# Patient Record
Sex: MALE | Race: WHITE | NOT HISPANIC OR LATINO | Employment: OTHER | URBAN - METROPOLITAN AREA
[De-identification: names, ages, dates, MRNs, and addresses within clinical notes are randomized per-mention and may not be internally consistent; named-entity substitution may affect disease eponyms.]

---

## 2017-05-15 ENCOUNTER — ALLSCRIPTS OFFICE VISIT (OUTPATIENT)
Dept: WOUND CARE | Facility: HOSPITAL | Age: 77
End: 2017-05-15
Payer: MEDICARE

## 2017-05-15 PROCEDURE — 99213 OFFICE O/P EST LOW 20 MIN: CPT | Performed by: PODIATRIST

## 2017-05-15 PROCEDURE — 11042 DBRDMT SUBQ TIS 1ST 20SQCM/<: CPT | Performed by: PODIATRIST

## 2017-05-22 ENCOUNTER — ALLSCRIPTS OFFICE VISIT (OUTPATIENT)
Dept: WOUND CARE | Facility: HOSPITAL | Age: 77
End: 2017-05-22
Payer: MEDICARE

## 2017-05-22 PROCEDURE — 11042 DBRDMT SUBQ TIS 1ST 20SQCM/<: CPT | Performed by: PODIATRIST

## 2017-06-05 ENCOUNTER — ALLSCRIPTS OFFICE VISIT (OUTPATIENT)
Dept: WOUND CARE | Facility: HOSPITAL | Age: 77
End: 2017-06-05
Payer: MEDICARE

## 2017-06-05 PROCEDURE — 11042 DBRDMT SUBQ TIS 1ST 20SQCM/<: CPT | Performed by: PODIATRIST

## 2017-06-26 ENCOUNTER — APPOINTMENT (OUTPATIENT)
Dept: WOUND CARE | Facility: HOSPITAL | Age: 77
End: 2017-06-26
Payer: MEDICARE

## 2017-06-26 PROCEDURE — 11042 DBRDMT SUBQ TIS 1ST 20SQCM/<: CPT | Performed by: PODIATRIST

## 2017-07-03 ENCOUNTER — APPOINTMENT (OUTPATIENT)
Dept: WOUND CARE | Facility: HOSPITAL | Age: 77
End: 2017-07-03
Payer: MEDICARE

## 2017-07-03 PROCEDURE — 11042 DBRDMT SUBQ TIS 1ST 20SQCM/<: CPT | Performed by: PODIATRIST

## 2017-07-10 ENCOUNTER — APPOINTMENT (OUTPATIENT)
Dept: WOUND CARE | Facility: HOSPITAL | Age: 77
End: 2017-07-10
Payer: MEDICARE

## 2017-07-10 PROCEDURE — 11042 DBRDMT SUBQ TIS 1ST 20SQCM/<: CPT

## 2017-07-24 ENCOUNTER — APPOINTMENT (OUTPATIENT)
Dept: WOUND CARE | Facility: HOSPITAL | Age: 77
End: 2017-07-24
Payer: MEDICARE

## 2017-07-24 PROCEDURE — 97597 DBRDMT OPN WND 1ST 20 CM/<: CPT | Performed by: PODIATRIST

## 2017-08-07 ENCOUNTER — APPOINTMENT (OUTPATIENT)
Dept: WOUND CARE | Facility: HOSPITAL | Age: 77
End: 2017-08-07
Payer: MEDICARE

## 2017-08-07 PROCEDURE — 97597 DBRDMT OPN WND 1ST 20 CM/<: CPT | Performed by: PODIATRIST

## 2017-08-21 ENCOUNTER — HOSPITAL ENCOUNTER (OUTPATIENT)
Dept: RADIOLOGY | Facility: HOSPITAL | Age: 77
Discharge: HOME | End: 2017-08-21
Attending: PODIATRIST
Payer: MEDICARE

## 2017-08-21 ENCOUNTER — APPOINTMENT (OUTPATIENT)
Dept: WOUND CARE | Facility: HOSPITAL | Age: 77
End: 2017-08-21
Payer: MEDICARE

## 2017-08-21 ENCOUNTER — TRANSCRIBE ORDERS (OUTPATIENT)
Dept: ADMINISTRATIVE | Facility: HOSPITAL | Age: 77
End: 2017-08-21

## 2017-08-21 DIAGNOSIS — L97.422 ULCER OF LEFT HEEL, WITH FAT LAYER EXPOSED (HCC): ICD-10-CM

## 2017-08-21 DIAGNOSIS — L97.422 ULCER OF LEFT HEEL, WITH FAT LAYER EXPOSED (HCC): Primary | ICD-10-CM

## 2017-08-21 PROCEDURE — 73650 X-RAY EXAM OF HEEL: CPT

## 2017-08-21 PROCEDURE — 11042 DBRDMT SUBQ TIS 1ST 20SQCM/<: CPT | Performed by: PODIATRIST

## 2017-08-21 PROCEDURE — 99213 OFFICE O/P EST LOW 20 MIN: CPT | Performed by: PODIATRIST

## 2017-08-28 ENCOUNTER — APPOINTMENT (OUTPATIENT)
Dept: WOUND CARE | Facility: HOSPITAL | Age: 77
End: 2017-08-28
Payer: MEDICARE

## 2017-08-28 PROCEDURE — 11042 DBRDMT SUBQ TIS 1ST 20SQCM/<: CPT | Performed by: PODIATRIST

## 2017-09-02 ENCOUNTER — APPOINTMENT (EMERGENCY)
Dept: RADIOLOGY | Facility: HOSPITAL | Age: 77
DRG: 616 | End: 2017-09-02
Payer: MEDICARE

## 2017-09-02 ENCOUNTER — GENERIC CONVERSION - ENCOUNTER (OUTPATIENT)
Dept: OTHER | Facility: OTHER | Age: 77
End: 2017-09-02

## 2017-09-02 ENCOUNTER — HOSPITAL ENCOUNTER (INPATIENT)
Facility: HOSPITAL | Age: 77
LOS: 31 days | DRG: 616 | End: 2017-10-03
Attending: EMERGENCY MEDICINE | Admitting: INTERNAL MEDICINE
Payer: MEDICARE

## 2017-09-02 DIAGNOSIS — N18.30 STAGE 3 CHRONIC KIDNEY DISEASE (HCC): ICD-10-CM

## 2017-09-02 DIAGNOSIS — I73.9 PAD (PERIPHERAL ARTERY DISEASE) (HCC): ICD-10-CM

## 2017-09-02 DIAGNOSIS — L89.509: ICD-10-CM

## 2017-09-02 DIAGNOSIS — E11.9 DIABETES MELLITUS (HCC): ICD-10-CM

## 2017-09-02 DIAGNOSIS — Z95.2 S/P AVR: Chronic | ICD-10-CM

## 2017-09-02 DIAGNOSIS — M25.561 ACUTE PAIN OF RIGHT KNEE: ICD-10-CM

## 2017-09-02 DIAGNOSIS — I50.32 CHRONIC DIASTOLIC HEART FAILURE (HCC): ICD-10-CM

## 2017-09-02 DIAGNOSIS — I48.20 CHRONIC ATRIAL FIBRILLATION (HCC): ICD-10-CM

## 2017-09-02 DIAGNOSIS — Z97.8 CHRONIC INDWELLING FOLEY CATHETER: ICD-10-CM

## 2017-09-02 DIAGNOSIS — L89.603: ICD-10-CM

## 2017-09-02 DIAGNOSIS — D72.829 LEUKOCYTOSIS: ICD-10-CM

## 2017-09-02 DIAGNOSIS — R53.1 ASTHENIA: Primary | ICD-10-CM

## 2017-09-02 DIAGNOSIS — R79.82 CRP ELEVATED: ICD-10-CM

## 2017-09-02 PROBLEM — R82.90 ABNORMAL URINALYSIS: Status: ACTIVE | Noted: 2017-09-02

## 2017-09-02 LAB
ALBUMIN SERPL BCP-MCNC: 3.4 G/DL (ref 3.5–5)
ALP SERPL-CCNC: 99 U/L (ref 46–116)
ALT SERPL W P-5'-P-CCNC: 25 U/L (ref 12–78)
ANION GAP SERPL CALCULATED.3IONS-SCNC: 6 MMOL/L (ref 4–13)
AST SERPL W P-5'-P-CCNC: 15 U/L (ref 5–45)
ATRIAL RATE: 202 BPM
BACTERIA UR QL AUTO: ABNORMAL /HPF
BASOPHILS # BLD MANUAL: 0 THOUSAND/UL (ref 0–0.1)
BASOPHILS NFR MAR MANUAL: 0 % (ref 0–1)
BILIRUB SERPL-MCNC: 0.34 MG/DL (ref 0.2–1)
BILIRUB UR QL STRIP: ABNORMAL
BUN SERPL-MCNC: 27 MG/DL (ref 5–25)
CALCIUM SERPL-MCNC: 9.6 MG/DL (ref 8.3–10.1)
CHLORIDE SERPL-SCNC: 98 MMOL/L (ref 100–108)
CLARITY UR: ABNORMAL
CO2 SERPL-SCNC: 34 MMOL/L (ref 21–32)
COLOR UR: ABNORMAL
CREAT SERPL-MCNC: 1.49 MG/DL (ref 0.6–1.3)
EOSINOPHIL # BLD MANUAL: 0.5 THOUSAND/UL (ref 0–0.4)
EOSINOPHIL NFR BLD MANUAL: 4 % (ref 0–6)
ERYTHROCYTE [DISTWIDTH] IN BLOOD BY AUTOMATED COUNT: 14.5 % (ref 11.6–15.1)
GFR SERPL CREATININE-BSD FRML MDRD: 46 ML/MIN/1.73SQ M
GLUCOSE SERPL-MCNC: 148 MG/DL (ref 65–140)
GLUCOSE SERPL-MCNC: 217 MG/DL (ref 65–140)
GLUCOSE SERPL-MCNC: 219 MG/DL (ref 65–140)
GLUCOSE SERPL-MCNC: 255 MG/DL (ref 65–140)
GLUCOSE UR STRIP-MCNC: NEGATIVE MG/DL
HCT VFR BLD AUTO: 39.1 % (ref 36.5–49.3)
HGB BLD-MCNC: 13.1 G/DL (ref 12–17)
HGB UR QL STRIP.AUTO: ABNORMAL
INR PPP: 2.9 (ref 0.86–1.16)
KETONES UR STRIP-MCNC: NEGATIVE MG/DL
LACTATE SERPL-SCNC: 2 MMOL/L (ref 0.5–2)
LEUKOCYTE ESTERASE UR QL STRIP: ABNORMAL
LYMPHOCYTES # BLD AUTO: 0.62 THOUSAND/UL (ref 0.6–4.47)
LYMPHOCYTES # BLD AUTO: 5 % (ref 14–44)
MCH RBC QN AUTO: 28.1 PG (ref 26.8–34.3)
MCHC RBC AUTO-ENTMCNC: 33.5 G/DL (ref 31.4–37.4)
MCV RBC AUTO: 84 FL (ref 82–98)
MONOCYTES # BLD AUTO: 0.87 THOUSAND/UL (ref 0–1.22)
MONOCYTES NFR BLD: 7 % (ref 4–12)
NEUTROPHILS # BLD MANUAL: 10.47 THOUSAND/UL (ref 1.85–7.62)
NEUTS BAND NFR BLD MANUAL: 2 % (ref 0–8)
NEUTS SEG NFR BLD AUTO: 82 % (ref 43–75)
NITRITE UR QL STRIP: POSITIVE
NON-SQ EPI CELLS URNS QL MICRO: ABNORMAL /HPF
NT-PROBNP SERPL-MCNC: 678 PG/ML
PH UR STRIP.AUTO: 7.5 [PH] (ref 4.5–8)
PLATELET # BLD AUTO: 327 THOUSANDS/UL (ref 149–390)
PLATELET BLD QL SMEAR: ADEQUATE
PMV BLD AUTO: 8.6 FL (ref 8.9–12.7)
POTASSIUM SERPL-SCNC: 4.2 MMOL/L (ref 3.5–5.3)
PROT SERPL-MCNC: 7.3 G/DL (ref 6.4–8.2)
PROT UR STRIP-MCNC: ABNORMAL MG/DL
PROTHROMBIN TIME: 30.7 SECONDS (ref 12.1–14.4)
QRS AXIS: 92 DEGREES
QRSD INTERVAL: 82 MS
QT INTERVAL: 352 MS
QTC INTERVAL: 396 MS
RBC # BLD AUTO: 4.66 MILLION/UL (ref 3.88–5.62)
RBC #/AREA URNS AUTO: ABNORMAL /HPF
RBC MORPH BLD: NORMAL
SODIUM SERPL-SCNC: 138 MMOL/L (ref 136–145)
SP GR UR STRIP.AUTO: 1.02 (ref 1–1.03)
SPECIMEN SOURCE: NORMAL
T WAVE AXIS: 38 DEGREES
TOTAL CELLS COUNTED SPEC: 100
TROPONIN I BLD-MCNC: 0.01 NG/ML (ref 0–0.08)
UROBILINOGEN UR QL STRIP.AUTO: 0.2 E.U./DL
VENTRICULAR RATE: 76 BPM
WBC # BLD AUTO: 12.46 THOUSAND/UL (ref 4.31–10.16)
WBC #/AREA URNS AUTO: ABNORMAL /HPF

## 2017-09-02 PROCEDURE — 87147 CULTURE TYPE IMMUNOLOGIC: CPT

## 2017-09-02 PROCEDURE — 81002 URINALYSIS NONAUTO W/O SCOPE: CPT | Performed by: EMERGENCY MEDICINE

## 2017-09-02 PROCEDURE — 36415 COLL VENOUS BLD VENIPUNCTURE: CPT | Performed by: EMERGENCY MEDICINE

## 2017-09-02 PROCEDURE — 84484 ASSAY OF TROPONIN QUANT: CPT

## 2017-09-02 PROCEDURE — 83880 ASSAY OF NATRIURETIC PEPTIDE: CPT | Performed by: EMERGENCY MEDICINE

## 2017-09-02 PROCEDURE — 82948 REAGENT STRIP/BLOOD GLUCOSE: CPT

## 2017-09-02 PROCEDURE — 83605 ASSAY OF LACTIC ACID: CPT | Performed by: EMERGENCY MEDICINE

## 2017-09-02 PROCEDURE — 81001 URINALYSIS AUTO W/SCOPE: CPT

## 2017-09-02 PROCEDURE — 85610 PROTHROMBIN TIME: CPT | Performed by: PHYSICIAN ASSISTANT

## 2017-09-02 PROCEDURE — 80053 COMPREHEN METABOLIC PANEL: CPT | Performed by: EMERGENCY MEDICINE

## 2017-09-02 PROCEDURE — 71020 HB CHEST X-RAY 2VW FRONTAL&LATL: CPT

## 2017-09-02 PROCEDURE — 87075 CULTR BACTERIA EXCEPT BLOOD: CPT | Performed by: PODIATRIST

## 2017-09-02 PROCEDURE — 87077 CULTURE AEROBIC IDENTIFY: CPT | Performed by: PODIATRIST

## 2017-09-02 PROCEDURE — 87186 SC STD MICRODIL/AGAR DIL: CPT | Performed by: PODIATRIST

## 2017-09-02 PROCEDURE — 87147 CULTURE TYPE IMMUNOLOGIC: CPT | Performed by: PODIATRIST

## 2017-09-02 PROCEDURE — 85027 COMPLETE CBC AUTOMATED: CPT | Performed by: EMERGENCY MEDICINE

## 2017-09-02 PROCEDURE — 87205 SMEAR GRAM STAIN: CPT | Performed by: PODIATRIST

## 2017-09-02 PROCEDURE — 99285 EMERGENCY DEPT VISIT HI MDM: CPT

## 2017-09-02 PROCEDURE — 85007 BL SMEAR W/DIFF WBC COUNT: CPT | Performed by: EMERGENCY MEDICINE

## 2017-09-02 PROCEDURE — 93005 ELECTROCARDIOGRAM TRACING: CPT | Performed by: EMERGENCY MEDICINE

## 2017-09-02 PROCEDURE — 87070 CULTURE OTHR SPECIMN AEROBIC: CPT | Performed by: PODIATRIST

## 2017-09-02 PROCEDURE — 87086 URINE CULTURE/COLONY COUNT: CPT

## 2017-09-02 RX ORDER — OXYBUTYNIN CHLORIDE 5 MG/1
5 TABLET ORAL 2 TIMES DAILY
Status: DISCONTINUED | OUTPATIENT
Start: 2017-09-02 | End: 2017-10-03 | Stop reason: HOSPADM

## 2017-09-02 RX ORDER — FUROSEMIDE 40 MG/1
40 TABLET ORAL DAILY
Status: DISCONTINUED | OUTPATIENT
Start: 2017-09-02 | End: 2017-09-10

## 2017-09-02 RX ORDER — INSULIN GLARGINE 100 [IU]/ML
24 INJECTION, SOLUTION SUBCUTANEOUS 2 TIMES DAILY
COMMUNITY
End: 2017-10-03 | Stop reason: HOSPADM

## 2017-09-02 RX ORDER — INSULIN GLARGINE 100 [IU]/ML
24 INJECTION, SOLUTION SUBCUTANEOUS EVERY 12 HOURS SCHEDULED
Status: DISCONTINUED | OUTPATIENT
Start: 2017-09-02 | End: 2017-09-07

## 2017-09-02 RX ORDER — FERROUS SULFATE 325(65) MG
325 TABLET ORAL 2 TIMES DAILY WITH MEALS
COMMUNITY

## 2017-09-02 RX ORDER — OXYBUTYNIN CHLORIDE 5 MG/1
5 TABLET ORAL 2 TIMES DAILY
COMMUNITY

## 2017-09-02 RX ORDER — WARFARIN SODIUM 7.5 MG/1
7.5 TABLET ORAL
Status: DISCONTINUED | OUTPATIENT
Start: 2017-09-02 | End: 2017-09-02

## 2017-09-02 RX ORDER — ACETAMINOPHEN 325 MG/1
650 TABLET ORAL EVERY 6 HOURS PRN
Status: DISCONTINUED | OUTPATIENT
Start: 2017-09-02 | End: 2017-10-03 | Stop reason: HOSPADM

## 2017-09-02 RX ORDER — FERROUS SULFATE 325(65) MG
325 TABLET ORAL 2 TIMES DAILY
Status: DISCONTINUED | OUTPATIENT
Start: 2017-09-02 | End: 2017-10-03 | Stop reason: HOSPADM

## 2017-09-02 RX ORDER — WARFARIN SODIUM 5 MG/1
5 TABLET ORAL
Status: DISCONTINUED | OUTPATIENT
Start: 2017-09-02 | End: 2017-09-07

## 2017-09-02 RX ORDER — ONDANSETRON 2 MG/ML
4 INJECTION INTRAMUSCULAR; INTRAVENOUS EVERY 6 HOURS PRN
Status: DISCONTINUED | OUTPATIENT
Start: 2017-09-02 | End: 2017-10-03 | Stop reason: HOSPADM

## 2017-09-02 RX ORDER — PRAVASTATIN SODIUM 40 MG
40 TABLET ORAL
Status: DISCONTINUED | OUTPATIENT
Start: 2017-09-02 | End: 2017-10-03 | Stop reason: HOSPADM

## 2017-09-02 RX ORDER — WARFARIN SODIUM 7.5 MG/1
7.5 TABLET ORAL
Status: ON HOLD | COMMUNITY
End: 2017-10-03

## 2017-09-02 RX ORDER — SUB-Q INSULIN DEVICE, 40 UNIT
EACH MISCELLANEOUS
COMMUNITY
End: 2017-10-26 | Stop reason: ALTCHOICE

## 2017-09-02 RX ORDER — GABAPENTIN 100 MG/1
100 CAPSULE ORAL 3 TIMES DAILY
Status: DISCONTINUED | OUTPATIENT
Start: 2017-09-02 | End: 2017-10-03 | Stop reason: HOSPADM

## 2017-09-02 RX ORDER — WARFARIN SODIUM 7.5 MG/1
7.5 TABLET ORAL
Status: DISCONTINUED | OUTPATIENT
Start: 2017-09-04 | End: 2017-09-07

## 2017-09-02 RX ORDER — GABAPENTIN 100 MG/1
100 CAPSULE ORAL 3 TIMES DAILY
COMMUNITY
End: 2017-10-26 | Stop reason: DRUGHIGH

## 2017-09-02 RX ORDER — FUROSEMIDE 40 MG/1
40 TABLET ORAL DAILY
COMMUNITY
End: 2017-10-26 | Stop reason: DRUGHIGH

## 2017-09-02 RX ORDER — WARFARIN SODIUM 5 MG/1
5 TABLET ORAL
COMMUNITY
End: 2017-10-03 | Stop reason: HOSPADM

## 2017-09-02 RX ORDER — WARFARIN SODIUM 5 MG/1
5 TABLET ORAL
Status: DISCONTINUED | OUTPATIENT
Start: 2017-09-04 | End: 2017-09-02

## 2017-09-02 RX ORDER — SIMVASTATIN 40 MG
40 TABLET ORAL DAILY
COMMUNITY
End: 2017-10-26 | Stop reason: ALTCHOICE

## 2017-09-02 RX ADMIN — GABAPENTIN 100 MG: 100 CAPSULE ORAL at 21:04

## 2017-09-02 RX ADMIN — OXYBUTYNIN CHLORIDE 5 MG: 5 TABLET ORAL at 17:10

## 2017-09-02 RX ADMIN — FERROUS SULFATE TAB 325 MG (65 MG ELEMENTAL FE) 325 MG: 325 (65 FE) TAB at 17:09

## 2017-09-02 RX ADMIN — PRAVASTATIN SODIUM 40 MG: 40 TABLET ORAL at 17:08

## 2017-09-02 RX ADMIN — INSULIN GLARGINE 24 UNITS: 100 INJECTION, SOLUTION SUBCUTANEOUS at 21:04

## 2017-09-02 RX ADMIN — INSULIN LISPRO 3 UNITS: 100 INJECTION, SOLUTION INTRAVENOUS; SUBCUTANEOUS at 21:04

## 2017-09-02 RX ADMIN — GABAPENTIN 100 MG: 100 CAPSULE ORAL at 17:09

## 2017-09-02 RX ADMIN — FLUTICASONE PROPIONATE AND SALMETEROL 1 PUFF: 50; 500 POWDER RESPIRATORY (INHALATION) at 19:31

## 2017-09-02 RX ADMIN — WARFARIN SODIUM 5 MG: 5 TABLET ORAL at 17:29

## 2017-09-02 NOTE — PLAN OF CARE
DISCHARGE PLANNING     Discharge to home or other facility with appropriate resources Progressing        INFECTION - ADULT     Absence or prevention of progression during hospitalization Progressing        Knowledge Deficit     Patient/family/caregiver demonstrates understanding of disease process, treatment plan, medications, and discharge instructions Progressing        Nutrition/Hydration-ADULT     Nutrient/Hydration intake appropriate for improving, restoring or maintaining nutritional needs Progressing        PAIN - ADULT     Verbalizes/displays adequate comfort level or baseline comfort level Progressing        Potential for Falls     Patient will remain free of falls Progressing        Prexisting or High Potential for Compromised Skin Integrity     Skin integrity is maintained or improved Progressing        SAFETY ADULT     Maintain or return to baseline ADL function Progressing     Maintain or return mobility status to optimal level Progressing        SKIN/TISSUE INTEGRITY - ADULT     Skin integrity remains intact Progressing     Incision(s), wounds(s) or drain site(s) healing without S/S of infection Progressing

## 2017-09-02 NOTE — H&P
History and Physical - [facility] Internal Medicine    Patient Information: Princess Brown 78 y o  male MRN: [id number]  Unit/Bed#: E4 -01 Encounter: [id number]  Admitting Physician: Tip Vargas PA-C  PCP: Alycia Zavala MD  Date of Admission:  09/02/17    Assessment/Plan:    Hospital Problem List:     Principal Problem:    Asthenia  Active Problems:    Decubitus ulcer of heel, stage 3    Leukocytosis    Stage 3 chronic kidney disease    Chronic indwelling Beckett catheter    Abnormal urinalysis    Chronic atrial fibrillation    Diabetes mellitus    Chronic diastolic heart failure      Plan for the Primary Problem(s):  · Generalized weakness  · Unclear etiology, possibly to underlying UTI vs progression of chronic weakness from wheelchair bound status  · Mild leukocytosis and creatinine 1 49, however electrolytes okay, and pt clinically appears euvolemic, pt otherwise asymptomatic  · CXR negative for PNA or pulmonary edema  · UA appears nitrite and leukocyte which is worse from previous UAs at [facility], however pt w/indwelling beckett cath and may be colonized  · Will monitor off antibiotics at this time, consult PT/OT for evaluation as I do not believe patient is safe to return home and likely needs SNF vs short term rehab given multiple comorbidities, poor ambulatory status  I anticipate this will take greater than 2 midnights  Patient is a full code    · Case was discussed with doctor dalton    Plan for Additional Problems:   · Leukocytosis  · Unclear etiology, suspect physiologic although consider 2* UTI vs pressure ulcers of calcaneus b/l (although do not appear infected)  · As pt feels well otherwise and is w/o fevers, will monitor urine culture, consult podiatry for wound cultures and local wound care, and continue to monitor VS    Calcaneus ulcers b/l   Appear to be stage 3 b/l   Pt known to Dr Ashvin Corey, no clear evidence of infection   Will consult podiatry for local wound care and possible culture given leukocytosis   Offload heels    Chronic indwelling beckett catheter   In the setting of previous prostate cancer   No problems with beckett catheter per pt, no suprapubic pain or sediment noted by family   Continue to monitor I/Os    Abnormal UA   Likely colonization given asymptomatic patient however nitrite/leukocyte positive which is new from previous UAs, last in 03/17 at [facility]   Monitor off abx, f/u urine cx    CKD Stage III   Suspect 2* DM   Baseline appears to be 1 18-1 4   No JULIA, avoid nephrotoxins    Chronic diastolic heart failure   No acute exacerbation   Continue lasix 40mg     Chronic A fib   Pt rate controlled w/o medications   Check PT/INR continue coumadin from Op setting    Diabetes mellitus 2   Insulin dependent, last A1c 6 1   Patient instructed to DC insulin pump, will continue on 24 units of Lantus b i d , will start on sliding scale insulin        VTE Prophylaxis: Warfarin (Coumadin)  / sequential compression device   Code Status: Full Code  POLST: There is no POLST form on file for this patient (pre-hospital)    Anticipated Length of Stay:  Patient will be admitted on an Inpatient basis with an anticipated length of stay of  Greater than 2 midnights  Justification for Hospital Stay: generalized weakness    Total Time for Visit, including Counseling / Coordination of Care: 45 minutes  Greater than 50% of this total time spent on direct patient counseling and coordination of care  Chief Complaint:   Generalized weakness    History of Present Illness:    Robe Quiles is a 78 y o  male who presents with PMH of diabetes mellitus, chronic diastolic heart failure, chronic AFib on Coumadin, and bilateral calcaneus pressure ulcers coming into ED with his wife due to generalized weakness and the inability to ambulate or transfer  HPI this obtained by patient who is a good historian    He reports he has a history of a right calcaneal ulcer which she has been following up with Dr  the for for some time and had seen some improvement over the last several months  Unfortunately he started also developed a left-sided calcaneal ulcer and had recently followed up with Dr Sonali Fernández for this as well  Because of this he has been wheelchair-bound is recommended offloading and Prevalon boots in the past for his ulcers  Typically, his wife and daughter are able to help lift him and assist with sit-to-stand activities from his couch where he sleeps due to his bedroom being accessible by stairs in the home setting  He no longer ambulates with walker cane and this has been progressive since March due to progression of his ulcers  He has had generalized weakness since, however his wife has been able to provide the lion share of assistance with transfers and bed mobility  Today however, the patient was in his normal state of health and was unable to his pop up like he normally does  He after multiple efforts with his wife was still unable to get up and with his daughter ultimately they were able to finally get him to stand however they are concerned about their ability to continue to take care of their father and brought him in for evaluation  Review of Systems:    Review of Systems   Constitutional: Negative for appetite change, chills, fatigue and fever  HENT: Negative for congestion and rhinorrhea  Eyes: Negative for visual disturbance  Respiratory: Negative for cough and shortness of breath  Cardiovascular: Negative for chest pain, palpitations and leg swelling  Gastrointestinal: Negative for abdominal pain, diarrhea, nausea and vomiting  Endocrine: Negative for polydipsia  Genitourinary: Negative for decreased urine volume, dysuria and hematuria  Musculoskeletal: Positive for gait problem  Negative for arthralgias, back pain and myalgias  Skin: Positive for wound  Negative for color change  Neurological: Positive for weakness and numbness (peripheral neuropathy in feet b/l)   Negative for light-headedness and headaches  Psychiatric/Behavioral: Negative for sleep disturbance  All other systems reviewed and are negative  Past Medical and Surgical History:     Past Medical History:   Diagnosis Date    Anemia     Aortic stenosis     Atrial fibrillation     Chronic kidney disease     stage 3    COPD (chronic obstructive pulmonary disease)     Diabetes mellitus     Hyperlipidemia     Hypertension     Sleep apnea        History reviewed  No pertinent surgical history  Meds/Allergies:    Prior to Admission medications    Medication Sig Start Date End Date Taking?  Authorizing Provider   Cholecalciferol (VITAMIN D3 PO) Take 5,000 Units by mouth daily   Yes Historical Provider, MD   ferrous sulfate 325 (65 Fe) mg tablet Take 325 mg by mouth 2 (two) times a day   Yes Historical Provider, MD   fluticasone-salmeterol (ADVAIR) 500-50 mcg/dose Inhale 1 puff 2 (two) times a day   Yes Historical Provider, MD   furosemide (LASIX) 40 mg tablet Take 40 mg by mouth daily   Yes Historical Provider, MD   gabapentin (NEURONTIN) 100 mg capsule Take 100 mg by mouth 3 (three) times a day   Yes Historical Provider, MD   insulin aspart (NovoLOG) 100 units/mL injection Inject under the skin 3 (three) times a day before meals   Yes Historical Provider, MD   Insulin Disposable Pump (V-GO 40) KIT by Does not apply route   Yes Historical Provider, MD   insulin glargine (LANTUS) 100 units/mL subcutaneous injection Inject 24 Units under the skin 2 (two) times a day     Yes Historical Provider, MD   MAGNESIUM OXIDE PO Take 400 mg by mouth daily   Yes Historical Provider, MD   Multiple Vitamins-Minerals (CENTRUM SILVER PO) Take 1 tablet by mouth daily   Yes Historical Provider, MD   oxybutynin (DITROPAN) 5 mg tablet Take 5 mg by mouth 2 (two) times a day   Yes Historical Provider, MD   simvastatin (ZOCOR) 40 mg tablet Take 40 mg by mouth daily   Yes Historical Provider, MD   tiotropium (SPIRIVA) 18 mcg inhalation capsule Place 18 mcg into inhaler and inhale daily   Yes Historical Provider, MD   warfarin (COUMADIN) 5 mg tablet Take 5 mg by mouth daily   Yes Historical Provider, MD   warfarin (COUMADIN) 7 5 mg tablet Take 7 5 mg by mouth daily   Yes Historical Provider, MD     I have reviewed home medications with patient personally  Allergies: Allergies   Allergen Reactions    Aspirin      Chest tightness       Social History:     Marital Status: /Civil Union   Occupation:   Patient Pre-hospital Living Situation:   Patient Pre-hospital Level of Mobility:   Patient Pre-hospital Diet Restrictions:   Substance Use History:   History   Alcohol Use No     History   Smoking Status    Former Smoker   Smokeless Tobacco    Not on file     History   Drug Use No       Family History:    History reviewed  No pertinent family history  Physical Exam:     Vitals:   Blood Pressure: 133/75 (09/02/17 1347)  Pulse: 78 (09/02/17 1347)  Temperature: (!) 96 1 °F (35 6 °C) (09/02/17 1347)  Temp Source: Oral (09/02/17 1347)  Respirations: 20 (09/02/17 1347)  Height: 5' 9 5" (176 5 cm) (09/02/17 1347)  Weight - Scale: 108 kg (238 lb 8 6 oz) (09/02/17 1347)  SpO2: 99 % (09/02/17 1347)    Physical Exam   Constitutional: He appears well-developed  No distress  HENT:   Head: Normocephalic and atraumatic  Right Ear: External ear normal    Left Ear: External ear normal    Mouth/Throat: Oropharynx is clear and moist  No oropharyngeal exudate  Eyes: Conjunctivae are normal  Pupils are equal, round, and reactive to light  Right eye exhibits no discharge  Left eye exhibits no discharge  No scleral icterus  Cardiovascular: Normal rate, normal heart sounds and intact distal pulses  Exam reveals no gallop and no friction rub  No murmur heard  S2 click  No split beats or extra beats  Irregularly irregular rhythm   Pulmonary/Chest: Effort normal and breath sounds normal  No respiratory distress  He has no wheezes  He has no rales  He exhibits no tenderness  Abdominal: Soft  He exhibits no distension  There is no tenderness  There is no rebound and no guarding  Periumbilical hernia, reducible, nontender  Obese abdomen     Musculoskeletal: He exhibits no edema  Lymphadenopathy:     He has no cervical adenopathy  Neurological: He is alert  Skin: Skin is warm and dry  He is not diaphoretic  No erythema  B/l calcaneus ulcers  approx stage 3 b/l  Left heel ulcer appears to have subcutaneous exposure, but no surrounding erythema, or purulence  B/l feet are xerotic  Right hallux with mild erythema but is not warm to touch, fluctuant or indurated or painful to touch  Psychiatric: He has a normal mood and affect  Vitals reviewed  (  Be Sure to Include Physical Exam: Delete this entire line when you have entered your exam)    Additional Data:     Lab Results: I have personally reviewed pertinent reports  Results from last 7 days  Lab Units 09/02/17  1135   WBC Thousand/uL 12 46*   HEMOGLOBIN g/dL 13 1   HEMATOCRIT % 39 1   PLATELETS Thousands/uL 327   LYMPHO PCT % 5*   MONO PCT MAN % 7   EOSINO PCT MANUAL % 4       Results from last 7 days  Lab Units 09/02/17  1135   SODIUM mmol/L 138   POTASSIUM mmol/L 4 2   CHLORIDE mmol/L 98*   CO2 mmol/L 34*   BUN mg/dL 27*   CREATININE mg/dL 1 49*   CALCIUM mg/dL 9 6   TOTAL PROTEIN g/dL 7 3   BILIRUBIN TOTAL mg/dL 0 34   ALK PHOS U/L 99   ALT U/L 25   AST U/L 15   GLUCOSE RANDOM mg/dL 217*           Imaging: I have personally reviewed pertinent reports   , I have personally reviewed pertinent films in PACS and sternotomy wires noted, cardiomegaly appreciated  no pleural effusion, vascular congestion or infiltrate noted  concur with radiology read  Xr Chest 2 Views    Result Date: 9/2/2017  Narrative: CHEST INDICATION:  Weakness  COMPARISON:  None VIEWS:  Frontal and lateral projections IMAGES:  2 FINDINGS:  Sternotomy wires are intact  Prior prosthetic heart valve   Cardiac silhouette is within upper limits of normal in size  The lungs are clear  No pneumothorax or pleural effusion  Visualized osseous structures appear within normal limits for the patient's age  Impression: No active pulmonary disease  Workstation performed: [id number]     Xr Heel / Calcaneus 2+ Vw Left    Result Date: 8/23/2017  Narrative: LEFT HEEL INDICATION:  Ulcer on left heel  COMPARISON: None VIEWS:  2 IMAGES:  3 FINDINGS: There is no acute fracture or dislocation  A tiny plantar calcaneal spur noted with degenerative changes of the tibiotalar joint also noted  No periosteal reaction or osseous destruction to suggest osteomyelitis  The posterior plantar soft tissue swelling with no retained radiopaque foreign body or subcutaneous emphysema  There are vascular calcifications throughout the regional soft tissues  Impression: No acute osseous abnormality  Plantar soft tissue swelling  Workstation performed: [id number]       EKG, Pathology, and Other Studies Reviewed on Admission:   · EKG: rate controlled a fib  · No ST changes or T wave inversions  · Right axis deviation    Allscripts / Epic Records Reviewed: Yes     ** Please Note: This note has been constructed using a voice recognition system   **

## 2017-09-02 NOTE — ED PROVIDER NOTES
History  Chief Complaint   Patient presents with    Weakness - Generalized     pt woke up this morning just feeling very weak and unable to get up   pt denies fevers, n/v   denies any pain or sick contacts  79 y/o M presents for evaluation of worseing weakness  Pt is chronic weakness and is bed ridden  He is usually able to get up and do ADL with family assistance which he has been unable to do for the past 2 days  No n/v, f/c, ha, recent falls/head trauma, focal neuro deficits/weakness, change in beckett output, diarrhea, constipation, cp, sob, cough, uri symptoms, ha, vertio  Pt has 2 chronic heal wounds which he sees wound center for  Prior to Admission Medications   Prescriptions Last Dose Informant Patient Reported? Taking?    Cholecalciferol (VITAMIN D3 PO)   Yes Yes   Sig: Take 5,000 Units by mouth daily   Insulin Disposable Pump (V-GO 40) KIT   Yes Yes   Sig: by Does not apply route   MAGNESIUM OXIDE PO   Yes Yes   Sig: Take 400 mg by mouth daily   Multiple Vitamins-Minerals (CENTRUM SILVER PO)   Yes Yes   Sig: Take 1 tablet by mouth daily   ferrous sulfate 325 (65 Fe) mg tablet   Yes Yes   Sig: Take 325 mg by mouth 2 (two) times a day   fluticasone-salmeterol (ADVAIR) 500-50 mcg/dose   Yes Yes   Sig: Inhale 1 puff 2 (two) times a day   furosemide (LASIX) 40 mg tablet   Yes Yes   Sig: Take 40 mg by mouth daily   gabapentin (NEURONTIN) 100 mg capsule   Yes Yes   Sig: Take 100 mg by mouth 3 (three) times a day   insulin aspart (NovoLOG) 100 units/mL injection   Yes Yes   Sig: Inject under the skin 3 (three) times a day before meals   insulin glargine (LANTUS) 100 units/mL subcutaneous injection   Yes Yes   Sig: Inject 24 Units under the skin 2 (two) times a day     oxybutynin (DITROPAN) 5 mg tablet   Yes Yes   Sig: Take 5 mg by mouth 2 (two) times a day   simvastatin (ZOCOR) 40 mg tablet   Yes Yes   Sig: Take 40 mg by mouth daily   tiotropium (SPIRIVA) 18 mcg inhalation capsule   Yes Yes   Sig: Place 18 mcg into inhaler and inhale daily   warfarin (COUMADIN) 5 mg tablet   Yes Yes   Sig: Take 5 mg by mouth daily   warfarin (COUMADIN) 7 5 mg tablet   Yes Yes   Sig: Take 7 5 mg by mouth daily      Facility-Administered Medications: None       Past Medical History:   Diagnosis Date    Anemia     Aortic stenosis     Atrial fibrillation     Chronic kidney disease     stage 3    COPD (chronic obstructive pulmonary disease)     Diabetes mellitus     Hyperlipidemia     Hypertension     Sleep apnea        History reviewed  No pertinent surgical history  History reviewed  No pertinent family history  I have reviewed and agree with the history as documented  Social History   Substance Use Topics    Smoking status: Former Smoker    Smokeless tobacco: Not on file    Alcohol use No        Review of Systems   Constitutional: Positive for fatigue  Negative for chills, diaphoresis and fever  HENT: Negative for congestion, ear pain, rhinorrhea, sinus pressure, sneezing, sore throat and trouble swallowing  Eyes: Negative for pain and visual disturbance  Respiratory: Negative for cough, chest tightness, shortness of breath, wheezing and stridor  Cardiovascular: Negative for chest pain, palpitations and leg swelling  Gastrointestinal: Negative for abdominal pain, blood in stool, constipation, diarrhea, nausea and vomiting  Endocrine: Negative for polydipsia, polyphagia and polyuria  Genitourinary: Negative for decreased urine volume, flank pain, frequency, penile swelling, scrotal swelling and testicular pain  Musculoskeletal: Negative for arthralgias and myalgias  Skin: Negative for color change and rash  Neurological: Positive for weakness  Negative for dizziness, tremors, seizures, syncope, facial asymmetry, speech difficulty, light-headedness, numbness and headaches  Hematological: Negative for adenopathy  Psychiatric/Behavioral: The patient is not nervous/anxious  Physical Exam  ED Triage Vitals   Temperature Pulse Respirations Blood Pressure SpO2   09/02/17 1041 09/02/17 1041 09/02/17 1041 09/02/17 1043 09/02/17 1041   97 6 °F (36 4 °C) 81 18 139/66 97 %      Temp Source Heart Rate Source Patient Position BP Location FiO2 (%)   09/02/17 1041 -- -- -- --   Oral          Pain Score       09/02/17 1041       No Pain           Physical Exam   Constitutional: He is oriented to person, place, and time  He appears well-developed and well-nourished  HENT:   Mouth/Throat: No oropharyngeal exudate  TMs normal bilaterally no pharyngeal erythema no rhinorrhea nontender palpation of sinuses, normal looking turbinates   Eyes: Conjunctivae and EOM are normal    Neck: Normal range of motion  Neck supple  No meningeal signs   Cardiovascular: Normal rate, regular rhythm, normal heart sounds and intact distal pulses  Pulmonary/Chest: Effort normal and breath sounds normal  No respiratory distress  He has no wheezes  He has no rales  He exhibits no tenderness  Abdominal: Soft  Bowel sounds are normal  He exhibits no distension and no mass  There is no tenderness  No hernia  No cvat   Musculoskeletal: Normal range of motion  He exhibits no edema  Lymphadenopathy:     He has no cervical adenopathy  Neurological: He is alert and oriented to person, place, and time  No cranial nerve deficit  Skin: No rash noted  No erythema  No edema  Stage 3 pressure ulcer bilaterarl heels with out evidence of infection   Psychiatric: He has a normal mood and affect  His behavior is normal    Nursing note and vitals reviewed        ED Medications  Medications - No data to display    Diagnostic Studies  Labs Reviewed   CBC AND DIFFERENTIAL - Abnormal        Result Value Ref Range Status    WBC 12 46 (*) 4 31 - 10 16 Thousand/uL Final    MPV 8 6 (*) 8 9 - 12 7 fL Final    RBC 4 66  3 88 - 5 62 Million/uL Final    Hemoglobin 13 1  12 0 - 17 0 g/dL Final    Hematocrit 39 1  36 5 - 49 3 % Final    MCV 84  82 - 98 fL Final    MCH 28 1  26 8 - 34 3 pg Final    MCHC 33 5  31 4 - 37 4 g/dL Final    RDW 14 5  11 6 - 15 1 % Final    Platelets 693  814 - 390 Thousands/uL Final    Narrative: This is an appended report  These results have been appended to a previously verified report  COMPREHENSIVE METABOLIC PANEL - Abnormal     Chloride 98 (*) 100 - 108 mmol/L Final    CO2 34 (*) 21 - 32 mmol/L Final    BUN 27 (*) 5 - 25 mg/dL Final    Creatinine 1 49 (*) 0 60 - 1 30 mg/dL Final    Comment: Standardized to IDMS reference method    Glucose 217 (*) 65 - 140 mg/dL Final    Comment:   If the patient is fasting, the ADA then defines impaired fasting glucose as > 100 mg/dL and diabetes as > or equal to 123 mg/dL  Specimen collection should occur prior to Sulfasalazine administration due to the potential for falsely depressed results  Specimen collection should occur prior to Sulfapyridine administration due to the potential for falsely elevated results  Albumin 3 4 (*) 3 5 - 5 0 g/dL Final    Sodium 138  136 - 145 mmol/L Final    Potassium 4 2  3 5 - 5 3 mmol/L Final    Anion Gap 6  4 - 13 mmol/L Final    Calcium 9 6  8 3 - 10 1 mg/dL Final    AST 15  5 - 45 U/L Final    Comment:   Specimen collection should occur prior to Sulfasalazine administration due to the potential for falsely depressed results  ALT 25  12 - 78 U/L Final    Comment:   Specimen collection should occur prior to Sulfasalazine administration due to the potential for falsely depressed results  Alkaline Phosphatase 99  46 - 116 U/L Final    Total Protein 7 3  6 4 - 8 2 g/dL Final    Total Bilirubin 0 34  0 20 - 1 00 mg/dL Final    eGFR 46  ml/min/1 73sq m Final    Narrative:     National Kidney Disease Education Program recommendations are as follows:  GFR calculation is accurate only with a steady state creatinine  Chronic Kidney disease less than 60 ml/min/1 73 sq  meters  Kidney failure less than 15 ml/min/1 73 sq  meters  NT-BNP PRO (BRAIN NATRIURETIC PEPTIDE) - Abnormal     NT-proBNP 678 (*) <125 pg/mL Final   URINE MICROSCOPIC - Abnormal     RBC, UA Innumerable (*) None Seen /hpf Final    WBC, UA Innumerable (*) None Seen /hpf Final    Bacteria, UA Innumerable (*) None Seen, Occasional /hpf Final    Epithelial Cells Occasional  None Seen, Occasional /hpf Final   POCT URINALYSIS DIPSTICK - Abnormal    ED URINE MACROSCOPIC - Abnormal     Leukocytes, UA Large (*) Negative Final    Nitrite, UA Positive (*) Negative Final    Protein,  (2+) (*) Negative mg/dl Final    Bilirubin, UA Interference- unable to analyze (*) Negative Final    Comment: The dipstick result may be falsely positive do to interfering substances  We recommend reliance upon serum bilirubin, liver & kidney function tests to guide patient care if clinically indicated      Blood, UA Large (*) Negative Final    Color, UA Green   Final    Clarity, UA Turbid   Final    pH, UA 7 5  4 5 - 8 0 Final    Glucose, UA Negative  Negative mg/dl Final    Ketones, UA Negative  Negative mg/dl Final    Urobilinogen, UA 0 2  0 2, 1 0 E U /dl E U /dl Final    Specific Gravity, UA 1 020  1 003 - 1 030 Final    Narrative:     CLINITEK RESULT   MANUAL DIFFERENTIAL(PHLEBS DO NOT ORDER) - Abnormal     Segmented % 82 (*) 43 - 75 % Final    Lymphocytes % 5 (*) 14 - 44 % Final    Absolute Neutrophils 10 47 (*) 1 85 - 7 62 Thousand/uL Final    Eosinophils Absolute 0 50 (*) 0 00 - 0 40 Thousand/uL Final    Bands % 2  0 - 8 % Final    Monocytes % 7  4 - 12 % Final    Eosinophils % 4  0 - 6 % Final    Basophils % 0  0 - 1 % Final    Lymphocytes Absolute 0 62  0 60 - 4 47 Thousand/uL Final    Monocytes Absolute 0 87  0 00 - 1 22 Thousand/uL Final    Basophils Absolute 0 00  0 00 - 0 10 Thousand/uL Final    Total Counted 100   Final    RBC Morphology Normal   Final    Platelet Estimate Adequate  Adequate Final   LACTIC ACID, PLASMA - Normal    LACTIC ACID 2 0  0 5 - 2 0 mmol/L Final    Narrative: Result may be elevated if tourniquet was used during collection  POCT TROPONIN - Normal    POC Troponin I 0 01  0 00 - 0 08 ng/ml Final    Specimen Type VENOUS   Final    Narrative:     [employer] i-Stat handheld analyzer 99% cutoff is > 0 08ng/mL in [facility] Emergency Departments    o cTnI 99% cutoff is useful only when applied to patients in the clinical setting of myocardial ischemia  o cTnI 99% cutoff should be interpreted in the context of clinical history, ECG findings and possibly cardiac imaging to establish correct diagnosis  o cTnI 99% cutoff may be suggestive but clearly not indicative of a coronary event without the clinical setting of myocardial ischemia  URINE CULTURE       XR chest 2 views   ED Interpretation   Primary reviewed: no acute abnormality      Final Result      No active pulmonary disease           Workstation performed: [id number]             Procedures  ECG 12 Lead Documentation  Date/Time: 9/2/2017 11:35 AM  Performed by: Brandon Concepcion by: Danitza SORTO     ECG reviewed by me, the ED Provider: yes    Patient location:  ED  Previous ECG:     Previous ECG:  Unavailable  Rate:     ECG rate:  76    ECG rate assessment: normal    Rhythm:     Rhythm: atrial fibrillation    Ectopy:     Ectopy: none    QRS:     QRS axis:  Normal    QRS intervals:  Normal  Conduction:     Conduction: normal    ST segments:     ST segments:  Normal  T waves:     T waves: normal            Phone Contacts  ED Phone Contact    ED Course  ED Course   Nelly Ritter's Documentation   Value Comment Time   Nitrite, UA: (!) Positive Chronic beckett, will bot tx for uti, culture p 09/02 1219   Creatinine: (!) 1 49 (Reviewed) 09/02 1228                               MDM  Number of Diagnoses or Management Options  Asthenia:   Pressure ulcer, ankle:   Diagnosis management comments: Generalized weakness with out neuro complaints and benign neuro exam-will do urine, labs, cardiac work up, admit    [facility] Time    Disposition  Final diagnoses:   Asthenia   Pressure ulcer, ankle - bilateral     ED Disposition     ED Disposition Condition Comment    Admit  Case was discussed with Jeniffer and the patient's admission status was agreed to be Admission Status: inpatient status to the service of Dr Lion Adjutant   Follow-up Information    None       Patient's Medications   Discharge Prescriptions    No medications on file     No discharge procedures on file      ED Provider  Electronically Signed by       Kenya Hernandez MD  09/02/17 5547

## 2017-09-02 NOTE — CONSULTS
Consultation - Kerwin Olguin 78 y o  male MRN: [id number]  Unit/Bed#: E4 -01 Encounter: [id number]      Assessment/Plan     Assessment/Plan:  1  Left heel diabetic ulceration, Craft 3, and submet 5 laceration 2/2 DM with neuropathy and pressure  - Heel ulceration is stable with no clinical signs of infection  No ascending cellulitis, no purulence  Wound probes to bone  - Submet 5 wound was not present on admission  During the dressing change, Pt scrapped it on the edge of the bed  Will monitor   - X-rays from 8/21/17 were reviewed: Results show no OM  - Wound Cx were taken due to mild leukocytosis: Results pending   - Prevalon boots ordered to offload heels while in bed  - Will provide [address] while in house  Wounds dressed with Maxsorb, DSD   - Will discuss plan with attending and update as needed  2  Right heel diabetic ulceration, craft 2, and lateral fissure 2/2 DM with neuropathy and pressure  - Heel ulceration and fissure are stable with no clinical signs of infection  No ascending cellulitis, no purulence, no probing to bone  - Prevalon boots ordered to offload heels while in bed  - Wounds dressed with Maxsorb, DSD   3  DM type 2 with neuropathy   4  CKD, Stage 3      History of Present Illness   Physician Requesting Consult: Dinorah Stands, DO  Reason for Consult / Principal Problem: B/l heel wounds    HPI: Colleen Patricia is a 78y o  year old male who presents with B/l heel wounds  He states that he has had the right heel wound since June 2017  He was in the hospital, and they never offloaded his feet, so he believes that is how they started  He has been following up weekly with Dr Niya Nixon for treatment of the wound  He states that the wound has got much smaller since treatments from Dr Niya Nixon  He states that the left heel wound started about 2 weeks ago  He states that Dr Niya Nixon is aware of the wound, and has also been treating this wound since he first noticed it   Dr Niya Nixon took x-rays on 8/21/17 which showed no osseus abnormalities  He states that when his wife changes the dressing he sometimes notices small amounts of blood on the dressings  He denies any recent N/V/F/C/SOB/CP  Inpatient consult to Podiatry  Performed by: Juan Barrett  Authorized by: Jonn Ho           Review of Systems   Constitutional: Negative for chills and fever  HENT: Negative  Respiratory: Negative for chest tightness, shortness of breath and wheezing  Cardiovascular: Negative for chest pain, palpitations and leg swelling  Gastrointestinal: Negative for constipation, diarrhea, nausea and vomiting  Genitourinary: Negative for difficulty urinating  Musculoskeletal: Positive for gait problem  Skin: Positive for color change and wound  Neurological: Positive for weakness and numbness  Negative for dizziness, light-headedness and headaches  Psychiatric/Behavioral: Negative  Historical Information   Past Medical History:   Diagnosis Date    Anemia     Aortic stenosis     Atrial fibrillation     Chronic kidney disease     stage 3    COPD (chronic obstructive pulmonary disease)     Diabetes mellitus     Hyperlipidemia     Hypertension     Sleep apnea      History reviewed  No pertinent surgical history    Social History   History   Alcohol Use No     History   Drug Use No     History   Smoking Status    Former Smoker   Smokeless Tobacco    Not on file     Family History: non-contributory    Meds/Allergies   current meds:   Current Facility-Administered Medications   Medication Dose Route Frequency Provider Last Rate Last Dose    acetaminophen (TYLENOL) tablet 650 mg  650 mg Oral Q6H PRN Ora Chapa PA-C        ferrous sulfate tablet 325 mg  325 mg Oral BID Ora Chapa PA-C        fluticasone-salmeterol (ADVAIR) 500-50 mcg/dose inhaler 1 puff  1 puff Inhalation BID Ora Chapa PA-C        furosemide (LASIX) tablet 40 mg  40 mg Oral Daily Ora Mccoy PA-C  gabapentin (NEURONTIN) capsule 100 mg  100 mg Oral TID Ora Chapa PA-C        insulin glargine (LANTUS) subcutaneous injection 24 Units  24 Units Subcutaneous Q12H [address] Ora Chapa PA-C        insulin lispro (HumaLOG) 100 units/mL subcutaneous injection 1-6 Units  1-6 Units Subcutaneous TID AC Ora Chapa PA-C        insulin lispro (HumaLOG) 100 units/mL subcutaneous injection 1-6 Units  1-6 Units Subcutaneous HS Ora Chapa PA-C        magnesium hydroxide (MILK OF MAGNESIA) 400 mg/5 mL oral suspension 30 mL  30 mL Oral PRN Ora Chapa PA-C        [START ON 9/3/2017] magnesium oxide (MAG-OX) tablet 400 mg  400 mg Oral Daily Ora Chapa PA-C        ondansetron (ZOFRAN) injection 4 mg  4 mg Intravenous Q6H PRN Ora Chapa PA-C        oxybutynin (DITROPAN) tablet 5 mg  5 mg Oral BID Ora Chapa PA-C        pravastatin (PRAVACHOL) tablet 40 mg  40 mg Oral Daily With [employer] Chapa        tiotropium (SPIRIVA) capsule for inhaler 18 mcg  18 mcg Inhalation Daily Ora Chapa PA-C        [START ON 9/4/2017] warfarin (COUMADIN) tablet 5 mg  5 mg Oral Once per day on Mon Thu [place] Rosamaria Chapa PA-C        warfarin (COUMADIN) tablet 7 5 mg  7 5 mg Oral Once per day on Sun Tue Wed Fri Sat Ora Chapa PA-C           Allergies   Allergen Reactions    Aspirin      Chest tightness       Objective     No intake or output data in the 24 hours ending 09/02/17 1451        Physical Exam   Constitutional: He is oriented to person, place, and time  He appears well-developed and well-nourished  HENT:   Head: Normocephalic and atraumatic  Eyes: Right eye exhibits no discharge  Left eye exhibits no discharge  Neck: Normal range of motion  Neck supple  Cardiovascular: Normal rate, regular rhythm and normal heart sounds  DP and PT pulses are 2/4 B/l  CRT is <3 sec x8  Pedal hair is absent  No edema noted B/l     Pulmonary/Chest: Effort normal  No respiratory distress  He has no wheezes  He has no rales  He exhibits no tenderness  Musculoskeletal: Normal range of motion  He exhibits deformity  He exhibits no edema or tenderness  MMT is 4/5 in all compartments of the foot B/l  No calf tenderness noted B/l  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  No POP or tenderness noted B/l  Neurological: He is alert and oriented to person, place, and time  Gross sensation is intact, protective sensation is absent B/l  Skin:   RLE: Plantar and posterior heel wound noted  Heel ulceration appears stable with no clinical signs of infection  No ascending cellulitis, no purulence, no probing to bone  Wound bed is granular in appearance, and measures 1 2x0 4x0 1cm  Fissure located on the lateral aspect of the foot  Fissure appears stable with no clinical signs of infection  No ascending cellulitis, no purulence, no probing to bone  LLE: Heel ulceration appears stable with no clinical signs of infection  No ascending cellulitis, no purulence  Wound probes to bone, mild malodor noted  Wound bed is 50% granular, 25% fibrotic, and 25% necrotic in appearance, and measures 3 0x1 6x0 4cm  Superficial wound on submet 5 that occurred when changing the dressing  Psychiatric: He has a normal mood and affect  His behavior is normal  Judgment and thought content normal        Left heel      Right lateral foot      Right heel      Left submet 5    Lab Results:   I have personally reviewed pertinent labs    CBC:   Lab Results   Component Value Date    WBC 12 46 (H) 09/02/2017    RBC 4 66 09/02/2017     CMP:   Lab Results   Component Value Date     09/02/2017    CL 98 (L) 09/02/2017    CO2 34 (H) 09/02/2017    ANIONGAP 6 09/02/2017    BUN 27 (H) 09/02/2017    CREATININE 1 49 (H) 09/02/2017    GLUCOSE 217 (H) 09/02/2017    CALCIUM 9 6 09/02/2017    AST 15 09/02/2017    ALT 25 09/02/2017    ALKPHOS 99 09/02/2017    PROT 7 3 09/02/2017    ALBUMIN 3 4 (L) 09/02/2017    BILITOT 0 34 09/02/2017    EGFR 46 09/02/2017                   Invalid input(s): LABAEARO            Imaging Studies: I have personally reviewed pertinent reports  EKG, Pathology, and Other Studies: I have personally reviewed pertinent reports

## 2017-09-03 ENCOUNTER — APPOINTMENT (INPATIENT)
Dept: RADIOLOGY | Facility: HOSPITAL | Age: 77
DRG: 616 | End: 2017-09-03
Payer: MEDICARE

## 2017-09-03 LAB
ANION GAP SERPL CALCULATED.3IONS-SCNC: 7 MMOL/L (ref 4–13)
BUN SERPL-MCNC: 29 MG/DL (ref 5–25)
CALCIUM SERPL-MCNC: 9.3 MG/DL (ref 8.3–10.1)
CHLORIDE SERPL-SCNC: 99 MMOL/L (ref 100–108)
CO2 SERPL-SCNC: 32 MMOL/L (ref 21–32)
CREAT SERPL-MCNC: 1.43 MG/DL (ref 0.6–1.3)
CRP SERPL QL: 63.4 MG/L
ERYTHROCYTE [SEDIMENTATION RATE] IN BLOOD: 94 MM/HOUR (ref 0–10)
EST. AVERAGE GLUCOSE BLD GHB EST-MCNC: 143 MG/DL
GFR SERPL CREATININE-BSD FRML MDRD: 48 ML/MIN/1.73SQ M
GLUCOSE SERPL-MCNC: 171 MG/DL (ref 65–140)
GLUCOSE SERPL-MCNC: 187 MG/DL (ref 65–140)
GLUCOSE SERPL-MCNC: 217 MG/DL (ref 65–140)
GLUCOSE SERPL-MCNC: 320 MG/DL (ref 65–140)
GLUCOSE SERPL-MCNC: 363 MG/DL (ref 65–140)
HBA1C MFR BLD: 6.6 % (ref 4.2–6.3)
POTASSIUM SERPL-SCNC: 4.2 MMOL/L (ref 3.5–5.3)
SODIUM SERPL-SCNC: 138 MMOL/L (ref 136–145)
TSH SERPL DL<=0.05 MIU/L-ACNC: 2 UIU/ML (ref 0.36–3.74)

## 2017-09-03 PROCEDURE — 73630 X-RAY EXAM OF FOOT: CPT

## 2017-09-03 PROCEDURE — 86140 C-REACTIVE PROTEIN: CPT | Performed by: PODIATRIST

## 2017-09-03 PROCEDURE — 85652 RBC SED RATE AUTOMATED: CPT | Performed by: PODIATRIST

## 2017-09-03 PROCEDURE — 84443 ASSAY THYROID STIM HORMONE: CPT | Performed by: PHYSICIAN ASSISTANT

## 2017-09-03 PROCEDURE — 82948 REAGENT STRIP/BLOOD GLUCOSE: CPT

## 2017-09-03 PROCEDURE — 80048 BASIC METABOLIC PNL TOTAL CA: CPT | Performed by: PHYSICIAN ASSISTANT

## 2017-09-03 PROCEDURE — 83036 HEMOGLOBIN GLYCOSYLATED A1C: CPT | Performed by: INTERNAL MEDICINE

## 2017-09-03 RX ADMIN — FLUTICASONE PROPIONATE AND SALMETEROL 1 PUFF: 50; 500 POWDER RESPIRATORY (INHALATION) at 20:37

## 2017-09-03 RX ADMIN — PRAVASTATIN SODIUM 40 MG: 40 TABLET ORAL at 17:06

## 2017-09-03 RX ADMIN — INSULIN LISPRO 1 UNITS: 100 INJECTION, SOLUTION INTRAVENOUS; SUBCUTANEOUS at 08:50

## 2017-09-03 RX ADMIN — FLUTICASONE PROPIONATE AND SALMETEROL 1 PUFF: 50; 500 POWDER RESPIRATORY (INHALATION) at 08:50

## 2017-09-03 RX ADMIN — GABAPENTIN 100 MG: 100 CAPSULE ORAL at 08:57

## 2017-09-03 RX ADMIN — GABAPENTIN 100 MG: 100 CAPSULE ORAL at 20:37

## 2017-09-03 RX ADMIN — INSULIN LISPRO 2 UNITS: 100 INJECTION, SOLUTION INTRAVENOUS; SUBCUTANEOUS at 21:02

## 2017-09-03 RX ADMIN — INSULIN LISPRO 6 UNITS: 100 INJECTION, SOLUTION INTRAVENOUS; SUBCUTANEOUS at 11:52

## 2017-09-03 RX ADMIN — INSULIN GLARGINE 24 UNITS: 100 INJECTION, SOLUTION SUBCUTANEOUS at 21:02

## 2017-09-03 RX ADMIN — WARFARIN SODIUM 5 MG: 5 TABLET ORAL at 17:06

## 2017-09-03 RX ADMIN — TIOTROPIUM BROMIDE 18 MCG: 18 CAPSULE ORAL; RESPIRATORY (INHALATION) at 08:50

## 2017-09-03 RX ADMIN — Medication 400 MG: at 08:57

## 2017-09-03 RX ADMIN — OXYBUTYNIN CHLORIDE 5 MG: 5 TABLET ORAL at 08:53

## 2017-09-03 RX ADMIN — FERROUS SULFATE TAB 325 MG (65 MG ELEMENTAL FE) 325 MG: 325 (65 FE) TAB at 17:06

## 2017-09-03 RX ADMIN — INSULIN GLARGINE 24 UNITS: 100 INJECTION, SOLUTION SUBCUTANEOUS at 08:57

## 2017-09-03 RX ADMIN — INSULIN LISPRO 5 UNITS: 100 INJECTION, SOLUTION INTRAVENOUS; SUBCUTANEOUS at 17:07

## 2017-09-03 RX ADMIN — FERROUS SULFATE TAB 325 MG (65 MG ELEMENTAL FE) 325 MG: 325 (65 FE) TAB at 08:57

## 2017-09-03 RX ADMIN — GABAPENTIN 100 MG: 100 CAPSULE ORAL at 17:06

## 2017-09-03 RX ADMIN — FUROSEMIDE 40 MG: 40 TABLET ORAL at 08:53

## 2017-09-03 RX ADMIN — OXYBUTYNIN CHLORIDE 5 MG: 5 TABLET ORAL at 17:07

## 2017-09-03 NOTE — PROGRESS NOTES
[facility] Progress Note  Patient: Domi Olivas 78 y o  male   MRN: [id number]  PCP: Jessica Conn MD  Unit/Bed#: E4 -01 Encounter: [id number]  Date Of Visit: 09/03/17      Assessment/plan  Plan for the Primary Problem(s): 1  Generalized weakness- etiology is unclear  Possible UTI vrs deconditioning vrs infected foot ulcer  Await urine culture  Await podiatry attending eval       Plan for Additional Problems:   1  Leukocytosis- possible reactive vrs infection  No infectious source identified at this time  Check cbc in am      2  Bilateral diabetic heel ulcer- await podiatry attending eval  Resident stated no infection  Continue wound care  Awaiting podiatry clarification of ambulation status for physical therapy  3  Chronic indwelling beckett catheter- pt scheduled to have catheter changed this week  Will change  4  CKD Stage III- stable  5  Chronic diastolic heart failure- no acute exacerbation  Continue lasix                6  Chronic A fib- currently rate controlled  Continue coumadin  Check inr in am       7  Diabetes mellitus 2- pt on insulin pump at home and lantus sq  Pt not does not bolus himself with pump  Will ask endocrine to help manage    dispo- spoke with his wife  She would like str for pt  Subjective:   Pt seen and examined  Pt has generalized weakness  He needs his beckett catheter changed  He has no other complaints  No f/c no cp no sob no n/v/d no abd pain    Objective:     Vitals: Blood pressure 118/67, pulse 69, temperature (!) 97 4 °F (36 3 °C), temperature source Tympanic, resp  rate 18, height 5' 9 5" (1 765 m), weight 108 kg (238 lb 8 6 oz), SpO2 97 %  ,Body mass index is 34 72 kg/m²      Lab, Imaging and other studies:    Results from last 7 days  Lab Units 09/02/17  1426 09/02/17  1135   WBC Thousand/uL  --  12 46*   HEMOGLOBIN g/dL  --  13 1   HEMATOCRIT %  --  39 1   PLATELETS Thousands/uL  --  327   INR  2 90*  --        Results from last 7 days  Lab Units 09/03/17  0625 09/02/17  1135   SODIUM mmol/L 138 138   POTASSIUM mmol/L 4 2 4 2   CHLORIDE mmol/L 99* 98*   CO2 mmol/L 32 34*   BUN mg/dL 29* 27*   CREATININE mg/dL 1 43* 1 49*   CALCIUM mg/dL 9 3 9 6   TOTAL PROTEIN g/dL  --  7 3   BILIRUBIN TOTAL mg/dL  --  0 34   ALK PHOS U/L  --  99   ALT U/L  --  25   AST U/L  --  15   GLUCOSE RANDOM mg/dL 171* 217*         Lab Results   Component Value Date    WOUNDCULT 3+ Growth of Staphylococcus aureus 09/02/2017         Xr Chest 2 Views    Result Date: 9/2/2017  Narrative: CHEST INDICATION:  Weakness  COMPARISON:  None VIEWS:  Frontal and lateral projections IMAGES:  2 FINDINGS:  Sternotomy wires are intact  Prior prosthetic heart valve  Cardiac silhouette is within upper limits of normal in size  The lungs are clear  No pneumothorax or pleural effusion  Visualized osseous structures appear within normal limits for the patient's age  Impression: No active pulmonary disease  Workstation performed: [id number]     Xr Foot 3+ Vw Left    Result Date: 9/3/2017  Narrative: LEFT FOOT INDICATION:  "eval OM, ulcer on heel of left foot " COMPARISON: Calcaneus radiographs 8/21/2017  VIEWS:  3 IMAGES:  3 FINDINGS: There is no acute fracture or dislocation  No definite osteomyelitis  Mild/moderate diffuse degenerative changes most prominent the tibiotalar joint space  Advanced atherosclerotic calcifications  Impression: No definite osteomyelitis  Workstation performed: [id number]     Xr Heel / Calcaneus 2+ Vw Left    Result Date: 8/23/2017  Narrative: LEFT HEEL INDICATION:  Ulcer on left heel  COMPARISON: None VIEWS:  2 IMAGES:  3 FINDINGS: There is no acute fracture or dislocation  A tiny plantar calcaneal spur noted with degenerative changes of the tibiotalar joint also noted  No periosteal reaction or osseous destruction to suggest osteomyelitis  The posterior plantar soft tissue swelling with no retained radiopaque foreign body or subcutaneous emphysema    There are vascular calcifications throughout the regional soft tissues  Impression: No acute osseous abnormality  Plantar soft tissue swelling  Workstation performed: [id number]         Physical exam:  Physical Exam  General appearance: alert, appears stated age and cooperative  Head: Normocephalic, without obvious abnormality, atraumatic  Eyes: conjunctivae/corneas clear  PERRL, EOM's intact  Fundi benign  Neck: no adenopathy, no carotid bruit, no JVD, supple, symmetrical, trachea midline and thyroid not enlarged, symmetric, no tenderness/mass/nodules  Lungs: clear to auscultation bilaterally  Heart: regular rate and rhythm, S1, S2 normal, no murmur, click, rub or gallop  Abdomen: soft, non-tender; bowel sounds normal; no masses,  no organomegaly  Extremities: bandages bilateral feet c/d/i  Pulses: 2+ and symmetric  Skin: bandaged bilateral feet c/d/i  Neurologic: awake alert oriented x3 4/5 muscle strength in all         VTE Pharmacologic Prophylaxis: Warfarin (Coumadin)  VTE Mechanical Prophylaxis: sequential compression device    Counseling / Coordination of Care  Total floor / unit time spent today 20 minutes      Current Length of Stay: 1 day(s)    Current Patient Status: Inpatient       Code Status: Level 1 - Full Code

## 2017-09-03 NOTE — PHYSICAL THERAPY NOTE
PHYSICAL THERAPY NOTE          Patient Name: Elizabeth HERNANDEZ Date: 9/3/2017     Spoke with Dr Cirilo Malagon re:  Heel wounds and need for clarification of WBS from podiatry  Per Dr Cirilo Malagon cx for PT eval today and await further clarification if any WB restrictions from podiatry standpoint    Armani Aden, PT

## 2017-09-03 NOTE — OCCUPATIONAL THERAPY NOTE
Occupational Therapy Cancellation        Patient Name: Grant Ramsey  JULKP'A Date: 9/3/2017    Attempted to see pt for OT evaluation, will await WB status from podiatry to complete OT evaluation       Kevin Camacho MS, OTR/L

## 2017-09-03 NOTE — CASE MANAGEMENT
Initial Clinical Review    Admission: Date/Time/Statement: 9/2/17 @ 1247     Orders Placed This Encounter   Procedures    Inpatient Admission (expected length of stay for this patient is greater than two midnights)     Standing Status:   Standing     Number of Occurrences:   1     Order Specific Question:   Admitting Physician     Answer:   Tracy Watkins [[id number]]     Order Specific Question:   Level of Care     Answer:   Med Surg [[id number]]     Order Specific Question:   Estimated length of stay     Answer:   More than 2 Midnights     Order Specific Question:   Certification     Answer:   I certify that inpatient services are medically necessary for this patient for a duration of greater than two midnights  See H&P and MD Progress Notes for additional information about the patient's course of treatment  ED: Date/Time/Mode of Arrival:   ED Arrival Information     Expected Arrival Acuity Means of Arrival Escorted By Service Admission Type    - 9/2/2017 10:35 Urgent Ambulance [facility] General Medicine Urgent    Arrival Complaint    WEAKNESS          Chief Complaint:   Chief Complaint   Patient presents with    Weakness - Generalized     pt woke up this morning just feeling very weak and unable to get up   pt denies fevers, n/v   denies any pain or sick contacts  History of Illness:   Yessica Kwon is a 78 y o  male who presents with PMH of diabetes mellitus, chronic diastolic heart failure, chronic AFib on Coumadin, and bilateral calcaneus pressure ulcers coming into ED with his wife due to generalized weakness and the inability to ambulate or transfer  HPI this obtained by patient who is a good historian  He reports he has a history of a right calcaneal ulcer which she has been following up with Dr  the for for some time and had seen some improvement over the last several months    Unfortunately he started also developed a left-sided calcaneal ulcer and had recently followed up with Dr Patsy Olvera for this as well   Because of this he has been wheelchair-bound is recommended offloading and Prevalon boots in the past for his ulcers  Typically, his wife and daughter are able to help lift him and assist with sit-to-stand activities from his couch where he sleeps due to his bedroom being accessible by stairs in the home setting      He no longer ambulates with walker cane and this has been progressive since March due to progression of his ulcers  He has had generalized weakness since, however his wife has been able to provide the lion share of assistance with transfers and bed mobility  Today however, the patient was in his normal state of health and was unable to his pop up like he normally does  He after multiple efforts with his wife was still unable to get up and with his daughter ultimately they were able to finally get him to stand however they are concerned about their ability to continue to take care of their father and brought him in for evaluation  ED Vital Signs:   ED Triage Vitals   Temperature Pulse Respirations Blood Pressure SpO2   09/02/17 1041 09/02/17 1041 09/02/17 1041 09/02/17 1043 09/02/17 1041   97 6 °F (36 4 °C) 81 18 139/66 97 %      Temp Source Heart Rate Source Patient Position BP Location FiO2 (%)   09/02/17 1041 09/02/17 1347 09/02/17 1347 09/02/17 1347 --   Oral Monitor Lying Right arm       Pain Score       09/02/17 1041       No Pain        Wt Readings from Last 1 Encounters:   09/02/17 108 kg (238 lb 8 6 oz)       Vital Signs (abnormal):    above    Abnormal Labs/Diagnostic Test Results:   CXR:  NAD  WBC   12 46  BNP    678  Albumin  3 4  BS  217  BUN/Creat   27/1 49  Co2  34    ED Treatment:   Medication Administration from 09/02/2017 1034 to 09/02/2017 1340     None          Past Medical/Surgical History:    Active Ambulatory Problems     Diagnosis Date Noted    No Active Ambulatory Problems     Resolved Ambulatory Problems     Diagnosis Date Noted    No Resolved Ambulatory Problems Past Medical History:   Diagnosis Date    Anemia     Aortic stenosis     Atrial fibrillation     Chronic kidney disease     COPD (chronic obstructive pulmonary disease)     Diabetes mellitus     Hyperlipidemia     Hypertension     Sleep apnea        Admitting Diagnosis: Asthenia [R53 1]  Weakness [R53 1]  Pressure ulcer, ankle [L89 509]    Age/Sex: 78 y o  male    · Assessment/Plan:   Generalized weakness  ? Unclear etiology, possibly to underlying UTI vs progression of chronic weakness from wheelchair bound status  ? Mild leukocytosis and creatinine 1 49, however electrolytes okay, and pt clinically appears euvolemic, pt otherwise asymptomatic  ? CXR negative for PNA or pulmonary edema  ? UA appears nitrite and leukocyte which is worse from previous UAs at [facility], however pt w/indwelling beckett cath and may be colonized  ? Will monitor off antibiotics at this time, consult PT/OT for evaluation as I do not believe patient is safe to return home and likely needs SNF vs short term rehab given multiple comorbidities, poor ambulatory status  I anticipate this will take greater than 2 midnights  Patient is a full code  ?  Case was discussed with doctor sha  Calcaneus ulcers b/l                  Appear to be stage 3 b/l                  Pt known to Dr Ashvin Corey, no clear evidence of infection                  Will consult podiatry for local wound care and possible culture given leukocytosis                  Offload heels     Chronic indwelling beckett catheter                  In the setting of previous prostate cancer                  No problems with beckett catheter per pt, no suprapubic pain or sediment noted by family                  Continue to monitor I/Os     Abnormal UA                  Likely colonization given asymptomatic patient however nitrite/leukocyte positive which is new from previous UAs, last in 03/17 at [facility]                  Monitor off abx, f/u urine cx     CKD Stage III Suspect 2* DM                  Baseline appears to be 1 18-1 4                  No JULIA, avoid nephrotoxins  Chronic diastolic heart failure                        No acute exacerbation                        Continue lasix 40mg                  Chronic A fib                        Pt rate controlled w/o medications                        Check PT/INR continue coumadin from Op setting     Diabetes mellitus 2                        Insulin dependent, last A1c 6 1                        Patient instructed to DC insulin pump, will continue on 24 units of Lantus b i d , will start on sliding scale insulin           VTE Prophylaxis: Warfarin (Coumadin)  / sequential compression device   Code Status: Full Code  POLST: There is no POLST form on file for this patient (pre-hospital)     Anticipated Length of Stay:  Patient will be admitted on an Inpatient basis with an anticipated length of stay of  Greater than 2 midnights  Justification for Hospital Stay: generalized weakness     Plan for Additional Problems:   · Leukocytosis  ?  Unclear etiology, suspect physiologic although consider 2* UTI vs pressure ulcers of calcaneus b/l (although do not appear infected)  As pt feels well otherwise and is w/o fevers, will monitor urine culture, consult podiatry for wound cultures and local wound care, and continue to monitor VS    Admission Orders:    IP    9/2  @    1248  Scheduled Meds:   ferrous sulfate 325 mg Oral BID   fluticasone-salmeterol 1 puff Inhalation BID   furosemide 40 mg Oral Daily   gabapentin 100 mg Oral TID   insulin glargine 24 Units Subcutaneous Q12H [address]   insulin lispro 1-6 Units Subcutaneous TID AC   insulin lispro 1-6 Units Subcutaneous HS   magnesium oxide 400 mg Oral Daily   oxybutynin 5 mg Oral BID   pravastatin 40 mg Oral Daily With Dinner   tiotropium 18 mcg Inhalation Daily   warfarin 5 mg Oral Once per day on Sun Tue Wed Fri Sat   [START ON 9/4/2017] warfarin 7 5 mg Oral Once per day on Mon Thu Continuous Infusions:    PRN Meds:   acetaminophen    magnesium hydroxide    ondansetron    CCD diet  PT/OT  Cons podiatry  Wound  c/s

## 2017-09-04 LAB
ANION GAP SERPL CALCULATED.3IONS-SCNC: 8 MMOL/L (ref 4–13)
BACTERIA WND AEROBE CULT: NORMAL
BUN SERPL-MCNC: 29 MG/DL (ref 5–25)
CALCIUM SERPL-MCNC: 9.1 MG/DL (ref 8.3–10.1)
CHLORIDE SERPL-SCNC: 100 MMOL/L (ref 100–108)
CO2 SERPL-SCNC: 31 MMOL/L (ref 21–32)
CREAT SERPL-MCNC: 1.42 MG/DL (ref 0.6–1.3)
ERYTHROCYTE [DISTWIDTH] IN BLOOD BY AUTOMATED COUNT: 14.5 % (ref 11.6–15.1)
GFR SERPL CREATININE-BSD FRML MDRD: 48 ML/MIN/1.73SQ M
GLUCOSE SERPL-MCNC: 137 MG/DL (ref 65–140)
GLUCOSE SERPL-MCNC: 149 MG/DL (ref 65–140)
GLUCOSE SERPL-MCNC: 218 MG/DL (ref 65–140)
GLUCOSE SERPL-MCNC: 229 MG/DL (ref 65–140)
GLUCOSE SERPL-MCNC: 265 MG/DL (ref 65–140)
GRAM STN SPEC: NORMAL
HCT VFR BLD AUTO: 33.9 % (ref 36.5–49.3)
HGB BLD-MCNC: 11.7 G/DL (ref 12–17)
INR PPP: 2.98 (ref 0.86–1.16)
MCH RBC QN AUTO: 28.7 PG (ref 26.8–34.3)
MCHC RBC AUTO-ENTMCNC: 34.5 G/DL (ref 31.4–37.4)
MCV RBC AUTO: 83 FL (ref 82–98)
PLATELET # BLD AUTO: 248 THOUSANDS/UL (ref 149–390)
PMV BLD AUTO: 9.1 FL (ref 8.9–12.7)
POTASSIUM SERPL-SCNC: 4.1 MMOL/L (ref 3.5–5.3)
PROTHROMBIN TIME: 31.4 SECONDS (ref 12.1–14.4)
RBC # BLD AUTO: 4.07 MILLION/UL (ref 3.88–5.62)
SODIUM SERPL-SCNC: 139 MMOL/L (ref 136–145)
WBC # BLD AUTO: 10.73 THOUSAND/UL (ref 4.31–10.16)

## 2017-09-04 PROCEDURE — G8988 SELF CARE GOAL STATUS: HCPCS

## 2017-09-04 PROCEDURE — 82948 REAGENT STRIP/BLOOD GLUCOSE: CPT

## 2017-09-04 PROCEDURE — 0T2BX0Z CHANGE DRAINAGE DEVICE IN BLADDER, EXTERNAL APPROACH: ICD-10-PCS | Performed by: INTERNAL MEDICINE

## 2017-09-04 PROCEDURE — G8987 SELF CARE CURRENT STATUS: HCPCS

## 2017-09-04 PROCEDURE — 97167 OT EVAL HIGH COMPLEX 60 MIN: CPT

## 2017-09-04 PROCEDURE — G8978 MOBILITY CURRENT STATUS: HCPCS

## 2017-09-04 PROCEDURE — 80048 BASIC METABOLIC PNL TOTAL CA: CPT | Performed by: INTERNAL MEDICINE

## 2017-09-04 PROCEDURE — 85610 PROTHROMBIN TIME: CPT | Performed by: INTERNAL MEDICINE

## 2017-09-04 PROCEDURE — 85027 COMPLETE CBC AUTOMATED: CPT | Performed by: INTERNAL MEDICINE

## 2017-09-04 PROCEDURE — 97163 PT EVAL HIGH COMPLEX 45 MIN: CPT

## 2017-09-04 PROCEDURE — G8979 MOBILITY GOAL STATUS: HCPCS

## 2017-09-04 RX ADMIN — FERROUS SULFATE TAB 325 MG (65 MG ELEMENTAL FE) 325 MG: 325 (65 FE) TAB at 17:49

## 2017-09-04 RX ADMIN — GABAPENTIN 100 MG: 100 CAPSULE ORAL at 08:15

## 2017-09-04 RX ADMIN — FUROSEMIDE 40 MG: 40 TABLET ORAL at 08:15

## 2017-09-04 RX ADMIN — FLUTICASONE PROPIONATE AND SALMETEROL 1 PUFF: 50; 500 POWDER RESPIRATORY (INHALATION) at 20:15

## 2017-09-04 RX ADMIN — OXYBUTYNIN CHLORIDE 5 MG: 5 TABLET ORAL at 17:50

## 2017-09-04 RX ADMIN — INSULIN LISPRO 2 UNITS: 100 INJECTION, SOLUTION INTRAVENOUS; SUBCUTANEOUS at 17:50

## 2017-09-04 RX ADMIN — INSULIN LISPRO 2 UNITS: 100 INJECTION, SOLUTION INTRAVENOUS; SUBCUTANEOUS at 11:53

## 2017-09-04 RX ADMIN — FLUTICASONE PROPIONATE AND SALMETEROL 1 PUFF: 50; 500 POWDER RESPIRATORY (INHALATION) at 08:16

## 2017-09-04 RX ADMIN — GABAPENTIN 100 MG: 100 CAPSULE ORAL at 21:43

## 2017-09-04 RX ADMIN — TIOTROPIUM BROMIDE 18 MCG: 18 CAPSULE ORAL; RESPIRATORY (INHALATION) at 08:16

## 2017-09-04 RX ADMIN — INSULIN GLARGINE 24 UNITS: 100 INJECTION, SOLUTION SUBCUTANEOUS at 08:15

## 2017-09-04 RX ADMIN — WARFARIN SODIUM 7.5 MG: 7.5 TABLET ORAL at 17:49

## 2017-09-04 RX ADMIN — Medication 400 MG: at 08:15

## 2017-09-04 RX ADMIN — OXYBUTYNIN CHLORIDE 5 MG: 5 TABLET ORAL at 08:16

## 2017-09-04 RX ADMIN — FERROUS SULFATE TAB 325 MG (65 MG ELEMENTAL FE) 325 MG: 325 (65 FE) TAB at 08:15

## 2017-09-04 RX ADMIN — INSULIN GLARGINE 24 UNITS: 100 INJECTION, SOLUTION SUBCUTANEOUS at 21:43

## 2017-09-04 RX ADMIN — GABAPENTIN 100 MG: 100 CAPSULE ORAL at 17:48

## 2017-09-04 RX ADMIN — INSULIN LISPRO 3 UNITS: 100 INJECTION, SOLUTION INTRAVENOUS; SUBCUTANEOUS at 21:43

## 2017-09-04 RX ADMIN — PRAVASTATIN SODIUM 40 MG: 40 TABLET ORAL at 17:48

## 2017-09-04 NOTE — PLAN OF CARE
Problem: PHYSICAL THERAPY ADULT  Goal: Performs mobility at highest level of function for planned discharge setting  See evaluation for individualized goals  Treatment/Interventions: Functional transfer training, LE strengthening/ROM, Elevations, Therapeutic exercise, Endurance training, Patient/family training, Equipment eval/education, Bed mobility, Gait training, OT  Equipment Recommended: Walker (RW)       See flowsheet documentation for full assessment, interventions and recommendations  Outcome: Progressing  Prognosis: Fair  Problem List: Decreased strength, Decreased endurance, Impaired balance, Decreased mobility, Decreased safety awareness, Impaired hearing, Impaired sensation, Decreased skin integrity, Pain, Obesity  Assessment: Pt is 77 yo male that presented to [facility] with generalized weakness, inability to ambulate, and bilateral heel ulcers  Per podiatry note on 9/4/17, pt is WBAT BLE  His wife assists with HPI  She states pt has been progressively weak since March when he was hospitalized  Prior to current admission, pt unable to ambulate without assistance or negotiate stairs  He had visiting nurses and HHPT but was halted when he developed B heel ulcers and was ordered NWB  Pt's wife says she can no longer physically care for him and assist him at home  On evaluation, he presents with BLE weakness and poor sitting and standing balance  Pt is very retropulsive, is unaware of positioning, requires modAx2 to maintain upright posture  He performs transfers with mod-maxAx2 and ambulates 1 lateral step with max cueing + modAx2 + RW  Issued HEP handout and reviewed with pt and wife  Pt would benefit from skilled PT services to address deficits and progress towards PLOF  Currently recommend d/c to STR    Barriers to Discharge: Decreased caregiver support, Inaccessible home environment     Recommendation: Short-term skilled PT     PT - OK to Discharge: No (yes to STR when medically cleared)    See flowsheet documentation for full assessment

## 2017-09-04 NOTE — PLAN OF CARE
Problem: OCCUPATIONAL THERAPY ADULT  Goal: Performs self-care activities at highest level of function for planned discharge setting  See evaluation for individualized goals  Treatment Interventions: ADL retraining, Functional transfer training, UE strengthening/ROM, Endurance training, Patient/family training, Equipment evaluation/education, Compensatory technique education, Energy conservation, Activityengagement          See flowsheet documentation for full assessment, interventions and recommendations  Limitation: Decreased ADL status, Decreased UE strength, Decreased Safe judgement during ADL, Decreased endurance, Decreased self-care trans, Decreased high-level ADLs (WBAT b/l LEs)  Prognosis: Fair  Assessment: Pt is a 78 y o  male seen for OT evaluation s/p admit to [facility] on 9/2/2017 w/ Asthenia  Comorbidities affecting pt's functional performance at time of assessment include: b/l heel ulcerations (WBAT per podiatry note), DM ,CHF, a-fib, DM II, UTI, chronic indwelling beckett cath, COPD  Personal factors affecting pt at time of IE include: deconditioned, impaired balance  Pt wife reports since pt hospitalization in march, pt has had a decline in ADLs, transfers and mobility and pt recent NWB on R LE limited progression w/ home therapy and increased weakness, caused increased assistance wife needed to provide pt w/ during ADLS and transfers  Prior to admission, pt was living w/ spouse w/ 1st floor setup w/ commode and reports setup UB ADLS, assist LB ADLS, assist for functional transfers and short distance mobility, assist for IADLs   Upon evaluation: Pt requires MIN-MOD assist UB ADLs, MOD-MAX assist LB ADLS, MAX assist-Dependent toileting, MAX assist supine<>sit bed mobility, MOD assist sit<>stand from bed, MOD assist x2 sidestepping to [facility], retropulsive during mobility w/ guidance of RW 2* the following deficits impacting occupational performance: decreased strength and endurance, impaired balance,decreased activity tolerance, retropulsive in static stance and dynamic sitting, increased fatigue, decreased sensation of b/l feet  Pt to benefit from continued skilled OT tx while in the hospital to address deficits as defined above and maximize level of functional independence w ADL's and functional mobility  Occupational Performance areas to address include: grooming, bathing/shower, toilet hygiene, dressing, functional mobility and clothing management  Pt positioned upright in bed at end of session w/ prevalon boots intact to offload heels and bed alarm intact  From OT standpoint, recommendation at time of d/c would be short term rehab        Discharge Recommendation: Short Term Rehab         Comments: Hernan Mcgarry MS, OTR/L

## 2017-09-04 NOTE — PROGRESS NOTES
Progress Note - Podiatry  Domi Olivas 78 y o  male MRN: [id number]  Unit/Bed#: E4 -01 Encounter: [id number]    Assessment/Plan:  1  Left heel diabetic ulceration, Craft 3, and submet 5 laceration 2/2 DM with neuropathy and pressure  - Heel ulceration is stable with no clinical signs of infection  No ascending cellulitis, no purulence  Wound probes to bone  - Submet 5 wound shows no clinical signs of infection   - Repeat X-rays were reviewed: Results show no OM  - Wound Cx: prliminary results show 3+ growth of Staph aureus, 4+ growth gram-positive cocci in pairs chains, clusters, and 4+ Gram-negative rods  - Prevalon boots ordered to offload heels while in bed  - Will provide [address] while in house  Dressing was left intact today  Will change 9/5  - WBS: WBAT B/l  - Will discuss plan with attending and update as needed  2  Right heel diabetic ulceration, craft 2, and lateral fissure 2/2 DM with neuropathy and pressure  - Heel ulceration and fissure are stable with no clinical signs of infection  No ascending cellulitis, no purulence, no probing to bone  - Prevalon boots ordered to offload heels while in bed  - Dressing was left intact today  Will change 9/5  - WBS: WBAT B/l  3  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%  4  CKD, Stage 3    Subjective/Objective   Chief Complaint:   Chief Complaint   Patient presents with    Weakness - Generalized     pt woke up this morning just feeling very weak and unable to get up   pt denies fevers, n/v   denies any pain or sick contacts  Subjective: 78 y o  y/o male was seen and evaluated at bedside in no acute distress, nontoxic in appearance  He denies any N/V/F/C/SOB/CP    Blood pressure (!) 116/49, pulse 74, temperature 97 8 °F (36 6 °C), temperature source Tympanic, resp  rate 18, height 5' 9 5" (1 765 m), weight 108 kg (238 lb 8 6 oz), SpO2 95 %  ,Body mass index is 34 72 kg/m²      Invasive Devices     Peripheral Intravenous Line            Peripheral IV 09/02/17 Left Antecubital 1 day          Drain            Urethral Catheter 18 Fr  1 day                Physical Exam:   General: Alert, cooperative and no distress  Lungs: Non labored breathing  Heart: Positive S1, S2  Abdomen: Soft, non-tender  Extremity:     NVS at baseline B/l  MSK function at B/l  No calf tenderness noted B/l  LLE: Dressing is c/d/i with no strike through noted to the outer dressing  RLE: Dressing is c/d/i with no strike through noted to the outer dressing  Lab, Imaging and other studies:   I have personally reviewed pertinent lab results  , CBC:   Lab Results   Component Value Date    WBC 10 73 (H) 09/04/2017    HGB 11 7 (L) 09/04/2017    HCT 33 9 (L) 09/04/2017    MCV 83 09/04/2017     09/04/2017    MCH 28 7 09/04/2017    MCHC 34 5 09/04/2017    RDW 14 5 09/04/2017    MPV 9 1 09/04/2017   , CMP: No results found for: NA, K, CL, CO2, ANIONGAP, BUN, CREATININE, GLUCOSE, CALCIUM, AST, ALT, ALKPHOS, PROT, ALBUMIN, BILITOT, EGFR    Imaging: I have personally reviewed pertinent films in PACS  EKG, Pathology, and Other Studies: I have personally reviewed pertinent reports    VTE Pharmacologic Prophylaxis: Warfarin (Coumadin)

## 2017-09-04 NOTE — PHYSICAL THERAPY NOTE
Patient Name: Eriberto ROSARIOPV'O Date: 9/4/2017  Physical Therapy Evaluation      Patient Active Problem List   Diagnosis    Asthenia    Decubitus ulcer of heel, stage 3    Leukocytosis    Stage 3 chronic kidney disease    Chronic indwelling Rush catheter    Abnormal urinalysis    Chronic atrial fibrillation    Diabetes mellitus    Chronic diastolic heart failure       Past Medical History:   Diagnosis Date    Anemia     Aortic stenosis     Atrial fibrillation     Chronic kidney disease     stage 3    COPD (chronic obstructive pulmonary disease)     Diabetes mellitus     Hyperlipidemia     Hypertension     Sleep apnea        History reviewed  No pertinent surgical history  09/04/17 1156   Note Type   Note type Eval only   Pain Assessment   Pain Assessment No/denies pain   Pain Score No Pain   Home Living   Type of Home House   Home Layout Two level;Stairs to enter with rails;Bed/bath upstairs  (5 LUISA, recliner + BSC on first floor)   [address] [employer] Walker;Cane   Additional Comments sleeps on 1st floor as he cannot stair climb   Prior Function   Level of [place] Needs assistance with IADLs; Needs assistance with ADLs and functional mobility   Lives With Spouse   Receives Help From Family   ADL Assistance Needs assistance   IADLs Needs assistance   Falls in the last 6 months (2-3)   Vocational Retired   Restrictions/Precautions   RLE [place] Bearing Per Order WBAT  (per podiatry note 9/4/17)   LLE Weight Bearing Per Order WBAT  (per podiatry note 9/4/17)   Other Precautions Bed Alarm;WBS;Multiple lines;Telemetry;Pain; Fall Risk;Hard of hearing   General   Additional Pertinent History PMH: Afib, CKD, COPD, DM, HTN   Family/Caregiver Present Yes  (wife)   Cognition   Overall Cognitive Status WFL   RUE Assessment   RUE Assessment WFL   LUE Assessment   LUE Assessment WFL   Strength RLE   R Hip Flexion 3-/5   R Knee Flexion 3/5   R Knee Extension 3+/5   R Ankle Dorsiflexion 3+/5   Strength LLE   L Hip Flexion 3-/5   L Knee Flexion 3/5   L Knee Extension 3+/5   L Ankle Dorsiflexion 3+/5   Coordination   Sensation X   Light Touch   RLE Light Touch Impaired   LLE Light Touch Impaired   Bed Mobility   Supine to Sit 2  Maximal assistance   Additional items Assist x 2; Increased time required;LE management;Verbal cues;HOB elevated   Sit to Supine 2  Maximal assistance   Additional items Assist x 2;LE management;Verbal cues; Increased time required   Transfers   Sit to Stand 3  Moderate assistance   Additional items Assist x 2;Verbal cues; Increased time required; Bedrails   Stand to Sit 3  Moderate assistance   Additional items Assist x 2; Increased time required;Verbal cues   Additional Comments retropulsion in sitting and standing   Ambulation/Elevation   Gait pattern Wide LYNDSEY; Decreased foot clearance; Forward Flexion; Excessively slow; Short stride   Gait Assistance 3  Moderate assist   Additional items Assist x 2;Verbal cues; Tactile cues   Assistive Device Rolling walker   Distance 1'  (lateral step to the right)   Balance   Static Sitting Fair -   Dynamic Sitting Poor +   Static Standing Poor   Ambulatory Poor -   Activity Tolerance   Activity Tolerance Patient limited by fatigue   Nurse Made Aware Valerie   Assessment   Prognosis Fair   Problem List Decreased strength;Decreased endurance; Impaired balance;Decreased mobility; Decreased safety awareness; Impaired hearing; Impaired sensation;Decreased skin integrity;Pain;Obesity   Assessment Pt is 79 yo male that [facility] to [facility] with generalized weakness, inability to ambulate, and bilateral heel ulcers  Per podiatry note on 9/4/17, pt is WBAT BLE  His wife assists with HPI  She states pt has been progressively weak since March when he was hospitalized  Prior to current admission, pt unable to ambulate without assistance or negotiate stairs   He had visiting nurses and HHPT but was halted when he developed B heel ulcers and was ordered NWB  Pt's wife says she can no longer physically care for him and assist him at home  On evaluation, he presents with BLE weakness and poor sitting and standing balance  Pt is very retropulsive, is unaware of positioning, requires modAx2 to maintain upright posture  He performs transfers with mod-maxAx2 and ambulates 1 lateral step with max cueing + modAx2 + RW  Issued HEP handout and reviewed with pt and wife  Pt would benefit from skilled PT services to address deficits and progress towards PLOF  Currently recommend d/c to STR  Barriers to Discharge Decreased caregiver support; Inaccessible home environment   Goals   Patient Goals go to rehab   STG Expiration Date 09/13/17   Short Term Goal #1 1) inc strength 1/2 grade 2) inc balance 1/2 grade 3) perform bed mobility with modAx1 4) perform sit to stand transfer with minAx1 5) ambulate 50' with RW and minAx1 6) negotiate stairs with assistance and HR    Treatment Day 0   Plan   Treatment/Interventions Functional transfer training;LE strengthening/ROM; Elevations; Therapeutic exercise; Endurance training;Patient/family training;Equipment eval/education; Bed mobility;Gait training;OT   PT Frequency 5x/wk; Weekend  (6x/week)   Recommendation   Recommendation Short-term skilled PT   Equipment Recommended Walker  (RW)   PT - OK to Discharge No  (yes to STR when medically cleared)   Additional Comments pt's wife says she cannot physically care for him anymore, he has become too weak   Barthel Index   Feeding 5   Bathing 0   Grooming Score 0   Dressing Score 5   Bladder Score 0   Bowels Score 5   Toilet Use Score 0   Transfers (Bed/Chair) Score 5   Mobility (Level Surface) Score 0   Stairs Score 0   Barthel Index Score 20       Ibis Trevino, PT

## 2017-09-04 NOTE — OCCUPATIONAL THERAPY NOTE
[address]  Evaluation     Patient Name: Domi LAO Date: 9/4/2017  Problem List  Patient Active Problem List   Diagnosis    Asthenia    Decubitus ulcer of heel, stage 3    Leukocytosis    Stage 3 chronic kidney disease    Chronic indwelling Rush catheter    Abnormal urinalysis    Chronic atrial fibrillation    Diabetes mellitus    Chronic diastolic heart failure     Past Medical History  Past Medical History:   Diagnosis Date    Anemia     Aortic stenosis     Atrial fibrillation     Chronic kidney disease     stage 3    COPD (chronic obstructive pulmonary disease)     Diabetes mellitus     Hyperlipidemia     Hypertension     Sleep apnea      Past Surgical History  History reviewed  No pertinent surgical history  09/04/17 1155   Note Type   Note type Eval/Treat   Restrictions/Precautions   Weight Bearing Precautions Per Order Yes   RLE Weight Bearing Per Order WBAT  (per podiatry note on 9/4/2017)   LLE Weight Bearing Per Order WBAT  (per podiatry note on 9/4/2017)   Other Precautions Bed Alarm;WBS;Multiple lines;Telemetry; Fall Risk;Pain;Hard of hearing   Pain Assessment   Pain Assessment No/denies pain   Pain Score No Pain   Home Living   Type of [address] [place] [address] Two level;Stairs to enter with rails  (5 LUISA, flight to 2nd floor, 1st floor setup w/ BSC)   Bathroom Shower/Tub Tub/shower unit   Bathroom Toilet Standard   Bathroom Equipment Commode   Bathroom Accessibility Accessible   Home Equipment Walker;Cane   Additional Comments pt since hospitalization in March and April has been sleeping on couch on 1st floor w/ BSC, wife assist w/ transfers and ADLS   Prior Function   Level of [place] Needs assistance with ADLs and functional mobility; Needs assistance with IADLs  (prior to march independent)   Lives With Spouse   Receives Help From Family  (spouse and daughter)   ADL Assistance Needs assistance  (setup UB ADLs and grooming)   IADLs Needs assistance Falls in the last 6 months (3, one down outside stairs recently)   Vocational Retired   Comments pt mainly transferring from couch to chair and BSC at home, recent NWB of LEs   Lifestyle   Autonomy per pt setup UB ADLs, and grooming, assist LB ADLS, assist toileting, assist functional transfers and mobility w/ RW   Reciprocal Relationships spouse and daughter   Service to Others retired [employer]    [address] watching tv   ADL   Where Assessed Edge of bed   [address] 5  Supervision/Setup   Grooming Assistance 4  Minimal Assistance   [address] 3  Moderate Assistance   LB [address] 2  Maximal Assistance   [address] 3  Moderate Assistance    [facility] 2  [facility]  2  [address] 2  Maximal Assistance   Additional Comments pt MAX assist LB ADLS   Bed Mobility   Supine to Sit 2  Maximal assistance   Additional items Assist x 2; Increased time required;LE management;Verbal cues; Bedrails;HOB elevated   Sit to Supine 2  Maximal assistance   Additional items Assist x 2; Increased time required;Verbal cues;LE management; Bedrails;HOB elevated   Additional Comments Pt EOB sitting Fair-/Poor+   Transfers   Sit to Stand 3  Moderate assistance   Additional items Assist x 2; Increased time required;Verbal cues   Stand to Sit 3  Moderate assistance   Additional items Assist x 2; Increased time required;Verbal cues   Additional Comments pt retropulsive and pulling walker up   Functional Mobility   Functional Mobility 3  Moderate assistance   Additional Comments assist x 2 sidestepping to [facility], pt retropulsive   Additional items Rolling walker   Balance   Static Sitting Fair -   Dynamic Sitting Poor +  (retropulsive )   Static Standing Poor   Dynamic Standing Poor -   Activity Tolerance   Activity Tolerance Patient limited by fatigue   Nurse Made Aware appropriate to see per RN, Malena Bence Assessment   RUE Assessment WFL  (4-/5)   LUE Assessment   LUE Assessment WFL  (4-/5)   Hand Function   Gross Motor Coordination Functional   Fine Motor Coordination Impaired   Sensation   Light Touch Severe deficits in the RLE; Severe deficits in the LLE   Proprioception   Proprioception No apparent deficits   Vision-Basic Assessment   Current Vision Wears glasses only for reading   Patient Visual Report (pt reports visual deficits at times from DM)   Vision - Complex Assessment   Ocular Range of Motion [facility]   Acuity Able to read clock/calendar on wall without difficulty   Perception   Inattention/Neglect Appears intact   Cognition   Overall Cognitive Status [facility]   Arousal/Participation Alert; Cooperative   Attention Within functional limits   Orientation Level Oriented to person;Oriented to place;Oriented to time;Oriented to situation   Memory Decreased short term memory   Following Commands Follows one step commands with increased time or repetition   Comments pt w/ impaired STM, hard of hearing, increased processing time needed   Assessment   Limitation Decreased ADL status; Decreased UE strength;Decreased Safe judgement during ADL;Decreased endurance;Decreased self-care trans;Decreased high-level ADLs  (WBAT b/l LEs)   Prognosis Fair   Assessment Pt is a 78 y o  male seen for OT evaluation s/p admit to [facility] on 9/2/2017 w/ Asthenia  Comorbidities affecting pt's functional performance at time of assessment include: b/l heel ulcerations (WBAT per podiatry note), DM ,CHF, a-fib, DM II, UTI, chronic indwelling beckett cath, COPD  Personal factors affecting pt at time of IE include: deconditioned, impaired balance  Pt wife reports since pt hospitalization in march, pt has had a decline in ADLs, transfers and mobility and pt recent NWB on R LE limited progression w/ home therapy and increased weakness, caused increased assistance wife needed to provide pt w/ during ADLS and transfers   Prior to admission, pt was living w/ spouse w/ 1st floor setup w/ commode and reports setup UB ADLS, assist LB ADLS, assist for functional transfers and short distance mobility, assist for IADLs  Upon evaluation: Pt requires MIN-MOD assist UB ADLs, MOD-MAX assist LB ADLS, MAX assist-Dependent toileting, MAX assist supine<>sit bed mobility, MOD assist sit<>stand from bed, MOD assist x2 sidestepping to [facility], retropulsive during mobility w/ guidance of RW 2* the following deficits impacting occupational performance: decreased strength and endurance, impaired balance,decreased activity tolerance, retropulsive in static stance and dynamic sitting, increased fatigue, decreased sensation of b/l feet  Pt to benefit from continued skilled OT tx while in the hospital to address deficits as defined above and maximize level of functional independence w ADL's and functional mobility  Occupational Performance areas to address include: grooming, bathing/shower, toilet hygiene, dressing, functional mobility and clothing management  Pt positioned upright in bed at end of session w/ prevalon boots intact to offload heels and bed alarm intact  From OT standpoint, recommendation at time of d/c would be short term rehab  Goals   Patient Goals "get stronger"   LTG Time Frame 7-10   Long Term Goal please see below goals   Plan   Treatment Interventions ADL retraining;Functional transfer training;UE strengthening/ROM; Endurance training;Patient/family training;Equipment evaluation/education; Compensatory technique education; Energy conservation; Activityengagement   Goal Expiration Date 09/14/17   OT Frequency 3-5x/wk   Recommendation   Discharge Recommendation Short Term Rehab   Barthel Index   Feeding 10   Bathing 0   Grooming Score 0   Dressing Score 5   Bladder Score 0   Bowels Score 5   Toilet Use Score 0   Transfers (Bed/Chair) Score 5   Mobility (Level Surface) Score 0   Stairs Score 0   Barthel Index Score 25     Occupational Therapy Goals to be met in 7-10 days:  1) Pt will improve activity tolerance to G for min 30 min txment sessions  2) Pt will complete UB ADLs/self care w/ setup and LB ADLs w/ MOD assist  3) Pt will complete toileting w/ min assist w/ G hygiene/thoroughness using DME PRN  4) Pt will improve functional transfers on/off all surfaces using DME PRN w/ G balance/safety including toileting w/ min assist  5) Pt will improve fx'l mobility during I/ADl/leisure tasks using DME PRN w/ g balance/safety w/ min assist  6) Pt will engage in ongoing cognitive assessment w/ G participation to A w/ safe d/c planning/recommendations  7) Pt will demonstrate G carryover of pt/caregiver education and training as appropriate w/ mod I  w/ G tolerance  8) Pt will engage in depression screen/leisure interest checklist w/ G participation to monitor s/s depression and ID 3 positive coping strategies to A w/ emotional regulation and management  9) Pt will demonstrate 100% carryover of E C  techniques w/ mod I t/o fx'l I/ADL/leisure tasks w/o cues s/p skilled education  10) Pt will demonstrate improved bed mobility to min assist w/ Good- dynamic sitting balance  11) Pt will demonstrate improved b/l UE strength by 1 MMT grade to enhance ADLS and functional transfers  12) Pt will demonstrate improved EOB sitting tolerance to 20 minutes to enhance ADLS    Documentation completed by: Eri Garcia MS, OTR/L

## 2017-09-05 LAB
ANION GAP SERPL CALCULATED.3IONS-SCNC: 6 MMOL/L (ref 4–13)
BACTERIA SPEC ANAEROBE CULT: NORMAL
BACTERIA UR CULT: NORMAL
BACTERIA UR QL AUTO: ABNORMAL /HPF
BILIRUB UR QL STRIP: NEGATIVE
BUN SERPL-MCNC: 30 MG/DL (ref 5–25)
CALCIUM SERPL-MCNC: 9.3 MG/DL (ref 8.3–10.1)
CHLORIDE SERPL-SCNC: 98 MMOL/L (ref 100–108)
CLARITY UR: CLEAR
CO2 SERPL-SCNC: 31 MMOL/L (ref 21–32)
COLOR UR: YELLOW
CREAT SERPL-MCNC: 1.28 MG/DL (ref 0.6–1.3)
ERYTHROCYTE [DISTWIDTH] IN BLOOD BY AUTOMATED COUNT: 14.5 % (ref 11.6–15.1)
GFR SERPL CREATININE-BSD FRML MDRD: 55 ML/MIN/1.73SQ M
GLUCOSE SERPL-MCNC: 147 MG/DL (ref 65–140)
GLUCOSE SERPL-MCNC: 148 MG/DL (ref 65–140)
GLUCOSE SERPL-MCNC: 205 MG/DL (ref 65–140)
GLUCOSE SERPL-MCNC: 228 MG/DL (ref 65–140)
GLUCOSE SERPL-MCNC: 251 MG/DL (ref 65–140)
GLUCOSE UR STRIP-MCNC: NEGATIVE MG/DL
HCT VFR BLD AUTO: 34.5 % (ref 36.5–49.3)
HGB BLD-MCNC: 12 G/DL (ref 12–17)
HGB UR QL STRIP.AUTO: ABNORMAL
INR PPP: 2.59 (ref 0.86–1.16)
KETONES UR STRIP-MCNC: NEGATIVE MG/DL
LEUKOCYTE ESTERASE UR QL STRIP: ABNORMAL
MCH RBC QN AUTO: 29 PG (ref 26.8–34.3)
MCHC RBC AUTO-ENTMCNC: 34.8 G/DL (ref 31.4–37.4)
MCV RBC AUTO: 83 FL (ref 82–98)
NITRITE UR QL STRIP: POSITIVE
NON-SQ EPI CELLS URNS QL MICRO: ABNORMAL /HPF
PH UR STRIP.AUTO: 6.5 [PH] (ref 4.5–8)
PLATELET # BLD AUTO: 258 THOUSANDS/UL (ref 149–390)
PMV BLD AUTO: 9.2 FL (ref 8.9–12.7)
POTASSIUM SERPL-SCNC: 4 MMOL/L (ref 3.5–5.3)
PROT UR STRIP-MCNC: NEGATIVE MG/DL
PROTHROMBIN TIME: 28.1 SECONDS (ref 12.1–14.4)
RBC # BLD AUTO: 4.14 MILLION/UL (ref 3.88–5.62)
RBC #/AREA URNS AUTO: ABNORMAL /HPF
SODIUM SERPL-SCNC: 135 MMOL/L (ref 136–145)
SP GR UR STRIP.AUTO: <=1.005 (ref 1–1.03)
UROBILINOGEN UR QL STRIP.AUTO: 0.2 E.U./DL
WBC # BLD AUTO: 10.39 THOUSAND/UL (ref 4.31–10.16)
WBC #/AREA URNS AUTO: ABNORMAL /HPF

## 2017-09-05 PROCEDURE — 97530 THERAPEUTIC ACTIVITIES: CPT

## 2017-09-05 PROCEDURE — 97535 SELF CARE MNGMENT TRAINING: CPT

## 2017-09-05 PROCEDURE — 80048 BASIC METABOLIC PNL TOTAL CA: CPT | Performed by: INTERNAL MEDICINE

## 2017-09-05 PROCEDURE — 97116 GAIT TRAINING THERAPY: CPT

## 2017-09-05 PROCEDURE — 97110 THERAPEUTIC EXERCISES: CPT

## 2017-09-05 PROCEDURE — 85610 PROTHROMBIN TIME: CPT | Performed by: INTERNAL MEDICINE

## 2017-09-05 PROCEDURE — 81001 URINALYSIS AUTO W/SCOPE: CPT | Performed by: INTERNAL MEDICINE

## 2017-09-05 PROCEDURE — 82948 REAGENT STRIP/BLOOD GLUCOSE: CPT

## 2017-09-05 PROCEDURE — 85027 COMPLETE CBC AUTOMATED: CPT | Performed by: INTERNAL MEDICINE

## 2017-09-05 RX ADMIN — GABAPENTIN 100 MG: 100 CAPSULE ORAL at 21:30

## 2017-09-05 RX ADMIN — FLUTICASONE PROPIONATE AND SALMETEROL 1 PUFF: 50; 500 POWDER RESPIRATORY (INHALATION) at 21:31

## 2017-09-05 RX ADMIN — Medication 400 MG: at 09:13

## 2017-09-05 RX ADMIN — FERROUS SULFATE TAB 325 MG (65 MG ELEMENTAL FE) 325 MG: 325 (65 FE) TAB at 17:52

## 2017-09-05 RX ADMIN — GABAPENTIN 100 MG: 100 CAPSULE ORAL at 17:51

## 2017-09-05 RX ADMIN — TIOTROPIUM BROMIDE 18 MCG: 18 CAPSULE ORAL; RESPIRATORY (INHALATION) at 09:13

## 2017-09-05 RX ADMIN — PRAVASTATIN SODIUM 40 MG: 40 TABLET ORAL at 17:52

## 2017-09-05 RX ADMIN — INSULIN LISPRO 8 UNITS: 100 INJECTION, SOLUTION INTRAVENOUS; SUBCUTANEOUS at 17:54

## 2017-09-05 RX ADMIN — OXYBUTYNIN CHLORIDE 5 MG: 5 TABLET ORAL at 09:14

## 2017-09-05 RX ADMIN — INSULIN LISPRO 3 UNITS: 100 INJECTION, SOLUTION INTRAVENOUS; SUBCUTANEOUS at 12:28

## 2017-09-05 RX ADMIN — FUROSEMIDE 40 MG: 40 TABLET ORAL at 09:13

## 2017-09-05 RX ADMIN — WARFARIN SODIUM 5 MG: 5 TABLET ORAL at 17:51

## 2017-09-05 RX ADMIN — FERROUS SULFATE TAB 325 MG (65 MG ELEMENTAL FE) 325 MG: 325 (65 FE) TAB at 09:13

## 2017-09-05 RX ADMIN — FLUTICASONE PROPIONATE AND SALMETEROL 1 PUFF: 50; 500 POWDER RESPIRATORY (INHALATION) at 09:13

## 2017-09-05 RX ADMIN — INSULIN GLARGINE 24 UNITS: 100 INJECTION, SOLUTION SUBCUTANEOUS at 09:21

## 2017-09-05 RX ADMIN — INSULIN GLARGINE 24 UNITS: 100 INJECTION, SOLUTION SUBCUTANEOUS at 21:31

## 2017-09-05 RX ADMIN — INSULIN LISPRO 4 UNITS: 100 INJECTION, SOLUTION INTRAVENOUS; SUBCUTANEOUS at 17:54

## 2017-09-05 RX ADMIN — INSULIN LISPRO 2 UNITS: 100 INJECTION, SOLUTION INTRAVENOUS; SUBCUTANEOUS at 21:31

## 2017-09-05 RX ADMIN — OXYBUTYNIN CHLORIDE 5 MG: 5 TABLET ORAL at 17:53

## 2017-09-05 RX ADMIN — GABAPENTIN 100 MG: 100 CAPSULE ORAL at 09:13

## 2017-09-05 NOTE — OCCUPATIONAL THERAPY NOTE
Occupational Therapy Progress Note      Patient Name: Rashi Bose    ZPITV'W Date: 9/5/2017    Problem List:   Patient Active Problem List   Diagnosis    Asthenia    Decubitus ulcer of heel, stage 3    Leukocytosis    Stage 3 chronic kidney disease    Chronic indwelling Rush catheter    Abnormal urinalysis    Chronic atrial fibrillation    Diabetes mellitus    Chronic diastolic heart failure                09/05/17 1201   Restrictions/Precautions   Weight Bearing Precautions Per Order Yes   RLE Weight Bearing Per Order WBAT   LLE Weight Bearing Per Order WBAT  (per podiatry on 9/4/17)   Other Precautions Bed Alarm;WBS;Fall Risk;Hard of hearing   Pain Assessment   Pain Assessment No/denies pain   Pain Score No Pain   ADL   Where Assessed Edge of bed   Grooming Assistance 5  Supervision/Setup   Grooming Deficit Setup;Supervision/safety; Increased time to complete;Wash/dry hands; Wash/dry face; Teeth care   UB Bathing Assistance 4  Minimal Assistance   UB Bathing Deficit Setup;Verbal cueing;Supervision/safety; Increased time to complete   LB Bathing Assistance 3  Moderate Assistance   LB Bathing Deficit Setup;Verbal cueing;Supervision/safety; Increased time to complete;Right lower leg including foot; Left lower leg including foot; Buttocks   LB Bathing Comments assist for LEs, did not wash feet due to dressings intact on b/l feet   UB Dressing Assistance 4  Minimal Assistance   UB Dressing Deficit Setup; Increased time to complete;Supervision/safety   UB Dressing Comments don/doff hospital gown   LB Dressing Assistance 1  Total Assistance   LB Dressing Deficit Don/doff R sock; Don/doff L sock   LB Dressing Comments to don/doff socks   Functional Standing Tolerance   Time 2 minutes    Activity functional mobility   Comments RW and no LOB   Bed Mobility   Supine to Sit 3  Moderate assistance   Additional items Assist x 2; Increased time required;Verbal cues;LE management;HOB elevated; Bedrails   Additional Comments pt EOB w/ Fair/Fair- sitting balance   Transfers   Sit to Stand 3  Moderate assistance   Additional items Assist x 2; Increased time required;Verbal cues;Armrests   Stand to Sit 3  Moderate assistance   Additional items Assist x 2; Increased time required;Verbal cues;Armrests   Stand pivot 4  Minimal assistance   Additional items Assist x 2; Increased time required;Verbal cues   Functional Mobility   Functional Mobility 4  Minimal assistance   Additional Comments assist x 2   Additional items Rolling walker   Therapeutic Exercise - ROM   UE-ROM Yes   ROM- Right Upper Extremities   R Shoulder AROM;ABduction; Flexion; Extension   R Elbow AROM;Elbow flexion;Elbow extension   R Weight/Reps/Sets 2 sets x 10 reps   RUE ROM Comment tolerated well   ROM - Left Upper Extremities    L Shoulder AROM; Flexion; Extension;ABduction   L Elbow AROM;Elbow flexion;Elbow extension   L Weight/Reps/Sets 2 sets x 10 reps   LUE ROM Comment tolerated well   Cognition   Overall Cognitive Status WFL   Arousal/Participation Alert; Cooperative   Attention Within functional limits   Orientation Level Oriented to person;Oriented to place;Oriented to time;Oriented to situation   Memory Decreased short term memory   Following Commands Follows one step commands with increased time or repetition   Comments pt w/ [place], impaired short term memory, pt agreeable to rehab   Activity Tolerance   Activity Tolerance Patient limited by fatigue   Medical Staff Made Aware appropriate to see per RNLeny   Assessment   Assessment PT is a 78 y o male seen for OT session focused on ADLs, functional transfers and mobility  Pt w/ setup for grooming, washing face, and brushing teeth  Pt MIN assist for UB Bathing and MIN [facility]  Pt w/ MOD assist for LB Bathing and dependent for don/doff socks  Pt w/ MOD assist x2 supine>sit bed mobility and MOD assist x 2 sit<>stand from elevated bed height   Pt MIN assist x 2 functional mobility to bedside chair w/ no LOB during task  Pt completed b/l UE exercises to increased strength and endurance for daily activities  Will continue to follow to address OT goals  Pt continues to be limited due to decreased strength and endurance, impaired balance, decreased activity tolerance, impaired STM  Recommend STR when pt medically stable, pt agreeable to rehab  Plan   Treatment Interventions ADL retraining;Functional transfer training;UE strengthening/ROM; Endurance training; Compensatory technique education; Energy conservation; Activityengagement   Goal Expiration Date 09/14/17   Treatment Day 1   OT Frequency 3-5x/wk   Recommendation   Discharge Recommendation Short Term Rehab     Documentation completed by: Hoda Buckner MS, OTR/L

## 2017-09-05 NOTE — PROGRESS NOTES
Progress Note - Madison Mullen 78 y o  male MRN: [id number]    Unit/Bed#: E4 -01 Encounter: [id number]      Assessment/Plan:  1-generalized weakness:  Acute/chronic:  Likely multifactorial   Patient undergoing an evaluation for possible infection due to his elevated CRP and ESR    -continue PT/OT   -will need inpatient short-term rehab    2-chronic kidney disease stage 3:  Baseline creatinine ranges 1 1-1 4  Creatinine currently at baseline  Continue to monitor    3-chronic diastolic congestive heart failure:  No evidence of acute exacerbation  He is currently euvolemic  Continue his Lasix    4-chronic atrial fibrillation:  Patient is currently rate controlled, not on any anti chronotropic agents     His INR is therapeutic at 2 98     5-diabetes type 2:  Patient was on a insulin pump as an outpatient  Patient's complaint held on admission  He is currently on Lantus and sliding scale insulin  His Accu-Cheks have been adequately controlled today:  149, 218, 229  Continue current regimen and monitor  6-chronic indwelling Rush catheter:  Patient's catheter was changed by his home nurse monthly  Is due to be changed today  Will change his catheter per home regimen  7-left heel diabetic ulceration:  Craft 3, and sub met 5 laceration:   Appreciate the Podiatry service evaluation  Continue wound care  No evidence of osteomyelitis  8-right heel diabetic ulceration, Craft 2:  Continue wound care per Podiatry  No evidence of osteomyelitis  9-infectious Disease:  leukocytosis:  Patient presented with a leukocytosis with a white blood cell count of 12  This was felt to be most likely secondary to stress response  This improved to 10 7 without antibiotic intervention  Continue to monitor    -patient's ESR however is markedly elevated at 94, and his CRP is 63    -the patient's urinalysis is not normal, however this is likely reflective of his chronic colonization due to his chronic Rush catheter  Patient's urine cultures are pending final report    -patient's wound cultures are growing multiple organisms that are likely colonization as well  Per podiatry there is not felt to be an acute infection/osteomyelitis of the ulcers  -continue to evaluate, will recheck his CBC in the morning  10-history of prostate cancer:  Continue outpatient follow-up with his primary urologist     11-mechanical AVR:  On chronic anticoagulation with coumadin  INR therapeutic at 2 9  Cont home regimen  Disposition:  Inpatient short-term rehab, per physical therapy  VTE Pharmacologic Prophylaxis: Warfarin (Coumadin)  VTE Mechanical Prophylaxis: sequential compression device    Certification Statement: The patient will continue to require additional inpatient hospital stay due to need for further acute intervention for weakness, infection eval    Status: inpatient     ===================================================================    Subjective:  Patient denies any complaints  He notes he feels slightly better and stronger today  He denies any current pain anywhere  He denies any headache, chest pain, back pain, abdominal pain  He denies any suprapubic pain  He denies any myalgias or arthralgias  He denies any fevers or chills  He denies any shortness of breath, cough, chest congestion or phlegm  He denies any abdominal pain or cramping, nausea, vomiting, diarrhea  He denies any dizziness or lightheadedness  Physical Exam:   Temp:  [97 8 °F (36 6 °C)-98 5 °F (36 9 °C)] 98 5 °F (36 9 °C)  HR:  [73-79] 73  Resp:  [18] 18  BP: (116-139)/(49-75) 139/71    Gen:  Pleasant, non-tachypnic, non-dyspnic  Conversant  Heart: regular rate and rhythm, S1S2 present, 1/6 mariah  + click  no rub or gallop  Lungs: clear to ausculatation bilaterally  No wheezing, crackles, or rhonchi  No accessory muscle use or respiratory distress  Abd: soft, non-tender, non-distended   NABS, no guarding, rebound or peritoneal signs   Extremities:  Bilateral lower extremity dressings in place  Neuro: awake, alert and oriented  Fluent speech  Skin: warm and dry: no petechiae, purpura and rash  Bilateral lower extremity dressing in place    LABS:     Results from last 7 days  Lab Units 09/04/17  0603 09/02/17  1135   WBC Thousand/uL 10 73* 12 46*   HEMOGLOBIN g/dL 11 7* 13 1   HEMATOCRIT % 33 9* 39 1   PLATELETS Thousands/uL 248 327       Results from last 7 days  Lab Units 09/04/17  0603 09/03/17  0625 09/02/17  1135   SODIUM mmol/L 139 138 138   POTASSIUM mmol/L 4 1 4 2 4 2   CHLORIDE mmol/L 100 99* 98*   CO2 mmol/L 31 32 34*   BUN mg/dL 29* 29* 27*   CREATININE mg/dL 1 42* 1 43* 1 49*   GLUCOSE RANDOM mg/dL 137 171* 217*   CALCIUM mg/dL 9 1 9 3 9 6       Hospital Data:    9/3:  Left foot x-ray:  No osteomyelitis    9/2:  Chest x-ray:  No acute disease    9/2:  Wound culture:  Staph aureus/Enterococcus    9/2:  Urine culture:  Greater than 100,000 gram-negative kashif  [id number] Enterococcus         ---------------------------------------------------------------------------------------------------------------  This note has been constructed using a voice recognition system

## 2017-09-05 NOTE — PLAN OF CARE
Problem: OCCUPATIONAL THERAPY ADULT  Goal: Performs self-care activities at highest level of function for planned discharge setting  See evaluation for individualized goals  Treatment Interventions: ADL retraining, Functional transfer training, UE strengthening/ROM, Endurance training, Patient/family training, Equipment evaluation/education, Compensatory technique education, Energy conservation, Activityengagement          See flowsheet documentation for full assessment, interventions and recommendations  Outcome: Progressing  Limitation: Decreased ADL status, Decreased UE strength, Decreased Safe judgement during ADL, Decreased endurance, Decreased self-care trans, Decreased high-level ADLs (WBAT b/l LEs)  Prognosis: Fair  Assessment: PT is a 78 y o male seen for OT session focused on ADLs, functional transfers and mobility  Pt w/ setup for grooming, washing face, and brushing teeth  Pt MIN assist for UB Bathing and MIN assist for don/doff hospital gown  Pt w/ MOD assist for LB Bathing and dependent for don/doff socks  Pt w/ MOD assist x2 supine>sit bed mobility and MOD assist x 2 sit<>stand from elevated bed height  Pt MIN assist x 2 functional mobility to bedside chair w/ no LOB during task  Pt completed b/l UE exercises to increased strength and endurance for daily activities  Will continue to follow to address OT goals  Pt continues to be limited due to decreased strength and endurance, impaired balance, decreased activity tolerance, impaired STM  Recommend STR when pt medically stable, pt agreeable to rehab       Discharge Recommendation: Short Term Rehab         Comments: Lakshmi Ramsey MS, OTR/L

## 2017-09-05 NOTE — PROGRESS NOTES
Progress Note - Podiatry  Robe Quiles 78 y o  male MRN: [id number]  Unit/Bed#: E4 -01 Encounter: [id number]    Assessment/Plan:  3  77 yo M with left foot ulcerations (to lateral ankle and plantar foot)  -dressing changed at bedside today  Applied maxsorb, dsd to both wounds  Currently, neither wound probes to bone and neither have any purulence/malodor/crepitus or erythema    -will continue to provide [facility] while in house but will need follow-up as outpatient  Will need to closely monitor left ankle wound  -offloading of utmost importance  Patient is compliant with prevalon boots and offloading with pillow support  -PT on board, recommending STR    -final wound cultures growing MSSA and enterococcus faecalis  Off abx currently  -will continue to monitor leukocytosis  -repeat xrays reviewed, no evidence of OM  -will d/w attending    2  Right posterior heel ulceration   -continue offloading  -no acute signs of infection  Applied maxsorb, dsd although appears to be healing well  -continue [facility] while in house    3  DM, type 2 with neuropathy  -continue mgmt per SLIM    4  CKD stage 3  -mgmt per LSIM    Subjective/Objective   Chief Complaint:   Chief Complaint   Patient presents with    Weakness - Generalized     pt woke up this morning just feeling very weak and unable to get up   pt denies fevers, n/v   denies any pain or sick contacts  Subjective: 78 y o  y/o male was seen and evaluated at bedside in NAD  Patient denies any pain currently  No n/v/f/c/sob/chest pain    Blood pressure 136/74, pulse 78, temperature 98 1 °F (36 7 °C), temperature source Tympanic, resp  rate 18, height 5' 9 5" (1 765 m), weight 108 kg (238 lb 1 6 oz), SpO2 95 %  ,Body mass index is 34 66 kg/m²      Invasive Devices     Peripheral Intravenous Line            Peripheral IV 09/02/17 Left Antecubital 2 days          Drain            Urethral Catheter 18 Fr  2 days                Physical Exam:   General: Alert, cooperative and no distress  Lungs: Non labored breathing  Abdomen: Soft, non-tender  Extremity: Dressing c/d/i to the b/l feet  Right foot with partially epithelialized posterior heel wound  No drainage  No acute signs of infection  Does not probe deep  There is also mild HPK tissue to lateral amputation site  Left foot lateral posterior heel wound with mixed fibrotic and necrotic tissue present in wound  Does not probe deep to bone currently  The wound measures 3 cm x 1 5 cm x 0 5 cm  There is also fernandez stage 2 ulceration to plantar lateral foot with granular tissue present                        Lab, Imaging and other studies:   I have personally reviewed pertinent lab results  Lab Results   Component Value Date    WBC 10 39 (H) 09/05/2017    HGB 12 0 09/05/2017    HCT 34 5 (L) 09/05/2017    MCV 83 09/05/2017     09/05/2017     Lab Results   Component Value Date    GLUCOSE 148 (H) 09/05/2017    CALCIUM 9 3 09/05/2017     (L) 09/05/2017    K 4 0 09/05/2017    CO2 31 09/05/2017    CL 98 (L) 09/05/2017    BUN 30 (H) 09/05/2017    CREATININE 1 28 09/05/2017         Imaging: I have personally reviewed pertinent films in PACS  EKG, Pathology, and Other Studies: I have personally reviewed pertinent reports    VTE Pharmacologic Prophylaxis: Sequential compression device (Venodyne)  and Warfarin (Coumadin)

## 2017-09-05 NOTE — PLAN OF CARE
Problem: PHYSICAL THERAPY ADULT  Goal: Performs mobility at highest level of function for planned discharge setting  See evaluation for individualized goals  Treatment/Interventions: Functional transfer training, LE strengthening/ROM, Elevations, Therapeutic exercise, Endurance training, Patient/family training, Equipment eval/education, Bed mobility, Gait training, OT  Equipment Recommended: Walker (RW)       See flowsheet documentation for full assessment, interventions and recommendations  Outcome: Progressing  Prognosis: Fair  Problem List: Decreased strength, Decreased endurance, Impaired balance, Decreased mobility, Impaired hearing, Decreased safety awareness, Impaired sensation, Decreased skin integrity, Obesity  Assessment: No retropulsion noted this session  Pt  progressed with ambulation distances with less assistance  Pt continues to require verbal cues for safe and proper transfer and mobility techniques  PT performs b/l le arom all actively requiring verbal cues for correct exercise techniques  PT will continue to benefit from skilled inpt PT and rehab to progress mobility, strength, balance,and safety inorder to maximize functional mobility and I    Barriers to Discharge: Decreased caregiver support, Inaccessible home environment     Recommendation: Short-term skilled PT     PT - OK to Discharge: Yes (when medically cleared)    See flowsheet documentation for full assessment

## 2017-09-05 NOTE — CONSULTS
Consultation - Infectious Disease   Robe Quiles 78 y o  male MRN: [id number]  Unit/Bed#: E4 -01 Encounter: [id number]      IMPRESSION & RECOMMENDATIONS:   Impression/Recommendations:  1  Elevated ESR  Likely secondary to #2  No signs or symptoms of acute infection  No signs or symptoms of pain acute inflammatory or rheumatologic process  UA negative making UTI unlikely  Chest x-ray shows no evidence of pneumonia  Remains clinically stable off antibiotics  Rec:   · Continue to follow closely off antibiotics  · Follow temperatures and white blood cell count closely  · If develops new fever would check blood cultures ×2 sets    2  Chronic left heel ulcer  No evidence of acute infection  X-ray negative for osteomyelitis  Exam shows some mild necrosis  Rec:   · Continue to follow closely off antibiotics  · Continue local wound care as per podiatry  · Close outpatient follow-up at the wound Center ongoing    3  Weakness  Suspect multifactorial, largely due to recent inactivity and deconditioning  Rec:   · Continue PT while in house  · SNF upon discharge    The patient is stable from an ID standpoint  Discussed in detail with the patient and his wife at the bedside  Discussed in detail with the primary service  We will sign off for now  Please call with new questions  Antibiotics:  None    Thank you for this consultation  We will follow along with you  HISTORY OF PRESENT ILLNESS:  Reason for Consult: Elevated ESR    HPI: Robe Quiles is a 78 y o  male with multiple medical problems as below  The patient was admitted earlier in the rear with influenza and pneumonia  Since that time the patient's wife states that he has been chronically weak  This has been exacerbated by heel wounds which have required nonweightbearing status  Patient has had several falls at home due to weakness  He was brought to the emergency department on 9/2 for one of these episodes    Upon presentation he was noted to be afebrile with a normal heart rate and a mild leukocytosis of 12 5  He underwent a urinalysis and a chest x-ray which were unremarkable  He has been observed off antibiotics  He is noted to have a left Achilles wound which is been followed by podiatry  X-ray showed no evidence of osteomyelitis  This part of his initial evaluation he did have a sedimentation rate and CRP sent which were elevated  We are asked to comment on further evaluation and management  Upon questioning the patient denies any subjective fevers, chills, night sweats, joint pains, or myalgias  He has had no recent weight loss  Denies any nausea, vomiting, diarrhea  REVIEW OF SYSTEMS:  As per history of present illness  A complete system-based review of systems is otherwise negative  PAST MEDICAL HISTORY:  Past Medical History:   Diagnosis Date    Anemia     Aortic stenosis     Atrial fibrillation     Chronic kidney disease     stage 3    COPD (chronic obstructive pulmonary disease)     Diabetes mellitus     Hyperlipidemia     Hypertension     Sleep apnea      History reviewed  No pertinent surgical history  FAMILY HISTORY:  Non-contributory    SOCIAL HISTORY:  History   Alcohol Use No     History   Drug Use No     History   Smoking Status    Former Smoker   Smokeless Tobacco    Not on file       ALLERGIES:  Allergies   Allergen Reactions    Aspirin      Chest tightness       MEDICATIONS:  All current active medications have been reviewed      PHYSICAL EXAM:  Vitals:  HR:  [71-78] 78  Resp:  [18] 18  BP: (136-144)/(65-74) 136/74  SpO2:  [93 %-95 %] 95 %  Temp (24hrs), Av 8 °F (36 6 °C), Min:97 5 °F (36 4 °C), Max:98 1 °F (36 7 °C)  Current: Temperature: 98 1 °F (36 7 °C)     Physical Exam:  General:  Well-nourished, well-developed, in no acute distress  Eyes:  Conjunctive clear with no hemorrhages or effusions  Oropharynx:  No ulcers, no lesions  Neck:  Supple, no lymphadenopathy  Lungs:  Clear to auscultation bilaterallyAnteriorly, no accessory muscle use  Cardiac:  Regular rate and rhythm, no murmurs  Abdomen:  Soft, non-tender, non-distended  Extremities:  No peripheral cyanosis, clubbing, or edema  Skin:  No rashes  Neurological:  Moves all four extremities spontaneously, sensation grossly intact  Left lateral Achilles area: Malodorous necrotic wound with visible tendon  No purulence or cellulitis  LABS, IMAGING, & OTHER STUDIES:  Lab Results:  I have personally reviewed pertinent labs  Results from last 7 days  Lab Units 09/05/17  0652 09/04/17  0603 09/03/17  0625 09/02/17  1135   SODIUM mmol/L 135* 139 138 138   POTASSIUM mmol/L 4 0 4 1 4 2 4 2   CHLORIDE mmol/L 98* 100 99* 98*   CO2 mmol/L 31 31 32 34*   ANION GAP mmol/L 6 8 7 6   BUN mg/dL 30* 29* 29* 27*   CREATININE mg/dL 1 28 1 42* 1 43* 1 49*   EGFR ml/min/1 73sq m 55 48 48 46   GLUCOSE RANDOM mg/dL 148* 137 171* 217*   CALCIUM mg/dL 9 3 9 1 9 3 9 6   AST U/L  --   --   --  15   ALT U/L  --   --   --  25   ALK PHOS U/L  --   --   --  99   TOTAL PROTEIN g/dL  --   --   --  7 3   ALBUMIN g/dL  --   --   --  3 4*   BILIRUBIN TOTAL mg/dL  --   --   --  0 34       Results from last 7 days  Lab Units 09/05/17  0652 09/04/17  0603 09/02/17  1135   WBC Thousand/uL 10 39* 10 73* 12 46*   HEMOGLOBIN g/dL 12 0 11 7* 13 1   PLATELETS Thousands/uL 258 248 327       Results from last 7 days  Lab Units 09/02/17  1504 09/02/17  1151   GRAM STAIN RESULT  1+ Polys  4+ Gram positive cocci in pairs, chains and clusters  4+ Gram negative rods  Rare Gram positive rods  --    URINE CULTURE   --  >100,000 cfu/ml Mixed Contaminants X3   WOUND CULTURE  3+ Growth of Staphylococcus aureus  3+ Growth of Enterococcus faecalis  3+ Growth of Mixed Skin Sabiha  --        Imaging Studies:   I have personally reviewed pertinent imaging study reports and images in PACS    Left foot Xray 9/3 no osteomyelitis  CXR 9/2 CXR    EKG, Pathology, and Other Studies:   I have personally reviewed pertinent reports

## 2017-09-05 NOTE — CONSULTS
Consultation - Hasmukh Celestin 78 y o  male MRN: [id number]    Unit/Bed#: E4 -01 Encounter: [id number]      Assessment/Plan     Assessment: This is a 78y o -year-old male with diabetes with hyperglycemia, left heel ulcer, stage 3 chronic kidney disease and asthenia  Plan:  1  Type 2 diabetes with hyperglycemia-he is having elevated blood sugar post meal   This shows that his blood sugar is not well controlled during this hospitalization  At home, he takes anywhere between 6-8 units per meal   I will resume 8 units of Humalog with meals  He is requiring significantly less insulin here in the hospital compared to at home  Continue to monitor blood sugar over time and make adjustments to his regimen if necessary  Due to being on intensive insulin therapy, the patient is at high risk of severe hypoglycemia and potential death  2   Left heel ulcer is being managed by Podiatry  3   Stage 3 chronic kidney disease is stable  4   For the asthenia, acute inpatient rehabilitation has been recommended  CC: Diabetes Consult    History of Present Illness     HPI: Hasmukh Celestin is a 78y o  year old male with type 2 diabetes for 11 years  He is on insulin at home  He denies any polyuria, polydipsia, nocturia and blurry vision  He denies retinopathy, heart attack, stroke and claudication but does admit to neuropathy and nephropathy  He denies any hypoglycemia  His two brothers have diabetes  At home, he is on NovoLog insulin in a V Go 40 delivery device  Therefore, he received 40 units of NovoLog over 24 hours in addition to 20 units of Lantus twice a day  His total daily dose of basal insulin is 80 units  His mealtime insulin is 6-8 units per meal     Inpatient consult to Endocrinology  Consult performed by: Desi Escobar  Consult ordered by: Shilpa Bar          Review of Systems   Constitutional: Negative for chills and fever  HENT: Negative for congestion      Respiratory: Negative for shortness of breath  Cardiovascular: Negative for chest pain  Gastrointestinal: Negative for constipation, diarrhea, nausea and vomiting  Endocrine: Negative for cold intolerance, heat intolerance, polydipsia, polyphagia and polyuria  Musculoskeletal: Negative for arthralgias  Neurological: Negative for dizziness  Psychiatric/Behavioral: Negative for agitation  All other systems reviewed and are negative  Historical Information   Past Medical History:   Diagnosis Date    Anemia     Aortic stenosis     Atrial fibrillation     Chronic kidney disease     stage 3    COPD (chronic obstructive pulmonary disease)     Diabetes mellitus     Hyperlipidemia     Hypertension     Sleep apnea      History reviewed  No pertinent surgical history  Social History   History   Alcohol Use No     History   Drug Use No     History   Smoking Status    Former Smoker   Smokeless Tobacco    Not on file     Family History: History reviewed  No pertinent family history      Meds/Allergies   Current Facility-Administered Medications   Medication Dose Route Frequency Provider Last Rate Last Dose    acetaminophen (TYLENOL) tablet 650 mg  650 mg Oral Q6H PRN Ora Chapa PA-C        ferrous sulfate tablet 325 mg  325 mg Oral BID Ora Chapa PA-C   325 mg at 09/05/17 0913    fluticasone-salmeterol (ADVAIR) 500-50 mcg/dose inhaler 1 puff  1 puff Inhalation BID Kush Nicolas PA-C   1 puff at 09/05/17 0913    furosemide (LASIX) tablet 40 mg  40 mg Oral Daily Ora Chapa PA-C   40 mg at 09/05/17 0913    gabapentin (NEURONTIN) capsule 100 mg  100 mg Oral TID Uzma Chapa PA-C   100 mg at 09/05/17 0913    insulin glargine (LANTUS) subcutaneous injection 24 Units  24 Units Subcutaneous Q12H [address] Ora Chapa PA-C   24 Units at 09/05/17 0921    insulin lispro (HumaLOG) 100 units/mL subcutaneous injection 1-6 Units  1-6 Units Subcutaneous TID [facility] Ora Chapa PA-C   3 Units at 09/05/17 1228    insulin lispro (HumaLOG) 100 units/mL subcutaneous injection 1-6 Units  1-6 Units Subcutaneous HS Ora Rogel PA-C   3 Units at 09/04/17 2143    magnesium hydroxide (MILK OF MAGNESIA) 400 mg/5 mL oral suspension 30 mL  30 mL Oral PRN Ora Chapa PA-C        magnesium oxide (MAG-OX) tablet 400 mg  400 mg Oral Daily Ora Chapa PA-C   400 mg at 09/05/17 0913    ondansetron (ZOFRAN) injection 4 mg  4 mg Intravenous Q6H PRN Ora Chapa PA-C        oxybutynin (DITROPAN) tablet 5 mg  5 mg Oral BID Ora Chapa PA-C   5 mg at 09/05/17 0914    pravastatin (PRAVACHOL) tablet 40 mg  40 mg Oral Daily With Big Elijah Chapa PA-C   40 mg at 09/04/17 1748    tiotropium (SPIRIVA) capsule for inhaler 18 mcg  18 mcg Inhalation Daily Ora Chapa PA-C   18 mcg at 09/05/17 0913    warfarin (COUMADIN) tablet 5 mg  5 mg Oral Once per day on Sun Tue Wed Fri Sat 120 Baylee Chapa PA-C   5 mg at 09/03/17 1706    warfarin (COUMADIN) tablet 7 5 mg  7 5 mg Oral Once per day on Mon Thu 120 Baylee Chapa PA-C   7 5 mg at 09/04/17 1749     Allergies   Allergen Reactions    Aspirin      Chest tightness       Objective   Vitals: Blood pressure 136/74, pulse 78, temperature 98 1 °F (36 7 °C), temperature source Tympanic, resp  rate 18, height 5' 9 5" (1 765 m), weight 108 kg (238 lb 1 6 oz), SpO2 95 %  Intake/Output Summary (Last 24 hours) at 09/05/17 1523  Last data filed at 09/05/17 1300   Gross per 24 hour   Intake              840 ml   Output             3100 ml   Net            -2260 ml     Invasive Devices     Peripheral Intravenous Line            Peripheral IV 09/02/17 Left Antecubital 3 days          Drain            Urethral Catheter 18 Fr  2 days                Physical Exam   Constitutional: He is oriented to person, place, and time  He appears well-developed and well-nourished  No distress  HENT:   Head: Normocephalic and atraumatic     Mouth/Throat: Oropharynx is clear and moist and mucous membranes are normal  No oropharyngeal exudate  Eyes: Conjunctivae, EOM and lids are normal  Right eye exhibits no discharge  Left eye exhibits no discharge  No scleral icterus  Neck: Neck supple  No thyromegaly present  Cardiovascular: Normal rate, regular rhythm and normal heart sounds  Exam reveals no gallop and no friction rub  No murmur heard  Pulmonary/Chest: Effort normal and breath sounds normal  No respiratory distress  He has no wheezes  Abdominal: Soft  Bowel sounds are normal  He exhibits no distension  There is no tenderness  Musculoskeletal: Normal range of motion  He exhibits no edema or deformity  Lymphadenopathy:        Head (right side): No occipital adenopathy present  Head (left side): No occipital adenopathy present  Right: No supraclavicular adenopathy present  Left: No supraclavicular adenopathy present  Neurological: He is alert and oriented to person, place, and time  No cranial nerve deficit  Coordination normal    Skin: Skin is warm and intact  No rash noted  He is not diaphoretic  No erythema  Psychiatric: He has a normal mood and affect  Vitals reviewed  The history was obtained from the review of the chart, patient and family  Lab Results:     Results from last 7 days  Lab Units 09/03/17  1457   HEMOGLOBIN A1C % 6 6*     Lab Results   Component Value Date    WBC 10 39 (H) 09/05/2017    HGB 12 0 09/05/2017    HCT 34 5 (L) 09/05/2017    MCV 83 09/05/2017     09/05/2017     Lab Results   Component Value Date/Time    BUN 30 (H) 09/05/2017 06:52 AM     (L) 09/05/2017 06:52 AM    K 4 0 09/05/2017 06:52 AM    CL 98 (L) 09/05/2017 06:52 AM    CO2 31 09/05/2017 06:52 AM    CREATININE 1 28 09/05/2017 06:52 AM    AST 15 09/02/2017 11:35 AM    ALT 25 09/02/2017 11:35 AM     No results for input(s): CHOL, HDL, LDL, TRIG, VLDL in the last 72 hours    No results found for: Mauricio Sifuentes  POC Glucose (mg/dl)   Date Value   09/05/2017 251 (H)   09/05/2017 147 (H)   09/04/2017 265 (H)   09/04/2017 229 (H)   09/04/2017 218 (H)   09/04/2017 149 (H)   09/03/2017 217 (H)   09/03/2017 320 (H)   09/03/2017 363 (H)   09/03/2017 187 (H)       Imaging Studies: I have personally reviewed pertinent reports  Left foot x-ray shows no osteomyelitis  Chest x-ray shows no active pulmonary disease  Portions of the record may have been created with voice recognition software  Occasional wrong word or "sound a like" substitutions may have occurred due to the inherent limitations of voice recognition software  Read the chart carefully and recognize, using context, where substitutions have occurred

## 2017-09-05 NOTE — PROGRESS NOTES
[facility] Internal Medicine Progress Note  Patient: Anam Siddiqui 78 y o  male   MRN: [id number]  PCP: Alisa Grace MD  Unit/Bed#: E4 -14 Encounter: [id number]  Date Of Visit: 09/05/17    Assessment:    Principal Problem:    Asthenia  Active Problems:    Decubitus ulcer of heel, stage 3    Leukocytosis    Stage 3 chronic kidney disease    Chronic indwelling Rush catheter    Abnormal urinalysis    Chronic atrial fibrillation    Diabetes mellitus    Chronic diastolic heart failure      Plan:    · Asthenia  · Acute on chronic, likely multifactorial given chronic diastolic heart failure, bilateral heel ulcerations and poor mobility at baseline  Initially thought to be secondary to UTI versus infected foot ulcers, however urine cultures significant for mixed olivia and podiatry and Infectious Disease confirmed no evidence of acute infection, large blood on previous UA is resolved  Repeat UA demonstrates positive nitrite and small leuks,  However pt asympomatic and suspect this is 2* colonization  PT recommending inpatient rehab, Case Management consulted/VM left    CKD stage III   1 49 on admission now to 1 28 today   BL 1 18-1 4   Avoid nephrotoxins    Chronic diastolic heart failure   No evidence of acute exacerbation   Patient appears euvolemic   Continue current Lasix regimen    Chronic atrial fibrillation   Currently rate controlled off of chronotropic agents   INR is therapeutic at 2 59    DM II   Uncontrolled   Fasting 147, meal time 250, similar to previous   Was on insuling pump in OP and held on admission   Continue lantus and [facility], continue SSI    Chronic indwelling Rush catheterization   Change per home regimen yesterday,    Left/right heel diabetic ulcers    Local wound care per podiatry no evident of OM    Leukocytosis   Suspect physiologic   10 39 on now improved from admission of 12   Given significant elevation of ESR 94, and CRP 60   Appreciate ID consult, no further infectious workup as this could be simply from ulcers vs other factors for high inflammatory markers such as cardiac disease    Hx of prostate cancer   F/u OP urology    Mechanical AVR   Coumadin therapeutic   Continue current regimen    VTE Pharmacologic Prophylaxis:   Pharmacologic: Warfarin (Coumadin)  Mechanical VTE Prophylaxis in Place: Yes    Patient Centered Rounds: I have performed bedside rounds with nursing staff today  Discussions with Specialists or Other Care Team Provider: tara infectious disease    Education and Discussions with Family / Patient: work up thus far disposition anastasia for inpatient rehab    Time Spent for Care: 30 minutes  More than 50% of total time spent on counseling and coordination of care as described above  Current Length of Stay: 3 day(s)    Current Patient Status: Inpatient   Certification Statement: The patient will continue to require additional inpatient hospital stay due to placement and evaluation for inpatient rehab    Discharge Plan / Estimated Discharge Date:     Code Status: Level 1 - Full Code      Subjective: The patient has any complaints  He reports that he continues to be somewhat stronger from admission  He has no pain particularly within the chest or the bladder and offers no complaint otherwise  No fevers overnight, no n/v/d     Objective:     Vitals:   Temp (24hrs), Av °F (36 7 °C), Min:97 5 °F (36 4 °C), Max:98 5 °F (36 9 °C)    HR:  [71-78] 78  Resp:  [18] 18  BP: (136-144)/(65-74) 136/74  SpO2:  [93 %-97 %] 95 %  Body mass index is 34 66 kg/m²  Input and Output Summary (last 24 hours): Intake/Output Summary (Last 24 hours) at 17 1357  Last data filed at 17 0900   Gross per 24 hour   Intake              600 ml   Output             2350 ml   Net            -1750 ml       Physical Exam:     Physical Exam   Constitutional: He appears well-developed  No distress     Appears stated age, no apparent distress   HENT:   Head: Normocephalic and atraumatic  Right Ear: External ear normal    Left Ear: External ear normal    Mouth/Throat: Oropharynx is clear and moist  No oropharyngeal exudate  Eyes: Conjunctivae are normal  Right eye exhibits no discharge  Left eye exhibits no discharge  No scleral icterus  Cardiovascular: Normal rate, normal heart sounds and intact distal pulses  Exam reveals no gallop and no friction rub  No murmur heard  Irregularly irregular   Pulmonary/Chest: Effort normal and breath sounds normal  No respiratory distress  He has no wheezes  He has no rales  Abdominal: Soft  He exhibits no distension  There is no tenderness  There is no rebound and no guarding  Musculoskeletal: He exhibits no edema  Lymphadenopathy:     He has no cervical adenopathy  Neurological: He is alert  Skin: Skin is warm and dry  He is not diaphoretic  No erythema  Psychiatric: He has a normal mood and affect  Vitals reviewed  (  Be Sure to Include Physical Exam: Delete this entire line when you have entered your exam)    Additional Data:     Labs:      Results from last 7 days  Lab Units 09/05/17  0652  09/02/17  1135   WBC Thousand/uL 10 39*  < > 12 46*   HEMOGLOBIN g/dL 12 0  < > 13 1   HEMATOCRIT % 34 5*  < > 39 1   PLATELETS Thousands/uL 258  < > 327   LYMPHO PCT %  --   --  5*   MONO PCT MAN %  --   --  7   EOSINO PCT MANUAL %  --   --  4   < > = values in this interval not displayed  Results from last 7 days  Lab Units 09/05/17  0652  09/02/17  1135   SODIUM mmol/L 135*  < > 138   POTASSIUM mmol/L 4 0  < > 4 2   CHLORIDE mmol/L 98*  < > 98*   CO2 mmol/L 31  < > 34*   BUN mg/dL 30*  < > 27*   CREATININE mg/dL 1 28  < > 1 49*   CALCIUM mg/dL 9 3  < > 9 6   TOTAL PROTEIN g/dL  --   --  7 3   BILIRUBIN TOTAL mg/dL  --   --  0 34   ALK PHOS U/L  --   --  99   ALT U/L  --   --  25   AST U/L  --   --  15   GLUCOSE RANDOM mg/dL 148*  < > 217*   < > = values in this interval not displayed      Results from last 7 days  Lab Units 09/05/17  0652   INR  2 59*       * I Have Reviewed All Lab Data Listed Above  * Additional Pertinent Lab Tests Reviewed: Fifi 66 Admission Reviewed    Imaging:    Imaging Reports Reviewed Today Include:   Imaging Personally Reviewed by Myself Includes:      Recent Cultures (last 7 days):       Results from last 7 days  Lab Units 09/02/17  1504 09/02/17  1151   GRAM STAIN RESULT  1+ Polys  4+ Gram positive cocci in pairs, chains and clusters  4+ Gram negative rods  Rare Gram positive rods  --    URINE CULTURE   --  >100,000 cfu/ml Mixed Contaminants X3   WOUND CULTURE  3+ Growth of Staphylococcus aureus  3+ Growth of Enterococcus faecalis  3+ Growth of Mixed Skin Sabiha  --        Last 24 Hours Medication List:     ferrous sulfate 325 mg Oral BID   fluticasone-salmeterol 1 puff Inhalation BID   furosemide 40 mg Oral Daily   gabapentin 100 mg Oral TID   insulin glargine 24 Units Subcutaneous Q12H EFRAIN   insulin lispro 1-6 Units Subcutaneous TID AC   insulin lispro 1-6 Units Subcutaneous HS   magnesium oxide 400 mg Oral Daily   oxybutynin 5 mg Oral BID   pravastatin 40 mg Oral Daily With Dinner   tiotropium 18 mcg Inhalation Daily   warfarin 5 mg Oral Once per day on Sun Tue Wed Fri Sat   warfarin 7 5 mg Oral Once per day on Mon Thu        Today, Patient Was Seen By: Juve Arevalo PA-C    ** Please Note: This note has been constructed using a voice recognition system   **

## 2017-09-05 NOTE — PHYSICAL THERAPY NOTE
Physical Therapy Treatment Note       09/05/17 1206   Pain Assessment   Pain Assessment No/denies pain   Restrictions/Precautions   RLE Weight Bearing Per Order WBAT   LLE Weight Bearing Per Order WBAT   Other Precautions Bed Alarm;WBS;Fall Risk;Hard of hearing  ( (+) beckett cath)   General   Chart Reviewed Yes   Response to Previous Treatment Patient with no complaints from previous session  Family/Caregiver Present No   Cognition   Arousal/Participation Alert; Cooperative   Attention Within functional limits   Orientation Level Oriented to person;Oriented to place;Oriented to time;Oriented to situation   Subjective   Subjective PT  offers no c/o pain  Pt agreeable to therapy  Bed Mobility   Supine to Sit 3  Moderate assistance   Additional items Assist x 2;HOB elevated; Increased time required;Verbal cues;LE management   Transfers   Sit to Stand 3  Moderate assistance   Additional items Assist x 2; Increased time required;Verbal cues   Stand to Sit 3  Moderate assistance   Additional items Assist x 2;Armrests; Increased time required;Verbal cues   Ambulation/Elevation   Gait pattern Wide LYNDSEY; Decreased foot clearance; Short stride; Excessively slow; Foward flexed   Gait Assistance 4  Minimal assist  (min assist x2- mod assist x1)   Additional items Assist x 2   Assistive Device Rolling walker   Distance 6' forward, 6' backward  Balance   Static Sitting Fair +   Static Standing Poor   Ambulatory Poor -   Activity Tolerance   Activity Tolerance Patient limited by fatigue   Exercises   Hip Flexion Sitting;10 reps;AROM; Bilateral  (3 x 10 reps  )   Hip Abduction Sitting;10 reps;AROM; Bilateral   Hip Adduction Sitting;10 reps;AROM; Bilateral  (isometric adduction  )   Knee AROM Long Arc Quad Sitting;10 reps;AROM; Bilateral   Ankle Pumps Sitting;10 reps;AROM; Bilateral   Assessment   Prognosis Fair   Problem List Decreased strength;Decreased endurance; Impaired balance;Decreased mobility; Impaired hearing;Decreased safety awareness; Impaired sensation;Decreased skin integrity;Obesity   Assessment No retropulsion noted this session  Pt  progressed with ambulation distances with less assistance  Pt continues to require verbal cues for safe and proper transfer and mobility techniques  PT performs b/l le arom all actively requiring verbal cues for correct exercise techniques  PT will continue to benefit from skilled inpt PT and rehab to progress mobility, strength, balance,and safety inorder to maximize functional mobility and I     Goals   Patient Goals " I want to get stronger and be able to do more "     [facility] Expiration Date 09/13/17   Treatment Day 1   Plan   Treatment/Interventions Functional transfer training;LE strengthening/ROM; Elevations; Therapeutic exercise; Endurance training;Bed mobility;Gait training;Patient/family training;Equipment eval/education;OT   Progress Progressing toward goals   PT Frequency 5x/wk; Weekend  (6x/week)   Recommendation   Recommendation Short-term skilled PT   PT - OK to Discharge Yes  (when medically cleared)       Norma Jimenez PTA

## 2017-09-06 LAB
GLUCOSE SERPL-MCNC: 119 MG/DL (ref 65–140)
GLUCOSE SERPL-MCNC: 139 MG/DL (ref 65–140)
GLUCOSE SERPL-MCNC: 160 MG/DL (ref 65–140)
GLUCOSE SERPL-MCNC: 212 MG/DL (ref 65–140)

## 2017-09-06 PROCEDURE — 97116 GAIT TRAINING THERAPY: CPT

## 2017-09-06 PROCEDURE — 82948 REAGENT STRIP/BLOOD GLUCOSE: CPT

## 2017-09-06 PROCEDURE — 97110 THERAPEUTIC EXERCISES: CPT

## 2017-09-06 RX ADMIN — FLUTICASONE PROPIONATE AND SALMETEROL 1 PUFF: 50; 500 POWDER RESPIRATORY (INHALATION) at 21:00

## 2017-09-06 RX ADMIN — FUROSEMIDE 40 MG: 40 TABLET ORAL at 08:44

## 2017-09-06 RX ADMIN — INSULIN LISPRO 2 UNITS: 100 INJECTION, SOLUTION INTRAVENOUS; SUBCUTANEOUS at 17:51

## 2017-09-06 RX ADMIN — INSULIN LISPRO 4 UNITS: 100 INJECTION, SOLUTION INTRAVENOUS; SUBCUTANEOUS at 13:03

## 2017-09-06 RX ADMIN — OXYBUTYNIN CHLORIDE 5 MG: 5 TABLET ORAL at 08:44

## 2017-09-06 RX ADMIN — Medication 400 MG: at 08:43

## 2017-09-06 RX ADMIN — TIOTROPIUM BROMIDE 18 MCG: 18 CAPSULE ORAL; RESPIRATORY (INHALATION) at 08:45

## 2017-09-06 RX ADMIN — INSULIN LISPRO 8 UNITS: 100 INJECTION, SOLUTION INTRAVENOUS; SUBCUTANEOUS at 17:50

## 2017-09-06 RX ADMIN — INSULIN GLARGINE 24 UNITS: 100 INJECTION, SOLUTION SUBCUTANEOUS at 22:00

## 2017-09-06 RX ADMIN — INSULIN GLARGINE 24 UNITS: 100 INJECTION, SOLUTION SUBCUTANEOUS at 08:50

## 2017-09-06 RX ADMIN — FERROUS SULFATE TAB 325 MG (65 MG ELEMENTAL FE) 325 MG: 325 (65 FE) TAB at 08:43

## 2017-09-06 RX ADMIN — WARFARIN SODIUM 5 MG: 5 TABLET ORAL at 17:53

## 2017-09-06 RX ADMIN — FERROUS SULFATE TAB 325 MG (65 MG ELEMENTAL FE) 325 MG: 325 (65 FE) TAB at 17:50

## 2017-09-06 RX ADMIN — GABAPENTIN 100 MG: 100 CAPSULE ORAL at 22:00

## 2017-09-06 RX ADMIN — GABAPENTIN 100 MG: 100 CAPSULE ORAL at 17:50

## 2017-09-06 RX ADMIN — INSULIN LISPRO 8 UNITS: 100 INJECTION, SOLUTION INTRAVENOUS; SUBCUTANEOUS at 08:52

## 2017-09-06 RX ADMIN — PRAVASTATIN SODIUM 40 MG: 40 TABLET ORAL at 17:50

## 2017-09-06 RX ADMIN — FLUTICASONE PROPIONATE AND SALMETEROL 1 PUFF: 50; 500 POWDER RESPIRATORY (INHALATION) at 08:44

## 2017-09-06 RX ADMIN — OXYBUTYNIN CHLORIDE 5 MG: 5 TABLET ORAL at 17:51

## 2017-09-06 RX ADMIN — INSULIN LISPRO 8 UNITS: 100 INJECTION, SOLUTION INTRAVENOUS; SUBCUTANEOUS at 13:03

## 2017-09-06 RX ADMIN — GABAPENTIN 100 MG: 100 CAPSULE ORAL at 08:45

## 2017-09-06 NOTE — SOCIAL WORK
CM met with patient at beside to address discharge needs  Present in the room was wife; patient gave permission to speak in front of her  Patient lives in a 2-story row home with his wife, Shara Barnhart, and their daughter; bedroom is located on 1st floor, patient claims difficulty with steps  STR list was provided to patient and his wife, asking for 3 options in order of preference  Patient needs assistance with ADLs and functional mobility  Food shopping & meal prep is done by wife  Patient claims use of DMEs: a cane, RW, & WC  Hx of VNA reported  Patient is retired  PCP identified  No POA identified, but wife is patients healthcare representative and designated caregiver if needed  Patient uses [facility] in [place] on [address]  for Rx needs; patient made aware of [address] to use at discharge if needed  Patient does not drive; reports wife available at discharge to transport home  Help/support reported  Patient made aware of CM's name, number and role  No other needs at present  CM to follow as needed

## 2017-09-06 NOTE — PHYSICAL THERAPY NOTE
Physical Therapy Treatment Note     09/06/17 1418   Pain Assessment   Pain Assessment No/denies pain   Pain Score No Pain   Restrictions/Precautions   RLE Weight Bearing Per Order WBAT   LLE Weight Bearing Per Order WBAT   Other Precautions Bed Alarm; Fall Risk;WBS;Hard of hearing   General   Chart Reviewed Yes   Response to Previous Treatment Patient with no complaints from previous session  Family/Caregiver Present No   Cognition   Arousal/Participation Alert; Cooperative   Attention Within functional limits   Orientation Level Oriented to person;Oriented to place;Oriented to time;Oriented to situation   Following Commands Follows multistep commands with increased time or repetition   Subjective   Subjective Pt  in bed offers no c/o pain  Pt  willing to participate in PT session  Bed Mobility   Supine to Sit 3  Moderate assistance   Additional items Assist x 1;HOB elevated; Increased time required;Verbal cues;LE management   Transfers   Sit to Stand 3  Moderate assistance   Additional items Assist x 2; Increased time required;Verbal cues  (bed height elevated   )   Stand to Sit 4  Minimal assistance   Additional items Assist x 1;Verbal cues;Armrests; Increased time required   Additional Comments retropulsion with sit to stand and initial steps  Ambulation/Elevation   Gait pattern Poor UE support; Wide LYNDSEY;Retropulsion; Short stride; Inconsistent gisell   Gait Assistance 4  Minimal assist   Additional items Assist x 2  (min assist x2- mod assist x1)   Assistive Device Rolling walker   Distance 18'x1   Balance   Static Sitting Fair +   Static Standing Poor +  (initially zero)   Dynamic Standing Poor -   Ambulatory Poor   Endurance Deficit   Endurance Deficit Yes  (fatigue)   Activity Tolerance   Activity Tolerance Patient limited by fatigue;Patient tolerated treatment well   Exercises   Quad Sets Supine   Heelslides 15 reps;AROM; Bilateral   Glute Sets Supine;15 reps;AROM; Bilateral   Hip Flexion Sitting;Bilateral;10 reps  (3 x10 reps  )   Hip Abduction Supine;15 reps;AAROM;AROM; Bilateral   Hip Adduction Supine;10 reps;AAROM;AROM; Bilateral   Knee AROM Short Arc Quad Supine;15 reps;AROM; Bilateral   Knee AROM Long Arc Quad Sitting;15 reps;AROM; Bilateral   Ankle Pumps Supine;15 reps;AROM; Bilateral   Assessment   Prognosis Fair   Problem List Decreased strength;Decreased endurance; Impaired balance;Decreased mobility; Impaired hearing;Decreased safety awareness; Impaired sensation;Decreased skin integrity;Obesity   Assessment Retropulsion noted today when performing sit to stand and for first 5' requiring mod assist x2 progressing to min assist x2 and then mod assist x1  Pt progressed with ambulation distances to 18'x1  PT performed supine aa-arom exercises to b/l le's x 15 reps  PT  Tolerated well  NOted fatigue and sob with exertion  PT  performed static standing with min assist x1 x   Pt remained seated out of bed in chair post PT session  SCD's applied to b/l le's post PT session  PT will continue to benefit from skilled inpt PT and rehab to progress mobility inorder to maximize functional I    Goals   Patient Goals " to be able to walk "     [facility] Expiration Date 09/13/17   Treatment Day 2   Plan   Treatment/Interventions Functional transfer training;LE strengthening/ROM; Elevations; Therapeutic exercise; Endurance training;Patient/family training;Equipment eval/education; Bed mobility;Gait training;Spoke to nursing   Progress Progressing toward goals   PT Frequency 5x/wk; Weekend  (1x weekend)   Recommendation   Recommendation Short-term skilled PT   PT - OK to Discharge Yes  (to rehab)         Debbie Gee PTA

## 2017-09-06 NOTE — PLAN OF CARE
Problem: PHYSICAL THERAPY ADULT  Goal: Performs mobility at highest level of function for planned discharge setting  See evaluation for individualized goals  Treatment/Interventions: Functional transfer training, LE strengthening/ROM, Elevations, Therapeutic exercise, Endurance training, Patient/family training, Equipment eval/education, Bed mobility, Gait training, OT  Equipment Recommended: Walker (RW)       See flowsheet documentation for full assessment, interventions and recommendations  Outcome: Progressing  Prognosis: Fair  Problem List: Decreased strength, Decreased endurance, Impaired balance, Decreased mobility, Impaired hearing, Decreased safety awareness, Impaired sensation, Decreased skin integrity, Obesity  Assessment: Retropulsion noted today when performing sit to stand and for first 5' requiring mod assist x2 progressing to min assist x2 and then mod assist x1  Pt progressed with ambulation distances to 18'x1  PT performed supine aa-arom exercises to b/l le's x 15 reps  PT  Tolerated well  NOted fatigue and sob with exertion  PT  performed static standing with min assist x1 x   Pt remained seated out of bed in chair post PT session  SCD's applied to b/l le's post PT session  PT will continue to benefit from skilled inpt PT and rehab to progress mobility inorder to maximize functional I   Barriers to Discharge: Decreased caregiver support, Inaccessible home environment     Recommendation: Short-term skilled PT     PT - OK to Discharge: Yes (to rehab)    See flowsheet documentation for full assessment

## 2017-09-06 NOTE — CASE MANAGEMENT
Continued Stay Review    Date: 9/5    Vital Signs: /63   Pulse 78   Temp (!) 95 9 °F (35 5 °C) (Tympanic)   Resp 18   Ht 5' 9 5" (1 765 m)   Wt 108 kg (238 lb 1 6 oz)   SpO2 96%   BMI 34 66 kg/m²     Medications:   Scheduled Meds:   ferrous sulfate 325 mg Oral BID   fluticasone-salmeterol 1 puff Inhalation BID   furosemide 40 mg Oral Daily   gabapentin 100 mg Oral TID   insulin glargine 24 Units Subcutaneous Q12H EFRAIN   insulin lispro 1-6 Units Subcutaneous HS   insulin lispro 2-12 Units Subcutaneous TID AC   insulin lispro 8 Units Subcutaneous TID With Meals   magnesium oxide 400 mg Oral Daily   oxybutynin 5 mg Oral BID   pravastatin 40 mg Oral Daily With Dinner   tiotropium 18 mcg Inhalation Daily   warfarin 5 mg Oral Once per day on Sun Tue Wed Fri Sat   warfarin 7 5 mg Oral Once per day on Mon Thu     Continuous Infusions:    PRN Meds:   acetaminophen    magnesium hydroxide    ondansetron    Abnormal Labs/Diagnostic Results:Results from last 7 days  Lab Units 09/05/17 0652   09/02/17  1135   WBC Thousand/uL 10 39*  < > 12 46*   HEMOGLOBIN g/dL 12 0  < > 13 1   HEMATOCRIT % 34 5*  < > 39 1   PLATELETS Thousands/uL 258  < > 327   LYMPHO PCT %  --   --  5*   MONO PCT MAN %  --   --  7   EOSINO PCT MANUAL %  --   --  4   < > = values in this interval not displayed      Results from last 7 days  Lab Units 09/05/17 0652   09/02/17  1135   SODIUM mmol/L 135*  < > 138   POTASSIUM mmol/L 4 0  < > 4 2   CHLORIDE mmol/L 98*  < > 98*   CO2 mmol/L 31  < > 34*   BUN mg/dL 30*  < > 27*   CREATININE mg/dL 1 28  < > 1 49*   CALCIUM mg/dL 9 3  < > 9 6   TOTAL PROTEIN g/dL  --   --  7 3   BILIRUBIN TOTAL mg/dL  --   --  0 34   ALK PHOS U/L  --   --  99   ALT U/L  --   --  25   AST U/L  --   --  15   GLUCOSE RANDOM mg/dL 148*  < > 217*   < > = values in this interval not displayed      Results from last 7 days  Lab Units 09/05/17  0652   INR   2 59*         Age/Sex: 78 y o  male     Assessment/Plan:   Principal Problem:    Asthenia  Active Problems:    Decubitus ulcer of heel, stage 3    Leukocytosis    Stage 3 chronic kidney disease    Chronic indwelling Rush catheter    Abnormal urinalysis    Chronic atrial fibrillation    Diabetes mellitus    Chronic diastolic heart failure        Plan:     · Asthenia  ?  Acute on chronic, likely multifactorial given chronic diastolic heart failure, bilateral heel ulcerations and poor mobility at baseline  Initially thought to be secondary to UTI versus infected foot ulcers, however urine cultures significant for mixed olivia and podiatry and Infectious Disease confirmed no evidence of acute infection, large blood on previous UA is resolved  Repeat UA demonstrates positive nitrite and small leuks,  However pt asympomatic and suspect this is 2* colonization  PT recommending inpatient rehab, Case Management consulted/VM left     CKD stage III                        1 49 on admission now to 1 28 today                        BL 1 18-1 4                        Avoid nephrotoxins     Chronic diastolic heart failure                        No evidence of acute exacerbation                        Patient appears euvolemic                        Continue current Lasix regimen     Chronic atrial fibrillation                        Currently rate controlled off of chronotropic agents                        INR is therapeutic at 2 59     DM II                        Uncontrolled                        Fasting 147, meal time 250, similar to previous                        Was on insuling pump in OP and held on admission                        Continue lantus and provide humalog 3 IU AC scheduled, continue SSI     Chronic indwelling Rush catheterization                        Change per home regimen yesterday,     Left/right heel diabetic ulcers                                           Local wound care per podiatry no evident of OM     Leukocytosis                        Suspect physiologic 10 39 on now improved from admission of 12                        Given significant elevation of ESR 94, and CRP 60                        Appreciate ID consult, no further infectious workup as this could be simply from ulcers vs other factors for high inflammatory markers such as cardiac disease     Hx of prostate cancer                        F/u OP urology     Mechanical AVR                        Coumadin therapeutic                        Continue current regimen     VTE Pharmacologic Prophylaxis:   Pharmacologic: Warfarin (Coumadin)  Mechanical VTE Prophylaxis in Place: Yes     Patient Centered Rounds: I have performed bedside rounds with nursing staff today      Discussions with Specialists or Other Care Team Provider: tara infectious disease     Education and Discussions with Family / Patient: work up thus far disposition anastasia for inpatient rehab     Time Spent for Care: 30 minutes  More than 50% of total time spent on counseling and coordination of care as described above      Current Length of Stay: 3 day(s)     Current Patient Status: Inpatient   Certification Statement: The patient will continue to require additional inpatient hospital stay due to placement and evaluation for inpatient rehab     Discharge Plan / Estimated Discharge Date:       ID consult  9/5    1  Elevated ESR  Likely secondary to #2  No signs or symptoms of acute infection  No signs or symptoms of pain acute inflammatory or rheumatologic process  UA negative making UTI unlikely  Chest x-ray shows no evidence of pneumonia  Remains clinically stable off antibiotics  Rec:                 · Continue to follow closely off antibiotics  · Follow temperatures and white blood cell count closely  · If develops new fever would check blood cultures ×2 sets     2    Chronic left heel ulcer  No evidence of acute infection  X-ray negative for osteomyelitis  Exam shows some mild necrosis  Rec:                 § Continue to follow closely off antibiotics  § Continue local wound care as per podiatry  § Close outpatient follow-up at the wound Center ongoing     3  Weakness  Suspect multifactorial, largely due to recent inactivity and deconditioning  Rec:                 § Continue PT while in house  § SNF upon discharge     The patient is stable from an ID standpoint  Discussed in detail with the patient and his wife at the bedside  Discussed in detail with the primary service  We will sign off for now  Please call with new questions      Antibiotics:  None    Endocrinology consult 9/5  Assessment: This is a 78y o -year-old male with diabetes with hyperglycemia, left heel ulcer, stage 3 chronic kidney disease and asthenia      Plan:  1  Type 2 diabetes with hyperglycemia-he is having elevated blood sugar post meal   This shows that his blood sugar is not well controlled during this hospitalization  At home, he takes anywhere between 6-8 units per meal   I will resume 8 units of Humalog with meals  He is requiring significantly less insulin here in the hospital compared to at home  Continue to monitor blood sugar over time and make adjustments to his regimen if necessary  Due to being on intensive insulin therapy, the patient is at high risk of severe hypoglycemia and potential death      2   Left heel ulcer is being managed by Podiatry      3   Stage 3 chronic kidney disease is stable      4    For the asthenia, acute inpatient rehabilitation has been recommended

## 2017-09-06 NOTE — PROGRESS NOTES
Progress Note - Trinity Garza 78 y o  male MRN: [id number]    Unit/Bed#: E4 -01 Encounter: [id number]      Assessment/Plan:  1-generalized weakness:  Acute/chronic:   after prolonged discussion with patient and his wife, it appears that this has been a progressive worsening of his weakness  Patient was hospitalized at [facility] in March of 2017 with pneumonia  He was weak at the time of discharge, had home physical therapy  -patient was just beginning to ambulate, and attempt walking down stairs with physical therapy, when he was diagnosed with a right heel ulcer, and was placed on a nonweightbearing status to his right heel    -since that time he has been essentially nonambulatory, and spent the majority of his time on the couch or transferring from the couch to the chair/commode    -patient was admitted to the hospital to evaluate any underlying etiologies for his deterioration of his general weakness    -patient was not noted to have any acute infectious etiology  -will check B12/B1/folic acid   -his current weakness likely reflects a progressive decline in his strength due to his relative immobility over the past 4 months                        -continue PT/OT                        -will need inpatient short-term rehab     2-chronic kidney disease stage 3:  Baseline creatinine ranges 1 1-1 4  Creatinine currently at baseline  Continue to monitor     3-chronic diastolic congestive heart failure:  No evidence of acute exacerbation  He is currently euvolemic  Continue his Lasix     4-chronic atrial fibrillation:  Patient is currently rate controlled, not on any anti chronotropic agents     His INR is therapeutic at 2 98      5-diabetes type 2:  Patient was on a insulin pump as an outpatient  Patient's complaint held on admission  He is currently on Lantus and sliding scale insulin  His Accu-Cheks have been adequately controlled today:  119, 212     Continue current regimen and monitor      6-chronic indwelling Rush catheter:  Patient's catheter was changed by his home nurse monthly  was changed on 09/04  Continue outpatient catheter changes per home regimen      7-left heel diabetic ulceration:  Craft 3, and sub met 5 laceration:   Appreciate the Podiatry service evaluation  Continue wound care  No evidence of osteomyelitis      8-right heel diabetic ulceration, Craft 2:  Continue wound care per Podiatry  No evidence of osteomyelitis      9-infectious Disease:  leukocytosis:  Patient presented with a leukocytosis with a white blood cell count of 12  This was felt to be most likely secondary to stress response  This improved to 10 7 without antibiotic intervention  Continue to monitor                         -patient's ESR however is markedly elevated at 94, and his CRP is 63                         -the patient's urinalysis/culture is not normal, however this is likely reflective of his chronic colonization due to his chronic Rush catheter                           -patient's wound cultures are growing multiple organisms that are likely colonization as well  Per podiatry there is not felt to be an acute infection/osteomyelitis of the ulcers  Patient was evaluated by the Infectious Disease team and not felt to have any acute infection requiring antibiotics     10-history of prostate cancer:  Continue outpatient follow-up with his primary urologist      11-mechanical AVR:  On chronic anticoagulation with coumadin  INR therapeutic at 2 9    Cont home regimen    12-family:  Updated wife at the bedside      VTE Pharmacologic Prophylaxis: Warfarin (Coumadin)  VTE Mechanical Prophylaxis: sequential compression device    Certification Statement: The patient will continue to require additional inpatient hospital stay due to need for further acute intervention for await inpt STR    Status: inpatient     Discussed with patient's nurse and case manager  ===================================================================    Subjective:  Patient denies any pain anywhere  He denies any headache, chest pain, back pain, abdominal pain  He denies any shortness of breath or cough  He denies any dizziness, lightheadedness  He denies any headache, visual changes, jaw pain or claudication pain  He denies any nausea, vomiting, diarrhea or constipation  Patient's wife is at the bedside  She notes that patient has been weak for the past several months  He had returned home from the hospital and was doing home physical therapy  She notes he just started attempting walking down steps with physical therapy when he developed the ulcer of his right heel back in April  At that time he was placed on nonweightbearing status to his right heel  The note that when that occurred patient spent most of is day on the couch  He would transfer from the couch to the chair, or the bedside commode  He would do exercises while sitting  He however was not ambulating  He noted he has become progressively weak since that time  He required 2 person assist to help him from sitting to standing  His wife notes he has become progressively weak since April  Prior to admission she and her daughter were no longer able to assist him from a sitting to a standing position  Physical Exam:   Temp:  [95 9 °F (35 5 °C)-98 °F (36 7 °C)] 95 9 °F (35 5 °C)  HR:  [70-78] 78  Resp:  [18] 18  BP: (119-132)/(63-64) 119/63    Gen:  Pleasant, non-tachypnic, non-dyspnic  Conversant  Heart: regular rate and rhythm, S1S2 present, no murmur, rub or gallop  Lungs: clear to ausculatation bilaterally  No wheezing, crackles, or rhonchi  No accessory muscle use or respiratory distress  Abd: soft, non-tender, non-distended  NABS, no guarding, rebound or peritoneal signs  Neuro: awake, alert and oriented  Fluent speech  Follows commands        LABS:     Results from last 7 days  Lab Units 09/05/17  0652 09/04/17  0603 09/02/17  1135   WBC Thousand/uL 10 39* 10 73* 12 46*   HEMOGLOBIN g/dL 12 0 11 7* 13 1   HEMATOCRIT % 34 5* 33 9* 39 1   PLATELETS Thousands/uL 258 248 327       Results from last 7 days  Lab Units 09/05/17  0652 09/04/17  0603 09/03/17  0625   SODIUM mmol/L 135* 139 138   POTASSIUM mmol/L 4 0 4 1 4 2   CHLORIDE mmol/L 98* 100 99*   CO2 mmol/L 31 31 32   BUN mg/dL 30* 29* 29*   CREATININE mg/dL 1 28 1 42* 1 43*   GLUCOSE RANDOM mg/dL 148* 137 171*   CALCIUM mg/dL 9 3 9 1 9 3       Hospital Data:    9/3:  Left foot x-ray:  No osteomyelitis     9/2:  Chest x-ray:  No acute disease     9/2:  Wound culture:  Staph aureus/Enterococcus     9/2:  Urine culture:  Greater than 100,000 gram-negative kashif  [id number] Enterococcus       ---------------------------------------------------------------------------------------------------------------  This note has been constructed using a voice recognition system

## 2017-09-06 NOTE — PLAN OF CARE

## 2017-09-07 ENCOUNTER — ANESTHESIA EVENT (INPATIENT)
Dept: PERIOP | Facility: HOSPITAL | Age: 77
DRG: 616 | End: 2017-09-07
Payer: MEDICARE

## 2017-09-07 ENCOUNTER — APPOINTMENT (INPATIENT)
Dept: NON INVASIVE DIAGNOSTICS | Facility: HOSPITAL | Age: 77
DRG: 616 | End: 2017-09-07
Payer: MEDICARE

## 2017-09-07 ENCOUNTER — APPOINTMENT (INPATIENT)
Dept: RADIOLOGY | Facility: HOSPITAL | Age: 77
DRG: 616 | End: 2017-09-07
Payer: MEDICARE

## 2017-09-07 LAB
FOLATE SERPL-MCNC: >20 NG/ML (ref 3.1–17.5)
GLUCOSE SERPL-MCNC: 108 MG/DL (ref 65–140)
GLUCOSE SERPL-MCNC: 148 MG/DL (ref 65–140)
GLUCOSE SERPL-MCNC: 215 MG/DL (ref 65–140)
GLUCOSE SERPL-MCNC: 227 MG/DL (ref 65–140)
INR PPP: 2.61 (ref 0.86–1.16)
PROTHROMBIN TIME: 28.3 SECONDS (ref 12.1–14.4)
VIT B12 SERPL-MCNC: 609 PG/ML (ref 100–900)

## 2017-09-07 PROCEDURE — 84425 ASSAY OF VITAMIN B-1: CPT | Performed by: INTERNAL MEDICINE

## 2017-09-07 PROCEDURE — 93925 LOWER EXTREMITY STUDY: CPT

## 2017-09-07 PROCEDURE — 73650 X-RAY EXAM OF HEEL: CPT

## 2017-09-07 PROCEDURE — 85610 PROTHROMBIN TIME: CPT | Performed by: INTERNAL MEDICINE

## 2017-09-07 PROCEDURE — 82607 VITAMIN B-12: CPT | Performed by: INTERNAL MEDICINE

## 2017-09-07 PROCEDURE — 82948 REAGENT STRIP/BLOOD GLUCOSE: CPT

## 2017-09-07 PROCEDURE — 82746 ASSAY OF FOLIC ACID SERUM: CPT | Performed by: INTERNAL MEDICINE

## 2017-09-07 PROCEDURE — 93923 UPR/LXTR ART STDY 3+ LVLS: CPT

## 2017-09-07 RX ORDER — PHYTONADIONE 5 MG/1
10 TABLET ORAL ONCE
Status: COMPLETED | OUTPATIENT
Start: 2017-09-07 | End: 2017-09-07

## 2017-09-07 RX ORDER — INSULIN GLARGINE 100 [IU]/ML
12 INJECTION, SOLUTION SUBCUTANEOUS
Status: DISCONTINUED | OUTPATIENT
Start: 2017-09-07 | End: 2017-09-10

## 2017-09-07 RX ADMIN — FLUTICASONE PROPIONATE AND SALMETEROL 1 PUFF: 50; 500 POWDER RESPIRATORY (INHALATION) at 21:12

## 2017-09-07 RX ADMIN — FERROUS SULFATE TAB 325 MG (65 MG ELEMENTAL FE) 325 MG: 325 (65 FE) TAB at 17:54

## 2017-09-07 RX ADMIN — INSULIN GLARGINE 12 UNITS: 100 INJECTION, SOLUTION SUBCUTANEOUS at 22:09

## 2017-09-07 RX ADMIN — FUROSEMIDE 40 MG: 40 TABLET ORAL at 09:46

## 2017-09-07 RX ADMIN — INSULIN LISPRO 4 UNITS: 100 INJECTION, SOLUTION INTRAVENOUS; SUBCUTANEOUS at 12:47

## 2017-09-07 RX ADMIN — FERROUS SULFATE TAB 325 MG (65 MG ELEMENTAL FE) 325 MG: 325 (65 FE) TAB at 09:46

## 2017-09-07 RX ADMIN — INSULIN GLARGINE 24 UNITS: 100 INJECTION, SOLUTION SUBCUTANEOUS at 09:54

## 2017-09-07 RX ADMIN — INSULIN LISPRO 8 UNITS: 100 INJECTION, SOLUTION INTRAVENOUS; SUBCUTANEOUS at 12:47

## 2017-09-07 RX ADMIN — PRAVASTATIN SODIUM 40 MG: 40 TABLET ORAL at 17:54

## 2017-09-07 RX ADMIN — INSULIN LISPRO 8 UNITS: 100 INJECTION, SOLUTION INTRAVENOUS; SUBCUTANEOUS at 07:31

## 2017-09-07 RX ADMIN — GABAPENTIN 100 MG: 100 CAPSULE ORAL at 21:09

## 2017-09-07 RX ADMIN — OXYBUTYNIN CHLORIDE 5 MG: 5 TABLET ORAL at 17:54

## 2017-09-07 RX ADMIN — FLUTICASONE PROPIONATE AND SALMETEROL 1 PUFF: 50; 500 POWDER RESPIRATORY (INHALATION) at 07:31

## 2017-09-07 RX ADMIN — Medication 400 MG: at 09:46

## 2017-09-07 RX ADMIN — TIOTROPIUM BROMIDE 18 MCG: 18 CAPSULE ORAL; RESPIRATORY (INHALATION) at 09:45

## 2017-09-07 RX ADMIN — OXYBUTYNIN CHLORIDE 5 MG: 5 TABLET ORAL at 09:46

## 2017-09-07 RX ADMIN — INSULIN LISPRO 8 UNITS: 100 INJECTION, SOLUTION INTRAVENOUS; SUBCUTANEOUS at 17:56

## 2017-09-07 RX ADMIN — GABAPENTIN 100 MG: 100 CAPSULE ORAL at 17:54

## 2017-09-07 RX ADMIN — PHYTONADIONE 10 MG: 5 TABLET ORAL at 23:52

## 2017-09-07 RX ADMIN — INSULIN LISPRO 2 UNITS: 100 INJECTION, SOLUTION INTRAVENOUS; SUBCUTANEOUS at 22:09

## 2017-09-07 RX ADMIN — GABAPENTIN 100 MG: 100 CAPSULE ORAL at 09:46

## 2017-09-07 NOTE — PROGRESS NOTES
Progress Note - Podiatry  Tiarra Woody 78 y o  male MRN: [id number]  Unit/Bed#: E4 -01 Encounter: [id number]    Assessment/Plan:  3  77 yo M with left foot ulcerations (to lateral ankle and plantar foot) with probable osteomyelitis to the lateral calcaneus secondary to malodor and probing to bone  -lateral foot wound is superficial and stable with some serosanguinous drainage   -lateral heel wound is fibrotic/necrotic in nature with some probing to bone  However there is no cellulitis  No purulence  Will order santyl for enzymatic debridement    -dressings applied: maxorb with dry sterile dressing    -will continue to provide [facility] while in house   -patient will f/u at wound care center upon discharge   -c/w offloading heels with pillows and prevalon boots   -PT on board, recommending STR   -final wound cultures growing MSSA and enterococcus faecalis  Monitoring off antibiotics   -X-ray results from 9/3/17 reviewed, no definite osteomyelitis   -ordered left heel x-ray to assess the lateral aspect of the calcaneus   -will d/w attending     2  Right posterior heel ulceration   -wound is stable with no acute signs of infection  -dressings applied: maxsorb with dry sterile dressing   -continue [facility] while in house     3  DM, type 2 with neuropathy  -management per SLIM     4  CKD stage 3  -management per SLIM    5  PAD  - arterial duplex ordered       Subjective/Objective   Chief Complaint:   Chief Complaint   Patient presents with    Weakness - Generalized     pt woke up this morning just feeling very weak and unable to get up   pt denies fevers, n/v   denies any pain or sick contacts  Subjective: 78 y o  y/o male was seen and evaluated at bedside in NAD  Denies any complains  Blood pressure 144/64, pulse 75, temperature (!) 97 °F (36 1 °C), temperature source Tympanic, resp  rate 18, height 5' 9 5" (1 765 m), weight 108 kg (238 lb 1 6 oz), SpO2 95 %  ,Body mass index is 34 66 kg/m²      Invasive Devices Peripheral Intravenous Line            Peripheral IV 09/07/17 Right Arm less than 1 day          Drain            Urethral Catheter 18 Fr  4 days                Physical Exam:   General: Alert, cooperative and no distress  Lungs: Non labored breathing  Heart: Positive S1, S2  Abdomen: Soft, non-tender  Extremity:   NVS and motor function at baseline  Dressings dry clean and intact upon exam    1) Right foot posterior heel wound is dry and stable without acute signs of infection ~ 0 5cm x0 3cm x0 2cm with fibrogranular wound base  2) Left posterior lateral heel wound 3 5cm x1 5cm x1cm with fibrogranular and necrotic wound base  Probes to bone and has some malodor  3) left plantar lateral foot wound measures 2 5cm x3cm x0 1cm with mostly granular wound base and point of necrosis centrally  Right posterior heel                                             Left lateral foot         Left lateral heel       Lab, Imaging and other studies:   I have personally reviewed pertinent lab results  , CBC: No results found for: WBC, HGB, HCT, MCV, PLT, ADJUSTEDWBC, MCH, MCHC, RDW, MPV, NRBC, CMP: No results found for: NA, K, CL, CO2, ANIONGAP, BUN, CREATININE, GLUCOSE, CALCIUM, AST, ALT, ALKPHOS, PROT, ALBUMIN, BILITOT, EGFR    Imaging: I have personally reviewed pertinent films in PACS  EKG, Pathology, and Other Studies: I have personally reviewed pertinent reports    VTE Pharmacologic Prophylaxis: Warfarin (Coumadin)

## 2017-09-07 NOTE — SOCIAL WORK
Patient was accepted at [facility], but then he was not medically cleared by podiatry because of suspicion of osteomyelitis to the left heel  An MRI was ordered to rule it out, therefore discharge is delayed until them

## 2017-09-07 NOTE — PROGRESS NOTES
Podiatry update:   - will order MRI to rule osteomyelitis to the left heel calcaneus   - patient to OR tomorrow for debridement of wound and possibly bone on the left heel with Dr Dacia Brannon in the afternoon   - NPO starting midnight   - will ask [facility] service to reverse warfarin anticoagulation   - consent was discussed with the patient and signed

## 2017-09-08 ENCOUNTER — ANESTHESIA (INPATIENT)
Dept: PERIOP | Facility: HOSPITAL | Age: 77
DRG: 616 | End: 2017-09-08
Payer: MEDICARE

## 2017-09-08 LAB
ABO GROUP BLD: NORMAL
ANION GAP SERPL CALCULATED.3IONS-SCNC: 3 MMOL/L (ref 4–13)
BLD GP AB SCN SERPL QL: NEGATIVE
BUN SERPL-MCNC: 28 MG/DL (ref 5–25)
CALCIUM SERPL-MCNC: 9.2 MG/DL (ref 8.3–10.1)
CHLORIDE SERPL-SCNC: 99 MMOL/L (ref 100–108)
CO2 SERPL-SCNC: 32 MMOL/L (ref 21–32)
CREAT SERPL-MCNC: 1.19 MG/DL (ref 0.6–1.3)
ERYTHROCYTE [DISTWIDTH] IN BLOOD BY AUTOMATED COUNT: 14.7 % (ref 11.6–15.1)
GFR SERPL CREATININE-BSD FRML MDRD: 60 ML/MIN/1.73SQ M
GLUCOSE SERPL-MCNC: 106 MG/DL (ref 65–140)
GLUCOSE SERPL-MCNC: 109 MG/DL (ref 65–140)
GLUCOSE SERPL-MCNC: 110 MG/DL (ref 65–140)
GLUCOSE SERPL-MCNC: 130 MG/DL (ref 65–140)
GLUCOSE SERPL-MCNC: 283 MG/DL (ref 65–140)
HCT VFR BLD AUTO: 35.4 % (ref 36.5–49.3)
HGB BLD-MCNC: 11.8 G/DL (ref 12–17)
INR PPP: 2.14 (ref 0.86–1.16)
MCH RBC QN AUTO: 28.4 PG (ref 26.8–34.3)
MCHC RBC AUTO-ENTMCNC: 33.3 G/DL (ref 31.4–37.4)
MCV RBC AUTO: 85 FL (ref 82–98)
PLATELET # BLD AUTO: 355 THOUSANDS/UL (ref 149–390)
PMV BLD AUTO: 9.1 FL (ref 8.9–12.7)
POTASSIUM SERPL-SCNC: 4.2 MMOL/L (ref 3.5–5.3)
PROTHROMBIN TIME: 24.1 SECONDS (ref 12.1–14.4)
RBC # BLD AUTO: 4.16 MILLION/UL (ref 3.88–5.62)
RH BLD: POSITIVE
SODIUM SERPL-SCNC: 134 MMOL/L (ref 136–145)
SPECIMEN EXPIRATION DATE: NORMAL
WBC # BLD AUTO: 11.5 THOUSAND/UL (ref 4.31–10.16)

## 2017-09-08 PROCEDURE — 0QBM0ZZ EXCISION OF LEFT TARSAL, OPEN APPROACH: ICD-10-PCS | Performed by: PODIATRIST

## 2017-09-08 PROCEDURE — 80048 BASIC METABOLIC PNL TOTAL CA: CPT | Performed by: INTERNAL MEDICINE

## 2017-09-08 PROCEDURE — 85027 COMPLETE CBC AUTOMATED: CPT | Performed by: INTERNAL MEDICINE

## 2017-09-08 PROCEDURE — 87205 SMEAR GRAM STAIN: CPT | Performed by: PODIATRIST

## 2017-09-08 PROCEDURE — 87176 TISSUE HOMOGENIZATION CULTR: CPT | Performed by: PODIATRIST

## 2017-09-08 PROCEDURE — 88304 TISSUE EXAM BY PATHOLOGIST: CPT | Performed by: PODIATRIST

## 2017-09-08 PROCEDURE — 86900 BLOOD TYPING SEROLOGIC ABO: CPT | Performed by: INTERNAL MEDICINE

## 2017-09-08 PROCEDURE — 86901 BLOOD TYPING SEROLOGIC RH(D): CPT | Performed by: INTERNAL MEDICINE

## 2017-09-08 PROCEDURE — 97532 HB COGNITIVE SKILLS DEVELOPMENT: CPT

## 2017-09-08 PROCEDURE — 85610 PROTHROMBIN TIME: CPT | Performed by: INTERNAL MEDICINE

## 2017-09-08 PROCEDURE — 86850 RBC ANTIBODY SCREEN: CPT | Performed by: INTERNAL MEDICINE

## 2017-09-08 PROCEDURE — 88311 DECALCIFY TISSUE: CPT | Performed by: PODIATRIST

## 2017-09-08 PROCEDURE — 82948 REAGENT STRIP/BLOOD GLUCOSE: CPT

## 2017-09-08 PROCEDURE — 87070 CULTURE OTHR SPECIMN AEROBIC: CPT | Performed by: PODIATRIST

## 2017-09-08 RX ORDER — FENTANYL CITRATE/PF 50 MCG/ML
25 SYRINGE (ML) INJECTION
Status: DISCONTINUED | OUTPATIENT
Start: 2017-09-08 | End: 2017-09-08 | Stop reason: HOSPADM

## 2017-09-08 RX ORDER — PROPOFOL 10 MG/ML
INJECTION, EMULSION INTRAVENOUS AS NEEDED
Status: DISCONTINUED | OUTPATIENT
Start: 2017-09-08 | End: 2017-09-08 | Stop reason: SURG

## 2017-09-08 RX ORDER — MAGNESIUM HYDROXIDE 1200 MG/15ML
LIQUID ORAL AS NEEDED
Status: DISCONTINUED | OUTPATIENT
Start: 2017-09-08 | End: 2017-09-08 | Stop reason: HOSPADM

## 2017-09-08 RX ORDER — ONDANSETRON 2 MG/ML
INJECTION INTRAMUSCULAR; INTRAVENOUS AS NEEDED
Status: DISCONTINUED | OUTPATIENT
Start: 2017-09-08 | End: 2017-09-08 | Stop reason: SURG

## 2017-09-08 RX ORDER — ONDANSETRON 2 MG/ML
4 INJECTION INTRAMUSCULAR; INTRAVENOUS ONCE AS NEEDED
Status: DISCONTINUED | OUTPATIENT
Start: 2017-09-08 | End: 2017-09-08 | Stop reason: HOSPADM

## 2017-09-08 RX ORDER — SODIUM CHLORIDE 9 MG/ML
125 INJECTION, SOLUTION INTRAVENOUS CONTINUOUS
Status: DISCONTINUED | OUTPATIENT
Start: 2017-09-08 | End: 2017-09-08

## 2017-09-08 RX ORDER — PROPOFOL 10 MG/ML
INJECTION, EMULSION INTRAVENOUS CONTINUOUS PRN
Status: DISCONTINUED | OUTPATIENT
Start: 2017-09-08 | End: 2017-09-08 | Stop reason: SURG

## 2017-09-08 RX ORDER — FENTANYL CITRATE 50 UG/ML
INJECTION, SOLUTION INTRAMUSCULAR; INTRAVENOUS AS NEEDED
Status: DISCONTINUED | OUTPATIENT
Start: 2017-09-08 | End: 2017-09-08 | Stop reason: SURG

## 2017-09-08 RX ORDER — MIDAZOLAM HYDROCHLORIDE 1 MG/ML
INJECTION INTRAMUSCULAR; INTRAVENOUS AS NEEDED
Status: DISCONTINUED | OUTPATIENT
Start: 2017-09-08 | End: 2017-09-08 | Stop reason: SURG

## 2017-09-08 RX ADMIN — SODIUM CHLORIDE 125 ML/HR: 0.9 INJECTION, SOLUTION INTRAVENOUS at 14:53

## 2017-09-08 RX ADMIN — INSULIN GLARGINE 12 UNITS: 100 INJECTION, SOLUTION SUBCUTANEOUS at 21:51

## 2017-09-08 RX ADMIN — FLUTICASONE PROPIONATE AND SALMETEROL 1 PUFF: 50; 500 POWDER RESPIRATORY (INHALATION) at 21:50

## 2017-09-08 RX ADMIN — FERROUS SULFATE TAB 325 MG (65 MG ELEMENTAL FE) 325 MG: 325 (65 FE) TAB at 17:48

## 2017-09-08 RX ADMIN — INSULIN LISPRO 4 UNITS: 100 INJECTION, SOLUTION INTRAVENOUS; SUBCUTANEOUS at 21:51

## 2017-09-08 RX ADMIN — FLUTICASONE PROPIONATE AND SALMETEROL 1 PUFF: 50; 500 POWDER RESPIRATORY (INHALATION) at 08:35

## 2017-09-08 RX ADMIN — INSULIN LISPRO 8 UNITS: 100 INJECTION, SOLUTION INTRAVENOUS; SUBCUTANEOUS at 17:58

## 2017-09-08 RX ADMIN — TIOTROPIUM BROMIDE 18 MCG: 18 CAPSULE ORAL; RESPIRATORY (INHALATION) at 12:08

## 2017-09-08 RX ADMIN — GABAPENTIN 100 MG: 100 CAPSULE ORAL at 17:58

## 2017-09-08 RX ADMIN — OXYBUTYNIN CHLORIDE 5 MG: 5 TABLET ORAL at 17:51

## 2017-09-08 RX ADMIN — GABAPENTIN 100 MG: 100 CAPSULE ORAL at 21:50

## 2017-09-08 RX ADMIN — GABAPENTIN 100 MG: 100 CAPSULE ORAL at 08:36

## 2017-09-08 RX ADMIN — FUROSEMIDE 40 MG: 40 TABLET ORAL at 08:36

## 2017-09-08 RX ADMIN — Medication 400 MG: at 08:36

## 2017-09-08 RX ADMIN — MIDAZOLAM HYDROCHLORIDE 2 MG: 1 INJECTION, SOLUTION INTRAMUSCULAR; INTRAVENOUS at 15:01

## 2017-09-08 RX ADMIN — ONDANSETRON HYDROCHLORIDE 4 MG: 2 INJECTION, SOLUTION INTRAVENOUS at 16:14

## 2017-09-08 RX ADMIN — FERROUS SULFATE TAB 325 MG (65 MG ELEMENTAL FE) 325 MG: 325 (65 FE) TAB at 08:36

## 2017-09-08 RX ADMIN — PROPOFOL 50 MG: 10 INJECTION, EMULSION INTRAVENOUS at 15:05

## 2017-09-08 RX ADMIN — COLLAGENASE SANTYL: 250 OINTMENT TOPICAL at 08:38

## 2017-09-08 RX ADMIN — PROPOFOL 50 MCG/KG/MIN: 10 INJECTION, EMULSION INTRAVENOUS at 15:10

## 2017-09-08 RX ADMIN — PRAVASTATIN SODIUM 40 MG: 40 TABLET ORAL at 17:57

## 2017-09-08 RX ADMIN — OXYBUTYNIN CHLORIDE 5 MG: 5 TABLET ORAL at 08:36

## 2017-09-08 RX ADMIN — FENTANYL CITRATE 100 MCG: 50 INJECTION, SOLUTION INTRAMUSCULAR; INTRAVENOUS at 15:05

## 2017-09-08 NOTE — PROGRESS NOTES
[facility] Progress Note  Patient: Shirin Duncan 78 y o  male   MRN: [id number]  PCP: Millie Luz MD  Unit/Bed#: E4 -01 Encounter: [id number]  Date Of Visit: 09/08/17    Assessment:    Principal Problem:    Asthenia  Active Problems:    Decubitus ulcer of heel, stage 3    Leukocytosis    Stage 3 chronic kidney disease    Chronic indwelling Rush catheter    Abnormal urinalysis    Chronic atrial fibrillation    Diabetes mellitus    Chronic diastolic heart failure      Plan:    · Generalized weakness:  Acute/chronic which has been progressive    -Hospitalization [facility] March 2017 with pneumonia-week at discharge, had home PT   -Beginning to ambulate in walk downstairs when diagnosed with right heel ulcer   -Placed on nonweightbearing status to right heel   -Since then has been essentially nonambulatory with majority of time on couch   -Admits to [facility] to evaluate for underlying etiologies of deterioration   -No acute infectious etiology, normal B12/B1/folic acid levels   -Current weakness reflects a progressive decline in strength due to relative immobility over past 4 months   -Continue PT/OT   -Most likely will need inpatient shortly rehab    · Chronic kidney disease stage 3:  Baseline 1 1-1 4  Creatinine today 1  19     · Chronic diastolic CHF:  Euvolemic  No acute exacerbation  Continue Lasix  · Chronic atrial fibrillation:  Currently rate controlled HR in 70's  Coumadin held yesterday for OR this afternoon  · Type 2 diabetes: On insulin pump as an outpatient which was held upon admission  Currently on Lantus and insulin sliding scale  Lantus evening dose was halved given OR today and his morning dose of Lantus was held  Postoperatively patient will resume his current regimen of Lantus 24 units every 12 hours once tolerating an oral diet  · Chronic indwelling Rush catheter:  Pre-hospital catheter was changed by his home nurse monthly  Last changed on 9/4/17      · Left heel diabetic ulcer:  Per Podiatry x-rays consistent for possible early osteomyelitis  MRI pending  Patient to OR today for I and D and partial calcanectomy  Now in the setting of newly discovered PAD  · Right heel diabetic ulceration:  Continue with wound care by Podiatry  No osteomyelitis present  · PAD:  Podiatry reviewed arterial leads with vascular tech  -LLE 50-75% stenosis distal SFA with diffuse femoral and popliteal disease, ABIs noncompressible  -RLE with diffuse disease throughout femoral and popliteal arteries, no significant stenosis, ABIs unreliable  -Awaiting official report  -Vascular consulted    · Leukocytosis:  Infectious Disease following  Initially with WBC count of 12  Today 11  Felt most likely secondary to stress response  Urinalysis/culture is not normal however likely reflective of chronic colonization due to chronic Rush catheter  Wound cultures growing multiple organisms however initially felt to be due to colonization  Given change in clinical exam, Podiatry is concerned for x-rays with possible early osteomyelitis  MRI pending and pt to go to OR today  · Mechanical AVR:  On chronic anticoagulation with Coumadin  Goal INR 2 to 3   -INR yesterday 2 61    -Podiatry wishes INR less than 2 for operative intervention     -Coumadin held last night and patient received 1 dose of vitamin K 10 mg orally  -INR today 2 14   -Will require perioperative bridging anticoagulation with heparin infusion versus Lovenox once INR is less than 2     · History of prostate cancer:  Continue outpatient follow-up      This case was discussed with Podiatry resident who has accepted transfer to their service  We will continue to see patient in consultation for medical management  VTE Pharmacologic Prophylaxis:   Pharmacologic: Warfarin (Coumadin)- will require bridging once INR is subtherapeutic given mechanical AVR  Mechanical VTE Prophylaxis in Place: Yes    Discharge Plan:   Will require inpatient stay for further acute intervention and most likely need inpatient rehab upon discharge  Discussions with Specialists or Other Care Team Provider:  Dr Charisma Morales, podiatry resident    Education and Discussions with Family / Patient:  Patient, offered to update wife- pt declined says she will be in later    Time Spent for Care: 30 minutes  More than 50% of total time spent on counseling and coordination of care as described above  Current Length of Stay: 6 day(s)  Current Patient Status: Inpatient   Code Status: Level 1 - Full Code    Subjective:   Patient resting comfortably in bed  With plans to go to OR this afternoon  Otherwise with no complaints or pain  Denies any shortness of breath, chest pain or pressure, palpitations, lightheadedness, nausea or vomiting, diarrhea or constipation  Objective:     Vitals:   Temp (24hrs), Av 6 °F (36 4 °C), Min:96 6 °F (35 9 °C), Max:98 3 °F (36 8 °C)    HR:  [50-79] 79  Resp:  [18] 18  BP: (128-140)/(58-72) 140/72  SpO2:  [94 %-97 %] 96 %  Body mass index is 34 66 kg/m²  Input and Output Summary (last 24 hours): Intake/Output Summary (Last 24 hours) at 17 1052  Last data filed at 17 0630   Gross per 24 hour   Intake              180 ml   Output             1950 ml   Net            -1770 ml       Physical Exam:     Physical Exam   Constitutional: He is oriented to person, place, and time  He appears well-developed and well-nourished  HENT:   Head: Normocephalic and atraumatic  Eyes: Conjunctivae are normal    Cardiovascular: Normal rate and regular rhythm  Murmur heard  Pulmonary/Chest: Effort normal and breath sounds normal  No respiratory distress  He has no wheezes  He has no rales  He exhibits no tenderness  Abdominal: Soft  Bowel sounds are normal  He exhibits no distension and no mass  There is no tenderness  There is no rebound and no guarding  Musculoskeletal: He exhibits no edema     Neurological: He is alert and oriented to person, place, and time  Skin: Skin is warm and dry  Venous stasis changes present to shins bilaterally  Feet partially gauze wrap with offloading boots intact   Psychiatric: He has a normal mood and affect  His behavior is normal  Judgment and thought content normal    Nursing note and vitals reviewed  Additional Data:     Labs:      Results from last 7 days  Lab Units 09/08/17  0640  09/02/17  1135   WBC Thousand/uL 11 50*  < > 12 46*   HEMOGLOBIN g/dL 11 8*  < > 13 1   HEMATOCRIT % 35 4*  < > 39 1   PLATELETS Thousands/uL 355  < > 327   LYMPHO PCT %  --   --  5*   MONO PCT MAN %  --   --  7   EOSINO PCT MANUAL %  --   --  4   < > = values in this interval not displayed  Results from last 7 days  Lab Units 09/08/17  0640  09/02/17  1135   SODIUM mmol/L 134*  < > 138   POTASSIUM mmol/L 4 2  < > 4 2   CHLORIDE mmol/L 99*  < > 98*   CO2 mmol/L 32  < > 34*   BUN mg/dL 28*  < > 27*   CREATININE mg/dL 1 19  < > 1 49*   CALCIUM mg/dL 9 2  < > 9 6   TOTAL PROTEIN g/dL  --   --  7 3   BILIRUBIN TOTAL mg/dL  --   --  0 34   ALK PHOS U/L  --   --  99   ALT U/L  --   --  25   AST U/L  --   --  15   GLUCOSE RANDOM mg/dL 106  < > 217*   < > = values in this interval not displayed  Results from last 7 days  Lab Units 09/08/17  0640   INR  2 14*       * I Have Reviewed All Lab Data Listed Above  * Additional Pertinent Lab Tests Reviewed:  [facility]    Imaging:    Imaging Reports Reviewed Today Include:   Imaging Personally Reviewed by Myself Includes:      Recent Cultures (last 7 days):       Results from last 7 days  Lab Units 09/02/17  1504 09/02/17  1151   GRAM STAIN RESULT  1+ Polys  4+ Gram positive cocci in pairs, chains and clusters  4+ Gram negative rods  Rare Gram positive rods  --    URINE CULTURE   --  >100,000 cfu/ml Mixed Contaminants X3   WOUND CULTURE  3+ Growth of Staphylococcus aureus  3+ Growth of Enterococcus faecalis  3+ Growth of Mixed Skin Sabiha  --        Last 24 Hours Medication List:     collagenase  Topical Daily   ferrous sulfate 325 mg Oral BID   fluticasone-salmeterol 1 puff Inhalation BID   furosemide 40 mg Oral Daily   gabapentin 100 mg Oral TID   insulin glargine 12 Units Subcutaneous HS   insulin lispro 1-6 Units Subcutaneous HS   insulin lispro 12 Units Subcutaneous Daily Before Breakfast   insulin lispro 2-12 Units Subcutaneous TID AC   insulin lispro 8 Units Subcutaneous Daily Before Lunch   insulin lispro 8 Units Subcutaneous Before Dinner   magnesium oxide 400 mg Oral Daily   oxybutynin 5 mg Oral BID   pravastatin 40 mg Oral Daily With Dinner   tiotropium 18 mcg Inhalation Daily        Today, Patient Was Seen By: Qasim Rome PA-C    ** Please Note: This note has been constructed using a voice recognition system   **

## 2017-09-08 NOTE — OCCUPATIONAL THERAPY NOTE
Occupational Therapy Treatment Note     09/08/17 1245   Restrictions/Precautions   Weight Bearing Precautions Per Order Yes   RLE Weight Bearing Per Order WBAT   LLE Weight Bearing Per Order WBAT   Other Precautions Bed Alarm;WBS;Multiple lines; Fall Risk;Hard of hearing   Pain Assessment   Pain Assessment No/denies pain   Pain Score No Pain   Cognition   Overall Cognitive Status WFL   Arousal/Participation Alert; Cooperative   Attention Within functional limits   Orientation Level Oriented X4   Memory Decreased short term memory   Following Commands Follows multistep commands with increased time or repetition   Comments Increased processing time needed   Cognition Assessment Tools MOCA   Score 25   Additional Activities   Additional Activities Other (Comment)  (Reviewed energy conservation packet)   Additional Activities Comments Pt verbalized understanding of the importance of energy conservation   Activity Tolerance   Activity Tolerance Patient tolerated treatment well   Medical Staff Made Aware Reported all findings to nursing staff  Assessment   Assessment Pt seen for skilled OT with a focus on completion of Evans Cognitive Assessment Version 7 1 and review of energy conservation techniques  Pt positioned with bed as chair to encourage upright positioning  Pt verbalized understanding of energy conservation techniques  Pt completed MOCA with a score of 25/30  Attention: 4/6; Language: 2/3  Score indicates pt has mild cognitive deficits  Pt may benefit from further rehab to achieve highest levels of independence with all functional tasks  Plan   Treatment Interventions ADL retraining;Functional transfer training;UE strengthening/ROM; Endurance training; Activityengagement; Energy conservation; Compensatory technique education   Goal Expiration Date 09/14/17   Treatment Day 2   OT Frequency 3-5x/wk   Recommendation   Discharge Recommendation Short Term Rehab   [facility]

## 2017-09-08 NOTE — CONSULTS
Consult Note - Vascular Surgery   [facility]: [phone]    Assessment:  Peripheral arterial disease  Left lateral ankle ulceration with osteomyelitis to the lateral calcaneus  Right heel ulceration  DM II with neuropathy  CKD 3  CAD status post CABG and mechanical AVR  AFib      Plan:  -LEAD showed L SFA stenosis, SIOBHAN 1 81, /GTP 44  -Planned to go to OR today with podiatry for I+D L calcaneous   -Recommending Arteriogram and revascularization of the left lower extremity to maximize healing potential  Will consult IR  -No intervention on RLE since wound is healing with local wound care  -CKD 3  Creatinine 1 19 today  Follows [employer] as an out patient  Will consult nephrology for optimization for agram next week      ______________________________________________________________________    Consulting Service: Podiatry     Chief Complaint:  Bilateral foot wounds    HPI: Cristin Asher is a 78 y o  male with DM II w/ neuropathy, CAD s/p CABG + mechanical AVR, Afib, CKD 3, 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 who presented on 09/02 with generalized weakness and inability to bear weight on his feet  He has been followed at as an outpatient for bilateral heel ulcerations [facility] under care of Dr Cavazos Laser   He has had an ulceration of the right heel for several months and this has been improving with local wound care  Approximately 3 weeks ago he developed an ulceration of the left lateral heel that has quickly progressed and found to have osteomyelitis of the calcaneus on xray 9/7  He is to go to the operating room with podiatry today for incision and drainage  Lower extremity arterial duplex done yesterday preliminarily showed a left 50-75%  SFA stenosis with diffuse fem-pop disease and tibial peroneal disease, SIOBHAN 1 81 (super normal), metatarsal pressure of 129 and great toe pressure of 44    On the right showed diffuse fem-pop disease with no focal stenosis, SIOBHAN 1 81, metatarsal pressure not obtained due to dressing,great toe pressure of 68  Vascular was consulted  Patient has no prior history of peripheral interventions  She denies ischemic rest pain  He does admit to some chronic lower extremity fatigue with walking a distance  Review of Systems:  General: negative  Cardiovascular: no chest pain or dyspnea on exertion  Respiratory: no cough, shortness of breath, or wheezing  Gastrointestinal: no abdominal pain, change in bowel habits, or black or bloody stools  Genitourinary ROS: positive for - beckett  Musculoskeletal ROS: positive for - gait disturbance  Neurological ROS: no TIA or stroke symptoms  Hematological and Lymphatic ROS: negative  Dermatological ROS: negative  Psychological ROS: negative  Ophthalmic ROS: negative  ENT ROS: negative    Past Medical History:  Past Medical History:   Diagnosis Date    Anemia     Aortic stenosis     Atrial fibrillation     Chronic kidney disease     stage 3    COPD (chronic obstructive pulmonary disease)     Diabetes mellitus     Hyperlipidemia     Hypertension     Sleep apnea        Past Surgical History:  History reviewed  No pertinent surgical history  Social History:  History   Alcohol Use No     History   Drug Use No     History   Smoking Status    Former Smoker   Smokeless Tobacco    Not on file       Family History:  History reviewed  No pertinent family history  Allergies:   Allergies   Allergen Reactions    Aspirin      Chest tightness       Medications:  Current Facility-Administered Medications   Medication Dose Route Frequency    acetaminophen (TYLENOL) tablet 650 mg  650 mg Oral Q6H PRN    collagenase (SANTYL) ointment   Topical Daily    ferrous sulfate tablet 325 mg  325 mg Oral BID    fluticasone-salmeterol (ADVAIR) 500-50 mcg/dose inhaler 1 puff  1 puff Inhalation BID    furosemide (LASIX) tablet 40 mg  40 mg Oral Daily    gabapentin (NEURONTIN) capsule 100 mg  100 mg Oral TID    insulin glargine (LANTUS) subcutaneous injection 12 Units  12 Units Subcutaneous HS    insulin lispro (HumaLOG) 100 units/mL subcutaneous injection 1-6 Units  1-6 Units Subcutaneous HS    insulin lispro (HumaLOG) 100 units/mL subcutaneous injection 12 Units  12 Units Subcutaneous Daily Before Breakfast    insulin lispro (HumaLOG) 100 units/mL subcutaneous injection 2-12 Units  2-12 Units Subcutaneous TID AC    insulin lispro (HumaLOG) 100 units/mL subcutaneous injection 8 Units  8 Units Subcutaneous Daily Before Lunch    insulin lispro (HumaLOG) 100 units/mL subcutaneous injection 8 Units  8 Units Subcutaneous Before Dinner    magnesium hydroxide (MILK OF MAGNESIA) 400 mg/5 mL oral suspension 30 mL  30 mL Oral PRN    magnesium oxide (MAG-OX) tablet 400 mg  400 mg Oral Daily    ondansetron (ZOFRAN) injection 4 mg  4 mg Intravenous Q6H PRN    oxybutynin (DITROPAN) tablet 5 mg  5 mg Oral BID    pravastatin (PRAVACHOL) tablet 40 mg  40 mg Oral Daily With Dinner    tiotropium (SPIRIVA) capsule for inhaler 18 mcg  18 mcg Inhalation Daily       Vitals:  Vitals:    09/08/17 0810   BP: 140/72   Pulse: 79   Resp: 18   Temp: (!) 96 6 °F (35 9 °C)   SpO2: 96%       I/Os:  I/O last 3 completed shifts: In: 180 [P O :180]  Out: 3800 [Urine:3800]  No intake/output data recorded      Lab Results and Cultures:   CBC with diff:   Lab Results   Component Value Date    WBC 11 50 (H) 09/08/2017    HGB 11 8 (L) 09/08/2017    HCT 35 4 (L) 09/08/2017    MCV 85 09/08/2017     09/08/2017    MCH 28 4 09/08/2017    MCHC 33 3 09/08/2017    RDW 14 7 09/08/2017    MPV 9 1 09/08/2017   ,   BMP/CMP:  Lab Results   Component Value Date     (L) 09/08/2017    K 4 2 09/08/2017    CL 99 (L) 09/08/2017    CO2 32 09/08/2017    ANIONGAP 3 (L) 09/08/2017    BUN 28 (H) 09/08/2017    CREATININE 1 19 09/08/2017    GLUCOSE 106 09/08/2017    CALCIUM 9 2 09/08/2017    AST 15 09/02/2017    ALT 25 09/02/2017    ALKPHOS 99 09/02/2017    PROT 7 3 09/02/2017    ALBUMIN 3 4 (L) 09/02/2017    BILITOT 0 34 09/02/2017    EGFR 60 09/08/2017   ,   Lipid Panel: No results found for: CHOL,   Coags:   Lab Results   Component Value Date    INR 2 14 (H) 09/08/2017   ,     Blood Culture: No results found for: BLOODCX,   Urinalysis:   Lab Results   Component Value Date    COLORU Yellow 09/05/2017    CLARITYU Clear 09/05/2017    SPECGRAV <=1 005 09/05/2017    PHUR 6 5 09/05/2017    LEUKOCYTESUR Small (A) 09/05/2017    NITRITE Positive (A) 09/05/2017    PROTEINUA Negative 09/05/2017    GLUCOSEU Negative 09/05/2017    KETONESU Negative 09/05/2017    BILIRUBINUR Negative 09/05/2017    BLOODU Trace-lysed (A) 09/05/2017   ,   Urine Culture:   Lab Results   Component Value Date    URINECX >100,000 cfu/ml Mixed Contaminants X3 09/02/2017   ,   Wound Culure:   Lab Results   Component Value Date    WOUNDCULT 3+ Growth of Staphylococcus aureus 09/02/2017    WOUNDCULT 3+ Growth of Enterococcus faecalis 09/02/2017    WOUNDCULT 3+ Growth of Mixed Skin Sabiha 09/02/2017       Imaging:  LEAD 9/7 - preliminary study reviewed     Physical Exam:    General appearance: alert, appears stated age and cooperative  Head: Normocephalic, without obvious abnormality, atraumatic  Eyes: conjunctivae/corneas clear  PERRL, EOM's intact  Fundi benign  Throat: lips, mucosa, and tongue normal; teeth and gums normal  Neck: no carotid bruit, no JVD, supple, symmetrical, trachea midline and thyroid not enlarged, symmetric, no tenderness/mass/nodules  Back: symmetric, no curvature  ROM normal  No CVA tenderness    Lungs: clear to auscultation bilaterally  Chest wall: no tenderness  Heart: regularly irregular rhythm  Abdomen: soft, non-tender; bowel sounds normal; no masses,  no organomegaly  Genitalia: deferred  Rectal: deferred  Extremities: trace edema bilateral lower extremities, chronic skin changes of lower leg, right heel ulceration is superficial, large necrotic wound of the left lateral ankle + fould odor, left plantar foot superficial ulceration at 5th metatarsal   Skin: dry skin bilateral lower extremities and hemosiderin staining of the lower legs   Neurologic: Alert and oriented X 3, normal strength and tone  Normal symmetric reflexes   Normal coordination and gait    Wound/Incision:  B/L foot  As above      Pulse exam:  Radial: Right: 2+ Left[de-identified] 2+  Femoral: Right: 2+ Left: 2+  Popliteal: Right: non-palpable Left: non-palpable  DP: Right: doppler signal non palpable Left: doppler signal non palpable   PT: Right: doppler signal and non-palpable Left: doppler signal and non-palpable    NEIL Blanchard  9/8/2017

## 2017-09-08 NOTE — WOUND OSTOMY CARE
Progress Note - Wound   Amador Vences 78 y o  male MRN: [id number]  Unit/Bed#: E4 -01 Encounter: [id number]      Assessment:   Patient seen for skin assessment  Lying in bed, denies pain  Wife at bedside  Patient reports that he has been increasingly weak at home and has been sleeping on a couch and transferring to a chair during the day with his family's assistance during the day  Patient does not have a specialty bed or chair pillow  Patient is occasionally incontinent of stool (incontinent during assessment), chronic beckett catheter present with urethral erosion (skin intact)  Bilateral lower extremities have scattered abrasions and dressings dry and intact to b/l feet/heels  Patient's wife reports he had open areas on his buttocks that she was applying Desitin cream to  Findings:  1  Present on admission left buttock pressure injury of unknown stage--pink, blanchable, fragile area with new epithelialization  Healed  2   Present on admission right buttock pressure injury of unknown stage is healing--scab covering area at this time  Plan:   1  Turn and reposition patient every 2 hours  2   Elevate heels off of bed in prevalon boots per podiatry  3   Apply Hydraguard lotion to b/l buttocks and sacrum TID & PRN  4   Sofcare cushion with OOB to chair  5   Wound care to follow weekly  Plan of care reviewed with primary RN  Education:  Patient and wife provided with education about use of sofcare cushion and importance of frequent repositioning while the patient is in bed or chair while awake  Patient and wife able to teach back repositioning schedule  Encouraged patient to inquire with his insurance company and/or case management about qualifying for specialty bed temporarily if he remains NWB to b/l lower extremities  Patient declining wound care team inquiring about specialty mattress for home at this time      Subjective:  78year old male presented to ED with generalized weakness and inability to ambulate or transfer to a chair  Patient has history of right calcaneal ulcer and recent development of left calcaneal ulcer despite being wheelchair bound and offloading the area with prevalon boots at home  Patient receiving treatment for b/l heel ulcers from Dr Atif Duron podiatry team     Objective:    Vitals: Blood pressure 140/72, pulse 79, temperature (!) 96 6 °F (35 9 °C), temperature source Tympanic, resp  rate 18, height 5' 9 5" (1 765 m), weight 108 kg (238 lb 1 6 oz), SpO2 96 %  ,Body mass index is 34 66 kg/m²  Pressure Ulcer 09/02/17 Heel Left (Active)   Staging Stage III 9/2/2017  2:29 PM   Wound Description THIERRY 9/7/2017 11:30 PM   Saira-wound Assessment THIERRY 9/7/2017 11:30 PM   Shape circular 9/2/2017 12:02 PM   Wound Length (cm) 2 5 cm 9/2/2017  2:29 PM   Wound Width (cm) 2 cm 9/2/2017  2:29 PM   Wound Depth (cm) 1 9/2/2017  2:29 PM   Calculated Wound Area (cm^2) 5 cm^2 9/2/2017  2:29 PM   Calculated Wound Volume (cm^3) 5 cm^3 9/2/2017  2:29 PM   Drainage Amount THIERRY 9/7/2017 11:30 PM   Drainage Description Serosanguineous 9/2/2017 12:02 PM   Treatment Offload;Heel boot(s) 9/5/2017  4:15 PM   Dressing Non adherent;Dry dressing 9/7/2017 11:30 PM   Dressing Changed Changed 9/2/2017  2:29 PM   Patient Tolerance Tolerated well 9/2/2017 12:02 PM   Dressing Status Clean;Dry; Intact 9/7/2017 11:30 PM       Pressure Ulcer 09/02/17 Heel Right (Active)   Staging Stage II 9/2/2017  2:29 PM   Wound Description THIERRY 9/7/2017 11:30 PM   Saira-wound Assessment THIERRY 9/7/2017 11:30 PM   Shape circular 9/2/2017 11:35 AM   Drainage Amount THIERRY 9/7/2017 11:30 PM   Drainage Description Purulent 9/2/2017 11:35 AM   Treatment Offload;Heel boot(s) 9/5/2017  4:15 PM   Dressing Non adherent;Dry dressing 9/7/2017 11:30 PM   Dressing Changed Changed 9/2/2017  2:29 PM   Patient Tolerance Tolerated well 9/2/2017 11:35 AM   Dressing Status Clean;Dry; Intact 9/7/2017 11:30 PM       Pressure Ulcer 09/02/17 Buttocks Inner;Right red raw area (Active)   Staging Stage II 9/7/2017 11:30 PM   Wound Description Clean;Dry; Intact; Pink 9/7/2017 11:30 PM   Saira-wound Assessment Clean;Dry; Intact; Pink 9/7/2017 11:30 PM   Wound Length (cm) 1 cm 9/2/2017  2:29 PM   Wound Width (cm) 1 cm 9/2/2017  2:29 PM   Calculated Wound Area (cm^2) 1 cm^2 9/2/2017  2:29 PM   Drainage Amount None 9/7/2017 11:30 PM   Treatment Offload; Turn & reposition 9/7/2017 11:30 PM   Dressing Moisture barrier;Open to air 9/7/2017 11:30 PM   Patient Tolerance Tolerated well 9/6/2017 11:15 AM   Dressing Status Clean;Dry; Intact 9/6/2017  9:00 AM       Wound 09/02/17 Abrasion(s) Leg Right scab (Active)   Wound Description Clean;Dry; Intact 9/7/2017 11:30 PM   Saira-wound Assessment Clean;Dry; Intact 9/7/2017 11:30 PM   Drainage Amount None 9/7/2017 11:30 PM   Dressing Open to air 9/7/2017 11:30 PM     Please contact the wound care team at extension [phone] with any questions    Ky Lock BSN, RN, CCRN

## 2017-09-08 NOTE — PLAN OF CARE
Problem: OCCUPATIONAL THERAPY ADULT  Goal: Performs self-care activities at highest level of function for planned discharge setting  See evaluation for individualized goals  Treatment Interventions: ADL retraining, Functional transfer training, UE strengthening/ROM, Endurance training, Patient/family training, Equipment evaluation/education, Compensatory technique education, Energy conservation, Activityengagement          See flowsheet documentation for full assessment, interventions and recommendations  Outcome: Progressing  Limitation: Decreased ADL status, Decreased UE strength, Decreased Safe judgement during ADL, Decreased endurance, Decreased self-care trans, Decreased high-level ADLs (WBAT b/l LEs)  Prognosis: Fair  Assessment: Pt seen for skilled OT with a focus on completion of [place] Cognitive Assessment Version 7 1 and review of energy conservation techniques  Pt positioned with bed as chair to encourage upright positioning  Pt verbalized understanding of energy conservation techniques  Pt completed MOCA with a score of 25/30  Attention: 4/6; Language: 2/3  Score indicates pt has mild cognitive deficits  Pt may benefit from further rehab to achieve highest levels of independence with all functional tasks        Discharge Recommendation: Short Term Rehab         Comments: KALLIE Victor Mc

## 2017-09-08 NOTE — PLAN OF CARE
Problem: Nutrition/Hydration-ADULT  Goal: Nutrient/Hydration intake appropriate for improving, restoring or maintaining nutritional needs  Monitor and assess patient's nutrition/hydration status for malnutrition (ex- brittle hair, bruises, dry skin, pale skin and conjunctiva, muscle wasting, smooth red tongue, and disorientation)  Collaborate with interdisciplinary team and initiate plan and interventions as ordered  Monitor patient's weight and dietary intake as ordered or per policy  Utilize nutrition screening tool and intervene per policy  Determine patient's food preferences and provide high-protein, high-caloric foods as appropriate  INTERVENTIONS:  - Monitor oral intake, urinary output, labs, and treatment plans  - Assess nutrition and hydration status and recommend course of action  - Evaluate amount of meals eaten  - Assist patient with eating if necessary   - Allow adequate time for meals  - Recommend/ encourage appropriate diets, oral nutritional supplements, and vitamin/mineral supplements  - Order, calculate, and assess calorie counts as needed  - Recommend, monitor, and adjust tube feedings and TPN/PPN based on assessed needs  - Assess need for intravenous fluids  - Provide specific nutrition/hydration education as appropriate  - Include patient/family/caregiver in decisions related to nutrition   Outcome: Progressing      Problem: Potential for Falls  Goal: Patient will remain free of falls  INTERVENTIONS:  - Assess patient frequently for physical needs  -  Identify cognitive and physical deficits and behaviors that affect risk of falls    -  [place] fall precautions as indicated by assessment   - Educate patient/family on patient safety including physical limitations  - Instruct patient to call for assistance with activity based on assessment  - Modify environment to reduce risk of injury  - Consider OT/PT consult to assist with strengthening/mobility   Outcome: Progressing      Problem: Prexisting or High Potential for Compromised Skin Integrity  Goal: Skin integrity is maintained or improved  INTERVENTIONS:  - Identify patients at risk for skin breakdown  - Assess and monitor skin integrity  - Assess and monitor nutrition and hydration status  - Monitor labs (i e  albumin)  - Assess for incontinence   - Turn and reposition patient  - Assist with mobility/ambulation  - Relieve pressure over bony prominences  - Avoid friction and shearing  - Provide appropriate hygiene as needed including keeping skin clean and dry  - Evaluate need for skin moisturizer/barrier cream  - Collaborate with interdisciplinary team (i e  Nutrition, Rehabilitation, etc )   - Patient/family teaching   Outcome: Progressing      Problem: PAIN - ADULT  Goal: Verbalizes/displays adequate comfort level or baseline comfort level  Interventions:  - Encourage patient to monitor pain and request assistance  - Assess pain using appropriate pain scale  - Administer analgesics based on type and severity of pain and evaluate response  - Implement non-pharmacological measures as appropriate and evaluate response  - Consider cultural and social influences on pain and pain management  - Notify physician/advanced practitioner if interventions unsuccessful or patient reports new pain   Outcome: Progressing      Problem: INFECTION - ADULT  Goal: Absence or prevention of progression during hospitalization  INTERVENTIONS:  - Assess and monitor for signs and symptoms of infection  - Monitor lab/diagnostic results  - Monitor all insertion sites, i e  indwelling lines, tubes, and drains  - Monitor endotracheal (as able) and nasal secretions for changes in amount and color  - [place] appropriate cooling/warming therapies per order  - Administer medications as ordered  - Instruct and encourage patient and family to use good hand hygiene technique  - Identify and instruct in appropriate isolation precautions for identified infection/condition Outcome: Progressing      Problem: SAFETY ADULT  Goal: Maintain or return to baseline ADL function  INTERVENTIONS:  -  Assess patient's ability to carry out ADLs; assess patient's baseline for ADL function and identify physical deficits which impact ability to perform ADLs (bathing, care of mouth/teeth, toileting, grooming, dressing, etc )  - Assess/evaluate cause of self-care deficits   - Assess range of motion  - Assess patient's mobility; develop plan if impaired  - Assess patient's need for assistive devices and provide as appropriate  - Encourage maximum independence but intervene and supervise when necessary  ¯ Involve family in performance of ADLs  ¯ Assess for home care needs following discharge   ¯ Request OT consult to assist with ADL evaluation and planning for discharge  ¯ Provide patient education as appropriate   Outcome: Progressing    Goal: Maintain or return mobility status to optimal level  INTERVENTIONS:  - Assess patient's baseline mobility status (ambulation, transfers, stairs, etc )    - Identify cognitive and physical deficits and behaviors that affect mobility  - Identify mobility aids required to assist with transfers and/or ambulation (gait belt, sit-to-stand, lift, walker, cane, etc )  - [place] fall precautions as indicated by assessment  - Record patient progress and toleration of activity level on Mobility SBAR; progress patient to next Phase/Stage  - Instruct patient to call for assistance with activity based on assessment  - Request Rehabilitation consult to assist with strengthening/weightbearing, etc    Outcome: Progressing      Problem: DISCHARGE PLANNING  Goal: Discharge to home or other facility with appropriate resources  INTERVENTIONS:  - Identify barriers to discharge w/patient and caregiver  - Arrange for needed discharge resources and transportation as appropriate  - Identify discharge learning needs (meds, wound care, etc )  - Arrange for interpretive services to assist at discharge as needed  - Refer to Case Management Department for coordinating discharge planning if the patient needs post-hospital services based on physician/advanced practitioner order or complex needs related to functional status, cognitive ability, or social support system   Outcome: Progressing      Problem: Knowledge Deficit  Goal: Patient/family/caregiver demonstrates understanding of disease process, treatment plan, medications, and discharge instructions  Complete learning assessment and assess knowledge base    Interventions:  - Provide teaching at level of understanding  - Provide teaching via preferred learning methods   Outcome: Progressing      Problem: SKIN/TISSUE INTEGRITY - ADULT  Goal: Skin integrity remains intact  INTERVENTIONS  - Identify patients at risk for skin breakdown  - Assess and monitor skin integrity  - Assess and monitor nutrition and hydration status  - Monitor labs (i e  albumin)  - Assess for incontinence   - Turn and reposition patient  - Assist with mobility/ambulation  - Relieve pressure over bony prominences  - Avoid friction and shearing  - Provide appropriate hygiene as needed including keeping skin clean and dry  - Evaluate need for skin moisturizer/barrier cream  - Collaborate with interdisciplinary team (i e  Nutrition, Rehabilitation, etc )   - Patient/family teaching   Outcome: Progressing    Goal: Incision(s), wounds(s) or drain site(s) healing without S/S of infection  INTERVENTIONS  - Assess and document risk factors for skin impairment   - Assess and document dressing, incision, wound bed, drain sites and surrounding tissue  - Initiate Nutrition services consult and/or wound management as needed   Outcome: Progressing      Problem: DISCHARGE PLANNING - CARE MANAGEMENT  Goal: Discharge to post-acute care or home with appropriate resources  INTERVENTIONS:  - Conduct assessment to determine patient/family and health care team treatment goals, and need for post-acute services based on payer coverage, community resources, and patient preferences, and barriers to discharge  - Address psychosocial, clinical, and financial barriers to discharge as identified in assessment in conjunction with the patient/family and health care team  - Arrange appropriate level of post-acute services according to patients   needs and preference and payer coverage in collaboration with the physician and health care team  - Communicate with and update the patient/family, physician, and health care team regarding progress on the discharge plan  - Arrange appropriate transportation to post-acute venues   Outcome: Progressing

## 2017-09-08 NOTE — PROGRESS NOTES
Progress Note - Bebeto Her 78 y o  male MRN: [id number]    Unit/Bed#: E4 -01 Encounter: [id number]      Assessment/Plan:  1-generalized weakness:  Acute/chronic:    this has been a progressive worsening of his weakness  Patient was hospitalized at [facility] in March of 2017 with pneumonia  He was weak at the time of discharge, had home physical therapy  -patient was just beginning to ambulate, and attempt walking down stairs with physical therapy, when he was diagnosed with a right heel ulcer, and was placed on a nonweightbearing status to his right heel                         -since that time he has been essentially nonambulatory, and spent the majority of his time on the couch or transferring from the couch to the chair/commode                         -patient was admitted to the hospital to evaluate any underlying etiologies for his deterioration of his general weakness                         -patient was not noted to have any acute infectious etiology  -normal B12/B1/folic acid levels                        -his current weakness likely reflects a progressive decline in his strength due to his relative immobility over the past 4 months                        -continue PT/OT                        -will need inpatient short-term rehab     2-chronic kidney disease stage 3:  Baseline creatinine ranges 1 1-1 4   Creatinine currently at baseline   Continue to monitor     3-chronic diastolic congestive heart failure:  No evidence of acute exacerbation   He is currently euvolemic   Continue his Lasix     4-chronic atrial fibrillation:  Patient is currently rate controlled, not on any anti chronotropic agents     His INR is therapeutic at 2 6- when will be held for operative evaluation tomorrow     5-diabetes type 2:  Patient was on a insulin pump as an outpatient   Patient's insulin pump was held on admission  [place] is currently on Lantus and sliding scale insulin      -His Accu-Cheks have been adequately controlled today:   108, 227, 148       * as patient is NPO after midnight for the OR tomorrow, his Lantus will be changed to only half of his evening dose, which would equal 12 units  Will hold his Lantus tomorrow morning  * postoperatively will restart his current regiment of Lantus which is 24 units q 12 hours, once he is tolerating p o          6-chronic indwelling Rush catheter:  Patient's catheter was changed by his home nurse monthly    was changed on 09/04  Continue outpatient catheter changes per home regimen      7-left heel diabetic ulceration:  Craft 3, and sub met 5 laceration:   Per Podiatry, concerns for superinfection at this time  X-ray with changes that could be consistent for possible early osteomyelitis  MRI pending   -patient to go to the operating room tomorrow    -will hold antibiotics at this time to obtain accurate operative cultures  Anticipate antibiotics will be started postoperatively  -will confer with the Infectious Disease team again tomorrow after the MRI results are back     8-right heel diabetic ulceration, Craft 2:  Continue wound care per Podiatry   No evidence of osteomyelitis      9-infectious Disease:  leukocytosis:  Patient presented with a leukocytosis with a white blood cell count of 12   This was felt to be most likely secondary to stress response                           -patient's ESR however is markedly elevated at 94, and his CRP is 63                         -the patient's urinalysis/culture is not normal, however this is likely reflective of his chronic colonization due to his chronic Rush catheter                           -patient's wound cultures are growing multiple organisms that was initially felt to be colonization, as he clinically did not have evidence of acute infection               -patient's clinical exam has now changed, per the podiatry service, and his x-rays concerning for possible early osteomyelitis                -await  MRI, and surgical evaluation:  And SP antibiotics will be initiated                        10-history of prostate cancer:  Continue outpatient follow-up with his primary urologist      11-mechanical AVR:  On chronic anticoagulation with coumadin   INR therapeutic at 2 6      * will hold Coumadin tonight and give 1 dose of vitamin K 10 mg p o    * recheck INR in a m :  Podiatry wishes patient's INR less than 2 for operative intervention  Patient will require perioperative bridging anticoagulation with the heparin infusion, as he has a mechanical aortic valve, once his INR is less than therapeutic      VTE Pharmacologic Prophylaxis: Warfarin (Coumadin)-will be changed to bridging heparin  VTE Mechanical Prophylaxis: sequential compression device    Certification Statement: The patient will continue to require additional inpatient hospital stay due to need for further acute intervention for operative evaluation for osteomyelitis    Status: inpatient     Discussed with Podiatry service    ===================================================================    Subjective:  Patient denies any complaints at all  He denies any pain anywhere  He denies any shortness of breath or cough  He denies any chest congestion or phlegm  He denies any abdominal pain, nausea, vomiting, diarrhea  He denies any dizziness or lightheadedness  Patient was evaluated by the podiatry service who was concerned regarding possible bacterial superinfection of his heel ulcer, and osteomyelitis  Podiatry service is planning on taking patient to the operating room tomorrow  Physical Exam:   Temp:  [97 °F (36 1 °C)-100 4 °F (38 °C)] 97 8 °F (36 6 °C)  HR:  [73-75] 73  Resp:  [18] 18  BP: (128-144)/(58-64) 128/58    Gen:  Pleasant, non-tachypnic, non-dyspnic  Conversant  Heart: regular rate and rhythm, S1S2 present, no murmur, rub or gallop    Positive click  Lungs: clear to ausculatation bilaterally  No wheezing, crackles, or rhonchi  No accessory muscle use or respiratory distress  Abd: soft, non-tender, non-distended  NABS, no guarding, rebound or peritoneal signs  Neuro: awake, alert, communicative  Fluent speech  LABS:     Results from last 7 days  Lab Units 09/05/17  0652 09/04/17  0603 09/02/17  1135   WBC Thousand/uL 10 39* 10 73* 12 46*   HEMOGLOBIN g/dL 12 0 11 7* 13 1   HEMATOCRIT % 34 5* 33 9* 39 1   PLATELETS Thousands/uL 258 248 327       Results from last 7 days  Lab Units 09/05/17  0652 09/04/17  0603 09/03/17  0625   SODIUM mmol/L 135* 139 138   POTASSIUM mmol/L 4 0 4 1 4 2   CHLORIDE mmol/L 98* 100 99*   CO2 mmol/L 31 31 32   BUN mg/dL 30* 29* 29*   CREATININE mg/dL 1 28 1 42* 1 43*   GLUCOSE RANDOM mg/dL 148* 137 171*   CALCIUM mg/dL 9 3 9 1 9 3       Hospital Data:    9/7:  Left heel x-ray:  Ill-defined, vague lucency in the posterior calcaneus without overlying cortical destruction   Given the history, early osteomyelitis not excluded   Recommend follow-up MRI with gadolinium or nuclear medicine white blood cell scan for further   evaluation    9/3:  Left foot x-ray:  No osteomyelitis     9/2:  Chest x-ray:  No acute disease     9/2:  Wound culture:  Staph aureus/Enterococcus     9/2:  Urine culture:  Greater than 100,000 gram-negative kashif   [id number] Enterococcus        ---------------------------------------------------------------------------------------------------------------  This note has been constructed using a voice recognition system

## 2017-09-08 NOTE — PROGRESS NOTES
Treatment update:  Patient's arterial leads reviewed with vascular tech  Patient's left lower extremity has a 50-75% stenosis in the distal SFA with diffuse femoral and popliteal disease  ABIs are noncompressible with the great toe pressure of 44 and a metatarsal pressure of 129  Right lower extremity with diffuse disease throughout the femoral and popliteal arteries however there are no significant stenoses  ABIs are unreliable with great toe pressure of 68  We will consult vascular for their recommendations regarding stenosis and treatment plan going forward  We will have her proceed with partial calcanectomy and I and D this afternoon with Dr Gab Crowell secondary to acute infection with necrosis at the left heel

## 2017-09-08 NOTE — PLAN OF CARE
DISCHARGE PLANNING     Discharge to home or other facility with appropriate resources Progressing        DISCHARGE PLANNING - CARE MANAGEMENT     Discharge to post-acute care or home with appropriate resources Progressing        INFECTION - ADULT     Absence or prevention of progression during hospitalization Progressing        Knowledge Deficit     Patient/family/caregiver demonstrates understanding of disease process, treatment plan, medications, and discharge instructions Progressing        Nutrition/Hydration-ADULT     Nutrient/Hydration intake appropriate for improving, restoring or maintaining nutritional needs Progressing        PAIN - ADULT     Verbalizes/displays adequate comfort level or baseline comfort level Progressing        Potential for Falls     Patient will remain free of falls Progressing        Prexisting or High Potential for Compromised Skin Integrity     Skin integrity is maintained or improved Progressing        SAFETY ADULT     Maintain or return to baseline ADL function Progressing     Maintain or return mobility status to optimal level Progressing        SKIN/TISSUE INTEGRITY - ADULT     Skin integrity remains intact Progressing     Incision(s), wounds(s) or drain site(s) healing without S/S of infection Progressing

## 2017-09-08 NOTE — OP NOTE
OPERATIVE REPORT  PATIENT NAME: Yessica Kwon    :  1940  MRN: [id number]  Pt Location: [facility] OR ROOM 03    SURGERY DATE: 2017    Surgeon(s) and Role:     * Jolanta Alba DPM - Primary     * Aroldo Vieira - Observing     * Claudia Sharp DPM - Assisting    Preop Diagnosis:     * Decubitus ulcer of heel, stage 3 [L89 603]    Post-Op Diagnosis Codes:     * Decubitus ulcer of heel, stage 3 [L89 603]    Procedure(s) (LRB):  DEBRIDEMENT WOUND ([address] OUT), I&D, PARTIAL CALCANECTOMY, APPLICATION OF WOUND VAC (Left)    Specimen(s):  ID Type Source Tests Collected by Time Destination   1 : clean margin calcaneous  Tissue Soft Tissue, Debridement TISSUE EXAM Jolanta Alba DPM 2017 1603    A : clean margin calcaneous  Tissue Soft Tissue, Debridement CULTURE, TISSUE AND GRAM STAIN Jolanta Alba DPM 2017 1612        Estimated Blood Loss:   <100 cc     Drains:  Negative Pressure Wound Therapy (V A C ) Foot Left (Active)   Dressing Status Clean;Dry; Intact 2017 12:00 AM   Number of days: 0       Urethral Catheter 18 Fr  (Active)   Reasons to continue Urinary Catheter  Chronic urinary catheter 2017  8:40 PM   Site Assessment Clean;Skin intact 2017 11:30 PM   Collection Container Standard drainage bag 2017 11:30 PM   Securement Method Leg strap 2017 11:30 PM   Output (mL) 700 mL 2017  6:30 AM   Number of days: 6       Anesthesia Type:   IV sedation with 10 cc of 1:1 mix of 1% lidocaine plain and 0 5% marcaine plain     Hemostasis:   Anatomical dissection     Materials:   KCI wound vac     Operative Indications:  Decubitus ulcer of heel, stage 3 [L89 603]  Possible osteomyelitis of left heel     Operative Findings:  adequate perfusion; hard calcaneal bone; no deep sinus tract of infection or purulence     Complications:   None    Procedure and Technique:  Under mild sedation, the patient was brought into the operating room and placed on the operating room table in the supine position   A pneumatic ankle tourniquet was then placed around the patient's left calf with ample webril padding but was never inflated  A time out was performed to confirm the correct patient, procedure and site with all parties in agreement  Following IV sedation, local anesthetic was obtained about the patient's left heel was performed consisting of 10 ml of 1% lidocaine plain and 0 5% bupivacaine plain in a 1:1 mixture  The foot was then scrubbed, prepped and draped in the usual aseptic manner  Attention was directed to the left lateral heel where a wound was noted  Using a sterile 15 blade and rongeur, excisional debridement was performed down to the level of bone to removal all non-viable soft tissue and bone to reveal healthy bleeding wound bed  A clean margin of calcaneal bone was taken for culture and bone biopsy and sent to microbiology and pathology respectively  Calcaneal bone was noted to be hard and viable  The wound was irrigated with copious amounts of sterile saline solution with pulse lavage  The incision site was then dressed with KCI  wound vac at 75mmHg low continuous with 1 black sponge in wound and 1 black sponge for bridge  The patient tolerated the procedure and anesthesia well and was transported to the PACU with vital signs stable  As with many limb salvage procedures, we contemplate the possibility of performing further stages to this procedure  Procedures may include debridements, delayed closure, plastic surgery techniques, or more proximal amputations  This procedure may be considered part of a multi-staged limb salvage treatment plan       I was present for the entire procedure    Patient Disposition:  PACU  and hemodynamically stable    SIGNATURE: Ba Jim DPM  DATE: September 8, 2017  TIME: 4:26 PM

## 2017-09-08 NOTE — PHYSICAL THERAPY NOTE
Physical Therapy Cancellation Note    Pt to OR today  Will follow post-op as approp  Podiatry to advise   Lynette Ayala, PT

## 2017-09-08 NOTE — PROGRESS NOTES
Progress Note - Podiatry  Trini Lopez 78 y o  male MRN: [id number]  Unit/Bed#: E4 -01 Encounter: [id number]    Assessment/Plan:  3  79 yo M with left foot ulcerations (to lateral ankle and plantar foot) with osteomyelitis to the lateral calcaneus   - dressing is left intact, will change in OR  - NPO status confirmed, will take patient to the OR for I and D/partial calcanectomy this afternoon  Plan discussed with the patient  -will continue to provide [address] while in house   -patient will f/u at wound care center upon discharge   -c/w offloading heels with pillows and prevalon boots   -PT on board, recommending STR   -final wound cultures growing MSSA and enterococcus faecalis  Monitoring off antibiotics   -left lateral heel shows lucency near the area of ulceration, MRI ordered to determine extent of osteomyelitis  -will d/w attending     2  Right posterior heel ulceration   -wound is stable with no acute signs of infection  -dressings applied: maxsorb with dry sterile dressing   -continue [address] while in house     3  DM, type 2 with neuropathy  -management per SLIM     4  CKD stage 3  -management per SLIM    5  PAD  - arterial duplex results are pending, will follow up on results      Subjective/Objective   Chief Complaint:   Chief Complaint   Patient presents with    Weakness - Generalized     pt woke up this morning just feeling very weak and unable to get up   pt denies fevers, n/v   denies any pain or sick contacts  Subjective: 78 y o  y/o male was seen and evaluated at bedside in NAD  Offers no pedal complaints    Blood pressure 136/61, pulse (!) 50, temperature 98 3 °F (36 8 °C), temperature source Tympanic, resp  rate 18, height 5' 9 5" (1 765 m), weight 108 kg (238 lb 1 6 oz), SpO2 94 %  ,Body mass index is 34 66 kg/m²      Invasive Devices     Peripheral Intravenous Line            Peripheral IV 09/07/17 Right Arm 1 day          Drain            Urethral Catheter 18 Fr  5 days Physical Exam:   General: Alert, cooperative and no distress  Lungs: Non labored breathing  Heart: Positive S1, S2  Abdomen: Soft, non-tender  Extremity:   Dressing is left intact to bilateral feet, noted strike through  There clean dry intact  Manual muscle testing is at baseline, neurovascular status is at baseline  No ascending erythema  Lab, Imaging and other studies:   I have personally reviewed pertinent lab results  , CBC: No results found for: WBC, HGB, HCT, MCV, PLT, ADJUSTEDWBC, MCH, MCHC, RDW, MPV, NRBC, CMP:   Lab Results   Component Value Date     (L) 09/08/2017    K 4 2 09/08/2017    CL 99 (L) 09/08/2017    CO2 32 09/08/2017    ANIONGAP 3 (L) 09/08/2017    BUN 28 (H) 09/08/2017    CREATININE 1 19 09/08/2017    GLUCOSE 106 09/08/2017    CALCIUM 9 2 09/08/2017    EGFR 60 09/08/2017       Imaging: I have personally reviewed pertinent films in PACS  EKG, Pathology, and Other Studies: I have personally reviewed pertinent reports    VTE Pharmacologic Prophylaxis: Warfarin (Coumadin)

## 2017-09-09 PROBLEM — I73.9 PAD (PERIPHERAL ARTERY DISEASE) (HCC): Status: ACTIVE | Noted: 2017-09-09

## 2017-09-09 LAB
ANION GAP SERPL CALCULATED.3IONS-SCNC: 1 MMOL/L (ref 4–13)
APTT PPP: 42 SECONDS (ref 23–35)
BUN SERPL-MCNC: 29 MG/DL (ref 5–25)
CALCIUM SERPL-MCNC: 8.9 MG/DL (ref 8.3–10.1)
CHLORIDE SERPL-SCNC: 100 MMOL/L (ref 100–108)
CO2 SERPL-SCNC: 32 MMOL/L (ref 21–32)
CREAT SERPL-MCNC: 1.26 MG/DL (ref 0.6–1.3)
ERYTHROCYTE [DISTWIDTH] IN BLOOD BY AUTOMATED COUNT: 14.6 % (ref 11.6–15.1)
GFR SERPL CREATININE-BSD FRML MDRD: 56 ML/MIN/1.73SQ M
GLUCOSE SERPL-MCNC: 151 MG/DL (ref 65–140)
GLUCOSE SERPL-MCNC: 155 MG/DL (ref 65–140)
GLUCOSE SERPL-MCNC: 199 MG/DL (ref 65–140)
GLUCOSE SERPL-MCNC: 227 MG/DL (ref 65–140)
GLUCOSE SERPL-MCNC: 280 MG/DL (ref 65–140)
HCT VFR BLD AUTO: 31.7 % (ref 36.5–49.3)
HGB BLD-MCNC: 10.7 G/DL (ref 12–17)
INR PPP: 1.3 (ref 0.86–1.16)
MCH RBC QN AUTO: 28.5 PG (ref 26.8–34.3)
MCHC RBC AUTO-ENTMCNC: 33.8 G/DL (ref 31.4–37.4)
MCV RBC AUTO: 84 FL (ref 82–98)
PLATELET # BLD AUTO: 304 THOUSANDS/UL (ref 149–390)
PMV BLD AUTO: 8.8 FL (ref 8.9–12.7)
POTASSIUM SERPL-SCNC: 4.2 MMOL/L (ref 3.5–5.3)
PROTHROMBIN TIME: 16.3 SECONDS (ref 12.1–14.4)
RBC # BLD AUTO: 3.76 MILLION/UL (ref 3.88–5.62)
SODIUM SERPL-SCNC: 133 MMOL/L (ref 136–145)
WBC # BLD AUTO: 10.87 THOUSAND/UL (ref 4.31–10.16)

## 2017-09-09 PROCEDURE — 85027 COMPLETE CBC AUTOMATED: CPT | Performed by: PHYSICIAN ASSISTANT

## 2017-09-09 PROCEDURE — 85610 PROTHROMBIN TIME: CPT | Performed by: PHYSICIAN ASSISTANT

## 2017-09-09 PROCEDURE — 82948 REAGENT STRIP/BLOOD GLUCOSE: CPT

## 2017-09-09 PROCEDURE — 80048 BASIC METABOLIC PNL TOTAL CA: CPT | Performed by: PHYSICIAN ASSISTANT

## 2017-09-09 PROCEDURE — 85730 THROMBOPLASTIN TIME PARTIAL: CPT | Performed by: INTERNAL MEDICINE

## 2017-09-09 RX ORDER — HEPARIN SODIUM 10000 [USP'U]/100ML
3-30 INJECTION, SOLUTION INTRAVENOUS
Status: DISPENSED | OUTPATIENT
Start: 2017-09-09 | End: 2017-09-26

## 2017-09-09 RX ORDER — HEPARIN SODIUM 1000 [USP'U]/ML
8000 INJECTION, SOLUTION INTRAVENOUS; SUBCUTANEOUS AS NEEDED
Status: DISCONTINUED | OUTPATIENT
Start: 2017-09-09 | End: 2017-10-03 | Stop reason: HOSPADM

## 2017-09-09 RX ORDER — HEPARIN SODIUM 1000 [USP'U]/ML
4000 INJECTION, SOLUTION INTRAVENOUS; SUBCUTANEOUS AS NEEDED
Status: DISCONTINUED | OUTPATIENT
Start: 2017-09-09 | End: 2017-10-03 | Stop reason: HOSPADM

## 2017-09-09 RX ORDER — ACETYLCYSTEINE 200 MG/ML
1200 SOLUTION ORAL; RESPIRATORY (INHALATION) 2 TIMES DAILY
Status: COMPLETED | OUTPATIENT
Start: 2017-09-10 | End: 2017-09-11

## 2017-09-09 RX ADMIN — HEPARIN SODIUM AND DEXTROSE 18 UNITS/KG/HR: 10000; 5 INJECTION INTRAVENOUS at 17:45

## 2017-09-09 RX ADMIN — PRAVASTATIN SODIUM 40 MG: 40 TABLET ORAL at 17:31

## 2017-09-09 RX ADMIN — INSULIN LISPRO 8 UNITS: 100 INJECTION, SOLUTION INTRAVENOUS; SUBCUTANEOUS at 17:32

## 2017-09-09 RX ADMIN — FLUTICASONE PROPIONATE AND SALMETEROL 1 PUFF: 50; 500 POWDER RESPIRATORY (INHALATION) at 08:46

## 2017-09-09 RX ADMIN — INSULIN LISPRO 8 UNITS: 100 INJECTION, SOLUTION INTRAVENOUS; SUBCUTANEOUS at 13:29

## 2017-09-09 RX ADMIN — FUROSEMIDE 40 MG: 40 TABLET ORAL at 08:45

## 2017-09-09 RX ADMIN — INSULIN GLARGINE 12 UNITS: 100 INJECTION, SOLUTION SUBCUTANEOUS at 21:41

## 2017-09-09 RX ADMIN — OXYBUTYNIN CHLORIDE 5 MG: 5 TABLET ORAL at 08:45

## 2017-09-09 RX ADMIN — FERROUS SULFATE TAB 325 MG (65 MG ELEMENTAL FE) 325 MG: 325 (65 FE) TAB at 08:45

## 2017-09-09 RX ADMIN — COLLAGENASE SANTYL: 250 OINTMENT TOPICAL at 09:00

## 2017-09-09 RX ADMIN — FERROUS SULFATE TAB 325 MG (65 MG ELEMENTAL FE) 325 MG: 325 (65 FE) TAB at 17:31

## 2017-09-09 RX ADMIN — GABAPENTIN 100 MG: 100 CAPSULE ORAL at 08:45

## 2017-09-09 RX ADMIN — GABAPENTIN 100 MG: 100 CAPSULE ORAL at 21:41

## 2017-09-09 RX ADMIN — INSULIN LISPRO 4 UNITS: 100 INJECTION, SOLUTION INTRAVENOUS; SUBCUTANEOUS at 17:34

## 2017-09-09 RX ADMIN — Medication 400 MG: at 08:45

## 2017-09-09 RX ADMIN — FLUTICASONE PROPIONATE AND SALMETEROL 1 PUFF: 50; 500 POWDER RESPIRATORY (INHALATION) at 21:41

## 2017-09-09 RX ADMIN — GABAPENTIN 100 MG: 100 CAPSULE ORAL at 17:31

## 2017-09-09 RX ADMIN — INSULIN LISPRO 2 UNITS: 100 INJECTION, SOLUTION INTRAVENOUS; SUBCUTANEOUS at 13:29

## 2017-09-09 RX ADMIN — TIOTROPIUM BROMIDE 18 MCG: 18 CAPSULE ORAL; RESPIRATORY (INHALATION) at 08:46

## 2017-09-09 RX ADMIN — INSULIN LISPRO 4 UNITS: 100 INJECTION, SOLUTION INTRAVENOUS; SUBCUTANEOUS at 21:42

## 2017-09-09 RX ADMIN — OXYBUTYNIN CHLORIDE 5 MG: 5 TABLET ORAL at 17:32

## 2017-09-09 RX ADMIN — INSULIN LISPRO 2 UNITS: 100 INJECTION, SOLUTION INTRAVENOUS; SUBCUTANEOUS at 08:46

## 2017-09-09 NOTE — PROGRESS NOTES
Progress Note - Graciela Carolina 78 y o  male MRN: [id number]    Unit/Bed#: E4 -01 Encounter: [id number]    Assessment/Plan:    Left heel ulcer  status post I and D      Vac placed postop care by Podiatry      Monitor cultures     History of AVR  will start IV heparin hold it while holding warfarin postop    CKD stage 3   stable creatinine at 9 83    Diastolic HF   Compensated    Ambulatory dysfunction awaiting rehab     Diabetes   Lantus sliding scale and ADA    AFib    Chronic rate controlled      Heparin IV while holding warfarin postop    Subjective:   Feels good denies pain in left leg no shortness of breath chest pain nausea vomiting fevers or chills today appetite good    Objective:     Vitals: Blood pressure 129/68, pulse 71, temperature (!) 96 2 °F (35 7 °C), temperature source Tympanic, resp  rate 18, height 5' 9 5" (1 765 m), weight 108 kg (238 lb 1 6 oz), SpO2 97 %  ,Body mass index is 34 66 kg/m²          Results from last 7 days  Lab Units 09/09/17  0521   WBC Thousand/uL 10 87*   HEMOGLOBIN g/dL 10 7*   HEMATOCRIT % 31 7*   PLATELETS Thousands/uL 304   INR  1 30*       Results from last 7 days  Lab Units 09/09/17  0521   SODIUM mmol/L 133*   POTASSIUM mmol/L 4 2   CHLORIDE mmol/L 100   CO2 mmol/L 32   BUN mg/dL 29*   CREATININE mg/dL 1 26   CALCIUM mg/dL 8 9   GLUCOSE RANDOM mg/dL 155*       Scheduled Meds:    collagenase  Topical Daily   ferrous sulfate 325 mg Oral BID   fluticasone-salmeterol 1 puff Inhalation BID   furosemide 40 mg Oral Daily   gabapentin 100 mg Oral TID   heparin  Intravenous Once   insulin glargine 12 Units Subcutaneous HS   insulin lispro 1-6 Units Subcutaneous HS   insulin lispro 12 Units Subcutaneous Daily Before Breakfast   insulin lispro 2-12 Units Subcutaneous TID AC   insulin lispro 8 Units Subcutaneous Daily Before Lunch   insulin lispro 8 Units Subcutaneous Before Dinner   magnesium oxide 400 mg Oral Daily   oxybutynin 5 mg Oral BID   pravastatin 40 mg Oral Daily With Dinner   tiotropium 18 mcg Inhalation Daily       Continuous Infusions:     Physical exam:  General appearance:  Alert orient x3 in no distress interaction appropriate   Head/Eyes:  Nonicteric PERRL EOMI  Neck:  Supple  Lungs:  Decreased BS bilateral no wheezing rhonchi or rales  Heart: normal S1 S2 irregular  Abdomen:  Obese nontender with bowel sounds  Extremities:  Trace edema VAC left heel  Skin: no rash    Invasive Devices     Peripheral Intravenous Line            Peripheral IV 09/07/17 Right Arm 2 days          Drain            Urethral Catheter 18 Fr  6 days    Negative Pressure Wound Therapy (V A C ) Foot Left less than 1 day                  VTE Pharmacologic Prophylaxis:  heparin   VTE Mechanical Prophylaxis:  heparin                     Counseling / Coordination of Care  Total floor / unit time spent today  30   minutes  Greater than 50% of total time was spent with the patient and / or family counseling and / or coordination of care  A description of the counseling / coordination of care:  Discussed with surgery

## 2017-09-09 NOTE — CONSULTS
Consultation - Nephrology   Meena Muñiz 78 y o  male MRN: [id number]  Unit/Bed#: E4 -01 Encounter: [id number]    ASSESSMENT and PLAN:  1  CKD III: creatinine ranging 1 2-1 4 this admission    -he does follow with [facility]    -suspect his CKD is due to diabetes     -UA without proteinuria    -patient going for agram Monday at the earliest   -the day prior to agram we will order IVF and mucomyst   -will hold lasix the morning of agram but can continue for now   2  L calcaneous osteomyelitis: s/p I&D   -off abx and ID is on board  3  PAD: for agram next week   4  CAD s/p CABG with mechanical AVR  5  Chronic beckett   6  Hyponatremia: mild and likely related to volume  Continue lasix for now   7  Anemia: trend hgb post op         HISTORY OF PRESENT ILLNESS:  Requesting Physician: Gonzalez Castañeda MD  Reason for Consult: CKD pre IV dye     Meena Muñiz is a 78y o  year old male who was admitted to [facility] after presenting with weakness on 9/2  He has a history of a R calcaneal ulcer which had been improving but then he unfortunately also developed a L calcaneal ulcer  Because of this he was wheelchair bound and recently had become so weak he was unable to help with transfers like normal so his family brought him in to the ER  He was found to have osteomyelitis of the L calcaneous and went to the OR for I&D with podiatry on 9/7  He underwent LEADs which showed significant peripheral arterial disease so it was decided he should undergo an agram  He does have a history of mild CKD so renal was consulted  Patient is currently feeling well  He does follow with [facility] but does not remember the name of the physician or his baseline creatinine  He is unsure why he has CKD  His pain is currently well controlled  He has no chest pain, shortness of breath, nausea, vomiting or diarrhea  He does have a beckett in place which is chronic       PAST MEDICAL HISTORY:  Past Medical History:   Diagnosis Date    Anemia     Aortic stenosis     Atrial fibrillation     Chronic kidney disease     stage 3    COPD (chronic obstructive pulmonary disease)     Diabetes mellitus     Hyperlipidemia     Hypertension     Sleep apnea        PAST SURGICAL HISTORY:  History reviewed  No pertinent surgical history  ALLERGIES:  Allergies   Allergen Reactions    Aspirin      Chest tightness       SOCIAL HISTORY:  History   Alcohol Use No     History   Drug Use No     History   Smoking Status    Former Smoker   Smokeless Tobacco    Not on file       FAMILY HISTORY:  History reviewed  No pertinent family history  MEDICATIONS:  Scheduled Meds:  collagenase  Topical Daily   ferrous sulfate 325 mg Oral BID   fluticasone-salmeterol 1 puff Inhalation BID   furosemide 40 mg Oral Daily   gabapentin 100 mg Oral TID   insulin glargine 12 Units Subcutaneous HS   insulin lispro 1-6 Units Subcutaneous HS   insulin lispro 12 Units Subcutaneous Daily Before Breakfast   insulin lispro 2-12 Units Subcutaneous TID AC   insulin lispro 8 Units Subcutaneous Daily Before Lunch   insulin lispro 8 Units Subcutaneous Before Dinner   magnesium oxide 400 mg Oral Daily   oxybutynin 5 mg Oral BID   pravastatin 40 mg Oral Daily With Dinner   tiotropium 18 mcg Inhalation Daily       PRN Meds:   acetaminophen    magnesium hydroxide    ondansetron    Continuous Infusions:     REVIEW OF SYSTEMS:  A complete review of systems was done  Pertinent positives and negatives noted in the HPI but otherwise the review of systems is negative      PHYSICAL EXAM:  Current Weight: Weight - Scale: 108 kg (238 lb 1 6 oz)  First Weight: Weight - Scale: 110 kg (242 lb 8 1 oz)  Vitals:    09/09/17 0727   BP: 129/68   Pulse: 71   Resp: 18   Temp: (!) 96 2 °F (35 7 °C)   SpO2: 97%       Intake/Output Summary (Last 24 hours) at 09/09/17 1039  Last data filed at 09/09/17 0901   Gross per 24 hour   Intake              905 ml   Output              500 ml   Net              405 ml     General: no acute distress   Skin: no rash   Eyes: sclera anicteric   ENT: moist mucous membranes   Neck: supple, symmetric   Chest: clear to auscultation   CVS: regular rate and rhythm   Abdomen: soft, non-tender, non-distended   Extremities: L foot wrapped and wound vac in place, 0 to trace edema   Neuro: awake and alert   Psych: appropriate affect    Lab Results:   Lab Results   Component Value Date    WBC 10 87 (H) 09/09/2017    HGB 10 7 (L) 09/09/2017    HCT 31 7 (L) 09/09/2017    MCV 84 09/09/2017     09/09/2017     Lab Results   Component Value Date    GLUCOSE 155 (H) 09/09/2017    CALCIUM 8 9 09/09/2017     (L) 09/09/2017    K 4 2 09/09/2017    CO2 32 09/09/2017     09/09/2017    BUN 29 (H) 09/09/2017    CREATININE 1 26 09/09/2017     Lab Results   Component Value Date    CALCIUM 8 9 09/09/2017

## 2017-09-09 NOTE — ASSESSMENT & PLAN NOTE
Assessment:   Stable    Plan:   --Appreciate Renal input and prep for Agram  --Monitor Creat post-dye

## 2017-09-09 NOTE — PROGRESS NOTES
Split/Shared Statement  I saw/examined the patient  I agree with the Advanced Practitioner, Jatin Dillon PA-C's note with the following additions/exceptions:     72-year-old male who presents with a past medical history of CKD stage III, DM, CHF, diastolic, A  fib, Mechanical AVR, Chronic indwelling Beckett, Prostate cancer, bilateral pressure ulcers with weakness on 9/2  Podiatry was brought on board to evaluate the heel ulcerations  The etiology of weakness was unclear and the patient was admitted for further evaluation  Wound cultures are growing multiple various bacteria But felt to be secondary to colonization  Patient was found to have elevated ESR and CRP  1) CKD - Cr this admission is 1 2-1 4  need for contrast for LE studies early next week  follows with [facility]     - hold furosemide AM of a gram  - mucomyst X 4 doses starting day prior to procedure   - I have ordered mucomyst    - please order fluids starting 6 hours pre procedure at 75 cc/hr for 6 hours  Once timing of procedure is known  If timing is unknown, can start midnight prior to procedure  - risks of JULIA reviewed with patient  - if Cr worsening, then may need to delay procedure if not emergent  - IVF prior to procedure    2) chronic beckett - changed on 9/4  changed monthly at home  3) b/l LE wounds -     - as per Podiatry and ID and Primary Team   - has LLE heel wound - was to go to OR on 9/8 For I&D of left calcaneus  4) Vascular disease - concern for 50-75% stenosis in Distal SFA with diffuse femoral and popliteal disease on the vascular studies  Therefore vascular team was brought on board  5) hyponatremia -     - unclear etiology    - Na slowly trending down     - recheck in AM    6) on magnesium    - check mag level in AM

## 2017-09-09 NOTE — ASSESSMENT & PLAN NOTE
Assessment:   PAD w/ Left heel tissue loss  S/P L Heel debridement, partial calcanectomy, wound VAC placement 9/8    Plan:   --Arteriogram w/ L SFA intervention Monday 9/11  --Requires renal prep  --Continue Statin  --ASA allergic, may need to consider plavix for antiplatelet therapy after revascularization

## 2017-09-09 NOTE — SUBJECTIVE & OBJECTIVE
Subjective:  Pt doing well  Denies pain  Agreeable to Agram Monday  Vitals:  /68   Pulse 71   Temp (!) 96 2 °F (35 7 °C) (Tympanic)   Resp 18   Ht 5' 9 5" (1 765 m)   Wt 108 kg (238 lb 1 6 oz)   SpO2 97%   BMI 34 66 kg/m²     I/Os:  I/O last 3 completed shifts:   In: 725 [I V :725]  Out: 1450 [Urine:1450]  I/O this shift:  In: 180 [P O :180]  Out: -     Lab Results and Cultures:   Lab Results   Component Value Date    WBC 10 87 (H) 09/09/2017    HGB 10 7 (L) 09/09/2017    HCT 31 7 (L) 09/09/2017    MCV 84 09/09/2017     09/09/2017     Lab Results   Component Value Date    GLUCOSE 155 (H) 09/09/2017    CALCIUM 8 9 09/09/2017     (L) 09/09/2017    K 4 2 09/09/2017    CO2 32 09/09/2017     09/09/2017    BUN 29 (H) 09/09/2017    CREATININE 1 26 09/09/2017     Lab Results   Component Value Date    INR 1 30 (H) 09/09/2017    INR 2 14 (H) 09/08/2017    INR 2 61 (H) 09/07/2017    PROTIME 16 3 (H) 09/09/2017    PROTIME 24 1 (H) 09/08/2017    PROTIME 28 3 (H) 09/07/2017        Blood Culture: No results found for: BLOODCX,   Urinalysis:   Lab Results   Component Value Date    COLORU Yellow 09/05/2017    CLARITYU Clear 09/05/2017    SPECGRAV <=1 005 09/05/2017    PHUR 6 5 09/05/2017    LEUKOCYTESUR Small (A) 09/05/2017    NITRITE Positive (A) 09/05/2017    PROTEINUA Negative 09/05/2017    GLUCOSEU Negative 09/05/2017    KETONESU Negative 09/05/2017    BILIRUBINUR Negative 09/05/2017    BLOODU Trace-lysed (A) 09/05/2017   ,   Urine Culture:   Lab Results   Component Value Date    URINECX >100,000 cfu/ml Mixed Contaminants X3 09/02/2017   ,   Wound Culure:   Lab Results   Component Value Date    WOUNDCULT 3+ Growth of Staphylococcus aureus 09/02/2017    WOUNDCULT 3+ Growth of Enterococcus faecalis 09/02/2017    WOUNDCULT 3+ Growth of Mixed Skin Sabiha 09/02/2017       Medications:  Current Facility-Administered Medications   Medication Dose Route Frequency    acetaminophen (TYLENOL) tablet 650 mg  650 mg Oral Q6H PRN    collagenase (SANTYL) ointment   Topical Daily    ferrous sulfate tablet 325 mg  325 mg Oral BID    fluticasone-salmeterol (ADVAIR) 500-50 mcg/dose inhaler 1 puff  1 puff Inhalation BID    furosemide (LASIX) tablet 40 mg  40 mg Oral Daily    gabapentin (NEURONTIN) capsule 100 mg  100 mg Oral TID    insulin glargine (LANTUS) subcutaneous injection 12 Units  12 Units Subcutaneous HS    insulin lispro (HumaLOG) 100 units/mL subcutaneous injection 1-6 Units  1-6 Units Subcutaneous HS    insulin lispro (HumaLOG) 100 units/mL subcutaneous injection 12 Units  12 Units Subcutaneous Daily Before Breakfast    insulin lispro (HumaLOG) 100 units/mL subcutaneous injection 2-12 Units  2-12 Units Subcutaneous TID AC    insulin lispro (HumaLOG) 100 units/mL subcutaneous injection 8 Units  8 Units Subcutaneous Daily Before Lunch    insulin lispro (HumaLOG) 100 units/mL subcutaneous injection 8 Units  8 Units Subcutaneous Before Dinner    magnesium hydroxide (MILK OF MAGNESIA) 400 mg/5 mL oral suspension 30 mL  30 mL Oral PRN    magnesium oxide (MAG-OX) tablet 400 mg  400 mg Oral Daily    ondansetron (ZOFRAN) injection 4 mg  4 mg Intravenous Q6H PRN    oxybutynin (DITROPAN) tablet 5 mg  5 mg Oral BID    pravastatin (PRAVACHOL) tablet 40 mg  40 mg Oral Daily With Dinner    tiotropium (SPIRIVA) capsule for inhaler 18 mcg  18 mcg Inhalation Daily       Physical Exam:    General appearance: alert, appears stated age and cooperative  Lungs: clear to auscultation bilaterally  Heart: regular rate and rhythm, S1, S2 normal, no murmur, click, rub or gallop  Abdomen: soft, non-tender; bowel sounds normal; no masses,  no organomegaly  Extremities: +edema, feet warm, M/S intact   Left VAC intact      Pulse exam:  Femoral: Right: 2+ Left: 2+  Popliteal: Right: non-palpable Left: non-palpable  DP: Right: doppler signal Left: doppler signal  PT: Right: doppler signal Left: doppler signal      Mitesh Christie Nidia Goltz, PA-C  9/9/2017

## 2017-09-09 NOTE — PROGRESS NOTES
Vascular Surgery    Progress Note - Chayo Lane 78 y o  male MRN: [id number]    Unit/Bed#: E4 -01 Encounter: [id number]        PAD (peripheral artery disease)   Assessment & Plan    Assessment:   PAD w/ Left heel tissue loss  S/P L Heel debridement, partial calcanectomy, wound VAC placement 9/8    Plan:   --Arteriogram w/ L SFA intervention Monday 9/11  --Requires renal prep  --Continue Statin  --ASA allergic, may need to consider plavix for antiplatelet therapy after revascularization        Stage 3 chronic kidney disease   Assessment & Plan    Assessment:   Stable    Plan:   --Appreciate Renal input and prep for Agram  --Monitor Creat post-dye          Cardiac Disease  Assessment:  Hx AVR w/ [address] mechanical valve  Afib    Plan:  --INR 1 3 today  --Will need Heparin bridge until surgical procedures complete  --Hold coumadin until after Agram Monday  --D/W SLIM        Subjective:  Pt doing well  Denies pain  Agreeable to Agram Monday  Vitals:  /68   Pulse 71   Temp (!) 96 2 °F (35 7 °C) (Tympanic)   Resp 18   Ht 5' 9 5" (1 765 m)   Wt 108 kg (238 lb 1 6 oz)   SpO2 97%   BMI 34 66 kg/m²      I/Os:  I/O last 3 completed shifts:   In: 725 [I V :725]  Out: 1450 [Urine:1450]  I/O this shift:  In: 180 [P O :180]  Out: -     Lab Results and Cultures:   Lab Results   Component Value Date    WBC 10 87 (H) 09/09/2017    HGB 10 7 (L) 09/09/2017    HCT 31 7 (L) 09/09/2017    MCV 84 09/09/2017     09/09/2017     Lab Results   Component Value Date    GLUCOSE 155 (H) 09/09/2017    CALCIUM 8 9 09/09/2017     (L) 09/09/2017    K 4 2 09/09/2017    CO2 32 09/09/2017     09/09/2017    BUN 29 (H) 09/09/2017    CREATININE 1 26 09/09/2017     Lab Results   Component Value Date    INR 1 30 (H) 09/09/2017    INR 2 14 (H) 09/08/2017    INR 2 61 (H) 09/07/2017    PROTIME 16 3 (H) 09/09/2017    PROTIME 24 1 (H) 09/08/2017    PROTIME 28 3 (H) 09/07/2017        Blood Culture: No results found for: BLOODCX,   Urinalysis:   Lab Results   Component Value Date    COLORU Yellow 09/05/2017    CLARITYU Clear 09/05/2017    SPECGRAV <=1 005 09/05/2017    PHUR 6 5 09/05/2017    LEUKOCYTESUR Small (A) 09/05/2017    NITRITE Positive (A) 09/05/2017    PROTEINUA Negative 09/05/2017    GLUCOSEU Negative 09/05/2017    KETONESU Negative 09/05/2017    BILIRUBINUR Negative 09/05/2017    BLOODU Trace-lysed (A) 09/05/2017   ,   Urine Culture:   Lab Results   Component Value Date    URINECX >100,000 cfu/ml Mixed Contaminants X3 09/02/2017   ,   Wound Culure:   Lab Results   Component Value Date    WOUNDCULT 3+ Growth of Staphylococcus aureus 09/02/2017    WOUNDCULT 3+ Growth of Enterococcus faecalis 09/02/2017    WOUNDCULT 3+ Growth of Mixed Skin Sabiha 09/02/2017       Medications:  Current Facility-Administered Medications   Medication Dose Route Frequency    acetaminophen (TYLENOL) tablet 650 mg  650 mg Oral Q6H PRN    collagenase (SANTYL) ointment   Topical Daily    ferrous sulfate tablet 325 mg  325 mg Oral BID    fluticasone-salmeterol (ADVAIR) 500-50 mcg/dose inhaler 1 puff  1 puff Inhalation BID    furosemide (LASIX) tablet 40 mg  40 mg Oral Daily    gabapentin (NEURONTIN) capsule 100 mg  100 mg Oral TID    insulin glargine (LANTUS) subcutaneous injection 12 Units  12 Units Subcutaneous HS    insulin lispro (HumaLOG) 100 units/mL subcutaneous injection 1-6 Units  1-6 Units Subcutaneous HS    insulin lispro (HumaLOG) 100 units/mL subcutaneous injection 12 Units  12 Units Subcutaneous Daily Before Breakfast    insulin lispro (HumaLOG) 100 units/mL subcutaneous injection 2-12 Units  2-12 Units Subcutaneous TID AC    insulin lispro (HumaLOG) 100 units/mL subcutaneous injection 8 Units  8 Units Subcutaneous Daily Before Lunch    insulin lispro (HumaLOG) 100 units/mL subcutaneous injection 8 Units  8 Units Subcutaneous Before Dinner    magnesium hydroxide (MILK OF MAGNESIA) 400 mg/5 mL oral suspension 30 mL  30 mL Oral PRN    magnesium oxide (MAG-OX) tablet 400 mg  400 mg Oral Daily    ondansetron (ZOFRAN) injection 4 mg  4 mg Intravenous Q6H PRN    oxybutynin (DITROPAN) tablet 5 mg  5 mg Oral BID    pravastatin (PRAVACHOL) tablet 40 mg  40 mg Oral Daily With Dinner    tiotropium (SPIRIVA) capsule for inhaler 18 mcg  18 mcg Inhalation Daily       Physical Exam:    General appearance: alert, appears stated age and cooperative  Lungs: clear to auscultation bilaterally  Heart: regular rate and rhythm, S1, S2 normal, no murmur, click, rub or gallop  Abdomen: soft, non-tender; bowel sounds normal; no masses,  no organomegaly  Extremities: +edema, feet warm, M/S intact   Left VAC intact      Pulse exam:  Femoral: Right: 2+ Left: 2+  Popliteal: Right: non-palpable Left: non-palpable  DP: Right: doppler signal Left: doppler signal  PT: Right: doppler signal Left: doppler signal      Shelley Mckeon PA-C  9/9/2017

## 2017-09-09 NOTE — PROGRESS NOTES
Progress Note - Podiatry  Lila Contreras 78 y o  male MRN: [id number]  Unit/Bed#: E4 -01 Encounter: [id number]    Assessment/Plan:  3  79 yo M with s/p left partial calcanectomy with wound vac application secondary to  osteomyelitis by Dr Mary Christie (DOS: 9/8/17)  -dressings left intact today, will change tomorrow with attending (wound vac running at 75mmHg low continuous with 1 black sponge in wound and 1 for bridging)   -WBS: NWB to the LLE  -PT on board, recommending STR   -final wound cultures growing MSSA and enterococcus faecalis  Monitoring off antibiotics   -bone culture prelim results show no polys or bacteria   -pathology results pending for clean margin     2  Left lateral forefoot wound secondary to DM, PAD   -will change dressings tomorrow with attending      3  Right posterior heel ulceration   -continue [address] while in house  -will change dressings tomorrow      4  DM, type 2 with neuropathy  -c/w lantus and ISS   -management per SLIM     5  CKD stage 3  -management per SLIM     6  PAD  -arterial duplex results reviewed   -Agram with L SFA intervention on Monday 9/11  -renal prep per nephrology     7  Afib   -c/w heparin drip per SLIM while holding warfarin   -INR 1 3 today     8  Code status - Level 1        Subjective/Objective   Chief Complaint:   Chief Complaint   Patient presents with    Weakness - Generalized     pt woke up this morning just feeling very weak and unable to get up   pt denies fevers, n/v   denies any pain or sick contacts  Subjective: 78 y o  y/o male was seen and evaluated at bedside in NAD  Denies any pain  He is aware he will be going for agram on Monday  Blood pressure 118/56, pulse 74, temperature 97 7 °F (36 5 °C), temperature source Tympanic, resp  rate 18, height 5' 9 5" (1 765 m), weight 108 kg (238 lb 1 6 oz), SpO2 96 %  ,Body mass index is 34 66 kg/m²      Invasive Devices     Peripheral Intravenous Line            Peripheral IV 09/07/17 Right Arm 2 days Drain            Urethral Catheter 18 Fr  6 days    Negative Pressure Wound Therapy (V A C ) Foot Left less than 1 day                Physical Exam:   General: Alert, cooperative and no distress  Lungs: Non labored breathing  Heart: Positive S1, S2  Abdomen: Soft, non-tender  Extremity:   NVS and motor function at baseline  Wound vac running at 75mmHg with minimal leak  Dressings dry and intact to b/l LE  Lab, Imaging and other studies:   I have personally reviewed pertinent lab results  , CBC:   Lab Results   Component Value Date    WBC 10 87 (H) 09/09/2017    HGB 10 7 (L) 09/09/2017    HCT 31 7 (L) 09/09/2017    MCV 84 09/09/2017     09/09/2017    MCH 28 5 09/09/2017    MCHC 33 8 09/09/2017    RDW 14 6 09/09/2017    MPV 8 8 (L) 09/09/2017   , CMP:   Lab Results   Component Value Date     (L) 09/09/2017    K 4 2 09/09/2017     09/09/2017    CO2 32 09/09/2017    ANIONGAP 1 (L) 09/09/2017    BUN 29 (H) 09/09/2017    CREATININE 1 26 09/09/2017    GLUCOSE 155 (H) 09/09/2017    CALCIUM 8 9 09/09/2017    EGFR 56 09/09/2017       Imaging: I have personally reviewed pertinent films in PACS  EKG, Pathology, and Other Studies: I have personally reviewed pertinent reports    VTE Pharmacologic Prophylaxis: heparin drip

## 2017-09-10 ENCOUNTER — APPOINTMENT (INPATIENT)
Dept: RADIOLOGY | Facility: HOSPITAL | Age: 77
DRG: 616 | End: 2017-09-10
Payer: MEDICARE

## 2017-09-10 PROBLEM — Z95.2 S/P AVR: Chronic | Status: ACTIVE | Noted: 2017-09-10

## 2017-09-10 LAB
ANION GAP SERPL CALCULATED.3IONS-SCNC: 3 MMOL/L (ref 4–13)
APTT PPP: 131 SECONDS (ref 23–35)
APTT PPP: 52 SECONDS (ref 23–35)
APTT PPP: 90 SECONDS (ref 23–35)
BUN SERPL-MCNC: 29 MG/DL (ref 5–25)
CALCIUM SERPL-MCNC: 9.1 MG/DL (ref 8.3–10.1)
CHLORIDE SERPL-SCNC: 100 MMOL/L (ref 100–108)
CO2 SERPL-SCNC: 32 MMOL/L (ref 21–32)
CREAT SERPL-MCNC: 1.22 MG/DL (ref 0.6–1.3)
GFR SERPL CREATININE-BSD FRML MDRD: 58 ML/MIN/1.73SQ M
GLUCOSE SERPL-MCNC: 145 MG/DL (ref 65–140)
GLUCOSE SERPL-MCNC: 168 MG/DL (ref 65–140)
GLUCOSE SERPL-MCNC: 189 MG/DL (ref 65–140)
GLUCOSE SERPL-MCNC: 254 MG/DL (ref 65–140)
GLUCOSE SERPL-MCNC: 299 MG/DL (ref 65–140)
MAGNESIUM SERPL-MCNC: 2.1 MG/DL (ref 1.6–2.6)
POTASSIUM SERPL-SCNC: 4.2 MMOL/L (ref 3.5–5.3)
SODIUM SERPL-SCNC: 135 MMOL/L (ref 136–145)
VIT B1 BLD-SCNC: 156.9 NMOL/L (ref 66.5–200)

## 2017-09-10 PROCEDURE — 85730 THROMBOPLASTIN TIME PARTIAL: CPT | Performed by: INTERNAL MEDICINE

## 2017-09-10 PROCEDURE — 82948 REAGENT STRIP/BLOOD GLUCOSE: CPT

## 2017-09-10 PROCEDURE — 73560 X-RAY EXAM OF KNEE 1 OR 2: CPT

## 2017-09-10 PROCEDURE — 80048 BASIC METABOLIC PNL TOTAL CA: CPT | Performed by: PHYSICIAN ASSISTANT

## 2017-09-10 PROCEDURE — 83735 ASSAY OF MAGNESIUM: CPT | Performed by: INTERNAL MEDICINE

## 2017-09-10 RX ORDER — SODIUM CHLORIDE 9 MG/ML
75 INJECTION, SOLUTION INTRAVENOUS CONTINUOUS
Status: DISCONTINUED | OUTPATIENT
Start: 2017-09-11 | End: 2017-09-11

## 2017-09-10 RX ORDER — INSULIN GLARGINE 100 [IU]/ML
15 INJECTION, SOLUTION SUBCUTANEOUS
Status: DISCONTINUED | OUTPATIENT
Start: 2017-09-10 | End: 2017-09-11

## 2017-09-10 RX ADMIN — TIOTROPIUM BROMIDE 18 MCG: 18 CAPSULE ORAL; RESPIRATORY (INHALATION) at 08:51

## 2017-09-10 RX ADMIN — INSULIN LISPRO 8 UNITS: 100 INJECTION, SOLUTION INTRAVENOUS; SUBCUTANEOUS at 17:48

## 2017-09-10 RX ADMIN — OXYBUTYNIN CHLORIDE 5 MG: 5 TABLET ORAL at 17:53

## 2017-09-10 RX ADMIN — FERROUS SULFATE TAB 325 MG (65 MG ELEMENTAL FE) 325 MG: 325 (65 FE) TAB at 17:52

## 2017-09-10 RX ADMIN — SODIUM CHLORIDE 75 ML/HR: 0.9 INJECTION, SOLUTION INTRAVENOUS at 23:45

## 2017-09-10 RX ADMIN — FUROSEMIDE 40 MG: 40 TABLET ORAL at 08:45

## 2017-09-10 RX ADMIN — HEPARIN SODIUM AND DEXTROSE 17 UNITS/KG/HR: 10000; 5 INJECTION INTRAVENOUS at 23:49

## 2017-09-10 RX ADMIN — FLUTICASONE PROPIONATE AND SALMETEROL 1 PUFF: 50; 500 POWDER RESPIRATORY (INHALATION) at 21:30

## 2017-09-10 RX ADMIN — HEPARIN SODIUM AND DEXTROSE 15 UNITS/KG/HR: 10000; 5 INJECTION INTRAVENOUS at 09:53

## 2017-09-10 RX ADMIN — OXYBUTYNIN CHLORIDE 5 MG: 5 TABLET ORAL at 08:51

## 2017-09-10 RX ADMIN — INSULIN LISPRO 6 UNITS: 100 INJECTION, SOLUTION INTRAVENOUS; SUBCUTANEOUS at 11:40

## 2017-09-10 RX ADMIN — INSULIN LISPRO 2 UNITS: 100 INJECTION, SOLUTION INTRAVENOUS; SUBCUTANEOUS at 08:46

## 2017-09-10 RX ADMIN — INSULIN GLARGINE 15 UNITS: 100 INJECTION, SOLUTION SUBCUTANEOUS at 22:15

## 2017-09-10 RX ADMIN — Medication 400 MG: at 08:45

## 2017-09-10 RX ADMIN — INSULIN LISPRO 4 UNITS: 100 INJECTION, SOLUTION INTRAVENOUS; SUBCUTANEOUS at 21:36

## 2017-09-10 RX ADMIN — ACETYLCYSTEINE 1200 MG: 200 SOLUTION ORAL; RESPIRATORY (INHALATION) at 08:47

## 2017-09-10 RX ADMIN — FERROUS SULFATE TAB 325 MG (65 MG ELEMENTAL FE) 325 MG: 325 (65 FE) TAB at 08:45

## 2017-09-10 RX ADMIN — ACETYLCYSTEINE 1200 MG: 200 SOLUTION ORAL; RESPIRATORY (INHALATION) at 17:50

## 2017-09-10 RX ADMIN — GABAPENTIN 100 MG: 100 CAPSULE ORAL at 17:52

## 2017-09-10 RX ADMIN — GABAPENTIN 100 MG: 100 CAPSULE ORAL at 08:45

## 2017-09-10 RX ADMIN — PRAVASTATIN SODIUM 40 MG: 40 TABLET ORAL at 17:52

## 2017-09-10 RX ADMIN — HEPARIN SODIUM 4000 UNITS: 1000 INJECTION, SOLUTION INTRAVENOUS; SUBCUTANEOUS at 18:00

## 2017-09-10 RX ADMIN — INSULIN LISPRO 8 UNITS: 100 INJECTION, SOLUTION INTRAVENOUS; SUBCUTANEOUS at 11:40

## 2017-09-10 RX ADMIN — FLUTICASONE PROPIONATE AND SALMETEROL 1 PUFF: 50; 500 POWDER RESPIRATORY (INHALATION) at 08:51

## 2017-09-10 RX ADMIN — COLLAGENASE SANTYL: 250 OINTMENT TOPICAL at 08:56

## 2017-09-10 RX ADMIN — GABAPENTIN 100 MG: 100 CAPSULE ORAL at 21:30

## 2017-09-10 NOTE — PROGRESS NOTES
Progress Note - Rashi Bose 78 y o  male MRN: [id number]    Unit/Bed#: E4 -01 Encounter: [id number]    Assessment/Plan:    Left heel ulcer   status post I and D currently with VAC      Angiogram tomorrow to improve vascular supply      Postop care by Podiatry    History of mechanical valve IV heparin with pending angiogram and possible surgery      Monitor PTT    CKD stage 3   creatinine stable 1 22    Ambulatory dysfunction plan rehab on discharge    Diastolic HF   chronic and stable    Diabetes   glucoses last 24 hours 189 to 280      Will increase Lantus and Humalog 15 units      Continue ADA and sliding scale    AFib    chronic rate is controlled      IV heparin holding warfarin postop        Dyslipidemia   continue statin for LDL control     Subjective:   Feels good today no complaints denies pain in the lower extremities no chest pain no shortness of breath no nausea vomiting diarrhea no fevers or chills appetite okay    Objective:     Vitals: Blood pressure 128/58, pulse 60, temperature (!) 97 4 °F (36 3 °C), temperature source Tympanic, resp  rate 18, height 5' 9 5" (1 765 m), weight 108 kg (238 lb 1 6 oz), SpO2 98 %  ,Body mass index is 34 66 kg/m²          Results from last 7 days  Lab Units 09/09/17  0521   WBC Thousand/uL 10 87*   HEMOGLOBIN g/dL 10 7*   HEMATOCRIT % 31 7*   PLATELETS Thousands/uL 304   INR  1 30*       Results from last 7 days  Lab Units 09/10/17  0542   SODIUM mmol/L 135*   POTASSIUM mmol/L 4 2   CHLORIDE mmol/L 100   CO2 mmol/L 32   BUN mg/dL 29*   CREATININE mg/dL 1 22   CALCIUM mg/dL 9 1   GLUCOSE RANDOM mg/dL 168*       Scheduled Meds:    acetylcysteine 1,200 mg Oral BID   collagenase  Topical Daily   ferrous sulfate 325 mg Oral BID   fluticasone-salmeterol 1 puff Inhalation BID   gabapentin 100 mg Oral TID   insulin glargine 12 Units Subcutaneous HS   insulin lispro 1-6 Units Subcutaneous HS   insulin lispro 12 Units Subcutaneous Daily Before Breakfast   insulin lispro 2-12 Units Subcutaneous TID AC   insulin lispro 8 Units Subcutaneous Daily Before Lunch   insulin lispro 8 Units Subcutaneous Before Dinner   magnesium oxide 400 mg Oral Daily   oxybutynin 5 mg Oral BID   pravastatin 40 mg Oral Daily With Dinner   tiotropium 18 mcg Inhalation Daily       Continuous Infusions:    heparin (porcine) 3-30 Units/kg/hr (Order-Specific) Last Rate: 15 Units/kg/hr (09/10/17 0953)   [START ON 9/11/2017] sodium chloride 75 mL/hr      Physical exam:  General appearance:  Alert oriented x3 interaction appropriate  Head/Eyes:  Nonicteric PERRL EOMI  Neck:  Supple  Lungs:  Decreased BS bilateral no wheezing rhonchi or rales  Heart: normal S1 S2 regular  Abdomen:  Obese nontender with bowel sounds  Extremities:  Trace edema left heel VAC gauze covered  Skin: no rash    Invasive Devices     Peripheral Intravenous Line            Peripheral IV 09/09/17 Left Hand less than 1 day          Drain            Urethral Catheter 18 Fr  7 days    Negative Pressure Wound Therapy (V A C ) Foot Left 1 day                  VTE Pharmacologic Prophylaxis:  Heparin   VTE Mechanical Prophylaxis:  Heparin                    Counseling / Coordination of Care  Total floor / unit time spent today  30  minutes  Greater than 50% of total time was spent with the patient and / or family counseling and / or coordination of care  A description of the counseling / coordination of care:  Discussed with Podiatry and wife

## 2017-09-10 NOTE — PROGRESS NOTES
NEPHROLOGY PROGRESS NOTE   Hasmukh Celestin 78 y o  male MRN: [id number]  Unit/Bed#: E4 -01 Encounter: [id number]      ASSESSMENT and PLAN:  1  CKD III: creatinine ranging 1 2-1 4 this admission  Creatinine remains stable    -he does follow with [facility]    -suspect his CKD is due to diabetes               -UA without proteinuria    -continue mucomyst x 4 doses    -agram is likely tomorrow (unsure of time) so will start normal saline at 75cc/h at midnight   -hold lasix prior to procedure tomorrow    -check am BMP  2  L calcaneous osteomyelitis: s/p I&D   -off abx and ID is on board  3  PAD: for agram most likely tomorrow   4  CAD s/p CABG with mechanical AVR  5  Chronic beckett: last changed 9/4   6  Hyponatremia: mild and sodium up to 135 today   7  Anemia: trend hgb post op       SUBJECTIVE:  Feeling well today  No pain, shortness of breath, nausea, vomiting or diarrhea       OBJECTIVE:  Current Weight: Weight - Scale: 108 kg (238 lb 1 6 oz)  Vitals:    09/10/17 0712   BP: 128/58   Pulse: 60   Resp: 18   Temp: (!) 97 4 °F (36 3 °C)   SpO2: 98%       Intake/Output Summary (Last 24 hours) at 09/10/17 0945  Last data filed at 09/10/17 3642   Gross per 24 hour   Intake              780 ml   Output             2150 ml   Net            -1370 ml     General: no acute distress   Skin: no rash   Eyes: sclera anicteric   ENT: moist mucous membranes   Neck: supple, symmetric   Chest: clear to auscultation    CVS: regular rate and rhythm   Abdomen: soft, non-tender, non-distended   Extremities: L leg wrapped, 0 to trace edema   Neuro: awake and alert   Psych: appropriate affect     Medications:  Scheduled Meds:  acetylcysteine 1,200 mg Oral BID   collagenase  Topical Daily   ferrous sulfate 325 mg Oral BID   fluticasone-salmeterol 1 puff Inhalation BID   furosemide 40 mg Oral Daily   gabapentin 100 mg Oral TID   insulin glargine 12 Units Subcutaneous HS   insulin lispro 1-6 Units Subcutaneous HS   insulin lispro 12 Units Subcutaneous Daily Before Breakfast   insulin lispro 2-12 Units Subcutaneous TID AC   insulin lispro 8 Units Subcutaneous Daily Before Lunch   insulin lispro 8 Units Subcutaneous Before Dinner   magnesium oxide 400 mg Oral Daily   oxybutynin 5 mg Oral BID   pravastatin 40 mg Oral Daily With Dinner   tiotropium 18 mcg Inhalation Daily       PRN Meds:   acetaminophen    heparin (porcine)    heparin (porcine)    magnesium hydroxide    ondansetron    Continuous Infusions:  heparin (porcine) 3-30 Units/kg/hr (Order-Specific) Last Rate: 15 Units/kg/hr (09/10/17 0230)       Laboratory Results:  Lab Results   Component Value Date    WBC 10 87 (H) 09/09/2017    HGB 10 7 (L) 09/09/2017    HCT 31 7 (L) 09/09/2017    MCV 84 09/09/2017     09/09/2017     Lab Results   Component Value Date    GLUCOSE 168 (H) 09/10/2017    CALCIUM 9 1 09/10/2017     (L) 09/10/2017    K 4 2 09/10/2017    CO2 32 09/10/2017     09/10/2017    BUN 29 (H) 09/10/2017    CREATININE 1 22 09/10/2017     Lab Results   Component Value Date    CALCIUM 9 1 09/10/2017

## 2017-09-10 NOTE — ASSESSMENT & PLAN NOTE
Assessment:   PAD w/ Left heel tissue loss  S/P L Heel debridement, partial calcanectomy, wound VAC placement 9/8    Plan:   --Arteriogram w/ L SFA intervention tomorrow Monday 9/11  --Renal prep ordered by nephrology  --Continue Statin  --ASA allergic, may need to consider plavix for antiplatelet therapy after revascularization

## 2017-09-10 NOTE — PROGRESS NOTES
Vascular Surgery    Progress Note - Meena Muñiz 78 y o  male MRN: [id number]    Unit/Bed#: E4 -01 Encounter: [id number]        PAD (peripheral artery disease)   Assessment & Plan    Assessment:   PAD w/ Left heel tissue loss  S/P L Heel debridement, partial calcanectomy, wound VAC placement 9/8    Plan:   --Arteriogram w/ L SFA intervention tomorrow Monday 9/11  --Renal prep ordered by nephrology  --Continue Statin  --ASA allergic, may need to consider plavix for antiplatelet therapy after revascularization        S/P AVR   Assessment & Plan    Assessment:   [facility]  [facility] mechanical    Plan:   --Continue to hold coumadin for procedures  --Heparin bridge while off coumadin        Stage 3 chronic kidney disease   Assessment & Plan    Assessment:   Stable    Plan:   --Appreciate Renal input and prep for Agram  --Monitor Creat post-dye                  Subjective:  Pt doing well  No events overnight  Vitals:  /58   Pulse 60   Temp (!) 97 4 °F (36 3 °C) (Tympanic)   Resp 18   Ht 5' 9 5" (1 765 m)   Wt 108 kg (238 lb 1 6 oz)   SpO2 98%   BMI 34 66 kg/m²      I/Os:  I/O last 3 completed shifts:   In: 780 [P O :780]  Out: 2650 [Urine:2650]  I/O this shift:  In: 180 [P O :180]  Out: -     Lab Results and Cultures:   Lab Results   Component Value Date    WBC 10 87 (H) 09/09/2017    HGB 10 7 (L) 09/09/2017    HCT 31 7 (L) 09/09/2017    MCV 84 09/09/2017     09/09/2017     Lab Results   Component Value Date    GLUCOSE 168 (H) 09/10/2017    CALCIUM 9 1 09/10/2017     (L) 09/10/2017    K 4 2 09/10/2017    CO2 32 09/10/2017     09/10/2017    BUN 29 (H) 09/10/2017    CREATININE 1 22 09/10/2017     Lab Results   Component Value Date    INR 1 30 (H) 09/09/2017    INR 2 14 (H) 09/08/2017    INR 2 61 (H) 09/07/2017    PROTIME 16 3 (H) 09/09/2017    PROTIME 24 1 (H) 09/08/2017    PROTIME 28 3 (H) 09/07/2017        Blood Culture: No results found for: BLOODCX,   Urinalysis:   Lab Results   Component Value Date    COLORU Yellow 09/05/2017    CLARITYU Clear 09/05/2017    SPECGRAV <=1 005 09/05/2017    PHUR 6 5 09/05/2017    LEUKOCYTESUR Small (A) 09/05/2017    NITRITE Positive (A) 09/05/2017    PROTEINUA Negative 09/05/2017    GLUCOSEU Negative 09/05/2017    KETONESU Negative 09/05/2017    BILIRUBINUR Negative 09/05/2017    BLOODU Trace-lysed (A) 09/05/2017   ,   Urine Culture:   Lab Results   Component Value Date    URINECX >100,000 cfu/ml Mixed Contaminants X3 09/02/2017   ,   Wound Culure:   Lab Results   Component Value Date    WOUNDCULT 3+ Growth of Staphylococcus aureus 09/02/2017    WOUNDCULT 3+ Growth of Enterococcus faecalis 09/02/2017    WOUNDCULT 3+ Growth of Mixed Skin Sabiha 09/02/2017       Medications:  Current Facility-Administered Medications   Medication Dose Route Frequency    acetaminophen (TYLENOL) tablet 650 mg  650 mg Oral Q6H PRN    acetylcysteine (MUCOMYST) 200 mg/mL oral solution 1,200 mg  1,200 mg Oral BID    collagenase (SANTYL) ointment   Topical Daily    ferrous sulfate tablet 325 mg  325 mg Oral BID    fluticasone-salmeterol (ADVAIR) 500-50 mcg/dose inhaler 1 puff  1 puff Inhalation BID    gabapentin (NEURONTIN) capsule 100 mg  100 mg Oral TID    heparin (porcine) 25,000 units in 250 mL infusion (premix)  3-30 Units/kg/hr (Order-Specific) Intravenous Titrated    heparin (porcine) injection 4,000 Units  4,000 Units Intravenous PRN    heparin (porcine) injection 8,000 Units  8,000 Units Intravenous PRN    insulin glargine (LANTUS) subcutaneous injection 12 Units  12 Units Subcutaneous HS    insulin lispro (HumaLOG) 100 units/mL subcutaneous injection 1-6 Units  1-6 Units Subcutaneous HS    insulin lispro (HumaLOG) 100 units/mL subcutaneous injection 12 Units  12 Units Subcutaneous Daily Before Breakfast    insulin lispro (HumaLOG) 100 units/mL subcutaneous injection 2-12 Units  2-12 Units Subcutaneous TID AC    insulin lispro (HumaLOG) 100 units/mL subcutaneous injection 8 Units  8 Units Subcutaneous Daily Before Lunch    insulin lispro (HumaLOG) 100 units/mL subcutaneous injection 8 Units  8 Units Subcutaneous Before Dinner    magnesium hydroxide (MILK OF MAGNESIA) 400 mg/5 mL oral suspension 30 mL  30 mL Oral PRN    magnesium oxide (MAG-OX) tablet 400 mg  400 mg Oral Daily    ondansetron (ZOFRAN) injection 4 mg  4 mg Intravenous Q6H PRN    oxybutynin (DITROPAN) tablet 5 mg  5 mg Oral BID    pravastatin (PRAVACHOL) tablet 40 mg  40 mg Oral Daily With Dinner    [START ON 9/11/2017] sodium chloride 0 9 % infusion  75 mL/hr Intravenous Continuous    tiotropium (SPIRIVA) capsule for inhaler 18 mcg  18 mcg Inhalation Daily       Physical Exam:    General appearance: alert, appears stated age and cooperative  Lungs: clear to auscultation bilaterally  Heart: IRRR, +I0/Y5, +click  Abdomen: soft, non-tender; bowel sounds normal; no masses,  no organomegaly  Extremities: + edema, feet warm, M/S intact, Left VAC intact        Patrick Pulliam PA-C  9/10/2017

## 2017-09-10 NOTE — ASSESSMENT & PLAN NOTE
Assessment:   St  [facility]uf[facility] mechanical    Plan:   --Continue to hold coumadin for procedures  --Heparin bridge while off coumadin

## 2017-09-10 NOTE — SUBJECTIVE & OBJECTIVE
Subjective:  Pt doing well  No events overnight  Vitals:  /58   Pulse 60   Temp (!) 97 4 °F (36 3 °C) (Tympanic)   Resp 18   Ht 5' 9 5" (1 765 m)   Wt 108 kg (238 lb 1 6 oz)   SpO2 98%   BMI 34 66 kg/m²     I/Os:  I/O last 3 completed shifts:   In: 780 [P O :780]  Out: 2650 [Urine:2650]  I/O this shift:  In: 180 [P O :180]  Out: -     Lab Results and Cultures:   Lab Results   Component Value Date    WBC 10 87 (H) 09/09/2017    HGB 10 7 (L) 09/09/2017    HCT 31 7 (L) 09/09/2017    MCV 84 09/09/2017     09/09/2017     Lab Results   Component Value Date    GLUCOSE 168 (H) 09/10/2017    CALCIUM 9 1 09/10/2017     (L) 09/10/2017    K 4 2 09/10/2017    CO2 32 09/10/2017     09/10/2017    BUN 29 (H) 09/10/2017    CREATININE 1 22 09/10/2017     Lab Results   Component Value Date    INR 1 30 (H) 09/09/2017    INR 2 14 (H) 09/08/2017    INR 2 61 (H) 09/07/2017    PROTIME 16 3 (H) 09/09/2017    PROTIME 24 1 (H) 09/08/2017    PROTIME 28 3 (H) 09/07/2017        Blood Culture: No results found for: BLOODCX,   Urinalysis:   Lab Results   Component Value Date    COLORU Yellow 09/05/2017    CLARITYU Clear 09/05/2017    SPECGRAV <=1 005 09/05/2017    PHUR 6 5 09/05/2017    LEUKOCYTESUR Small (A) 09/05/2017    NITRITE Positive (A) 09/05/2017    PROTEINUA Negative 09/05/2017    GLUCOSEU Negative 09/05/2017    KETONESU Negative 09/05/2017    BILIRUBINUR Negative 09/05/2017    BLOODU Trace-lysed (A) 09/05/2017   ,   Urine Culture:   Lab Results   Component Value Date    URINECX >100,000 cfu/ml Mixed Contaminants X3 09/02/2017   ,   Wound Culure:   Lab Results   Component Value Date    WOUNDCULT 3+ Growth of Staphylococcus aureus 09/02/2017    WOUNDCULT 3+ Growth of Enterococcus faecalis 09/02/2017    WOUNDCULT 3+ Growth of Mixed Skin Sabiha 09/02/2017       Medications:  Current Facility-Administered Medications   Medication Dose Route Frequency    acetaminophen (TYLENOL) tablet 650 mg  650 mg Oral Q6H PRN    acetylcysteine (MUCOMYST) 200 mg/mL oral solution 1,200 mg  1,200 mg Oral BID    collagenase (SANTYL) ointment   Topical Daily    ferrous sulfate tablet 325 mg  325 mg Oral BID    fluticasone-salmeterol (ADVAIR) 500-50 mcg/dose inhaler 1 puff  1 puff Inhalation BID    gabapentin (NEURONTIN) capsule 100 mg  100 mg Oral TID    heparin (porcine) 25,000 units in 250 mL infusion (premix)  3-30 Units/kg/hr (Order-Specific) Intravenous Titrated    heparin (porcine) injection 4,000 Units  4,000 Units Intravenous PRN    heparin (porcine) injection 8,000 Units  8,000 Units Intravenous PRN    insulin glargine (LANTUS) subcutaneous injection 12 Units  12 Units Subcutaneous HS    insulin lispro (HumaLOG) 100 units/mL subcutaneous injection 1-6 Units  1-6 Units Subcutaneous HS    insulin lispro (HumaLOG) 100 units/mL subcutaneous injection 12 Units  12 Units Subcutaneous Daily Before Breakfast    insulin lispro (HumaLOG) 100 units/mL subcutaneous injection 2-12 Units  2-12 Units Subcutaneous TID AC    insulin lispro (HumaLOG) 100 units/mL subcutaneous injection 8 Units  8 Units Subcutaneous Daily Before Lunch    insulin lispro (HumaLOG) 100 units/mL subcutaneous injection 8 Units  8 Units Subcutaneous Before Dinner    magnesium hydroxide (MILK OF MAGNESIA) 400 mg/5 mL oral suspension 30 mL  30 mL Oral PRN    magnesium oxide (MAG-OX) tablet 400 mg  400 mg Oral Daily    ondansetron (ZOFRAN) injection 4 mg  4 mg Intravenous Q6H PRN    oxybutynin (DITROPAN) tablet 5 mg  5 mg Oral BID    pravastatin (PRAVACHOL) tablet 40 mg  40 mg Oral Daily With Dinner    [START ON 9/11/2017] sodium chloride 0 9 % infusion  75 mL/hr Intravenous Continuous    tiotropium (SPIRIVA) capsule for inhaler 18 mcg  18 mcg Inhalation Daily       Physical Exam:    General appearance: alert, appears stated age and cooperative  Lungs: clear to auscultation bilaterally  Heart: IRRR, +G1/E4, +click  Abdomen: soft, non-tender; bowel sounds normal; no masses,  no organomegaly  Extremities: + edema, feet warm, M/S intact, Left VAC intact        Nikia Tinsley PA-C  9/10/2017

## 2017-09-10 NOTE — PHYSICAL THERAPY NOTE
Physical Therapy Cancellation Note- went to see pt for re-eval, warner chavese  Pt complained of R knee pain on movement and palpation  Since pt is unable to tolerate rom on his only weight bearing lower limb re-eval is  deferred  Spoke with Dr Tracy Merino, PT

## 2017-09-10 NOTE — PROGRESS NOTES
Progress Note - Podiatry  Bebeto Her 78 y o  male MRN: [id number]  Unit/Bed#: E4 -01 Encounter: [id number]    Assessment/Plan:  3  79 yo M with s/p left partial calcanectomy with wound vac application secondary to  osteomyelitis by Dr Ronna Bartholomew (DOS: 9/8/17)  -wound appears to be healthy with exposed bone and tendon  Wound vac re-applied with maxorb to the distal skin to prevent further maceration and running at 75mmHg low continuous with 1 black sponge in wound and 1 for bridging; wound measurements: 4 5cm x2cm x1 5cm   -WBS: NWB to the LLE  -PT on board, recommending STR   -final wound cultures growing MSSA and enterococcus faecalis  Monitoring off antibiotics   -bone culture prelim results show no polys or bacteria   -pathology results pending for clean margin      2  Left lateral forefoot wound secondary to DM, PAD   -wound is stable with mild central necrosis   -dressings applied: maxorb with dry sterile dressing       3  Right posterior heel ulceration   -wound is mostly healed with mild opening   -applied maxorb with dry sterile dressing      4  DM, type 2 with neuropathy  -c/w lantus and ISS   -management per SLIM     5  CKD stage 3  -management per SLIM     6  PAD  -arterial duplex results reviewed   -Agram with L SFA intervention on Monday 9/11  -renal prep per nephrology      7  Afib   -c/w heparin drip per SLIM while holding warfarin      8  Code status - Level 1     Subjective/Objective   Chief Complaint:   Chief Complaint   Patient presents with    Weakness - Generalized     pt woke up this morning just feeling very weak and unable to get up   pt denies fevers, n/v   denies any pain or sick contacts  Subjective: 78 y o  y/o male was seen and evaluated at bedside  Denies any complains  His wife is by his side  Blood pressure 128/58, pulse 60, temperature (!) 97 4 °F (36 3 °C), temperature source Tympanic, resp   rate 18, height 5' 9 5" (1 765 m), weight 108 kg (238 lb 1 6 oz), SpO2 98 % ,Body mass index is 34 66 kg/m²  Invasive Devices     Peripheral Intravenous Line            Peripheral IV 09/09/17 Left Hand less than 1 day          Drain            Urethral Catheter 18 Fr  7 days    Negative Pressure Wound Therapy (V A C ) Foot Left 1 day                Physical Exam:   General: Alert, cooperative and no distress  Lungs: Non labored breathing  Heart: Positive S1, S2  Abdomen: Soft, non-tender  Extremity:   NVS and motor function at baseline  Wound vac running at 75mmHg with minimal leak  Dressings dry and intact to b/l LE  1) Left lateralheel wound measures: 4 5cm x 2cm x1 5cm with exposed bone and tendon   2) Left plantar lateral foot wound 2 5cm x3cm x0 1cm, mostly granular with pinpoint of necrosis centrally   3) Right plantar heel wound 0 5cm x0 3cm 0 2cm, fibrogranular in nature       Left lateral heel       Lab, Imaging and other studies:   I have personally reviewed pertinent lab results  , CBC: No results found for: WBC, HGB, HCT, MCV, PLT, ADJUSTEDWBC, MCH, MCHC, RDW, MPV, NRBC, CMP:   Lab Results   Component Value Date     (L) 09/10/2017    K 4 2 09/10/2017     09/10/2017    CO2 32 09/10/2017    ANIONGAP 3 (L) 09/10/2017    BUN 29 (H) 09/10/2017    CREATININE 1 22 09/10/2017    GLUCOSE 168 (H) 09/10/2017    CALCIUM 9 1 09/10/2017    EGFR 58 09/10/2017       Imaging: I have personally reviewed pertinent films in PACS  EKG, Pathology, and Other Studies: I have personally reviewed pertinent reports    VTE Pharmacologic Prophylaxis: heparin drip

## 2017-09-11 LAB
ANION GAP SERPL CALCULATED.3IONS-SCNC: 6 MMOL/L (ref 4–13)
APTT PPP: 129 SECONDS (ref 23–35)
APTT PPP: 73 SECONDS (ref 23–35)
APTT PPP: 79 SECONDS (ref 23–35)
BACTERIA TISS AEROBE CULT: NO GROWTH
BASOPHILS # BLD AUTO: 0.05 THOUSANDS/ΜL (ref 0–0.1)
BASOPHILS NFR BLD AUTO: 1 % (ref 0–1)
BUN SERPL-MCNC: 26 MG/DL (ref 5–25)
CALCIUM SERPL-MCNC: 9.1 MG/DL (ref 8.3–10.1)
CHLORIDE SERPL-SCNC: 101 MMOL/L (ref 100–108)
CO2 SERPL-SCNC: 30 MMOL/L (ref 21–32)
CREAT SERPL-MCNC: 1.21 MG/DL (ref 0.6–1.3)
EOSINOPHIL # BLD AUTO: 0.29 THOUSAND/ΜL (ref 0–0.61)
EOSINOPHIL NFR BLD AUTO: 3 % (ref 0–6)
ERYTHROCYTE [DISTWIDTH] IN BLOOD BY AUTOMATED COUNT: 14.4 % (ref 11.6–15.1)
GFR SERPL CREATININE-BSD FRML MDRD: 59 ML/MIN/1.73SQ M
GLUCOSE SERPL-MCNC: 220 MG/DL (ref 65–140)
GLUCOSE SERPL-MCNC: 221 MG/DL (ref 65–140)
GLUCOSE SERPL-MCNC: 226 MG/DL (ref 65–140)
GLUCOSE SERPL-MCNC: 240 MG/DL (ref 65–140)
GLUCOSE SERPL-MCNC: 248 MG/DL (ref 65–140)
GRAM STN SPEC: NORMAL
HCT VFR BLD AUTO: 31.4 % (ref 36.5–49.3)
HGB BLD-MCNC: 10.6 G/DL (ref 12–17)
INR PPP: 1.06 (ref 0.86–1.16)
LYMPHOCYTES # BLD AUTO: 1.36 THOUSANDS/ΜL (ref 0.6–4.47)
LYMPHOCYTES NFR BLD AUTO: 16 % (ref 14–44)
MCH RBC QN AUTO: 28.6 PG (ref 26.8–34.3)
MCHC RBC AUTO-ENTMCNC: 33.8 G/DL (ref 31.4–37.4)
MCV RBC AUTO: 85 FL (ref 82–98)
MONOCYTES # BLD AUTO: 0.58 THOUSAND/ΜL (ref 0.17–1.22)
MONOCYTES NFR BLD AUTO: 7 % (ref 4–12)
NEUTROPHILS # BLD AUTO: 6.26 THOUSANDS/ΜL (ref 1.85–7.62)
NEUTS SEG NFR BLD AUTO: 73 % (ref 43–75)
NRBC BLD AUTO-RTO: 0 /100 WBCS
PLATELET # BLD AUTO: 330 THOUSANDS/UL (ref 149–390)
PMV BLD AUTO: 9 FL (ref 8.9–12.7)
POTASSIUM SERPL-SCNC: 4 MMOL/L (ref 3.5–5.3)
PROTHROMBIN TIME: 13.8 SECONDS (ref 12.1–14.4)
RBC # BLD AUTO: 3.71 MILLION/UL (ref 3.88–5.62)
SODIUM SERPL-SCNC: 137 MMOL/L (ref 136–145)
WBC # BLD AUTO: 8.54 THOUSAND/UL (ref 4.31–10.16)

## 2017-09-11 PROCEDURE — G8981 BODY POS CURRENT STATUS: HCPCS

## 2017-09-11 PROCEDURE — G8987 SELF CARE CURRENT STATUS: HCPCS

## 2017-09-11 PROCEDURE — 85730 THROMBOPLASTIN TIME PARTIAL: CPT | Performed by: INTERNAL MEDICINE

## 2017-09-11 PROCEDURE — 97168 OT RE-EVAL EST PLAN CARE: CPT

## 2017-09-11 PROCEDURE — 97530 THERAPEUTIC ACTIVITIES: CPT

## 2017-09-11 PROCEDURE — 85730 THROMBOPLASTIN TIME PARTIAL: CPT | Performed by: PODIATRIST

## 2017-09-11 PROCEDURE — 85025 COMPLETE CBC W/AUTO DIFF WBC: CPT | Performed by: PODIATRIST

## 2017-09-11 PROCEDURE — 85610 PROTHROMBIN TIME: CPT | Performed by: PODIATRIST

## 2017-09-11 PROCEDURE — 82948 REAGENT STRIP/BLOOD GLUCOSE: CPT

## 2017-09-11 PROCEDURE — 97164 PT RE-EVAL EST PLAN CARE: CPT

## 2017-09-11 PROCEDURE — G8988 SELF CARE GOAL STATUS: HCPCS

## 2017-09-11 PROCEDURE — G8982 BODY POS GOAL STATUS: HCPCS

## 2017-09-11 PROCEDURE — 80048 BASIC METABOLIC PNL TOTAL CA: CPT | Performed by: PHYSICIAN ASSISTANT

## 2017-09-11 RX ORDER — INSULIN GLARGINE 100 [IU]/ML
20 INJECTION, SOLUTION SUBCUTANEOUS
Status: DISCONTINUED | OUTPATIENT
Start: 2017-09-11 | End: 2017-09-12

## 2017-09-11 RX ORDER — SODIUM CHLORIDE 9 MG/ML
75 INJECTION, SOLUTION INTRAVENOUS CONTINUOUS
Status: DISCONTINUED | OUTPATIENT
Start: 2017-09-12 | End: 2017-09-13 | Stop reason: ALTCHOICE

## 2017-09-11 RX ADMIN — INSULIN LISPRO 4 UNITS: 100 INJECTION, SOLUTION INTRAVENOUS; SUBCUTANEOUS at 12:09

## 2017-09-11 RX ADMIN — GABAPENTIN 100 MG: 100 CAPSULE ORAL at 08:24

## 2017-09-11 RX ADMIN — ACETAMINOPHEN 650 MG: 325 TABLET, FILM COATED ORAL at 10:28

## 2017-09-11 RX ADMIN — INSULIN LISPRO 3 UNITS: 100 INJECTION, SOLUTION INTRAVENOUS; SUBCUTANEOUS at 21:36

## 2017-09-11 RX ADMIN — FLUTICASONE PROPIONATE AND SALMETEROL 1 PUFF: 50; 500 POWDER RESPIRATORY (INHALATION) at 21:35

## 2017-09-11 RX ADMIN — ACETYLCYSTEINE 1200 MG: 200 SOLUTION ORAL; RESPIRATORY (INHALATION) at 08:19

## 2017-09-11 RX ADMIN — PRAVASTATIN SODIUM 40 MG: 40 TABLET ORAL at 17:05

## 2017-09-11 RX ADMIN — FLUTICASONE PROPIONATE AND SALMETEROL 1 PUFF: 50; 500 POWDER RESPIRATORY (INHALATION) at 08:20

## 2017-09-11 RX ADMIN — SODIUM CHLORIDE 75 ML/HR: 0.9 INJECTION, SOLUTION INTRAVENOUS at 12:09

## 2017-09-11 RX ADMIN — INSULIN LISPRO 8 UNITS: 100 INJECTION, SOLUTION INTRAVENOUS; SUBCUTANEOUS at 17:09

## 2017-09-11 RX ADMIN — OXYBUTYNIN CHLORIDE 5 MG: 5 TABLET ORAL at 17:06

## 2017-09-11 RX ADMIN — INSULIN LISPRO 4 UNITS: 100 INJECTION, SOLUTION INTRAVENOUS; SUBCUTANEOUS at 17:08

## 2017-09-11 RX ADMIN — FERROUS SULFATE TAB 325 MG (65 MG ELEMENTAL FE) 325 MG: 325 (65 FE) TAB at 17:05

## 2017-09-11 RX ADMIN — INSULIN LISPRO 4 UNITS: 100 INJECTION, SOLUTION INTRAVENOUS; SUBCUTANEOUS at 08:19

## 2017-09-11 RX ADMIN — GABAPENTIN 100 MG: 100 CAPSULE ORAL at 17:05

## 2017-09-11 RX ADMIN — OXYBUTYNIN CHLORIDE 5 MG: 5 TABLET ORAL at 08:25

## 2017-09-11 RX ADMIN — Medication 400 MG: at 08:24

## 2017-09-11 RX ADMIN — ACETYLCYSTEINE 1200 MG: 200 SOLUTION ORAL; RESPIRATORY (INHALATION) at 17:06

## 2017-09-11 RX ADMIN — FERROUS SULFATE TAB 325 MG (65 MG ELEMENTAL FE) 325 MG: 325 (65 FE) TAB at 08:24

## 2017-09-11 RX ADMIN — TIOTROPIUM BROMIDE 18 MCG: 18 CAPSULE ORAL; RESPIRATORY (INHALATION) at 08:20

## 2017-09-11 RX ADMIN — HEPARIN SODIUM AND DEXTROSE 14 UNITS/KG/HR: 10000; 5 INJECTION INTRAVENOUS at 19:40

## 2017-09-11 RX ADMIN — GABAPENTIN 100 MG: 100 CAPSULE ORAL at 21:35

## 2017-09-11 RX ADMIN — COLLAGENASE SANTYL: 250 OINTMENT TOPICAL at 08:47

## 2017-09-11 RX ADMIN — INSULIN GLARGINE 20 UNITS: 100 INJECTION, SOLUTION SUBCUTANEOUS at 21:35

## 2017-09-11 NOTE — PROGRESS NOTES
Progress Note - Infectious Disease   Robe Quiles 78 y o  male MRN: [id number]  Unit/Bed#: E4 -01 Encounter: [id number]      Impression/Recommendations:  1  Chronic left heel ulcer  X-ray suggested early osteomyelitis and wound debrided to bone  Postoperative day #3 status post washout/partial calcanectomy/VAC  OR cultures negative and no clinical signs of active infection  Unclear if there is any residual bone infection  Rec:                 · Continue to follow closely off antibiotics  · Follow up OR pathology of clean margin  If negative, no need for antibiotics  · Continue local wound care/VAC as per podiatry     2  PAD  With tissue loss and abnormal LEADs  Rec:  · A-gram pending today  · Close vascular follow-up ongoing    3  DM with peripheral neuropathy  A1C 6 6  Risk factor for #1  Rec:  · Continue management per medicine service    4  Weakness  Suspect multifactorial, largely due to recent inactivity and deconditioning  Rec:                 · Continue PT while in house  · SNF upon discharge     Discussed in detail with the podiatry service      Antibiotics:  None     Subjective:  Patient seen on AM rounds  Denies fevers, chills, or sweats  Denies nausea, vomiting, or diarrhea  Objective:  Vitals:  HR:  [68-75] 75  Resp:  [18] 18  BP: (116-142)/(58-77) 142/77  SpO2:  [93 %-99 %] 99 %  Temp (24hrs), Av 1 °F (36 7 °C), Min:97 9 °F (36 6 °C), Max:98 3 °F (36 8 °C)  Current: Temperature: 98 3 °F (36 8 °C)    Physical Exam:   General:  No acute distress  Psychiatric:  Awake and alert  Pulmonary:  Normal respiratory excursion without accessory muscle use  Abdomen:  Soft, nontender  Extremities:  No edema, VAC to left foot  Wound pics 9/10 reviewed:  No purulence or cellulitis  Skin:  No rashes    Lab Results:  I have personally reviewed pertinent labs      Results from last 7 days  Lab Units 17  0601 09/10/17  0542 17  0521   SODIUM mmol/L 137 135* 133*   POTASSIUM mmol/L 4 0 4 2 4  2   CHLORIDE mmol/L 101 100 100   CO2 mmol/L 30 32 32   ANION GAP mmol/L 6 3* 1*   BUN mg/dL 26* 29* 29*   CREATININE mg/dL 1 21 1 22 1 26   EGFR ml/min/1 73sq m 59 58 56   GLUCOSE RANDOM mg/dL 221* 168* 155*   CALCIUM mg/dL 9 1 9 1 8 9       Results from last 7 days  Lab Units 09/11/17  0602 09/09/17  0521 09/08/17  0640   WBC Thousand/uL 8 54 10 87* 11 50*   HEMOGLOBIN g/dL 10 6* 10 7* 11 8*   PLATELETS Thousands/uL 330 304 355       Results from last 7 days  Lab Units 09/08/17  1612   GRAM STAIN RESULT  No Polys or Bacteria seen       Imaging Studies:   I have personally reviewed pertinent imaging study reports and images in PACS  EKG, Pathology, and Other Studies:   I have personally reviewed pertinent reports

## 2017-09-11 NOTE — ASSESSMENT & PLAN NOTE
Assessment:   Stable    Plan:   --Appreciate Renal input and prep for Agram   Mucomyst and IV fluids per renal  --Monitor Creat post-dye

## 2017-09-11 NOTE — ASSESSMENT & PLAN NOTE
Assessment:  Stable     Plan:  Continue anticoagulation    Current heparin bridge while Coumadin held secondary to mechanical AVR

## 2017-09-11 NOTE — SOCIAL WORK
Note left for podiatry asking to be advised if patient will go to SNF with wound vac   Patient scheduled for a gram tomorrow AM

## 2017-09-11 NOTE — PROGRESS NOTES
Progress Note - Podiatry  Grant Ramsey 78 y o  male MRN: [id number]  Unit/Bed#: E4 -01 Encounter: [id number]    Assessment/Plan:  3  77 yo M with s/p left partial calcanectomy with wound vac application secondary to  osteomyelitis by Dr Flaquita Serrato (DOS: 9/8/17)  -wound VAC left intact to the left heel today will change tomorrow  -WBS: NWB to the LLE  -PT on board, recommending STR   -final wound cultures growing MSSA and enterococcus faecalis  Monitoring off antibiotics   -bone culture prelim results show no polys or bacteria   -pathology results pending for clean margin      2  Left lateral forefoot wound secondary to DM, PAD   -wound is stable with mild central necrosis   -dressing left intact for now, we will change tomorrow     3  Right posterior heel ulceration   -wound is mostly healed with mild opening   -dressing left intact for now, will change tomorrow     4  DM, type 2 with neuropathy  -c/w lantus and ISS   -management per SLIM     5  CKD stage 3  -management per SLIM     6  PAD  -arterial duplex results reviewed   - patient to go for angiogram today, will follow up on results   -renal prep per nephrology      7  Afib   -c/w heparin drip per SLIM while holding warfarin      8  Knee pain  -x-rays ordered will follow up on results    Subjective/Objective   Chief Complaint:   Chief Complaint   Patient presents with    Weakness - Generalized     pt woke up this morning just feeling very weak and unable to get up   pt denies fevers, n/v   denies any pain or sick contacts  Subjective: 78 y o  y/o male was seen and evaluated at bedside  Denies any complains  Blood pressure 142/77, pulse 75, temperature 98 3 °F (36 8 °C), temperature source Tympanic, resp  rate 18, height 5' 9 5" (1 765 m), weight 108 kg (238 lb 1 6 oz), SpO2 99 %  ,Body mass index is 34 66 kg/m²      Invasive Devices     Peripheral Intravenous Line            Peripheral IV 09/09/17 Left Hand 1 day          Drain            Urethral Catheter 18 Fr  8 days    Negative Pressure Wound Therapy (V A C ) Foot Left 2 days                Physical Exam:   General: Alert, cooperative and no distress  Lungs: Non labored breathing  Heart: Positive S1, S2  Abdomen: Soft, non-tender  Extremity:   NVS and motor function at baseline  Wound vac running at 75mmHg with minimal leak  Dressings dry and intact to b/l LE  No evidence strike through, no ascending erythema  No calf tenderness  Manual muscle testing as a baseline  Lab, Imaging and other studies:   I have personally reviewed pertinent lab results  , CBC:   Lab Results   Component Value Date    WBC 8 54 09/11/2017    HGB 10 6 (L) 09/11/2017    HCT 31 4 (L) 09/11/2017    MCV 85 09/11/2017     09/11/2017    MCH 28 6 09/11/2017    MCHC 33 8 09/11/2017    RDW 14 4 09/11/2017    MPV 9 0 09/11/2017    NRBC 0 09/11/2017   , CMP:   Lab Results   Component Value Date     09/11/2017    K 4 0 09/11/2017     09/11/2017    CO2 30 09/11/2017    ANIONGAP 6 09/11/2017    BUN 26 (H) 09/11/2017    CREATININE 1 21 09/11/2017    GLUCOSE 221 (H) 09/11/2017    CALCIUM 9 1 09/11/2017    EGFR 59 09/11/2017       Imaging: I have personally reviewed pertinent films in PACS  EKG, Pathology, and Other Studies: I have personally reviewed pertinent reports    VTE Pharmacologic Prophylaxis: heparin drip

## 2017-09-11 NOTE — PLAN OF CARE
Problem: OCCUPATIONAL THERAPY ADULT  Goal: Performs self-care activities at highest level of function for planned discharge setting  See evaluation for individualized goals  Treatment Interventions: ADL retraining, Functional transfer training, UE strengthening/ROM, Endurance training, Patient/family training, Equipment evaluation/education, Compensatory technique education, Energy conservation, Activityengagement          See flowsheet documentation for full assessment, interventions and recommendations  Limitation: Decreased ADL status, Decreased UE strength, Decreased Safe judgement during ADL, Decreased endurance, Decreased self-care trans, Decreased high-level ADLs (NWB L LE w/ wound vac)  Prognosis: Fair  Assessment: Pt is a 78 y o  male seen for OT evaluation s/p admit to [address] on 9/2/2017 w/ Asthenia  Pt seen for re-evaluation due to s/p L heel debridement, partial calcanectomy w/ wound vac placement on 9/8 and now NWB L LE  Pt to IR today for arteriogram L LE  Personal factors affecting pt at time of IE include:increased pain in R LE (x-ray (-) acute)  Pt seen for re-evaluation and pt w/ MOD assist x 2 supine>sit bed mobility, MAX assist to don/doff shoe and sock R LE, MAX assist toileting, MOD assist UB ADLS, MAX assist x 2 sit<>Stand from bed w/ elevated bed height and cues to maintain NWB on L LE and partial stand achieved  Pt unable to pivot on L LE to maintain NWB status to complete transfer  Pt to benefit from continued skilled OT tx while in the hospital to address deficits as defined above and maximize level of functional independence w ADL's and functional mobility  Occupational Performance areas to address include: grooming, bathing/shower, toilet hygiene, dressing, functional mobility and clothing management, UE strengthening  From OT standpoint, recommendation at time of d/c would be short term rehab   Pt seen for OT session to complete sliding board transfer from bed to chair  Pt w/ assist to position sliding board and MOD assist x 2 for sliding board transfers w/ cues to maintain NWB on L LE, pt noncompliant at times w/ NWB and educated t/o  Pt w/ bowel accident during sliding board transfer  Pt w/ MAX assist x 2 sit<>Stand w/ lift on chair utilized and cues to maintain NWB and assist of nursing student to clean bottom w/ MAX assist x 2 steadying for less than 30 seconds in stance  Pt seated in bedside chair at end of session w/ all needs met  Will continue to follow pt to address OT goals        Discharge Recommendation: Short Term Rehab         Comments: Candice López MS, OTR/L

## 2017-09-11 NOTE — PROGRESS NOTES
NEPHROLOGY PROGRESS NOTE   Robe Quiles 78 y o  male MRN: [id number]  Unit/Bed#: E4 -01 Encounter: [id number]      ASSESSMENT & PLAN:    1  Chronic kidney disease with a baseline creatinine of 1 2-1 4  -furosemide is on hold  -IV normal saline at 75 cc/hour-discontinue 6 hours post angiogram  -creatinine stable to proceed with procedure  -receiving Mucomyst as well  -history of congestive heart failure, atrial fibrillation and a mechanical aortic valve so we will be cautious with IV fluids-currently no signs of volume overload  -on furosemide 40 mg oral daily will restart tomorrow    2  Chronic Rush catheter  -has been present for 2 years and follows up with [facility]    3  Bilateral lower extremity wounds  -podiatry, infectious Disease are following along as well    4  Peripheral vascular disease  -50-75% stenosis in the distal SFA  -further intervention per vascular team    5    Hyponatremia  -have improved with IV saline, and improved with correction for hyperglycemia      SUBJECTIVE:    Patient seen, sitting in chair, no chest pain or shortness of Breath    OBJECTIVE:  Current Weight: Weight - Scale: 108 kg (238 lb 1 6 oz)  Vitals:    09/11/17 0726   BP: 142/77   Pulse: 75   Resp: 18   Temp: 98 3 °F (36 8 °C)   SpO2: 99%       Intake/Output Summary (Last 24 hours) at 09/11/17 1149  Last data filed at 09/11/17 0500   Gross per 24 hour   Intake              711 ml   Output             1650 ml   Net             -939 ml       General: conscious, cooperative, in not acute distress  Eyes: conjunctivae pink, anicteric sclerae  ENT: lips and mucous membranes moist  Neck: supple, no JVD  Chest: clear breath sounds bilateral, no crackles, ronchus or wheezings  CVS: distinct S1 & S2, normal rate, regular rhythm  Abdomen: soft, non-tender, non-distended, normoactive bowel sounds  Extremities:  Lower extremities wrapped chronic skin changes  Skin: no rash  Neuro: awake, alert, oriented    Medications:    Current Facility-Administered Medications:     acetaminophen (TYLENOL) tablet 650 mg, 650 mg, Oral, Q6H PRN, Ora Chapa PA-C, 650 mg at 09/11/17 1028    acetylcysteine (MUCOMYST) 200 mg/mL oral solution 1,200 mg, 1,200 mg, Oral, BID, Zakia Mojica MD, 1,200 mg at 09/11/17 6772    collagenase (SANTYL) ointment, , Topical, Daily, Sho Stanford, DPM    ferrous sulfate tablet 325 mg, 325 mg, Oral, BID, Ora Chapa PA-C, 325 mg at 09/11/17 0824    fluticasone-salmeterol (ADVAIR) 500-50 mcg/dose inhaler 1 puff, 1 puff, Inhalation, BID, Ora Chapa PA-C, 1 puff at 09/11/17 0820    gabapentin (NEURONTIN) capsule 100 mg, 100 mg, Oral, TID, Ora Chapa PA-C, 100 mg at 09/11/17 0824    heparin (porcine) 25,000 units in 250 mL infusion (premix), 3-30 Units/kg/hr (Order-Specific), Intravenous, Titrated, Parris Siemens, DO, Last Rate: 14 mL/hr at 09/11/17 0201, 14 Units/kg/hr at 09/11/17 0201    heparin (porcine) injection 4,000 Units, 4,000 Units, Intravenous, PRN, Parris Siemens, DO, 4,000 Units at 09/10/17 1800    heparin (porcine) injection 8,000 Units, 8,000 Units, Intravenous, PRN, Parris Siemens, DO    insulin glargine (LANTUS) subcutaneous injection 15 Units, 15 Units, Subcutaneous, HS, Parrisny Siemens, DO, 15 Units at 09/10/17 2215    insulin lispro (HumaLOG) 100 units/mL subcutaneous injection 1-6 Units, 1-6 Units, Subcutaneous, HS, Shagufta Maradiaga MD, 4 Units at 09/10/17 2136    insulin lispro (HumaLOG) 100 units/mL subcutaneous injection 15 Units, 15 Units, Subcutaneous, Daily Before Breakfast, Domingo Prasad DO    insulin lispro (HumaLOG) 100 units/mL subcutaneous injection 2-12 Units, 2-12 Units, Subcutaneous, TID AC, 4 Units at 09/11/17 0819 **AND** Fingerstick Glucose (POCT), , , TID AC, Kendall De La Torre MD    insulin lispro (HumaLOG) 100 units/mL subcutaneous injection 8 Units, 8 Units, Subcutaneous, Daily Before Janee Hartman MD, 8 Units at 09/10/17 1140    insulin lispro (HumaLOG) 100 units/mL subcutaneous injection 8 Units, 8 Units, Subcutaneous, Before Lilli Caraballo MD, 8 Units at 09/10/17 1748    magnesium hydroxide (MILK OF MAGNESIA) 400 mg/5 mL oral suspension 30 mL, 30 mL, Oral, PRN, Ora Chapa PA-C    magnesium oxide (MAG-OX) tablet 400 mg, 400 mg, Oral, Daily, Ora Chapa PA-C, 400 mg at 09/11/17 0824    ondansetron (ZOFRAN) injection 4 mg, 4 mg, Intravenous, Q6H PRN, Ora Chapa PA-C    oxybutynin (DITROPAN) tablet 5 mg, 5 mg, Oral, BID, Ora Chapa PA-C, 5 mg at 09/11/17 0825    pravastatin (PRAVACHOL) tablet 40 mg, 40 mg, Oral, Daily With Dinner, Anthony Garcia PA-C, 40 mg at 09/10/17 1752    sodium chloride 0 9 % infusion, 75 mL/hr, Intravenous, Continuous, Irvinessence Brewster, DO, Last Rate: 75 mL/hr at 09/10/17 2345, 75 mL/hr at 09/10/17 2345    tiotropium (SPIRIVA) capsule for inhaler 18 mcg, 18 mcg, Inhalation, Daily, Ora Chapa PA-C, 18 mcg at 09/11/17 0820    Invasive Devices:   Urethral Catheter 18 Fr   (Active)   Reasons to continue Urinary Catheter  Chronic urinary catheter 9/4/2017  8:40 PM   Site Assessment Clean;Skin intact 9/10/2017  7:30 PM   Collection Container Standard drainage bag 9/10/2017  7:30 PM   Securement Method Securing device (Describe) 9/10/2017  7:30 PM   Output (mL) 500 mL 9/11/2017  5:00 AM     Lab Results:     Results from last 7 days  Lab Units 09/11/17  0602 09/11/17  0601 09/10/17  0542 09/09/17  0521 09/08/17  0640 09/05/17  0652 09/05/17  0601   WBC Thousand/uL 8 54  --   --  10 87* 11 50* 10 39*  --    HEMOGLOBIN g/dL 10 6*  --   --  10 7* 11 8* 12 0  --    HEMATOCRIT % 31 4*  --   --  31 7* 35 4* 34 5*  --    PLATELETS Thousands/uL 330  --   --  304 355 258  --    SODIUM mmol/L  --  137 135* 133* 134* 135*  --    POTASSIUM mmol/L  --  4 0 4 2 4 2 4 2 4 0  --    CHLORIDE mmol/L  --  101 100 100 99* 98*  --    CO2 mmol/L  --  30 32 32 32 31  --    BUN mg/dL  --  26* 29* 29* 28* 30*  --    CREATININE mg/dL  --  1 21 1 22 1 26 1 19 1 28  --    CALCIUM mg/dL  --  9 1 9 1 8 9 9 2 9 3  --    MAGNESIUM mg/dL  --   --  2 1  --   --   --   --    GLUCOSE RANDOM mg/dL  --  221* 168* 155* 106 148*  --    PROTEIN UA mg/dl  --   --   --   --   --   --  Negative   LEUKOCYTES UA   --   --   --   --   --   --  Small*   BLOOD UA   --   --   --   --   --   --  Trace-lysed*       Previous work up:  Please see previous notes

## 2017-09-11 NOTE — PROGRESS NOTES
Progress Note - Shirin Duncan 78 y o  male MRN: [id number]    Unit/Bed#: E4 -01 Encounter: [id number]      CC: diabetes f/u    Subjective:   Shirin Duncan is a 78y o  year old male with type 2 diabetes  Feels well  No complaints  No hypoglycemia  Objective:     Vitals: Blood pressure 125/68, pulse 79, temperature 97 8 °F (36 6 °C), temperature source Tympanic, resp  rate 18, height 5' 9 5" (1 765 m), weight 108 kg (238 lb 1 6 oz), SpO2 97 %  ,Body mass index is 34 66 kg/m²  Intake/Output Summary (Last 24 hours) at 09/11/17 1512  Last data filed at 09/11/17 0500   Gross per 24 hour   Intake              471 ml   Output              500 ml   Net              -29 ml       Physical Exam:  General Appearance: awake, appears stated age and cooperative  Head: Normocephalic, without obvious abnormality, atraumatic  Extremities: moves all extremities  Skin: Skin color and temperature normal    Pulm: no labored breathing    Lab, Imaging and other studies: I have personally reviewed pertinent reports  POC Glucose (mg/dl)   Date Value   09/11/2017 248 (H)   09/11/2017 226 (H)   09/10/2017 299 (H)   09/10/2017 145 (H)   09/10/2017 254 (H)   09/10/2017 189 (H)   09/09/2017 280 (H)   09/09/2017 227 (H)   09/09/2017 199 (H)   09/09/2017 151 (H)       Assessment:  diabetes with hyperglycemia, left heel ulcer, stage 3 chronic kidney disease and peripheral arterial disease    Plan:  1  Type 2 diabetes with hyperglycemia-increase Lantus to 20 units due to morning hyperglycemia  Continue to monitor blood sugar over time and make adjustments to the regimen if necessary  2   Left heel ulcer-management per Podiatry  He is on IV antibiotics  3   Stage 3 chronic kidney disease-this is stable  4   Peripheral arterial disease-he is scheduled for left lower extremity angiogram tomorrow  Portions of the record may have been created with voice recognition software   Occasional wrong word or "sound a like" substitutions may have occurred due to the inherent limitations of voice recognition software  Read the chart carefully and recognize, using context, where substitutions have occurred

## 2017-09-11 NOTE — PLAN OF CARE
Problem: PHYSICAL THERAPY ADULT  Goal: Performs mobility at highest level of function for planned discharge setting  See evaluation for individualized goals  Treatment/Interventions: Functional transfer training, LE strengthening/ROM, Elevations, Therapeutic exercise, Endurance training, Patient/family training, Equipment eval/education, Bed mobility, Gait training, OT  Equipment Recommended: Walker (RW)       See flowsheet documentation for full assessment, interventions and recommendations  Outcome: Progressing  Prognosis: Fair  Problem List: Decreased strength, Decreased endurance, Impaired balance, Decreased mobility, Impaired hearing, Obesity, Pain, Orthopedic restrictions, Decreased skin integrity (NWB L foot)  Assessment: Pt 78 y o male seen for PT re-eval following DEBRIDEMENT WOUND [facility]), I&D, PARTIAL CALCANECTOMY, APPLICATION OF WOUND VAC to L heel on 9/8 2* to stage 3 decubitus ulcer  As per podiatry orders pt NWB to L foot & WBAT to R foot  Pt require modAx2 for bed mobility & maxAx2 for sit<>stand transitions  Pt unable to fully stand w/ RW 2* to R knee pain  Xray to R knee showed mild degenerative changes; no fx  Completed bed to chair transfer via sliding board, pt require modAx2 + cues for techniques & to maintain NWB to L foot  Pt has difficulty maintaining NWB to L foot 90% of the time  Pt tolerated OOB in chair at end of session  Call bell in reach  Nursing students (2) present during session hence able to observed sliding board transfers  RN notified re: need for sliding board transfers w/ good understanding  Will continue PT per POC  Will continue to recommend inpt rehab at D/C  CM to follow  Nsg staff to continue to mobilized pt as tolerated to prevent decline in function  Nsg notified     Barriers to Discharge: Decreased caregiver support, Inaccessible home environment     Recommendation: Short-term skilled PT     PT - OK to Discharge: Yes (to inpt rehab)    See flowsheet documentation for full assessment

## 2017-09-11 NOTE — SUBJECTIVE & OBJECTIVE
Subjective:  Patient without complaints  Denies left lower extremity rest pain  VAC dressing in place left foot with good seal   Minimal drainage  No temp spikes  VSS  Wound culture positive MSSA, Enterococcus faecalis  Drips:  Heparin, IVF  NPO for LLE angiogram today by IR  Vitals:  /77   Pulse 75   Temp 98 3 °F (36 8 °C) (Tympanic)   Resp 18   Ht 5' 9 5" (1 765 m)   Wt 108 kg (238 lb 1 6 oz)   SpO2 99%   BMI 34 66 kg/m²     I/Os:  I/O last 3 completed shifts: In: 624 [P O :891]  Out: 3200 [Urine:3200]  No intake/output data recorded      Lab Results and Cultures:   Lab Results   Component Value Date    WBC 8 54 09/11/2017    HGB 10 6 (L) 09/11/2017    HCT 31 4 (L) 09/11/2017    MCV 85 09/11/2017     09/11/2017     Lab Results   Component Value Date    GLUCOSE 221 (H) 09/11/2017    CALCIUM 9 1 09/11/2017     09/11/2017    K 4 0 09/11/2017    CO2 30 09/11/2017     09/11/2017    BUN 26 (H) 09/11/2017    CREATININE 1 21 09/11/2017     Lab Results   Component Value Date    INR 1 06 09/11/2017    INR 1 30 (H) 09/09/2017    INR 2 14 (H) 09/08/2017    PROTIME 13 8 09/11/2017    PROTIME 16 3 (H) 09/09/2017    PROTIME 24 1 (H) 09/08/2017        Blood Culture: No results found for: BLOODCX,   Urinalysis:   Lab Results   Component Value Date    COLORU Yellow 09/05/2017    CLARITYU Clear 09/05/2017    SPECGRAV <=1 005 09/05/2017    PHUR 6 5 09/05/2017    LEUKOCYTESUR Small (A) 09/05/2017    NITRITE Positive (A) 09/05/2017    PROTEINUA Negative 09/05/2017    GLUCOSEU Negative 09/05/2017    KETONESU Negative 09/05/2017    BILIRUBINUR Negative 09/05/2017    BLOODU Trace-lysed (A) 09/05/2017   ,   Urine Culture:   Lab Results   Component Value Date    URINECX >100,000 cfu/ml Mixed Contaminants X3 09/02/2017   ,   Wound Culure:   Lab Results   Component Value Date    WOUNDCULT 3+ Growth of Staphylococcus aureus 09/02/2017    WOUNDCULT 3+ Growth of Enterococcus faecalis 09/02/2017 WOUNDCULT 3+ Growth of Mixed Skin Sabiha 09/02/2017       Medications:  Current Facility-Administered Medications   Medication Dose Route Frequency    acetaminophen (TYLENOL) tablet 650 mg  650 mg Oral Q6H PRN    acetylcysteine (MUCOMYST) 200 mg/mL oral solution 1,200 mg  1,200 mg Oral BID    collagenase (SANTYL) ointment   Topical Daily    ferrous sulfate tablet 325 mg  325 mg Oral BID    fluticasone-salmeterol (ADVAIR) 500-50 mcg/dose inhaler 1 puff  1 puff Inhalation BID    gabapentin (NEURONTIN) capsule 100 mg  100 mg Oral TID    heparin (porcine) 25,000 units in 250 mL infusion (premix)  3-30 Units/kg/hr (Order-Specific) Intravenous Titrated    heparin (porcine) injection 4,000 Units  4,000 Units Intravenous PRN    heparin (porcine) injection 8,000 Units  8,000 Units Intravenous PRN    insulin glargine (LANTUS) subcutaneous injection 15 Units  15 Units Subcutaneous HS    insulin lispro (HumaLOG) 100 units/mL subcutaneous injection 1-6 Units  1-6 Units Subcutaneous HS    insulin lispro (HumaLOG) 100 units/mL subcutaneous injection 15 Units  15 Units Subcutaneous Daily Before Breakfast    insulin lispro (HumaLOG) 100 units/mL subcutaneous injection 2-12 Units  2-12 Units Subcutaneous TID AC    insulin lispro (HumaLOG) 100 units/mL subcutaneous injection 8 Units  8 Units Subcutaneous Daily Before Lunch    insulin lispro (HumaLOG) 100 units/mL subcutaneous injection 8 Units  8 Units Subcutaneous Before Dinner    magnesium hydroxide (MILK OF MAGNESIA) 400 mg/5 mL oral suspension 30 mL  30 mL Oral PRN    magnesium oxide (MAG-OX) tablet 400 mg  400 mg Oral Daily    ondansetron (ZOFRAN) injection 4 mg  4 mg Intravenous Q6H PRN    oxybutynin (DITROPAN) tablet 5 mg  5 mg Oral BID    pravastatin (PRAVACHOL) tablet 40 mg  40 mg Oral Daily With Dinner    sodium chloride 0 9 % infusion  75 mL/hr Intravenous Continuous    tiotropium (SPIRIVA) capsule for inhaler 18 mcg  18 mcg Inhalation Daily Imaging:  No new imaging studies for review    Physical Exam:    General appearance: alert, appears stated age, cooperative and no distress  Neurologic: Grossly normal  Neck: no adenopathy, no carotid bruit, no JVD and supple, symmetrical, trachea midline  Lungs: clear to auscultation bilaterally  Chest wall: no tenderness, Midline sternotomy scar well healed  Sternum stable  Heart: regular rate and rhythm, S1: normal, S2: normal prosthetic S2, no S3 or S4, no rub and No murmur  Abdomen: soft, non-tender; bowel sounds normal; no masses,  no organomegaly and Nondistended  No abdominal bruits  Extremities: Wound VAC dressing intact left lower extremity  Good seal   Dressings intact right heel  Bilateral lower extremities motor and sensory intact  No erythema of the left anterior shin  Minimal bilateral lower extremity edema  Wound/Incision:  As above  Left foot wound VAC dressing in place      Pulse exam:  Radial: Right: 2+ Left[de-identified] 2+  Femoral: Right: 2+ Left: 2+  Popliteal: Right: non-palpable Left: non-palpable  DP: Right: non-palpable Left: Obscured by dressing  PT: Right: non-palpable Left: Obscured by dressing      Elliot Horner PA-C  9/11/2017  [facility], [phone]

## 2017-09-11 NOTE — PROGRESS NOTES
Progress Note - Eriberto Shanks 78 y o  male MRN: [id number]    Unit/Bed#: E4 -01 Encounter: [id number]        S/P AVR   Assessment & Plan    Assessment:   S/p [facility] mechanical AVR 2007    Plan:   --Continue to hold coumadin for procedures  --Heparin bridge while off coumadin        PAD (peripheral artery disease)   Assessment & Plan    Assessment:   PAD w/ Left heel tissue loss, OM  S/P L Heel debridement, partial calcanectomy, wound VAC placement 9/8    Plan:   --Arteriogram w/ L SFA intervention today by IR  --npo, IVF  --Renal prep ordered by nephrology  --Continue Statin  --ASA allergic, may need to consider plavix for antiplatelet therapy after revascularization  --recommendations to follow results of LLE angiogram        Diabetes mellitus   Assessment & Plan       Assessment:  Stable     Plan:  Continue current medical regimen        Chronic atrial fibrillation   Assessment & Plan       Assessment:  Stable     Plan:  Continue anticoagulation  Current heparin bridge while Coumadin held secondary to mechanical AVR        Stage 3 chronic kidney disease   Assessment & Plan    Assessment:   Stable    Plan:   --Appreciate Renal input and prep for Agram   Mucomyst and IV fluids per renal  --Monitor Creat post-dye                  Subjective:  Patient without complaints  Denies left lower extremity rest pain  VAC dressing in place left foot with good seal   Minimal drainage  No temp spikes  VSS  Wound culture positive MSSA, Enterococcus faecalis  Drips:  Heparin, IVF  NPO for LLE angiogram today by IR  Vitals:  /77   Pulse 75   Temp 98 3 °F (36 8 °C) (Tympanic)   Resp 18   Ht 5' 9 5" (1 765 m)   Wt 108 kg (238 lb 1 6 oz)   SpO2 99%   BMI 34 66 kg/m²      I/Os:  I/O last 3 completed shifts: In: 811 [P O :891]  Out: 3200 [Urine:3200]  No intake/output data recorded      Lab Results and Cultures:   Lab Results   Component Value Date    WBC 8 54 09/11/2017    HGB 10 6 (L) 09/11/2017    HCT 31 4 (L) 09/11/2017    MCV 85 09/11/2017     09/11/2017     Lab Results   Component Value Date    GLUCOSE 221 (H) 09/11/2017    CALCIUM 9 1 09/11/2017     09/11/2017    K 4 0 09/11/2017    CO2 30 09/11/2017     09/11/2017    BUN 26 (H) 09/11/2017    CREATININE 1 21 09/11/2017     Lab Results   Component Value Date    INR 1 06 09/11/2017    INR 1 30 (H) 09/09/2017    INR 2 14 (H) 09/08/2017    PROTIME 13 8 09/11/2017    PROTIME 16 3 (H) 09/09/2017    PROTIME 24 1 (H) 09/08/2017        Blood Culture: No results found for: BLOODCX,   Urinalysis:   Lab Results   Component Value Date    COLORU Yellow 09/05/2017    CLARITYU Clear 09/05/2017    SPECGRAV <=1 005 09/05/2017    PHUR 6 5 09/05/2017    LEUKOCYTESUR Small (A) 09/05/2017    NITRITE Positive (A) 09/05/2017    PROTEINUA Negative 09/05/2017    GLUCOSEU Negative 09/05/2017    KETONESU Negative 09/05/2017    BILIRUBINUR Negative 09/05/2017    BLOODU Trace-lysed (A) 09/05/2017   ,   Urine Culture:   Lab Results   Component Value Date    URINECX >100,000 cfu/ml Mixed Contaminants X3 09/02/2017   ,   Wound Culure:   Lab Results   Component Value Date    WOUNDCULT 3+ Growth of Staphylococcus aureus 09/02/2017    WOUNDCULT 3+ Growth of Enterococcus faecalis 09/02/2017    WOUNDCULT 3+ Growth of Mixed Skin Sabiha 09/02/2017       Medications:  Current Facility-Administered Medications   Medication Dose Route Frequency    acetaminophen (TYLENOL) tablet 650 mg  650 mg Oral Q6H PRN    acetylcysteine (MUCOMYST) 200 mg/mL oral solution 1,200 mg  1,200 mg Oral BID    collagenase (SANTYL) ointment   Topical Daily    ferrous sulfate tablet 325 mg  325 mg Oral BID    fluticasone-salmeterol (ADVAIR) 500-50 mcg/dose inhaler 1 puff  1 puff Inhalation BID    gabapentin (NEURONTIN) capsule 100 mg  100 mg Oral TID    heparin (porcine) 25,000 units in 250 mL infusion (premix)  3-30 Units/kg/hr (Order-Specific) Intravenous Titrated    heparin (porcine) injection 4,000 Units  4,000 Units Intravenous PRN    heparin (porcine) injection 8,000 Units  8,000 Units Intravenous PRN    insulin glargine (LANTUS) subcutaneous injection 15 Units  15 Units Subcutaneous HS    insulin lispro (HumaLOG) 100 units/mL subcutaneous injection 1-6 Units  1-6 Units Subcutaneous HS    insulin lispro (HumaLOG) 100 units/mL subcutaneous injection 15 Units  15 Units Subcutaneous Daily Before Breakfast    insulin lispro (HumaLOG) 100 units/mL subcutaneous injection 2-12 Units  2-12 Units Subcutaneous TID AC    insulin lispro (HumaLOG) 100 units/mL subcutaneous injection 8 Units  8 Units Subcutaneous Daily Before Lunch    insulin lispro (HumaLOG) 100 units/mL subcutaneous injection 8 Units  8 Units Subcutaneous Before Dinner    magnesium hydroxide (MILK OF MAGNESIA) 400 mg/5 mL oral suspension 30 mL  30 mL Oral PRN    magnesium oxide (MAG-OX) tablet 400 mg  400 mg Oral Daily    ondansetron (ZOFRAN) injection 4 mg  4 mg Intravenous Q6H PRN    oxybutynin (DITROPAN) tablet 5 mg  5 mg Oral BID    pravastatin (PRAVACHOL) tablet 40 mg  40 mg Oral Daily With Dinner    sodium chloride 0 9 % infusion  75 mL/hr Intravenous Continuous    tiotropium (SPIRIVA) capsule for inhaler 18 mcg  18 mcg Inhalation Daily       Imaging:  No new imaging studies for review    Physical Exam:    General appearance: alert, appears stated age, cooperative and no distress  Neurologic: Grossly normal  Neck: no adenopathy, no carotid bruit, no JVD and supple, symmetrical, trachea midline  Lungs: clear to auscultation bilaterally  Chest wall: no tenderness, Midline sternotomy scar well healed  Sternum stable  Heart: regular rate and rhythm, S1: normal, S2: normal prosthetic S2, no S3 or S4, no rub and No murmur  Abdomen: soft, non-tender; bowel sounds normal; no masses,  no organomegaly and Nondistended  No abdominal bruits  Extremities: Wound VAC dressing intact left lower extremity    Good seal   Dressings intact right heel   Bilateral lower extremities motor and sensory intact  No erythema of the left anterior shin  Minimal bilateral lower extremity edema  Wound/Incision:  As above  Left foot wound VAC dressing in place      Pulse exam:  Radial: Right: 2+ Left[de-identified] 2+  Femoral: Right: 2+ Left: 2+  Popliteal: Right: non-palpable Left: non-palpable  DP: Right: non-palpable Left: Obscured by dressing  PT: Right: non-palpable Left: Obscured by dressing      Jonnie Thomson PA-C  9/11/2017  [facility], [phone]

## 2017-09-11 NOTE — ASSESSMENT & PLAN NOTE
Assessment:   PAD w/ Left heel tissue loss, OM  S/P L Heel debridement, partial calcanectomy, wound VAC placement 9/8    Plan:   --Arteriogram w/ L SFA intervention today by IR  --npo, IVF  --Renal prep ordered by nephrology  --Continue Statin  --ASA allergic, may need to consider plavix for antiplatelet therapy after revascularization  --recommendations to follow results of LLE angiogram

## 2017-09-11 NOTE — PROGRESS NOTES
[address] Internal Medicine Progress Note  Patient: Cristin Asher 78 y o  male   MRN: [id number]  PCP: Ericka Vilchis MD  Unit/Bed#: E4 -19 Encounter: [id number]  Date Of Visit: 09/11/17    Assessment:    Principal Problem:    Asthenia  Active Problems:    Decubitus ulcer of heel, stage 3    Leukocytosis    Stage 3 chronic kidney disease    Chronic indwelling Rush catheter    Abnormal urinalysis    Chronic atrial fibrillation    Diabetes mellitus    Chronic diastolic heart failure    PAD (peripheral artery disease)    S/P AVR      Plan:    · Left heel decubitus ulcer status post I and D currently with wound VAC angiography by IR postponed till to more due to scheduling issues to try and improve vascular supply postop care will be with Podiatry  · History of mechanical heart valve  AVR   ,present on heparin drip had been on warfarin in preparation for possible OR intervention as needed/recommendation with history of AFib is to resume warfarin once discharge plan stable  · Chronic diastolic heart failure stable  · Chronic indwelling Rush catheter on oxybutynin  · Type 2 diabetes mellitus present insulin maintenance is Humalog 8 units at lunch and dinner otherwise coverage at meals with 15 units Humalog a m  she is on Lantus 15 units bedtime had been on 24 units b i d  as outpatient would not adjust further until definitive plan for postop care  · Diabetic neuropathy continue gabapentin  · Bilateral lower extremity wounds left partial calcanectomy with wound VAC application secondary to osteomyelitis nonweightbearing to left lower extremity wound cultures growing MSSA and Enterococcus off antibiotics presently bone culture thus far negative also right posterior heel ulceration mostly healed dressed  · Chronic kidney disease will receive IV hydration preparation for angiography now scheduled for tomorrow and Mucomyst      VTE Pharmacologic Prophylaxis:   Pharmacologic: Heparin Drip  Mechanical VTE Prophylaxis in Place: No    Discussions with Specialists or Other Care Team Provider: no    Time Spent for Care: 45 minutes  More than 50% of total time spent on counseling and coordination of care as described above  Subjective:  No new complaints shortness of breath or chest pain noted      Objective:     Vitals:   Temp (24hrs), Av 1 °F (36 7 °C), Min:97 9 °F (36 6 °C), Max:98 3 °F (36 8 °C)    HR:  [68-75] 75  Resp:  [18] 18  BP: (116-142)/(58-77) 142/77  SpO2:  [93 %-99 %] 99 %  Body mass index is 34 66 kg/m²  Input and Output Summary (last 24 hours): Intake/Output Summary (Last 24 hours) at 17 1347  Last data filed at 17 0500   Gross per 24 hour   Intake              711 ml   Output             1650 ml   Net             -939 ml       Physical Exam:     Physical Exam:   General appearance: alert, appears stated age and cooperative  Head: Normocephalic, without obvious abnormality, atraumatic  Lungs: clear to auscultation bilaterally  Heart: regular rate and rhythm and Mechanical heart sounds noted  Abdomen: soft, non-tender; bowel sounds normal; no masses,  no organomegaly  Back: negative  Extremities: Bilateral lower extremities ulcers left heel wound VAC  Neurologic: Grossly normal      Additional Data:     Labs:      Results from last 7 days  Lab Units 17  0602   WBC Thousand/uL 8 54   HEMOGLOBIN g/dL 10 6*   HEMATOCRIT % 31 4*   PLATELETS Thousands/uL 330   NEUTROS PCT % 73   LYMPHS PCT % 16   MONOS PCT % 7   EOS PCT % 3       Results from last 7 days  Lab Units 17  0601   SODIUM mmol/L 137   POTASSIUM mmol/L 4 0   CHLORIDE mmol/L 101   CO2 mmol/L 30   BUN mg/dL 26*   CREATININE mg/dL 1 21   CALCIUM mg/dL 9 1   GLUCOSE RANDOM mg/dL 221*       Results from last 7 days  Lab Units 17  0601   INR  1 06       * I Have Reviewed All Lab Data Listed Above  * Additional Pertinent Lab Tests Reviewed:  All Labs For Current Hospital Admission Reviewed    Imaging:  Xr Chest 2 Views    Result Date: 9/2/2017  Narrative: CHEST INDICATION:  Weakness  COMPARISON:  None VIEWS:  Frontal and lateral projections IMAGES:  2 FINDINGS:  Sternotomy wires are intact  Prior prosthetic heart valve  Cardiac silhouette is within upper limits of normal in size  The lungs are clear  No pneumothorax or pleural effusion  Visualized osseous structures appear within normal limits for the patient's age  Impression: No active pulmonary disease  Workstation performed: [id number]     Xr Knee 1 Or 2 Vw Right    Result Date: 9/11/2017  Narrative: RIGHT KNEE INDICATION:  Right knee pain  COMPARISON: None VIEWS:  AP and lateral IMAGES:  2 FINDINGS: There is no acute fracture or dislocation  There is no joint effusion  There is mild degenerative change with medial compartment narrowing  No lytic or blastic lesions are seen  Soft tissues are unremarkable  Impression: No acute osseous abnormality  Mild degenerative change  Workstation performed: [id number]     Xr Foot 3+ Vw Left    Result Date: 9/3/2017  Narrative: LEFT FOOT INDICATION:  "eval OM, ulcer on heel of left foot " COMPARISON: Calcaneus radiographs 8/21/2017  VIEWS:  3 IMAGES:  3 FINDINGS: There is no acute fracture or dislocation  No definite osteomyelitis  Mild/moderate diffuse degenerative changes most prominent the tibiotalar joint space  Advanced atherosclerotic calcifications  Impression: No definite osteomyelitis  Workstation performed: [id number]     Xr Heel / Calcaneus 2+ Vw Left    Result Date: 9/7/2017  Narrative: LEFT HEEL INDICATION:  Heel ulcer  COMPARISON: Plain foot radiograph September 3, 2017 and heel images August 21, 2017  VIEWS:  2 IMAGES:  3 FINDINGS: There is no acute fracture or dislocation  Degenerative changes in the midfoot and hindfoot  There is ill-defined, vague lucency within the posterolateral calcaneus  There is no overlying cortical destruction    Given the history of ulceration in this area which probes to the bone (as per podiatry's notes), early osteomyelitis not excluded  Soft tissue swelling with ulceration along the plantar calcaneus  Impression: Ill-defined, vague lucency in the posterior calcaneus without overlying cortical destruction  Given the history, early osteomyelitis not excluded  Recommend follow-up MRI with gadolinium or nuclear medicine white blood cell scan for further  evaluation  ##imslh##imslh Workstation performed: [id number]     Xr Heel / Calcaneus 2+ Vw Left    Result Date: 8/23/2017  Narrative: LEFT HEEL INDICATION:  Ulcer on left heel  COMPARISON: None VIEWS:  2 IMAGES:  3 FINDINGS: There is no acute fracture or dislocation  A tiny plantar calcaneal spur noted with degenerative changes of the tibiotalar joint also noted  No periosteal reaction or osseous destruction to suggest osteomyelitis  The posterior plantar soft tissue swelling with no retained radiopaque foreign body or subcutaneous emphysema  There are vascular calcifications throughout the regional soft tissues  Impression: No acute osseous abnormality  Plantar soft tissue swelling  Workstation performed: [id number]     Vas Lower Limb Arterial Duplex, Complete Bilateral    Result Date: 9/8/2017  Narrative:  [facility] REPORT [facility]: Indications:  Bilateral Atherosclerosis with Ulceration [I70 25]  The patient is admitted with left plantar and lateral ankle ulcerations, and right posterior heel ulceration (not open)  History of right 4th, 5th toe amputations  Risk Factors: The patient has history of diabetes, hyperlipidemia and PAD  Clinical Right Pressure:  124/ mm Hg, Left Pressure:  140/ mm Hg     FINDINGS:  Segment                Right                 Left                                          Impression  PSV  EDV  Impression  PSV  EDV  Common Femoral Artery  Normal       94   15  Normal      101    0  Prox Profunda                       72   15               58    9  Prox SFA 106    0               92   14  Mid SFA                             88    0               72    9  Dist SFA                            74    0  50-75%      164   25  Proximal Pop                       127    0               54   24  Distal Pop                          96   14               85    0  Dist Post Tibial                   107    0              113   25  Dist  Ant  Tibial                   37    0              149   24     CONCLUSION: Impression: Right Lower Limb: Diffuse atherosclerotic arterial disease throughout the femoropopliteal vessels with no definite evidence for focal stenosis  Findings suggest tibioperoneal disease  Ankle Pressure: >254 mm Hg , SIOBHAN: 1 81, supra-normal, consistent with poorly compressible vessels  Prior same  Metatarsal Pressure:  Could not be obtained due to thick bandaging in place  Right Great Toe Pressure 68 mm Hg ,, above healing level for a diabetic patient  Prior 78 mm Hg  Left Lower Limb: Findings suggest a 50-75% stenosis in the distal superficial femoral artery  Diffuse atherosclerotic arterial disease throughout the femoropopliteal vessels  Findings suggest tibioperoneal disease  Ankle Pressure >254 mm Hg , SIOBHAN: 1 81,supra-normal, consistent with poorly compressible vessels  Prior same  Left Metatarsal Pressure: 129  mm Hg  Left Great Toe Pressure 44 mm Hg , below healing level for a diabetic patient  Prior 87 mm Hg  In comparison to the study of 3/10/2015 , there is new findings and progression of disease in the left leg, as described above  SIGNATURE: Electronically Signed by: Dixie Ly MD, [address] on 2017-09-08 03:33:32 PM    Imaging Reports Reviewed Today Include:  Reviewed  Imaging Personally Reviewed by Myself Includes:  No  Procedure: Xr Knee 1 Or 2 Vw Right    Result Date: 9/11/2017  Narrative: RIGHT KNEE INDICATION:  Right knee pain  COMPARISON: None VIEWS:  AP and lateral IMAGES:  2 FINDINGS: There is no acute fracture or dislocation   There is no joint effusion  There is mild degenerative change with medial compartment narrowing  No lytic or blastic lesions are seen  Soft tissues are unremarkable  Impression: No acute osseous abnormality  Mild degenerative change  Workstation performed: [id number]        Recent Cultures (last 7 days):       Results from last 7 days  Lab Units 09/08/17  1612   GRAM STAIN RESULT  No Polys or Bacteria seen       Last 24 Hours Medication List:     acetylcysteine 1,200 mg Oral BID   collagenase  Topical Daily   ferrous sulfate 325 mg Oral BID   fluticasone-salmeterol 1 puff Inhalation BID   gabapentin 100 mg Oral TID   insulin glargine 15 Units Subcutaneous HS   insulin lispro 1-6 Units Subcutaneous HS   insulin lispro 15 Units Subcutaneous Daily Before Breakfast   insulin lispro 2-12 Units Subcutaneous TID AC   insulin lispro 8 Units Subcutaneous Daily Before Lunch   insulin lispro 8 Units Subcutaneous Before Dinner   magnesium oxide 400 mg Oral Daily   oxybutynin 5 mg Oral BID   pravastatin 40 mg Oral Daily With Dinner   tiotropium 18 mcg Inhalation Daily        Today, Patient Was Seen By: Carlos Manuel Kruger MD    ** Please Note: Dragon 360 Dictation voice to text software may have been used in the creation of this document   **

## 2017-09-11 NOTE — CASE MANAGEMENT
Continued Stay Review    Date:  9/10/2017    Vital Signs: /68   Pulse 79   Temp 97 8 °F (36 6 °C) (Tympanic)   Resp 18   Ht 5' 9 5" (1 765 m)   Wt 108 kg (238 lb 1 6 oz)   SpO2 97%   BMI 34 66 kg/m²     Medications:   Scheduled Meds:   acetylcysteine 1,200 mg Oral BID   collagenase  Topical Daily   ferrous sulfate 325 mg Oral BID   fluticasone-salmeterol 1 puff Inhalation BID   gabapentin 100 mg Oral TID   insulin glargine 20 Units Subcutaneous HS   insulin lispro 1-6 Units Subcutaneous HS   insulin lispro 15 Units Subcutaneous Daily Before Breakfast   insulin lispro 2-12 Units Subcutaneous TID AC   insulin lispro 8 Units Subcutaneous Daily Before Lunch   insulin lispro 8 Units Subcutaneous Before Dinner   magnesium oxide 400 mg Oral Daily   oxybutynin 5 mg Oral BID   pravastatin 40 mg Oral Daily With Dinner   tiotropium 18 mcg Inhalation Daily     Continuous Infusions:   heparin (porcine) 3-30 Units/kg/hr (Order-Specific) Last Rate: 14 Units/kg/hr (09/11/17 0201)   [START ON 9/12/2017] sodium chloride 75 mL/hr      PRN Meds:   acetaminophen    heparin (porcine)    heparin (porcine)    magnesium hydroxide    ondansetron    Abnormal Labs/Diagnostic Results:  Na 135,  BS's 189,  254,  145,  299    Age/Sex: 78 y o  male     Assessment/Plan:   3  77 yo M with s/p left partial calcanectomy with wound vac application secondary to  osteomyelitis by Dr Taurus Avendaño (DOS: 9/8/17)  -wound appears to be healthy with exposed bone and tendon  Wound vac re-applied with maxorb to the distal skin to prevent further maceration and running at 75mmHg low continuous with 1 black sponge in wound and 1 for bridging; wound measurements: 4 5cm x2cm x1 5cm   -WBS: NWB to the LLE  -PT on board, recommending STR   -final wound cultures growing MSSA and enterococcus faecalis  Monitoring off antibiotics   -bone culture prelim results show no polys or bacteria   -pathology results pending for clean margin      2   Left lateral forefoot wound secondary to DM, PAD   -wound is stable with mild central necrosis   -dressings applied: maxorb with dry sterile dressing       3  Right posterior heel ulceration   -wound is mostly healed with mild opening   -applied maxorb with dry sterile dressing      4  DM, type 2 with neuropathy  -c/w lantus and ISS   -management per SLIM     5  CKD stage 3  -management per SLIM     6  PAD  -arterial duplex results reviewed   -Agram with L SFA intervention on Monday 9/11  -renal prep per nephrology      7  Afib   -c/w heparin drip per SLIM while holding warfarin      8   Code status - Level 1     Discharge Plan:  Anticipate rehab

## 2017-09-11 NOTE — PHYSICAL THERAPY NOTE
PT RE-EVALUATION & PROGRESS NOTE  Patient Active Problem List   Diagnosis    Asthenia    Decubitus ulcer of heel, stage 3    Leukocytosis    Stage 3 chronic kidney disease    Chronic indwelling Rush catheter    Abnormal urinalysis    Chronic atrial fibrillation    Diabetes mellitus    Chronic diastolic heart failure    PAD (peripheral artery disease)    S/P AVR     Past Medical History:   Diagnosis Date    Anemia     Aortic stenosis     Atrial fibrillation     Chronic kidney disease     stage 3    COPD (chronic obstructive pulmonary disease)     Diabetes mellitus     Hyperlipidemia     Hypertension     Sleep apnea      Past Surgical History:   Procedure Laterality Date    WOUND DEBRIDEMENT Left 9/8/2017    Procedure: DEBRIDEMENT WOUND [facility] OUT), I&D, PARTIAL CALCANECTOMY, APPLICATION OF WOUND VAC;  Surgeon: Lawrence Montelongo DPM;  Location: [facility];  Service: Podiatry        09/11/17 1039   Note Type   Note type Re-eval;Progress   Pain Assessment   Pain Assessment 0-10   Pain Score 8   Pain Type Acute pain   Pain Location Knee   Pain Orientation Right;Other (Comment)  (medial)   Hospital Pain Intervention(s) Repositioned; Emotional support  (RN notified)   Restrictions/Precautions   RUE Weight Bearing Per Order FWB   LUE Weight Bearing Per Order FWB   RLE Weight Bearing Per Order WBAT   LLE Weight Bearing Per Order NWB   Other Precautions Multiple lines; Fall Risk;Pain;WBS;Hard of hearing   General   Additional Pertinent History 9/8: s/p DEBRIDEMENT WOUND [facility] OUT), I&D, PARTIAL CALCANECTOMY, APPLICATION OF WOUND VAC (Left)   Family/Caregiver Present Yes  (wife)   Cognition   Overall Cognitive Status WFL   Attention Within functional limits   Following Commands Follows multistep commands with increased time or repetition   Bed Mobility   Supine to Sit 3  Moderate assistance   Additional items Assist x 2;HOB elevated; Bedrails; Increased time required;Verbal cues;LE management   Transfers   Sit to Stand 2  Maximal assistance   Additional items Assist x 2; Increased time required;Verbal cues;Armrests  (bed elevated; unable to stand fully 2* to R knee pain)   Stand to Sit 3  Moderate assistance   Additional items Assist x 2;Armrests; Increased time required;Verbal cues; Bed elevated   Sliding Board transfer 3  Moderate assistance   Additional items Assist x 2;Armrests; Increased time required;Verbal cues   Additional Comments pt require cues to maintain NWB to LLE   Ambulation/Elevation   Gait pattern Not appropriate  (2* to inc pain to R knee)   Balance   Static Sitting Fair +   Static Standing Zero   Ambulatory Zero   Endurance Deficit   Endurance Deficit Yes   Endurance Deficit Description pain, fatigue & WB restrictions   Activity Tolerance   Activity Tolerance Patient limited by fatigue;Patient limited by pain; Other (Comment)  (WB restriction)   Nurse Made Aware Valerie   Assessment   Prognosis Fair   Problem List Decreased strength;Decreased endurance; Impaired balance;Decreased mobility; Impaired hearing;Obesity;Pain;Orthopedic restrictions;Decreased skin integrity  (NWB L foot)   Assessment Pt 78 y o male seen for PT re-eval following DEBRIDEMENT WOUND [facility] OUT), I&D, PARTIAL CALCANECTOMY, APPLICATION OF WOUND VAC to L heel on 9/8 2* to stage 3 decubitus ulcer  As per podiatry orders pt NWB to L foot & WBAT to R foot  Pt require modAx2 for bed mobility & maxAx2 for sit<>stand transitions  Pt unable to fully stand w/ RW 2* to R knee pain  Xray to R knee showed mild degenerative changes; no fx  Completed bed to chair transfer via sliding board, pt require modAx2 + cues for techniques & to maintain NWB to L foot  Pt has difficulty maintaining NWB to L foot 90% of the time  Pt tolerated OOB in chair at end of session  Call bell in vin[place]  Nursing students (2) present during session hence able to observed sliding board transfers  RN notified re: need for sliding board transfers w/ good understanding   Will continue PT per POC  Will continue to recommend inpt rehab at D/C  CM to follow  Nsg staff to continue to mobilized pt as tolerated to prevent decline in function  Nsg notified  Goals   Patient Goals none stated   [facility] Expiration Date 09/21/17   Short Term Goal #1 Goals to be met in 10days: 1) inc strength & balance by 1/2 grade; 2) inc bed mobility to minAx1; 3) inc sliding board transfers to minAx1; 4) inc sit<>stand transfers to modAx1; 5) progress to SPT w/ RW when appropriate; 6) inc barthel score to 35; 7) pt/caregiver ed   Treatment Day 3   Plan   Treatment/Interventions Functional transfer training;LE strengthening/ROM; Therapeutic exercise; Endurance training;Patient/family training;Bed mobility;Gait training;Spoke to nursing;OT;Family   PT Frequency 5x/wk; Weekend  (1x weekend)   Recommendation   Recommendation Short-term skilled PT   Equipment Recommended Wheelchair;Walker   PT - OK to Discharge Yes  (to inpt rehab)   Barthel Index   Feeding 10   Bathing 0   Grooming Score 5   Dressing Score 5   Bladder Score 0   Bowels Score 0   Toilet Use Score 0   Transfers (Bed/Chair) Score 5   Mobility (Level Surface) Score 0   Stairs Score 0   Barthel Index Score 25     Wicho Cope, PT

## 2017-09-11 NOTE — PROGRESS NOTES
Contacted by IR regarding LLE angiogram today  Due to scheduling issues and emergencies at other [facility] campuses, LLE angiogram cancelled for today and rescheduled to tomorrow morning @ 8am  Nursing unit notified  Will place order for diet and npo after midnight tonight  IVF per renal   Continue heparin drip  Discussed with ROBINSON GrijalvaC  Vascular Surgery  [facility], [phone]

## 2017-09-11 NOTE — ASSESSMENT & PLAN NOTE
Assessment:   S/p [facility] mechanical AVR 2007    Plan:   --Continue to hold coumadin for procedures  --Heparin bridge while off coumadin

## 2017-09-11 NOTE — OCCUPATIONAL THERAPY NOTE
OccupationalTherapy Re-Evaluation and Progress note     Patient Name: Graciela Carolina  OYICHLOUISE Date: 9/11/2017  Problem List  Patient Active Problem List   Diagnosis    Asthenia    Decubitus ulcer of heel, stage 3    Leukocytosis    Stage 3 chronic kidney disease    Chronic indwelling Rush catheter    Abnormal urinalysis    Chronic atrial fibrillation    Diabetes mellitus    Chronic diastolic heart failure    PAD (peripheral artery disease)    S/P AVR     Past Medical History  Past Medical History:   Diagnosis Date    Anemia     Aortic stenosis     Atrial fibrillation     Chronic kidney disease     stage 3    COPD (chronic obstructive pulmonary disease)     Diabetes mellitus     Hyperlipidemia     Hypertension     Sleep apnea      Past Surgical History  Past Surgical History:   Procedure Laterality Date    WOUND DEBRIDEMENT Left 9/8/2017    Procedure: DEBRIDEMENT WOUND [facility] OUT), I&D, PARTIAL CALCANECTOMY, APPLICATION OF WOUND VAC;  Surgeon: Carolann Barba DPM;  [facility];  Service: Podiatry         09/11/17 1036   Note Type   Note type Re-eval;Progress   Restrictions/Precautions   Weight Bearing Precautions Per Order Yes   RLE Weight Bearing Per Order WBAT   LLE Weight Bearing Per Order NWB  (wound vac)   Braces or Orthoses (diabetic shoes)   Other Precautions Multiple lines;Telemetry; Fall Risk;Pain;Hard of hearing;WBS   Pain Assessment   Pain Assessment 0-10   Pain Score 8   Pain Type Acute pain;Chronic pain   Pain Location Knee   Pain Orientation Right   Hospital Pain Intervention(s) Repositioned; Emotional support  (RN notified and provided medication)   Response to Interventions tolerated   Home Living   Type of [address] [place] [address] Two level;Stairs to enter with rails  (5 [address] and 1st floor setup w/ commode)   Bathroom Shower/Tub Tub/shower unit   Bathroom Toilet Standard   Bathroom Equipment Commode   Bathroom Accessibility Accessible   Home Equipment Walker;Cane   Additional Comments pt since hospitalization in March and April has been sleeping on couch on 1st floor w/ [facility], wife assist w/ transfers and ADLS   Prior Function   Level of [place] Needs assistance with IADLs; Needs assistance with ADLs and functional mobility   Lives With Spouse   Receives Help From Family   ADL Assistance Needs assistance  (setup UB ADLS and grooming)   IADLs Needs assistance   Falls in the last 6 months (3)   Vocational Retired   Lifestyle   Autonomy per pt setup UB ADLs, and grooming, assist LB ADLS, assist toileting, assist functional transfers and mobility w/ RW   Reciprocal Relationships spouse and daughter   Service to Others retired [employer]    Intrinsic Gratification watching tv   [address] ([address]) [address] of bed   [address] 5  [address] [facility] 4  Minimal Assistance   [address] 3  Moderate Assistance   LB [address] 2  Maximal Assistance   [address] 3  Moderate Assistance    [facility] [address] 2  [facility]  2  Maximal [address] 2  Maximal Assistance   Bed Mobility   Supine to Sit 3  Moderate assistance   Additional items Assist x 2; Increased time required;HOB elevated; Bedrails;Verbal cues;LE management   Transfers   Sit to Stand 2  Maximal assistance   Additional items Assist x 2; Increased time required;Verbal cues;Armrests  (bed elevated, lift on chair utilized)   Stand to Sit 3  Moderate assistance   Additional items Assist x 2; Increased time required;Verbal cues;Armrests   Sliding Board transfer 3  Moderate assistance   Additional items Assist x 2; Increased time required;Verbal cues  (MAX assist positioning of board)   Additional Comments pt needed cues t/o to maintain NWB on L LE   Balance   Static Sitting Fair +   Dynamic Sitting Fair   Static Standing Zero   Activity Tolerance   Activity Tolerance Patient limited by fatigue;Patient limited by pain  (NWB L LE)   Nurse Made Aware appropriate to see per Rosalva RDZ Assessment   RUE Assessment WFL  (4-/5)   LUE Assessment   LUE Assessment WFL  (4-/5)   Hand Function   Gross Motor Coordination Functional   Fine Motor Coordination Functional   Sensation   Light Touch Severe deficits in the LLE; Severe deficits in the RLE   Proprioception   Proprioception No apparent deficits   Vision-Basic Assessment   Current Vision Wears glasses only for reading   Vision - Complex Assessment   Ocular Range of Motion [facility]   Acuity Able to read clock/calendar on wall without difficulty   Perception   Inattention/Neglect Appears intact   Cognition   Overall Cognitive Status [facility]   Arousal/Participation Cooperative; Alert   Attention Within functional limits   Orientation Level Oriented X4   Memory Decreased short term memory   Following Commands Follows multistep commands with increased time or repetition   Comments pt w/ impaired STM and pt [place]   Assessment   Limitation Decreased ADL status; Decreased UE strength;Decreased Safe judgement during ADL;Decreased endurance;Decreased self-care trans;Decreased high-level ADLs  (NWB L LE w/ wound vac)   Prognosis Fair   Assessment Pt is a 78 y o  male seen for OT evaluation s/p admit to [address] on 9/2/2017 w/ Asthenia  Pt seen for re-evaluation due to s/p L heel debridement, partial calcanectomy w/ wound vac placement on 9/8 and now NWB L LE  Pt to IR today for arteriogram L LE  Personal factors affecting pt at time of IE include:increased pain in R LE (x-ray (-) acute)  Pt seen for re-evaluation and pt w/ MOD assist x 2 supine>sit bed mobility, MAX assist to don/doff shoe and sock R LE, MAX assist toileting, MOD assist UB ADLS, MAX assist x 2 sit<>Stand from bed w/ elevated bed height and cues to maintain NWB on L LE and partial stand achieved  Pt unable to pivot on L LE to maintain NWB status to complete transfer   Pt to benefit from continued skilled OT tx while in the hospital to address deficits as defined above and maximize level of functional independence w ADL's and functional mobility  Occupational Performance areas to address include: grooming, bathing/shower, toilet hygiene, dressing, functional mobility and clothing management, UE strengthening  From OT standpoint, recommendation at time of d/c would be short term rehab  Pt seen for OT session to complete sliding board transfer from bed to chair  Pt w/ assist to position sliding board and MOD assist x 2 for sliding board transfers w/ cues to maintain NWB on L LE, pt noncompliant at times w/ NWB and educated t/o  Pt w/ bowel accident during sliding board transfer  Pt w/ MAX assist x 2 sit<>Stand w/ lift on chair utilized and cues to maintain NWB and assist of nursing student to clean bottom w/ MAX assist x 2 steadying for less than 30 seconds in stance  Pt seated in bedside chair at end of session w/ all needs met  Will continue to follow pt to address OT goals  Goals   Patient Goals none expressed   LTG Time Frame 7-10   Long Term Goal please see below goals   Plan   Treatment Interventions ADL retraining;Functional transfer training;UE strengthening/ROM; Endurance training;Patient/family training; Compensatory technique education; Energy conservation; Activityengagement;Equipment evaluation/education   Goal Expiration Date 09/21/17   Treatment Day 3   OT Frequency 3-5x/wk   Recommendation   Discharge Recommendation Short Term Rehab   Barthel Index   Feeding 10   Bathing 0   Grooming Score 5   Dressing Score 5   Bladder Score 0   Bowels Score 0   Toilet Use Score 0   Transfers (Bed/Chair) Score 5   Mobility (Level Surface) Score 0   Stairs Score 0   Barthel Index Score 25     Occupational Therapy Goals to be met in 7-10 days:  1) Pt will improve activity tolerance to G for min 30 min txment sessions  2) Pt will complete ADLs/self care w/ setup UB ADLs and MIN assist LB ADLs   3) Pt will complete toileting w/ min assist w/ G hygiene/thoroughness using DME PRN  4) Pt will improve functional transfers on/off all surfaces using DME PRN w/ G balance/safety including toileting w/ MIN assist w/ use of sliding board  5) Pt will engage in ongoing cognitive assessment w/ G participation to A w/ safe d/c planning/recommendations  6) Pt will demonstrate G carryover of pt/caregiver education and training as appropriate w/ mod I  w/ G tolerance  7) Pt will engage in depression screen/leisure interest checklist w/ G participation to monitor s/s depression and ID 3 positive coping strategies to A w/ emotional regulation and management  8) Pt will demonstrate 100% carryover of E C  techniques w/ mod I t/o fx'l I/ADL/leisure tasks w/o cues s/p skilled education  9) Pt will tolerate bed mobility and EOB seated tasks w/ min A for 30 mins to engage in fx'l I/ADL/leisure tasks w/ min A w/ min cues  10) Pt will demonstrate improved b/l UE strength by 1 MMT grade to enhance ADLS and functional transfers  11) Pt will demonstrate 100% compliance to NWB status on L LE t/o OT sessions    Documentation completed by: Debora Calderón MS, OTR/L

## 2017-09-12 ENCOUNTER — APPOINTMENT (INPATIENT)
Dept: RADIOLOGY | Facility: HOSPITAL | Age: 77
DRG: 616 | End: 2017-09-12
Payer: MEDICARE

## 2017-09-12 LAB
APTT PPP: 83 SECONDS (ref 23–35)
GLUCOSE SERPL-MCNC: 166 MG/DL (ref 65–140)
GLUCOSE SERPL-MCNC: 207 MG/DL (ref 65–140)
GLUCOSE SERPL-MCNC: 211 MG/DL (ref 65–140)
GLUCOSE SERPL-MCNC: 244 MG/DL (ref 65–140)
HCT VFR BLD AUTO: 29.7 % (ref 36.5–49.3)
HGB BLD-MCNC: 10.1 G/DL (ref 12–17)

## 2017-09-12 PROCEDURE — 75710 ARTERY X-RAYS ARM/LEG: CPT

## 2017-09-12 PROCEDURE — 85730 THROMBOPLASTIN TIME PARTIAL: CPT | Performed by: PODIATRIST

## 2017-09-12 PROCEDURE — C1894 INTRO/SHEATH, NON-LASER: HCPCS

## 2017-09-12 PROCEDURE — 99152 MOD SED SAME PHYS/QHP 5/>YRS: CPT

## 2017-09-12 PROCEDURE — C1769 GUIDE WIRE: HCPCS

## 2017-09-12 PROCEDURE — C2623 CATH, TRANSLUMIN, DRUG-COAT: HCPCS

## 2017-09-12 PROCEDURE — 85014 HEMATOCRIT: CPT | Performed by: INTERNAL MEDICINE

## 2017-09-12 PROCEDURE — 76937 US GUIDE VASCULAR ACCESS: CPT

## 2017-09-12 PROCEDURE — 85018 HEMOGLOBIN: CPT | Performed by: INTERNAL MEDICINE

## 2017-09-12 PROCEDURE — C1760 CLOSURE DEV, VASC: HCPCS

## 2017-09-12 PROCEDURE — 047N3Z1 DILATION OF LEFT POPLITEAL ARTERY USING DRUG-COATED BALLOON, PERCUTANEOUS APPROACH: ICD-10-PCS | Performed by: RADIOLOGY

## 2017-09-12 PROCEDURE — 047L3Z1 DILATION OF LEFT FEMORAL ARTERY USING DRUG-COATED BALLOON, PERCUTANEOUS APPROACH: ICD-10-PCS | Performed by: RADIOLOGY

## 2017-09-12 PROCEDURE — C1725 CATH, TRANSLUMIN NON-LASER: HCPCS

## 2017-09-12 PROCEDURE — 75625 CONTRAST EXAM ABDOMINL AORTA: CPT

## 2017-09-12 PROCEDURE — 82948 REAGENT STRIP/BLOOD GLUCOSE: CPT

## 2017-09-12 PROCEDURE — 99153 MOD SED SAME PHYS/QHP EA: CPT

## 2017-09-12 RX ORDER — FENTANYL CITRATE 50 UG/ML
INJECTION, SOLUTION INTRAMUSCULAR; INTRAVENOUS CODE/TRAUMA/SEDATION MEDICATION
Status: COMPLETED | OUTPATIENT
Start: 2017-09-12 | End: 2017-09-12

## 2017-09-12 RX ORDER — INSULIN GLARGINE 100 [IU]/ML
30 INJECTION, SOLUTION SUBCUTANEOUS
Status: DISCONTINUED | OUTPATIENT
Start: 2017-09-12 | End: 2017-09-13

## 2017-09-12 RX ORDER — MIDAZOLAM HYDROCHLORIDE 1 MG/ML
INJECTION INTRAMUSCULAR; INTRAVENOUS CODE/TRAUMA/SEDATION MEDICATION
Status: COMPLETED | OUTPATIENT
Start: 2017-09-12 | End: 2017-09-12

## 2017-09-12 RX ORDER — HEPARIN SODIUM 1000 [USP'U]/ML
INJECTION, SOLUTION INTRAVENOUS; SUBCUTANEOUS CODE/TRAUMA/SEDATION MEDICATION
Status: COMPLETED | OUTPATIENT
Start: 2017-09-12 | End: 2017-09-12

## 2017-09-12 RX ADMIN — COLLAGENASE SANTYL: 250 OINTMENT TOPICAL at 09:09

## 2017-09-12 RX ADMIN — INSULIN GLARGINE 30 UNITS: 100 INJECTION, SOLUTION SUBCUTANEOUS at 22:02

## 2017-09-12 RX ADMIN — ACETAMINOPHEN 650 MG: 325 TABLET, FILM COATED ORAL at 09:05

## 2017-09-12 RX ADMIN — GABAPENTIN 100 MG: 100 CAPSULE ORAL at 09:02

## 2017-09-12 RX ADMIN — GABAPENTIN 100 MG: 100 CAPSULE ORAL at 22:03

## 2017-09-12 RX ADMIN — FLUTICASONE PROPIONATE AND SALMETEROL 1 PUFF: 50; 500 POWDER RESPIRATORY (INHALATION) at 21:05

## 2017-09-12 RX ADMIN — MIDAZOLAM 1 MG: 1 INJECTION INTRAMUSCULAR; INTRAVENOUS at 16:00

## 2017-09-12 RX ADMIN — OXYBUTYNIN CHLORIDE 5 MG: 5 TABLET ORAL at 18:32

## 2017-09-12 RX ADMIN — MIDAZOLAM 0.5 MG: 1 INJECTION INTRAMUSCULAR; INTRAVENOUS at 17:14

## 2017-09-12 RX ADMIN — PRAVASTATIN SODIUM 40 MG: 40 TABLET ORAL at 18:32

## 2017-09-12 RX ADMIN — OXYBUTYNIN CHLORIDE 5 MG: 5 TABLET ORAL at 09:02

## 2017-09-12 RX ADMIN — FERROUS SULFATE TAB 325 MG (65 MG ELEMENTAL FE) 325 MG: 325 (65 FE) TAB at 18:32

## 2017-09-12 RX ADMIN — TIOTROPIUM BROMIDE 18 MCG: 18 CAPSULE ORAL; RESPIRATORY (INHALATION) at 09:02

## 2017-09-12 RX ADMIN — FLUTICASONE PROPIONATE AND SALMETEROL 1 PUFF: 50; 500 POWDER RESPIRATORY (INHALATION) at 09:02

## 2017-09-12 RX ADMIN — FENTANYL CITRATE 25 MCG: 50 INJECTION, SOLUTION INTRAMUSCULAR; INTRAVENOUS at 17:01

## 2017-09-12 RX ADMIN — SODIUM CHLORIDE 75 ML/HR: 0.9 INJECTION, SOLUTION INTRAVENOUS at 09:07

## 2017-09-12 RX ADMIN — FENTANYL CITRATE 50 MCG: 50 INJECTION, SOLUTION INTRAMUSCULAR; INTRAVENOUS at 16:00

## 2017-09-12 RX ADMIN — MIDAZOLAM 0.5 MG: 1 INJECTION INTRAMUSCULAR; INTRAVENOUS at 17:19

## 2017-09-12 RX ADMIN — IODIXANOL 130 ML: 320 INJECTION, SOLUTION INTRAVASCULAR at 18:37

## 2017-09-12 RX ADMIN — INSULIN LISPRO 3 UNITS: 100 INJECTION, SOLUTION INTRAVENOUS; SUBCUTANEOUS at 22:03

## 2017-09-12 RX ADMIN — FENTANYL CITRATE 25 MCG: 50 INJECTION, SOLUTION INTRAMUSCULAR; INTRAVENOUS at 17:15

## 2017-09-12 RX ADMIN — HEPARIN SODIUM AND DEXTROSE 14 UNITS/KG/HR: 10000; 5 INJECTION INTRAVENOUS at 21:07

## 2017-09-12 RX ADMIN — HEPARIN SODIUM 3000 UNITS: 1000 INJECTION INTRAVENOUS; SUBCUTANEOUS at 17:10

## 2017-09-12 RX ADMIN — GABAPENTIN 100 MG: 100 CAPSULE ORAL at 18:32

## 2017-09-12 NOTE — PROGRESS NOTES
[facility] Progress Note  Patient: Graciela Carolina 78 y o  male   MRN: [id number]  PCP: Cris Luis MD  Unit/Bed#: E4 -88 Encounter: [id number]  Date Of Visit: 09/12/17    Assessment:    Principal Problem:    Asthenia  Active Problems:    Decubitus ulcer of heel, stage 3    Leukocytosis    Stage 3 chronic kidney disease    Chronic indwelling Rush catheter    Abnormal urinalysis    Chronic atrial fibrillation    Diabetes mellitus    Chronic diastolic heart failure    PAD (peripheral artery disease)    S/P AVR      Plan:    · Left heel decubitus ulcer status post I and D currently with wound VAC angiography by IR postponed till to more due to scheduling issues to try and improve vascular supply postop care will be with Podiatry  · History of mechanical heart valve  AVR   ,present on heparin drip had been on warfarin in preparation for possible OR intervention as needed/recommendation with history of AFib is to resume warfarin once discharge plan stable  · Chronic diastolic heart failure stable  · Chronic indwelling Rush catheter on oxybutynin  · Type 2 diabetes mellitus present insulin maintenance is Humalog 8 units at lunch and dinner otherwise coverage at meals with 15 units Humalog a m  she is on Lantus 15 units bedtime had been on 24 units b i d  as outpatient would not adjust further until definitive plan for postop care  · Diabetic neuropathy continue gabapentin  · Bilateral lower extremity wounds left partial calcanectomy with wound VAC application secondary to osteomyelitis nonweightbearing to left lower extremity wound cultures growing MSSA and Enterococcus off antibiotics presently bone culture thus far negative also right posterior heel ulceration mostly healed dressed  · Chronic kidney disease will receive IV hydration preparation for angiography now scheduled for tomorrow and Mucomyst  · Bright red blood per rectum noted in stool small volume had recent colonoscopy this past year unremarkable and denies history of hemorrhoids will continue to monitor check H&H today and CBC in a m  will continue on heparin bridge to warfarin for now  · Right knee pain to medial aspect especially with x-ray showing no osseous abnormalities does not appear to be a gouty flare would obtain orthopedic consultation as contralateral extremity to involve vascular insufficiency and no evidence of ischemic pain      VTE Pharmacologic Prophylaxis:   Pharmacologic: Heparin Drip  Mechanical VTE Prophylaxis in Place: No    Discussions with Specialists or Other Care Team Provider: no    Time Spent for Care: 45 minutes  More than 50% of total time spent on counseling and coordination of care as described above  Subjective:  No new complaints shortness of breath or chest pain noted was noted to have bright red blood around stool specimen today without significant volume with clots no abdominal pain and recent colonoscopy unremarkable      Objective:     Vitals:   Temp (24hrs), Av 1 °F (36 2 °C), Min:95 8 °F (35 4 °C), Max:98 1 °F (36 7 °C)    HR:  [62-79] 62  Resp:  [18] 18  BP: (109-127)/(54-70) 109/54  SpO2:  [96 %-97 %] 96 %  Body mass index is 34 66 kg/m²  Input and Output Summary (last 24 hours): Intake/Output Summary (Last 24 hours) at 17 1049  Last data filed at 17 0511   Gross per 24 hour   Intake                0 ml   Output             1175 ml   Net            -1175 ml       Physical Exam:     Physical Exam:   General appearance: alert, appears stated age and cooperative  Head: Normocephalic, without obvious abnormality, atraumatic  Lungs: clear to auscultation bilaterally  Heart: regular rate and rhythm and Mechanical heart sounds noted  Abdomen: soft, non-tender; bowel sounds normal; no masses,  no organomegaly  Back: negative  Extremities: Has significant medial aspect right knee pain very tender to touch without erythema or redness or warmth    Neurologic: Grossly normal      Additional Data:     Labs:      Results from last 7 days  Lab Units 09/11/17  0602   WBC Thousand/uL 8 54   HEMOGLOBIN g/dL 10 6*   HEMATOCRIT % 31 4*   PLATELETS Thousands/uL 330   NEUTROS PCT % 73   LYMPHS PCT % 16   MONOS PCT % 7   EOS PCT % 3       Results from last 7 days  Lab Units 09/11/17  0601   SODIUM mmol/L 137   POTASSIUM mmol/L 4 0   CHLORIDE mmol/L 101   CO2 mmol/L 30   BUN mg/dL 26*   CREATININE mg/dL 1 21   CALCIUM mg/dL 9 1   GLUCOSE RANDOM mg/dL 221*       Results from last 7 days  Lab Units 09/11/17  0601   INR  1 06       * I Have Reviewed All Lab Data Listed Above  * Additional Pertinent Lab Tests Reviewed: All Labs For Current Hospital Admission Reviewed    Imaging:  Xr Chest 2 Views    Result Date: 9/2/2017  Narrative: CHEST INDICATION:  Weakness  COMPARISON:  None VIEWS:  Frontal and lateral projections IMAGES:  2 FINDINGS:  Sternotomy wires are intact  Prior prosthetic heart valve  Cardiac silhouette is within upper limits of normal in size  The lungs are clear  No pneumothorax or pleural effusion  Visualized osseous structures appear within normal limits for the patient's age  Impression: No active pulmonary disease  Workstation performed: [id number]     Xr Knee 1 Or 2 Vw Right    Result Date: 9/11/2017  Narrative: RIGHT KNEE INDICATION:  Right knee pain  COMPARISON: None VIEWS:  AP and lateral IMAGES:  2 FINDINGS: There is no acute fracture or dislocation  There is no joint effusion  There is mild degenerative change with medial compartment narrowing  No lytic or blastic lesions are seen  Soft tissues are unremarkable  Impression: No acute osseous abnormality  Mild degenerative change  Workstation performed: [id number]     Xr Foot 3+ Vw Left    Result Date: 9/3/2017  Narrative: LEFT FOOT INDICATION:  "eval OM, ulcer on heel of left foot " COMPARISON: Calcaneus radiographs 8/21/2017  VIEWS:  3 IMAGES:  3 FINDINGS: There is no acute fracture or dislocation   No definite osteomyelitis  Mild/moderate diffuse degenerative changes most prominent the tibiotalar joint space  Advanced atherosclerotic calcifications  Impression: No definite osteomyelitis  Workstation performed: [id number]     Xr Heel / Calcaneus 2+ Vw Left    Result Date: 9/7/2017  Narrative: LEFT HEEL INDICATION:  Heel ulcer  COMPARISON: Plain foot radiograph September 3, 2017 and heel images August 21, 2017  VIEWS:  2 IMAGES:  3 FINDINGS: There is no acute fracture or dislocation  Degenerative changes in the midfoot and hindfoot  There is ill-defined, vague lucency within the posterolateral calcaneus  There is no overlying cortical destruction  Given the history of ulceration in this area which probes to the bone (as per podiatry's notes), early osteomyelitis not excluded  Soft tissue swelling with ulceration along the plantar calcaneus  Impression: Ill-defined, vague lucency in the posterior calcaneus without overlying cortical destruction  Given the history, early osteomyelitis not excluded  Recommend follow-up MRI with gadolinium or nuclear medicine white blood cell scan for further  evaluation  ##imslh##imslh Workstation performed: [id number]     Xr Heel / Calcaneus 2+ Vw Left    Result Date: 8/23/2017  Narrative: LEFT HEEL INDICATION:  Ulcer on left heel  COMPARISON: None VIEWS:  2 IMAGES:  3 FINDINGS: There is no acute fracture or dislocation  A tiny plantar calcaneal spur noted with degenerative changes of the tibiotalar joint also noted  No periosteal reaction or osseous destruction to suggest osteomyelitis  The posterior plantar soft tissue swelling with no retained radiopaque foreign body or subcutaneous emphysema  There are vascular calcifications throughout the regional soft tissues  Impression: No acute osseous abnormality  Plantar soft tissue swelling   Workstation performed: [id number]     Vas Lower Limb Arterial Duplex, Complete Bilateral    Result Date: 9/8/2017  Narrative: [facility] REPORT [facility]: Indications:  Bilateral Atherosclerosis with Ulceration [I70 25]  The patient is admitted with left plantar and lateral ankle ulcerations, and right posterior heel ulceration (not open)  History of right 4th, 5th toe amputations  Risk Factors: The patient has history of diabetes, hyperlipidemia and PAD  Clinical Right Pressure:  124/ mm Hg, Left Pressure:  140/ mm Hg  FINDINGS:  Segment                Right                 Left                                          Impression  PSV  EDV  Impression  PSV  EDV  Common Femoral Artery  Normal       94   15  Normal      101    0  Prox Profunda                       72   15               58    9  Prox SFA                           106    0               92   14  Mid SFA                             88    0               72    9  Dist SFA                            74    0  50-75%      164   25  Proximal Pop                       127    0               54   24  Distal Pop                          96   14               85    0  Dist Post Tibial                   107    0              113   25  Dist  Ant  Tibial                   37    0              149   24     CONCLUSION: Impression: Right Lower Limb: Diffuse atherosclerotic arterial disease throughout the femoropopliteal vessels with no definite evidence for focal stenosis  Findings suggest tibioperoneal disease  Ankle Pressure: >254 mm Hg , SIOBHAN: 1 81, supra-normal, consistent with poorly compressible vessels  Prior same  Metatarsal Pressure:  Could not be obtained due to thick bandaging in place  Right Great Toe Pressure 68 mm Hg ,, above healing level for a diabetic patient  Prior 78 mm Hg  Left Lower Limb: Findings suggest a 50-75% stenosis in the distal superficial femoral artery  Diffuse atherosclerotic arterial disease throughout the femoropopliteal vessels  Findings suggest tibioperoneal disease   Ankle Pressure >254 mm Hg , SIOBHAN: 1 81,supra-normal, consistent with poorly compressible vessels  Prior same  Left Metatarsal Pressure: 129  mm Hg  Left Great Toe Pressure 44 mm Hg , below healing level for a diabetic patient  Prior 87 mm Hg  In comparison to the study of 3/10/2015 , there is new findings and progression of disease in the left leg, as described above  SIGNATURE: Electronically Signed by: Brooks Donahue MD, [address] on 2017-09-08 03:33:32 PM    Imaging Reports Reviewed Today Include:  Reviewed  Imaging Personally Reviewed by Myself Includes:  No  Procedure: Xr Knee 1 Or 2 Vw Right    Result Date: 9/11/2017  Narrative: RIGHT KNEE INDICATION:  Right knee pain  COMPARISON: None VIEWS:  AP and lateral IMAGES:  2 FINDINGS: There is no acute fracture or dislocation  There is no joint effusion  There is mild degenerative change with medial compartment narrowing  No lytic or blastic lesions are seen  Soft tissues are unremarkable  Impression: No acute osseous abnormality  Mild degenerative change  Workstation performed: [id number]        Recent Cultures (last 7 days):       Results from last 7 days  Lab Units 09/08/17  1612   GRAM STAIN RESULT  No Polys or Bacteria seen       Last 24 Hours Medication List:     collagenase  Topical Daily   ferrous sulfate 325 mg Oral BID   fluticasone-salmeterol 1 puff Inhalation BID   gabapentin 100 mg Oral TID   insulin glargine 20 Units Subcutaneous HS   insulin lispro 1-6 Units Subcutaneous HS   insulin lispro 15 Units Subcutaneous Daily Before Breakfast   insulin lispro 2-12 Units Subcutaneous TID AC   insulin lispro 8 Units Subcutaneous Daily Before Lunch   insulin lispro 8 Units Subcutaneous Before Dinner   magnesium oxide 400 mg Oral Daily   oxybutynin 5 mg Oral BID   pravastatin 40 mg Oral Daily With Dinner   tiotropium 18 mcg Inhalation Daily        Today, Patient Was Seen By: Clementine Loyd MD    ** Please Note: Dragon 360 Dictation voice to text software may have been used in the creation of this document   **

## 2017-09-12 NOTE — PROGRESS NOTES
Progress Note - Podiatry  Robe Quiles 78 y o  male MRN: [id number]  Unit/Bed#: E4 -01 Encounter: [id number]    Assessment/Plan:  3  78 y o  y/o male s/p excisional debridement with partial calcanectomy and application of wound vac therapy on left heel   - wound vac changed today: 1 sponge in wound and 1 bridge removed, and 1 bridge and 1 sponge in wound reapplied  Continue at 75mmhg continuous  - per ID recommendation, continue to monitor patient off antibiotics  - continue to await clean margin results  - WBS: NWB to LLE; PT/OT onboard and both recommend rehab   - Will plan to d/c on wound vac  2  Craft Stage 1 Ulceration to left lateral forefoot - POA  - podiatry to c/w LWC: maxorb + DSD; dressing changes to be performed 3x/week every Tu/Th/Sat  3  Right Stage 1 Pressure Ulceration - POA   - podiatry to c/w LWC: maxorb + DSD; dressing changes to be performed 3x/week every Tu/Th/Sat  4  DM, type 2 with neuropathy: A1c = 6 6%  - increase lantus to 20 units in AM; c/w ISS with accuchecks; BS range = low to mid 200s, management per endo  5  PAD - per vascular   - scheduled for LLE agram with IR today, await results  - LEADs reviewed: 50-75% stenosis of L distal SFA   - continue to hold coumadin prior to Agram    6  CKD, stage 3 - per nephrology   - baseline creatinine 1 2-1 4  - renal prep for agram: mucomyst and IVF 75cc/hour; d/c post agram   7  CHF   8  Afib   - c/w heparin drip   9  Knee Pain   - xrays reviewed: mild degenerative changes noted     > appreciate medical management per internal medicine       Subjective/Objective   Chief Complaint: am I going to be home with the wound vac? Subjective: 78 y o  y/o male was seen and evaluated at bedside  No acute events overnight with all vital signs stable  Offers no pedal complaints  States his right knee is bothering him    Objective:     Blood pressure 109/54, pulse 62, temperature 98 1 °F (36 7 °C), temperature source Tympanic, resp   rate 18, height 5' 9 5" (1 765 m), weight 108 kg (238 lb 1 6 oz), SpO2 96 %  ,Body mass index is 34 66 kg/m²  Invasive Devices     Peripheral Intravenous Line            Peripheral IV 09/09/17 Left Hand 2 days          Drain            Urethral Catheter 18 Fr  9 days    Negative Pressure Wound Therapy (V A C ) Foot Left 3 days                  Physical Exam:   General: Alert, cooperative and no distress  Lungs: Non labored breathing  Lower Extremity:   NVS status at baseline  MMT is weakened  There is wound on the plantar right heel which is mostly epithelialized with minimal drainge  There are no SOI  Small hyperkeratotic area surrounding the wound  There is a superficial wound of the left lateral foot with exposed dermis  Minimal drainage  No SOI  Wound of the left lateral heel with a mixed base of granular, bone, and exposed tendon  Mild malodor  No purulence, no other SOI  4x2x0 4cm    Amputation sites stable      Lab, Imaging and other studies: I have personally reviewed pertinent lab results  Imaging: I have personally reviewed pertinent films in PACS  EKG, Pathology, and Other Studies: I have personally reviewed pertinent reports    VTE Pharmacologic Prophylaxis: Heparin  VTE Mechanical Prophylaxis: sequential compression device

## 2017-09-12 NOTE — BRIEF OP NOTE (RAD/CATH)
LEFT LOWER EXTREMITY ANGIOGRAM AND ANGIOPLASTY:  Procedure Note    PATIENT NAME: Moriah Farrell  : 1940  MRN: [id number]     Pre-op Diagnosis:   1  Asthenia    2  Pressure ulcer, ankle    3  Decubitus ulcer of heel, stage 3    4  Diabetes mellitus    5  Leukocytosis    6  Chronic indwelling Rush catheter    7  CRP elevated    8  Stage 3 chronic kidney disease      Post-op Diagnosis:   1  Asthenia    2  Pressure ulcer, ankle    3  Decubitus ulcer of heel, stage 3    4  Diabetes mellitus    5  Leukocytosis    6  Chronic indwelling Rush catheter    7  CRP elevated    8  Stage 3 chronic kidney disease        Surgeon:   Donovan Yañez MD  Assistants:     No qualified resident was available  Estimated Blood Loss: None  Findings: Left SFA/popliteal hemodynamically significant lesion  Treated with angioplasty using LUTONIX balloon with good angiographic result  Specimens: None  Complications:  None      Anesthesia: Conscious sedation and Gurjit Alba MD     Date: 2017  Time: 7:26 PM

## 2017-09-12 NOTE — PROGRESS NOTES
Progress Note - Graciela Carolina 78 y o  male MRN: [id number]    Unit/Bed#: E4 -01 Encounter: [id number]        S/P AVR   Assessment & Plan    Assessment:   S/p St  Rufus mechanical AVR 2007    Plan:   --Continue to hold coumadin for procedures  --continue Heparin bridge while off coumadin        PAD (peripheral artery disease)   Assessment & Plan    Assessment:   PAD w/ Left heel tissue loss, OM  S/P L Heel debridement, partial calcanectomy, wound VAC placement 9/8    Plan:   --abdominal aortogram with LLE runoff and possible endovascular intervention today by IR  --npo, IVF  --Renal prep ordered by nephrology  --Continue Statin  --ASA allergic, may need to consider plavix for antiplatelet therapy after revascularization  --recommendations to follow results of LLE angiogram        Diabetes mellitus   Assessment & Plan       Assessment:  Stable     Plan:  Continue current medical regimen        Chronic atrial fibrillation   Assessment & Plan       Assessment:  Stable     Plan:  Continue anticoagulation  Current heparin bridge while Coumadin held secondary to mechanical AVR        Stage 3 chronic kidney disease   Assessment & Plan    Assessment:   Stable    Plan:   --Appreciate Renal input and prep for Agram   Mucomyst and IV fluids per renal  --Monitor Creat post-dye                  Subjective:  Patient without complaints  Denies LLE rest pain  Denies abdominal pain, nausea, back pain, vomiting or bloody diarrhea  Notified by nursing this a m  regarding minimal amount of blood streaking on patient's stool  No melena or hematochezia  Patient reports occasional blood on the toilet paper at home after bowel movements  No history of a GI bleed  Discussed with Dr Danielito Metz who confirmed patient was stable to go forward with planned LLE angiogram this a m     Angiogram postponed by IR this morning to 12 noon today  Patient remains on heparin drip    VSS    Vitals:  /54   Pulse 62   Temp 98 1 °F (36 7 °C) (Tympanic)   Resp 18   Ht 5' 9 5" (1 765 m)   Wt 108 kg (238 lb 1 6 oz)   SpO2 96%   BMI 34 66 kg/m²      I/Os:  I/O last 3 completed shifts:  In: -   Out: 1675 [Urine:1675]  No intake/output data recorded      Lab Results and Cultures:   Lab Results   Component Value Date    WBC 8 54 09/11/2017    HGB 10 6 (L) 09/11/2017    HCT 31 4 (L) 09/11/2017    MCV 85 09/11/2017     09/11/2017     Lab Results   Component Value Date    GLUCOSE 221 (H) 09/11/2017    CALCIUM 9 1 09/11/2017     09/11/2017    K 4 0 09/11/2017    CO2 30 09/11/2017     09/11/2017    BUN 26 (H) 09/11/2017    CREATININE 1 21 09/11/2017     Lab Results   Component Value Date    INR 1 06 09/11/2017    INR 1 30 (H) 09/09/2017    INR 2 14 (H) 09/08/2017    PROTIME 13 8 09/11/2017    PROTIME 16 3 (H) 09/09/2017    PROTIME 24 1 (H) 09/08/2017        Blood Culture: No results found for: BLOODCX,   Urinalysis:   Lab Results   Component Value Date    COLORU Yellow 09/05/2017    CLARITYU Clear 09/05/2017    SPECGRAV <=1 005 09/05/2017    PHUR 6 5 09/05/2017    LEUKOCYTESUR Small (A) 09/05/2017    NITRITE Positive (A) 09/05/2017    PROTEINUA Negative 09/05/2017    GLUCOSEU Negative 09/05/2017    KETONESU Negative 09/05/2017    BILIRUBINUR Negative 09/05/2017    BLOODU Trace-lysed (A) 09/05/2017   ,   Urine Culture:   Lab Results   Component Value Date    URINECX >100,000 cfu/ml Mixed Contaminants X3 09/02/2017   ,   Wound Culure:   Lab Results   Component Value Date    WOUNDCULT 3+ Growth of Staphylococcus aureus 09/02/2017    WOUNDCULT 3+ Growth of Enterococcus faecalis 09/02/2017    WOUNDCULT 3+ Growth of Mixed Skin Sabiha 09/02/2017       Medications:  Current Facility-Administered Medications   Medication Dose Route Frequency    acetaminophen (TYLENOL) tablet 650 mg  650 mg Oral Q6H PRN    collagenase (SANTYL) ointment   Topical Daily    ferrous sulfate tablet 325 mg  325 mg Oral BID    fluticasone-salmeterol (ADVAIR) 500-50 mcg/dose inhaler 1 puff  1 puff Inhalation BID    gabapentin (NEURONTIN) capsule 100 mg  100 mg Oral TID    heparin (porcine) 25,000 units in 250 mL infusion (premix)  3-30 Units/kg/hr (Order-Specific) Intravenous Titrated    heparin (porcine) injection 4,000 Units  4,000 Units Intravenous PRN    heparin (porcine) injection 8,000 Units  8,000 Units Intravenous PRN    insulin glargine (LANTUS) subcutaneous injection 20 Units  20 Units Subcutaneous HS    insulin lispro (HumaLOG) 100 units/mL subcutaneous injection 1-6 Units  1-6 Units Subcutaneous HS    insulin lispro (HumaLOG) 100 units/mL subcutaneous injection 15 Units  15 Units Subcutaneous Daily Before Breakfast    insulin lispro (HumaLOG) 100 units/mL subcutaneous injection 2-12 Units  2-12 Units Subcutaneous TID AC    insulin lispro (HumaLOG) 100 units/mL subcutaneous injection 8 Units  8 Units Subcutaneous Daily Before Lunch    insulin lispro (HumaLOG) 100 units/mL subcutaneous injection 8 Units  8 Units Subcutaneous Before Dinner    magnesium hydroxide (MILK OF MAGNESIA) 400 mg/5 mL oral suspension 30 mL  30 mL Oral PRN    magnesium oxide (MAG-OX) tablet 400 mg  400 mg Oral Daily    ondansetron (ZOFRAN) injection 4 mg  4 mg Intravenous Q6H PRN    oxybutynin (DITROPAN) tablet 5 mg  5 mg Oral BID    pravastatin (PRAVACHOL) tablet 40 mg  40 mg Oral Daily With Dinner    sodium chloride 0 9 % infusion  75 mL/hr Intravenous Continuous    tiotropium (SPIRIVA) capsule for inhaler 18 mcg  18 mcg Inhalation Daily       Imaging:  No new imaging studies for review    Physical Exam:    General appearance: alert, appears stated age, cooperative and no distress  Neurologic: Grossly normal  Neck: no adenopathy, no carotid bruit, no JVD and supple, symmetrical, trachea midline  Lungs: clear to auscultation bilaterally  Chest wall: no tenderness, Midline sternotomy scar well healed    Sternum stable  Heart: regular rate and rhythm, S1: normal, S2: normal prosthetic S2, no S3 or S4, no rub and No murmur  Crisp S2  Abdomen: soft, non-tender; bowel sounds normal; no masses,  no organomegaly and Nondistended  No rebound tenderness  No ecchymosis of the abdominal wall  No abdominal bruits  Extremities: Wound VAC dressing intact left lower extremity  Good seal   Dressings intact right heel  Bilateral lower extremities motor and sensory intact  No erythema of left anterior shin  Minimal bilateral lower extremity edema  Wound/Incision:  As above    Left foot wound VAC dressing in place    Pulse exam:  Radial: Right: 2+ Left[de-identified] 2+  Femoral: Right: 2+ Left: 2+  Popliteal: Right: non-palpable Left: non-palpable  DP: Right: non-palpable Left: Obscured by dressing  PT: Right: non-palpable Left: Obscured by dressing      Jay Tanner PA-C  9/12/2017  [facility], [phone]

## 2017-09-12 NOTE — ASSESSMENT & PLAN NOTE
Assessment:   PAD w/ Left heel tissue loss, OM  S/P L Heel debridement, partial calcanectomy, wound VAC placement 9/8    Plan:   --abdominal aortogram with LLE runoff and possible endovascular intervention today by IR  --npo, IVF  --Renal prep ordered by nephrology  --Continue Statin  --ASA allergic, may need to consider plavix for antiplatelet therapy after revascularization  --recommendations to follow results of LLE angiogram

## 2017-09-12 NOTE — PROGRESS NOTES
Patient was scheduled for a-gram this morning at 8  Prior to going to IR patient had bowel movement with small amount of blood surrounding stool on pad  Dr Salgado Card aware  IR aware and asked if vascular could be notified to ensure it was okay to proceed with procedure  Spoke with Alexa STILL from vascular who spoke with vascular physician who stated it was okay to keep heparin drip running and that patient could go for a-gram  Patient sent to IR but was sent back to unit and is rescheduled for a-gram at 1200  Patient aware and agreeable  Will monitor

## 2017-09-12 NOTE — ASSESSMENT & PLAN NOTE
Assessment:   S/p St  Rufus mechanical AVR 2007    Plan:   --Continue to hold coumadin for procedures  --continue Heparin bridge while off coumadin

## 2017-09-12 NOTE — SUBJECTIVE & OBJECTIVE
Subjective:  Patient without complaints  Denies LLE rest pain  Denies abdominal pain, nausea, back pain, vomiting or bloody diarrhea  Notified by nursing this a m  regarding minimal amount of blood streaking on patient's stool  No melena or hematochezia  Patient reports occasional blood on the toilet paper at home after bowel movements  No history of a GI bleed  Discussed with Dr Jailene Nunn who confirmed patient was stable to go forward with planned LLE angiogram this a m     Angiogram postponed by IR this morning to 12 noon today  Patient remains on heparin drip  VSS    Vitals:  /54   Pulse 62   Temp 98 1 °F (36 7 °C) (Tympanic)   Resp 18   Ht 5' 9 5" (1 765 m)   Wt 108 kg (238 lb 1 6 oz)   SpO2 96%   BMI 34 66 kg/m²     I/Os:  I/O last 3 completed shifts:  In: -   Out: 3936 [Urine:1675]  No intake/output data recorded      Lab Results and Cultures:   Lab Results   Component Value Date    WBC 8 54 09/11/2017    HGB 10 6 (L) 09/11/2017    HCT 31 4 (L) 09/11/2017    MCV 85 09/11/2017     09/11/2017     Lab Results   Component Value Date    GLUCOSE 221 (H) 09/11/2017    CALCIUM 9 1 09/11/2017     09/11/2017    K 4 0 09/11/2017    CO2 30 09/11/2017     09/11/2017    BUN 26 (H) 09/11/2017    CREATININE 1 21 09/11/2017     Lab Results   Component Value Date    INR 1 06 09/11/2017    INR 1 30 (H) 09/09/2017    INR 2 14 (H) 09/08/2017    PROTIME 13 8 09/11/2017    PROTIME 16 3 (H) 09/09/2017    PROTIME 24 1 (H) 09/08/2017        Blood Culture: No results found for: BLOODCX,   Urinalysis:   Lab Results   Component Value Date    COLORU Yellow 09/05/2017    CLARITYU Clear 09/05/2017    SPECGRAV <=1 005 09/05/2017    PHUR 6 5 09/05/2017    LEUKOCYTESUR Small (A) 09/05/2017    NITRITE Positive (A) 09/05/2017    PROTEINUA Negative 09/05/2017    GLUCOSEU Negative 09/05/2017    KETONESU Negative 09/05/2017    BILIRUBINUR Negative 09/05/2017    BLOODU Trace-lysed (A) 09/05/2017   ,   Urine Culture:   Lab Results   Component Value Date    URINECX >100,000 cfu/ml Mixed Contaminants X3 09/02/2017   ,   Wound Culure:   Lab Results   Component Value Date    WOUNDCULT 3+ Growth of Staphylococcus aureus 09/02/2017    WOUNDCULT 3+ Growth of Enterococcus faecalis 09/02/2017    WOUNDCULT 3+ Growth of Mixed Skin Sabiha 09/02/2017       Medications:  Current Facility-Administered Medications   Medication Dose Route Frequency    acetaminophen (TYLENOL) tablet 650 mg  650 mg Oral Q6H PRN    collagenase (SANTYL) ointment   Topical Daily    ferrous sulfate tablet 325 mg  325 mg Oral BID    fluticasone-salmeterol (ADVAIR) 500-50 mcg/dose inhaler 1 puff  1 puff Inhalation BID    gabapentin (NEURONTIN) capsule 100 mg  100 mg Oral TID    heparin (porcine) 25,000 units in 250 mL infusion (premix)  3-30 Units/kg/hr (Order-Specific) Intravenous Titrated    heparin (porcine) injection 4,000 Units  4,000 Units Intravenous PRN    heparin (porcine) injection 8,000 Units  8,000 Units Intravenous PRN    insulin glargine (LANTUS) subcutaneous injection 20 Units  20 Units Subcutaneous HS    insulin lispro (HumaLOG) 100 units/mL subcutaneous injection 1-6 Units  1-6 Units Subcutaneous HS    insulin lispro (HumaLOG) 100 units/mL subcutaneous injection 15 Units  15 Units Subcutaneous Daily Before Breakfast    insulin lispro (HumaLOG) 100 units/mL subcutaneous injection 2-12 Units  2-12 Units Subcutaneous TID AC    insulin lispro (HumaLOG) 100 units/mL subcutaneous injection 8 Units  8 Units Subcutaneous Daily Before Lunch    insulin lispro (HumaLOG) 100 units/mL subcutaneous injection 8 Units  8 Units Subcutaneous Before Dinner    magnesium hydroxide (MILK OF MAGNESIA) 400 mg/5 mL oral suspension 30 mL  30 mL Oral PRN    magnesium oxide (MAG-OX) tablet 400 mg  400 mg Oral Daily    ondansetron (ZOFRAN) injection 4 mg  4 mg Intravenous Q6H PRN    oxybutynin (DITROPAN) tablet 5 mg  5 mg Oral BID    pravastatin (PRAVACHOL) tablet 40 mg  40 mg Oral Daily With Dinner    sodium chloride 0 9 % infusion  75 mL/hr Intravenous Continuous    tiotropium (SPIRIVA) capsule for inhaler 18 mcg  18 mcg Inhalation Daily       Imaging:  No new imaging studies for review    Physical Exam:    General appearance: alert, appears stated age, cooperative and no distress  Neurologic: Grossly normal  Neck: no adenopathy, no carotid bruit, no JVD and supple, symmetrical, trachea midline  Lungs: clear to auscultation bilaterally  Chest wall: no tenderness, Midline sternotomy scar well healed  Sternum stable  Heart: regular rate and rhythm, S1: normal, S2: normal prosthetic S2, no S3 or S4, no rub and No murmur  Crisp S2  Abdomen: soft, non-tender; bowel sounds normal; no masses,  no organomegaly and Nondistended  No rebound tenderness  No ecchymosis of the abdominal wall  No abdominal bruits  Extremities: Wound VAC dressing intact left lower extremity  Good seal   Dressings intact right heel  Bilateral lower extremities motor and sensory intact  No erythema of left anterior shin  Minimal bilateral lower extremity edema  Wound/Incision:  As above    Left foot wound VAC dressing in place    Pulse exam:  Radial: Right: 2+ Left[de-identified] 2+  Femoral: Right: 2+ Left: 2+  Popliteal: Right: non-palpable Left: non-palpable  DP: Right: non-palpable Left: Obscured by dressing  PT: Right: non-palpable Left: Obscured by dressing      Nova Blizzard, PA-C  9/12/2017  [facility], [phone]

## 2017-09-12 NOTE — PROGRESS NOTES
Progress Note - Infectious Disease   Samantha Chowdhury 78 y o  male MRN: [id number]  Unit/Bed#: E4 -01 Encounter: [id number]      Impression/Recommendations:  1   Chronic left heel ulcer  X-ray suggested early osteomyelitis and wound debrided to bone  Postoperative day #4 status post washout/partial calcanectomy/VAC  OR cultures negative and no clinical signs of active infection  Unclear if there is any residual bone infection  Rec:                 · Continue to follow closely off antibiotics  · Follow up OR pathology of clean margin  If negative, no need for antibiotics  · Continue local wound care/VAC as per podiatry     2  PAD  With tissue loss and abnormal LEADs  Rec:  · A-gram pending today  · Close vascular follow-up ongoing     3   DM with peripheral neuropathy  A1C 6 6  Risk factor for #1  Rec:  · Continue management per medicine service     4   Weakness  Suspect multifactorial, largely due to recent inactivity and deconditioning  Rec:                 · Continue PT while in house  · SNF upon discharge     Antibiotics:  None    Subjective:  Patient seen on AM rounds  Denies fevers, chills, or sweats  Denies nausea, vomiting, or diarrhea  A-gram was delayed due to scheduling issues  Objective:  Vitals:  HR:  [62-79] 62  Resp:  [18] 18  BP: (109-127)/(54-70) 109/54  SpO2:  [96 %-97 %] 96 %  Temp (24hrs), Av 1 °F (36 2 °C), Min:95 8 °F (35 4 °C), Max:98 1 °F (36 7 °C)  Current: Temperature: 98 1 °F (36 7 °C)    Physical Exam:   General:  No acute distress  Psychiatric:  Awake and alert  Pulmonary:  Normal respiratory excursion without accessory muscle use  Abdomen:  Soft, nontender  Extremities:  No edema, left foot wrap intact  Skin:  No rashes    Lab Results:  I have personally reviewed pertinent labs      Results from last 7 days  Lab Units 17  0601 09/10/17  0542 17  0521   SODIUM mmol/L 137 135* 133*   POTASSIUM mmol/L 4 0 4 2 4 2   CHLORIDE mmol/L 101 100 100   CO2 mmol/L 30 32 32 ANION GAP mmol/L 6 3* 1*   BUN mg/dL 26* 29* 29*   CREATININE mg/dL 1 21 1 22 1 26   EGFR ml/min/1 73sq m 59 58 56   GLUCOSE RANDOM mg/dL 221* 168* 155*   CALCIUM mg/dL 9 1 9 1 8 9       Results from last 7 days  Lab Units 09/11/17  0602 09/09/17  0521 09/08/17  0640   WBC Thousand/uL 8 54 10 87* 11 50*   HEMOGLOBIN g/dL 10 6* 10 7* 11 8*   PLATELETS Thousands/uL 330 304 355       Results from last 7 days  Lab Units 09/08/17  1612   GRAM STAIN RESULT  No Polys or Bacteria seen       Imaging Studies:   I have personally reviewed pertinent imaging study reports and images in PACS  EKG, Pathology, and Other Studies:   I have personally reviewed pertinent reports

## 2017-09-12 NOTE — PROGRESS NOTES
NEPHROLOGY PROGRESS NOTE   Robe Quiles 78 y o  male MRN: [id number]  Unit/Bed#: E4 -01 Encounter: [id number]      ASSESSMENT & PLAN:    1  Chronic kidney disease with a baseline creatinine of 1 2-1 4  -going for angiogram today  -continues on normal saline with stop 6 hours post procedure  -received Mucomyst for contrast prevention    2  Chronic Rush catheter  -has been present for 2 years and follows up with [facility]    3  Bilateral lower extremity wounds  -podiatry, infectious Disease are following along as well    4  Peripheral vascular disease  -50-75% stenosis in the distal SFA  -further intervention per vascular team    5    Hyponatremia  -Improved      SUBJECTIVE:    Currently okay, NPO for a gram    OBJECTIVE:  Current Weight: Weight - Scale: 108 kg (238 lb 1 6 oz)  Vitals:    09/12/17 0700   BP: 109/54   Pulse: 62   Resp: 18   Temp: 98 1 °F (36 7 °C)   SpO2: 96%       Intake/Output Summary (Last 24 hours) at 09/12/17 1326  Last data filed at 09/12/17 0511   Gross per 24 hour   Intake                0 ml   Output             1175 ml   Net            -1175 ml     General: conscious, cooperative, in not acute distress  Eyes: conjunctivae pink, anicteric sclerae  ENT: lips and mucous membranes moist  Neck: supple, no JVD  Chest: clear breath sounds bilateral, no crackles, ronchus or wheezings  CVS: distinct S1 & S2, normal rate, regular rhythm  Abdomen: soft, non-tender, non-distended, normoactive bowel sounds  Extremities: no edema of both legs  Skin: no rash  Neuro: awake, alert, oriented    Medications:    Current Facility-Administered Medications:     acetaminophen (TYLENOL) tablet 650 mg, 650 mg, Oral, Q6H PRN, Ora Chapa PA-C, 650 mg at 09/12/17 0905    collagenase (SANTYL) ointment, , Topical, Daily, Nithya Akbar DPM    ferrous sulfate tablet 325 mg, 325 mg, Oral, BID, Ora Chapa PA-C, 325 mg at 09/11/17 1705    fluticasone-salmeterol (ADVAIR) 500-50 mcg/dose inhaler 1 puff, 1 puff, Inhalation, BID, Ora Chapa PA-C, 1 puff at 09/12/17 0902    gabapentin (NEURONTIN) capsule 100 mg, 100 mg, Oral, TID, Ora Chapa PA-C, 100 mg at 09/12/17 0902    heparin (porcine) 25,000 units in 250 mL infusion (premix), 3-30 Units/kg/hr (Order-Specific), Intravenous, Titrated, Capri Hu DO, Last Rate: 14 mL/hr at 09/11/17 1940, 14 Units/kg/hr at 09/11/17 1940    heparin (porcine) injection 4,000 Units, 4,000 Units, Intravenous, PRN, Capri Hu DO, 4,000 Units at 09/10/17 1800    heparin (porcine) injection 8,000 Units, 8,000 Units, Intravenous, PRN, Capri Mayte, DO    insulin glargine (LANTUS) subcutaneous injection 20 Units, 20 Units, Subcutaneous, HS, Berncie Gilbert MD, 20 Units at 09/11/17 2135    insulin lispro (HumaLOG) 100 units/mL subcutaneous injection 1-6 Units, 1-6 Units, Subcutaneous, HS, Bernice Gilbert MD, 3 Units at 09/11/17 2136    insulin lispro (HumaLOG) 100 units/mL subcutaneous injection 15 Units, 15 Units, Subcutaneous, Daily Before Breakfast, Domingo Prasad DO    insulin lispro (HumaLOG) 100 units/mL subcutaneous injection 2-12 Units, 2-12 Units, Subcutaneous, TID AC, 4 Units at 09/11/17 1708 **AND** Fingerstick Glucose (POCT), , , TID AC, Kendall De La Torre MD    insulin lispro (HumaLOG) 100 units/mL subcutaneous injection 8 Units, 8 Units, Subcutaneous, Daily Before Lunch, Bernice Gilbert MD, 8 Units at 09/10/17 1140    insulin lispro (HumaLOG) 100 units/mL subcutaneous injection 8 Units, 8 Units, Subcutaneous, Before Andrew Cruz MD, 8 Units at 09/11/17 1709    magnesium hydroxide (MILK OF MAGNESIA) 400 mg/5 mL oral suspension 30 mL, 30 mL, Oral, PRN, Ora Chapa PA-C    magnesium oxide (MAG-OX) tablet 400 mg, 400 mg, Oral, Daily, Ora Chapa PA-C, 400 mg at 09/11/17 0824    ondansetron (ZOFRAN) injection 4 mg, 4 mg, Intravenous, Q6H PRN, Ora Chapa PA-C    oxybutynin (DITROPAN) tablet 5 mg, 5 mg, Oral, BID, Ora Chapa PA-C, 5 mg at 09/12/17 0902    pravastatin (PRAVACHOL) tablet 40 mg, 40 mg, Oral, Daily With Isidro Allyson Chapa PA-C, 40 mg at 09/11/17 1705    sodium chloride 0 9 % infusion, 75 mL/hr, Intravenous, Continuous, Alexa Gomez PA-C, Last Rate: 75 mL/hr at 09/12/17 0907, 75 mL/hr at 09/12/17 0907    tiotropium (SPIRIVA) capsule for inhaler 18 mcg, 18 mcg, Inhalation, Daily, Ora Chapa PA-C, 18 mcg at 09/12/17 0902    Invasive Devices:   Urethral Catheter 18 Fr   (Active)   Reasons to continue Urinary Catheter  Chronic urinary catheter 9/4/2017  8:40 PM   Site Assessment Clean;Skin intact 9/10/2017  7:30 PM   Collection Container Standard drainage bag 9/10/2017  7:30 PM   Securement Method Securing device (Describe) 9/10/2017  7:30 PM   Output (mL) 500 mL 9/11/2017  5:00 AM     Lab Results:     Results from last 7 days  Lab Units 09/12/17  1102 09/11/17  0602 09/11/17  0601 09/10/17  0542 09/09/17  0521 09/08/17  0640   WBC Thousand/uL  --  8 54  --   --  10 87* 11 50*   HEMOGLOBIN g/dL 10 1* 10 6*  --   --  10 7* 11 8*   HEMATOCRIT % 29 7* 31 4*  --   --  31 7* 35 4*   PLATELETS Thousands/uL  --  330  --   --  304 355   SODIUM mmol/L  --   --  137 135* 133* 134*   POTASSIUM mmol/L  --   --  4 0 4 2 4 2 4 2   CHLORIDE mmol/L  --   --  101 100 100 99*   CO2 mmol/L  --   --  30 32 32 32   BUN mg/dL  --   --  26* 29* 29* 28*   CREATININE mg/dL  --   --  1 21 1 22 1 26 1 19   CALCIUM mg/dL  --   --  9 1 9 1 8 9 9 2   MAGNESIUM mg/dL  --   --   --  2 1  --   --    GLUCOSE RANDOM mg/dL  --   --  221* 168* 155* 106       Previous work up:  Please see previous notes

## 2017-09-13 ENCOUNTER — APPOINTMENT (INPATIENT)
Dept: NON INVASIVE DIAGNOSTICS | Facility: HOSPITAL | Age: 77
DRG: 616 | End: 2017-09-13
Payer: MEDICARE

## 2017-09-13 LAB
ANION GAP SERPL CALCULATED.3IONS-SCNC: 7 MMOL/L (ref 4–13)
APTT PPP: 63 SECONDS (ref 23–35)
BASOPHILS # BLD AUTO: 0.05 THOUSANDS/ΜL (ref 0–0.1)
BASOPHILS NFR BLD AUTO: 1 % (ref 0–1)
BUN SERPL-MCNC: 13 MG/DL (ref 5–25)
CALCIUM SERPL-MCNC: 8.5 MG/DL (ref 8.3–10.1)
CHLORIDE SERPL-SCNC: 102 MMOL/L (ref 100–108)
CO2 SERPL-SCNC: 24 MMOL/L (ref 21–32)
CREAT SERPL-MCNC: 0.97 MG/DL (ref 0.6–1.3)
EOSINOPHIL # BLD AUTO: 0.33 THOUSAND/ΜL (ref 0–0.61)
EOSINOPHIL NFR BLD AUTO: 3 % (ref 0–6)
ERYTHROCYTE [DISTWIDTH] IN BLOOD BY AUTOMATED COUNT: 14.7 % (ref 11.6–15.1)
GFR SERPL CREATININE-BSD FRML MDRD: 77 ML/MIN/1.73SQ M
GLUCOSE SERPL-MCNC: 153 MG/DL (ref 65–140)
GLUCOSE SERPL-MCNC: 203 MG/DL (ref 65–140)
GLUCOSE SERPL-MCNC: 248 MG/DL (ref 65–140)
GLUCOSE SERPL-MCNC: 250 MG/DL (ref 65–140)
GLUCOSE SERPL-MCNC: 258 MG/DL (ref 65–140)
HCT VFR BLD AUTO: 27.5 % (ref 36.5–49.3)
HGB BLD-MCNC: 9.4 G/DL (ref 12–17)
LYMPHOCYTES # BLD AUTO: 0.89 THOUSANDS/ΜL (ref 0.6–4.47)
LYMPHOCYTES NFR BLD AUTO: 9 % (ref 14–44)
MCH RBC QN AUTO: 29 PG (ref 26.8–34.3)
MCHC RBC AUTO-ENTMCNC: 34.2 G/DL (ref 31.4–37.4)
MCV RBC AUTO: 85 FL (ref 82–98)
MONOCYTES # BLD AUTO: 0.61 THOUSAND/ΜL (ref 0.17–1.22)
MONOCYTES NFR BLD AUTO: 6 % (ref 4–12)
NEUTROPHILS # BLD AUTO: 8.09 THOUSANDS/ΜL (ref 1.85–7.62)
NEUTS SEG NFR BLD AUTO: 81 % (ref 43–75)
NRBC BLD AUTO-RTO: 0 /100 WBCS
PLATELET # BLD AUTO: 282 THOUSANDS/UL (ref 149–390)
PMV BLD AUTO: 8.5 FL (ref 8.9–12.7)
POTASSIUM SERPL-SCNC: 4.2 MMOL/L (ref 3.5–5.3)
RBC # BLD AUTO: 3.24 MILLION/UL (ref 3.88–5.62)
SODIUM SERPL-SCNC: 133 MMOL/L (ref 136–145)
WBC # BLD AUTO: 9.97 THOUSAND/UL (ref 4.31–10.16)

## 2017-09-13 PROCEDURE — 82948 REAGENT STRIP/BLOOD GLUCOSE: CPT

## 2017-09-13 PROCEDURE — 80048 BASIC METABOLIC PNL TOTAL CA: CPT | Performed by: INTERNAL MEDICINE

## 2017-09-13 PROCEDURE — 85025 COMPLETE CBC W/AUTO DIFF WBC: CPT | Performed by: INTERNAL MEDICINE

## 2017-09-13 PROCEDURE — 85730 THROMBOPLASTIN TIME PARTIAL: CPT | Performed by: PODIATRIST

## 2017-09-13 RX ORDER — FUROSEMIDE 40 MG/1
40 TABLET ORAL DAILY
Status: DISCONTINUED | OUTPATIENT
Start: 2017-09-13 | End: 2017-09-27

## 2017-09-13 RX ORDER — CLOPIDOGREL BISULFATE 75 MG/1
75 TABLET ORAL DAILY
Status: DISCONTINUED | OUTPATIENT
Start: 2017-09-13 | End: 2017-09-25

## 2017-09-13 RX ORDER — INSULIN GLARGINE 100 [IU]/ML
40 INJECTION, SOLUTION SUBCUTANEOUS
Status: DISCONTINUED | OUTPATIENT
Start: 2017-09-13 | End: 2017-09-25

## 2017-09-13 RX ADMIN — INSULIN LISPRO 8 UNITS: 100 INJECTION, SOLUTION INTRAVENOUS; SUBCUTANEOUS at 12:48

## 2017-09-13 RX ADMIN — FLUTICASONE PROPIONATE AND SALMETEROL 1 PUFF: 50; 500 POWDER RESPIRATORY (INHALATION) at 08:57

## 2017-09-13 RX ADMIN — OXYBUTYNIN CHLORIDE 5 MG: 5 TABLET ORAL at 09:00

## 2017-09-13 RX ADMIN — GABAPENTIN 100 MG: 100 CAPSULE ORAL at 16:14

## 2017-09-13 RX ADMIN — FERROUS SULFATE TAB 325 MG (65 MG ELEMENTAL FE) 325 MG: 325 (65 FE) TAB at 17:25

## 2017-09-13 RX ADMIN — INSULIN LISPRO 4 UNITS: 100 INJECTION, SOLUTION INTRAVENOUS; SUBCUTANEOUS at 08:57

## 2017-09-13 RX ADMIN — PRAVASTATIN SODIUM 40 MG: 40 TABLET ORAL at 16:14

## 2017-09-13 RX ADMIN — INSULIN LISPRO 8 UNITS: 100 INJECTION, SOLUTION INTRAVENOUS; SUBCUTANEOUS at 16:15

## 2017-09-13 RX ADMIN — FLUTICASONE PROPIONATE AND SALMETEROL 1 PUFF: 50; 500 POWDER RESPIRATORY (INHALATION) at 21:22

## 2017-09-13 RX ADMIN — INSULIN GLARGINE 40 UNITS: 100 INJECTION, SOLUTION SUBCUTANEOUS at 21:22

## 2017-09-13 RX ADMIN — FERROUS SULFATE TAB 325 MG (65 MG ELEMENTAL FE) 325 MG: 325 (65 FE) TAB at 08:56

## 2017-09-13 RX ADMIN — INSULIN LISPRO 15 UNITS: 100 INJECTION, SOLUTION INTRAVENOUS; SUBCUTANEOUS at 08:58

## 2017-09-13 RX ADMIN — HEPARIN SODIUM AND DEXTROSE 14 UNITS/KG/HR: 10000; 5 INJECTION INTRAVENOUS at 17:21

## 2017-09-13 RX ADMIN — Medication 400 MG: at 08:58

## 2017-09-13 RX ADMIN — CLOPIDOGREL BISULFATE 75 MG: 75 TABLET ORAL at 12:48

## 2017-09-13 RX ADMIN — FUROSEMIDE 40 MG: 40 TABLET ORAL at 16:14

## 2017-09-13 RX ADMIN — INSULIN LISPRO 4 UNITS: 100 INJECTION, SOLUTION INTRAVENOUS; SUBCUTANEOUS at 16:14

## 2017-09-13 RX ADMIN — GABAPENTIN 100 MG: 100 CAPSULE ORAL at 21:22

## 2017-09-13 RX ADMIN — INSULIN LISPRO 6 UNITS: 100 INJECTION, SOLUTION INTRAVENOUS; SUBCUTANEOUS at 12:47

## 2017-09-13 RX ADMIN — OXYBUTYNIN CHLORIDE 5 MG: 5 TABLET ORAL at 17:25

## 2017-09-13 RX ADMIN — TIOTROPIUM BROMIDE 18 MCG: 18 CAPSULE ORAL; RESPIRATORY (INHALATION) at 08:59

## 2017-09-13 RX ADMIN — GABAPENTIN 100 MG: 100 CAPSULE ORAL at 08:56

## 2017-09-13 RX ADMIN — INSULIN LISPRO 1 UNITS: 100 INJECTION, SOLUTION INTRAVENOUS; SUBCUTANEOUS at 21:23

## 2017-09-13 NOTE — PROGRESS NOTES
Progress Note - Vascular Surgery       Assessment:  PAD with left heel tissue loss & osteomyelitis, s/p L heel debridement, partial calcanectomy & VAC placement 9/8/17  Right heel tissue loss  DM II  HTN  HLD  CKD III   Diastolic CHF  Afib  hx AVR on chronic Coumadin    Plan:  -s/p LLE arteriogram with SFA/popliteal angioplasty 9/12/17  Images viewed with 2 vessel runoff via AT and PT with small vessel disease  -Will check postintervention ABIs to assess for improvement     -Continue local wound care per podiatry, wound VAC in place  -Continue statin  Aspirin allergy, pt refuses aspirin reports chest tightness whenever aspirin taken, will initiate Plavix   -CKD  Creatine stable, better than baseline  ______________________________________________________________________    Subjective:  Patient seen in bed  Denies any pain  Denies any rectal bleeding  Right groin access site soft  Hemodynamically stable  Vitals:  /57   Pulse 73   Temp 98 3 °F (36 8 °C) (Tympanic)   Resp 18   Ht 5' 9 5" (1 765 m)   Wt 108 kg (238 lb 1 6 oz)   SpO2 99%   BMI 34 66 kg/m²     I/Os:  I/O last 3 completed shifts: In: 687 [I V :687]  Out: 1150 [Urine:1150]  No intake/output data recorded      Lab Results and Cultures:   CBC with diff:   Lab Results   Component Value Date    WBC 9 97 09/13/2017    HGB 9 4 (L) 09/13/2017    HCT 27 5 (L) 09/13/2017    MCV 85 09/13/2017     09/13/2017    MCH 29 0 09/13/2017    MCHC 34 2 09/13/2017    RDW 14 7 09/13/2017    MPV 8 5 (L) 09/13/2017    NRBC 0 09/13/2017   ,   BMP/CMP:  Lab Results   Component Value Date     (L) 09/13/2017    K 4 2 09/13/2017     09/13/2017    CO2 24 09/13/2017    ANIONGAP 7 09/13/2017    BUN 13 09/13/2017    CREATININE 0 97 09/13/2017    GLUCOSE 250 (H) 09/13/2017    CALCIUM 8 5 09/13/2017    AST 15 09/02/2017    ALT 25 09/02/2017    ALKPHOS 99 09/02/2017    PROT 7 3 09/02/2017    ALBUMIN 3 4 (L) 09/02/2017    BILITOT 0 34 09/02/2017 EGFR 77 09/13/2017   ,   Lipid Panel: No results found for: CHOL,   Coags:   Lab Results   Component Value Date    PTT 63 (H) 09/13/2017    INR 1 06 09/11/2017   ,     Blood Culture: No results found for: BLOODCX,   Urinalysis:   Lab Results   Component Value Date    COLORU Yellow 09/05/2017    CLARITYU Clear 09/05/2017    SPECGRAV <=1 005 09/05/2017    PHUR 6 5 09/05/2017    LEUKOCYTESUR Small (A) 09/05/2017    NITRITE Positive (A) 09/05/2017    PROTEINUA Negative 09/05/2017    GLUCOSEU Negative 09/05/2017    KETONESU Negative 09/05/2017    BILIRUBINUR Negative 09/05/2017    BLOODU Trace-lysed (A) 09/05/2017   ,   Urine Culture:   Lab Results   Component Value Date    URINECX >100,000 cfu/ml Mixed Contaminants X3 09/02/2017   ,   Wound Culure:   Lab Results   Component Value Date    WOUNDCULT 3+ Growth of Staphylococcus aureus 09/02/2017    WOUNDCULT 3+ Growth of Enterococcus faecalis 09/02/2017    WOUNDCULT 3+ Growth of Mixed Skin Sabiha 09/02/2017       Medications:  Current Facility-Administered Medications   Medication Dose Route Frequency    acetaminophen (TYLENOL) tablet 650 mg  650 mg Oral Q6H PRN    collagenase (SANTYL) ointment   Topical Daily    ferrous sulfate tablet 325 mg  325 mg Oral BID    fluticasone-salmeterol (ADVAIR) 500-50 mcg/dose inhaler 1 puff  1 puff Inhalation BID    gabapentin (NEURONTIN) capsule 100 mg  100 mg Oral TID    heparin (porcine) 25,000 units in 250 mL infusion (premix)  3-30 Units/kg/hr (Order-Specific) Intravenous Titrated    heparin (porcine) injection 4,000 Units  4,000 Units Intravenous PRN    heparin (porcine) injection 8,000 Units  8,000 Units Intravenous PRN    insulin glargine (LANTUS) subcutaneous injection 30 Units  30 Units Subcutaneous HS    insulin lispro (HumaLOG) 100 units/mL subcutaneous injection 1-6 Units  1-6 Units Subcutaneous HS    insulin lispro (HumaLOG) 100 units/mL subcutaneous injection 15 Units  15 Units Subcutaneous Daily Before Breakfast  insulin lispro (HumaLOG) 100 units/mL subcutaneous injection 2-12 Units  2-12 Units Subcutaneous TID AC    insulin lispro (HumaLOG) 100 units/mL subcutaneous injection 8 Units  8 Units Subcutaneous Daily Before Lunch    insulin lispro (HumaLOG) 100 units/mL subcutaneous injection 8 Units  8 Units Subcutaneous Before Dinner    magnesium hydroxide (MILK OF MAGNESIA) 400 mg/5 mL oral suspension 30 mL  30 mL Oral PRN    magnesium oxide (MAG-OX) tablet 400 mg  400 mg Oral Daily    ondansetron (ZOFRAN) injection 4 mg  4 mg Intravenous Q6H PRN    oxybutynin (DITROPAN) tablet 5 mg  5 mg Oral BID    pravastatin (PRAVACHOL) tablet 40 mg  40 mg Oral Daily With Dinner    tiotropium (SPIRIVA) capsule for inhaler 18 mcg  18 mcg Inhalation Daily       Imagin17 Agram images reviewed    Physical Exam:    General: A&Ox3, cooperative with exam, appears stated age, no acute distress  CV: RRR, S1, S2 prosthetic valve, no murmur  Respiratory: CTA bilaterally, no rhonchi, rales, or wheeze  Respirations even and unlabored  Abdominal: Obese, soft, non-tender, bowel sounds present  Extremities: Bilateral foot dressing in place C/D/I, left foot VAC, motor/sensory intact    Right groin access site soft, no ecchymosis, no pulsatility  Neurologic: Grossly normal    Pulse exam:  Femoral: Right: 2+ Left: 2+      NEIL Cantu  2017  [facility]: [phone]

## 2017-09-13 NOTE — CONSULTS
Consultation - Orthopedics   Jonas Harrington 78 y o  male MRN: [id number]  Unit/Bed#: E4 -01 Encounter: [id number]      Assessment/Plan     Assessment:  R knee pain, DJD    Plan:  Patient likely not a good candidate for steroid injection given hx of diabetes  Recommend resume PT/OT   Pain control prn  Can f/u as outpatient if needed    History of Present Illness   Physician Requesting Consult: Kali Turner DPM  Reason for Consult / Principal Problem: R knee pain  HPI: Jonas Harrington is a 78y o  year old male who presents with R knee pain  Patient states that he has had intermittent mild R knee pain for years and attributed it to arthritis  He has had increased R knee pain over the past 3-4 days during his admission  Denies any falls or trauma  His pain is mostly medial   He has not been able to weight bear much secondary to wounds on bilateral feet  Admits feeling some mild swelling  His wife is at bedside  Denies hx of gout or lyme disease  Consults    ROS: see HPI, all other systems negative  Historical Information   Past Medical History:   Diagnosis Date    Anemia     Aortic stenosis     Atrial fibrillation     Chronic kidney disease     stage 3    COPD (chronic obstructive pulmonary disease)     Diabetes mellitus     Hyperlipidemia     Hypertension     Sleep apnea      Past Surgical History:   Procedure Laterality Date    WOUND DEBRIDEMENT Left 9/8/2017    Procedure: DEBRIDEMENT WOUND [facility] OUT), I&D, PARTIAL CALCANECTOMY, APPLICATION OF WOUND VAC;  Surgeon: Kali Turner DPM;  Location: [facility] OR;  Service: Podiatry     Social History   History   Alcohol Use No     History   Drug Use No     History   Smoking Status    Former Smoker   Smokeless Tobacco    Not on file     Family History: History reviewed  No pertinent family history      Meds/Allergies   Prescriptions Prior to Admission   Medication    Cholecalciferol (VITAMIN D3 PO)    ferrous sulfate 325 (65 Fe) mg tablet    fluticasone-salmeterol (ADVAIR) 500-50 mcg/dose    furosemide (LASIX) 40 mg tablet    gabapentin (NEURONTIN) 100 mg capsule    insulin aspart (NovoLOG) 100 units/mL injection    Insulin Disposable Pump (V-GO 40) KIT    insulin glargine (LANTUS) 100 units/mL subcutaneous injection    MAGNESIUM OXIDE PO    Multiple Vitamins-Minerals (CENTRUM SILVER PO)    oxybutynin (DITROPAN) 5 mg tablet    simvastatin (ZOCOR) 40 mg tablet    tiotropium (SPIRIVA) 18 mcg inhalation capsule    warfarin (COUMADIN) 5 mg tablet    warfarin (COUMADIN) 7 5 mg tablet     Allergies   Allergen Reactions    Aspirin      Chest tightness       Objective   Vitals: Blood pressure 121/54, pulse 63, temperature (!) 97 2 °F (36 2 °C), temperature source Oral, resp  rate 17, height 5' 9 5" (1 765 m), weight 108 kg (238 lb 1 6 oz), SpO2 97 %  ,Body mass index is 34 66 kg/m²  Intake/Output Summary (Last 24 hours) at 09/13/17 1858  Last data filed at 09/13/17 1809   Gross per 24 hour   Intake             1247 ml   Output              850 ml   Net              397 ml     I/O last 24 hours: In: 36 [P O :560; I V :687]  Out: 1150 [Urine:1150]    Invasive Devices     Peripheral Intravenous Line            Peripheral IV 09/13/17 Right Forearm less than 1 day          Drain            Urethral Catheter 18 Fr  11 days    Negative Pressure Wound Therapy (V A C ) Foot Left 5 days                PE:  Gen:  Awake and alert  HEENT:  Hearing intact  Heart:  Regular rate  Lungs: no audible wheezing  GI: no abdominal distension    Ortho Exam   RLE:  EHL/FHL intact, brisk capillary refill, decreased sensation R foot, sensation intact around knee, foot with dressing, noted to have 2 toes previously amputed  R knee:  Mild effusion, mild swelling, no erythema, no ecchymosis, AROM: 0-30, tolerates PROM 0-45, +TTP over medial aspect of knee, no TTP elsewhere      R hip:  No pain with ROM    Lab Results:   CBC:   Lab Results   Component Value Date    WBC 9 97 09/13/2017    HGB 9 4 (L) 09/13/2017    HCT 27 5 (L) 09/13/2017    MCV 85 09/13/2017     09/13/2017    MCH 29 0 09/13/2017    MCHC 34 2 09/13/2017    RDW 14 7 09/13/2017    MPV 8 5 (L) 09/13/2017    NRBC 0 09/13/2017     Imaging Studies: I have personally reviewed pertinent films in PACS   XR R knee:  Mild DJD, XRs non-weight bearing    Code Status: Level 1 - Full Code  Advance Directive and Living Will:      Power of :    POLST:

## 2017-09-13 NOTE — PLAN OF CARE
Problem: Nutrition/Hydration-ADULT  Goal: Nutrient/Hydration intake appropriate for improving, restoring or maintaining nutritional needs  Monitor and assess patient's nutrition/hydration status for malnutrition (ex- brittle hair, bruises, dry skin, pale skin and conjunctiva, muscle wasting, smooth red tongue, and disorientation)  Collaborate with interdisciplinary team and initiate plan and interventions as ordered  Monitor patient's weight and dietary intake as ordered or per policy  Utilize nutrition screening tool and intervene per policy  Determine patient's food preferences and provide high-protein, high-caloric foods as appropriate  INTERVENTIONS:  - Monitor oral intake, urinary output, labs, and treatment plans  - Assess nutrition and hydration status and recommend course of action  - Evaluate amount of meals eaten  - Assist patient with eating if necessary   - Allow adequate time for meals  - Recommend/ encourage appropriate diets, oral nutritional supplements, and vitamin/mineral supplements  - Order, calculate, and assess calorie counts as needed  - Recommend, monitor, and adjust tube feedings and TPN/PPN based on assessed needs  - Assess need for intravenous fluids  - Provide specific nutrition/hydration education as appropriate  - Include patient/family/caregiver in decisions related to nutrition   Outcome: Progressing      Problem: Potential for Falls  Goal: Patient will remain free of falls  INTERVENTIONS:  - Assess patient frequently for physical needs  -  Identify cognitive and physical deficits and behaviors that affect risk of falls    -  [place] fall precautions as indicated by assessment   - Educate patient/family on patient safety including physical limitations  - Instruct patient to call for assistance with activity based on assessment  - Modify environment to reduce risk of injury  - Consider OT/PT consult to assist with strengthening/mobility   Outcome: Progressing      Problem: Prexisting or High Potential for Compromised Skin Integrity  Goal: Skin integrity is maintained or improved  INTERVENTIONS:  - Identify patients at risk for skin breakdown  - Assess and monitor skin integrity  - Assess and monitor nutrition and hydration status  - Monitor labs (i e  albumin)  - Assess for incontinence   - Turn and reposition patient  - Assist with mobility/ambulation  - Relieve pressure over bony prominences  - Avoid friction and shearing  - Provide appropriate hygiene as needed including keeping skin clean and dry  - Evaluate need for skin moisturizer/barrier cream  - Collaborate with interdisciplinary team (i e  Nutrition, Rehabilitation, etc )   - Patient/family teaching   Outcome: Progressing      Problem: PAIN - ADULT  Goal: Verbalizes/displays adequate comfort level or baseline comfort level  Interventions:  - Encourage patient to monitor pain and request assistance  - Assess pain using appropriate pain scale  - Administer analgesics based on type and severity of pain and evaluate response  - Implement non-pharmacological measures as appropriate and evaluate response  - Consider cultural and social influences on pain and pain management  - Notify physician/advanced practitioner if interventions unsuccessful or patient reports new pain   Outcome: Progressing      Problem: INFECTION - ADULT  Goal: Absence or prevention of progression during hospitalization  INTERVENTIONS:  - Assess and monitor for signs and symptoms of infection  - Monitor lab/diagnostic results  - Monitor all insertion sites, i e  indwelling lines, tubes, and drains  - Monitor endotracheal (as able) and nasal secretions for changes in amount and color  - [place] appropriate cooling/warming therapies per order  - Administer medications as ordered  - Instruct and encourage patient and family to use good hand hygiene technique  - Identify and instruct in appropriate isolation precautions for identified infection/condition Outcome: Progressing      Problem: SAFETY ADULT  Goal: Maintain or return to baseline ADL function  INTERVENTIONS:  -  Assess patient's ability to carry out ADLs; assess patient's baseline for ADL function and identify physical deficits which impact ability to perform ADLs (bathing, care of mouth/teeth, toileting, grooming, dressing, etc )  - Assess/evaluate cause of self-care deficits   - Assess range of motion  - Assess patient's mobility; develop plan if impaired  - Assess patient's need for assistive devices and provide as appropriate  - Encourage maximum independence but intervene and supervise when necessary  ¯ Involve family in performance of ADLs  ¯ Assess for home care needs following discharge   ¯ Request OT consult to assist with ADL evaluation and planning for discharge  ¯ Provide patient education as appropriate   Outcome: Progressing    Goal: Maintain or return mobility status to optimal level  INTERVENTIONS:  - Assess patient's baseline mobility status (ambulation, transfers, stairs, etc )    - Identify cognitive and physical deficits and behaviors that affect mobility  - Identify mobility aids required to assist with transfers and/or ambulation (gait belt, sit-to-stand, lift, walker, cane, etc )  - [place] fall precautions as indicated by assessment  - Record patient progress and toleration of activity level on Mobility SBAR; progress patient to next Phase/Stage  - Instruct patient to call for assistance with activity based on assessment  - Request Rehabilitation consult to assist with strengthening/weightbearing, etc    Outcome: Progressing      Problem: DISCHARGE PLANNING  Goal: Discharge to home or other facility with appropriate resources  INTERVENTIONS:  - Identify barriers to discharge w/patient and caregiver  - Arrange for needed discharge resources and transportation as appropriate  - Identify discharge learning needs (meds, wound care, etc )  - Arrange for interpretive services to assist at discharge as needed  - Refer to Case Management Department for coordinating discharge planning if the patient needs post-hospital services based on physician/advanced practitioner order or complex needs related to functional status, cognitive ability, or social support system   Outcome: Progressing      Problem: Knowledge Deficit  Goal: Patient/family/caregiver demonstrates understanding of disease process, treatment plan, medications, and discharge instructions  Complete learning assessment and assess knowledge base    Interventions:  - Provide teaching at level of understanding  - Provide teaching via preferred learning methods   Outcome: Progressing      Problem: SKIN/TISSUE INTEGRITY - ADULT  Goal: Skin integrity remains intact  INTERVENTIONS  - Identify patients at risk for skin breakdown  - Assess and monitor skin integrity  - Assess and monitor nutrition and hydration status  - Monitor labs (i e  albumin)  - Assess for incontinence   - Turn and reposition patient  - Assist with mobility/ambulation  - Relieve pressure over bony prominences  - Avoid friction and shearing  - Provide appropriate hygiene as needed including keeping skin clean and dry  - Evaluate need for skin moisturizer/barrier cream  - Collaborate with interdisciplinary team (i e  Nutrition, Rehabilitation, etc )   - Patient/family teaching   Outcome: Progressing    Goal: Incision(s), wounds(s) or drain site(s) healing without S/S of infection  INTERVENTIONS  - Assess and document risk factors for skin impairment   - Assess and document dressing, incision, wound bed, drain sites and surrounding tissue  - Initiate Nutrition services consult and/or wound management as needed   Outcome: Progressing      Problem: DISCHARGE PLANNING - CARE MANAGEMENT  Goal: Discharge to post-acute care or home with appropriate resources  INTERVENTIONS:  - Conduct assessment to determine patient/family and health care team treatment goals, and need for post-acute services based on payer coverage, community resources, and patient preferences, and barriers to discharge  - Address psychosocial, clinical, and financial barriers to discharge as identified in assessment in conjunction with the patient/family and health care team  - Arrange appropriate level of post-acute services according to patients   needs and preference and payer coverage in collaboration with the physician and health care team  - Communicate with and update the patient/family, physician, and health care team regarding progress on the discharge plan  - Arrange appropriate transportation to post-acute venues   Outcome: Progressing

## 2017-09-13 NOTE — PROGRESS NOTES
[address] Internal Medicine Progress Note  Patient: Domi Olivas 78 y o  male   MRN: [id number]  PCP: Jessica Conn MD  Unit/Bed#: E4 -36 Encounter: [id number]  Date Of Visit: 09/13/17    Assessment:    Principal Problem:    Asthenia  Active Problems:    Decubitus ulcer of heel, stage 3    Leukocytosis    Stage 3 chronic kidney disease    Chronic indwelling Rush catheter    Abnormal urinalysis    Chronic atrial fibrillation    Diabetes mellitus    Chronic diastolic heart failure    PAD (peripheral artery disease)    S/P AVR      Plan:    · Left heel decubitus ulcer status post I and D and partial calcanectomy currently with wound VAC nonweightbearing status per Podiatry will need rehab  · History of mechanical heart valve  AVR   ,present on heparin drip had been on warfarin in preparation for possible OR intervention as needed/recommendation with history of AFib is to resume warfarin once discharge plan stable  · Peripheral vascular disease underwent left SFA angioplasty yesterday via IR  · Chronic diastolic heart failure stable  · Chronic indwelling Rush catheter on oxybutynin  · Type 2 diabetes mellitus present insulin maintenance is Humalog 8 units at lunch and dinner otherwise coverage at meals with 15 units Humalog a m  she is on Lantus 15 units bedtime had been on 24 units b i d  as outpatient would not adjust further until definitive plan for postop care  · Diabetic neuropathy continue gabapentin  · Bilateral lower extremity wounds left partial calcanectomy with wound VAC application secondary to osteomyelitis nonweightbearing to left lower extremity wound cultures growing MSSA and Enterococcus off antibiotics presently bone culture thus far negative also right posterior heel ulceration mostly healed dressed  · Chronic kidney disease will receive IV hydration preparation for angiography now scheduled for tomorrow and Mucomyst  · Bright red blood per rectum noted in stool small volume had recent colonoscopy this past year unremarkable and denies history of hemorrhoids will continue to monitor check H&H today and CBC in a m  will continue on heparin bridge to warfarin for now/hemoglobin remained stable  · Right knee pain to medial aspect especially with x-ray showing no osseous abnormalities does not appear to be a gouty flare would obtain orthopedic consultation as most of pain appears to be in the medial joint line suggestive of internal knee derangement and the patient will be eventually weight-bearing in his rehab assignment      VTE Pharmacologic Prophylaxis:   Pharmacologic: Heparin Drip  Mechanical VTE Prophylaxis in Place: No    Discussions with Specialists or Other Care Team Provider: no    Time Spent for Care: 45 minutes  More than 50% of total time spent on counseling and coordination of care as described above  Subjective:  No new complaints shortness of breath or chest pain noted was noted to have bright red blood around stool specimen today without significant volume with clots no abdominal pain and recent colonoscopy unremarkable  Patient seems despondent and depressed over his situation and recent cancellation or postpone it to rehab      Objective:     Vitals:   Temp (24hrs), Av 4 °F (36 3 °C), Min:96 1 °F (35 6 °C), Max:98 3 °F (36 8 °C)    HR:  [68-89] 73  Resp:  [4-23] 18  BP: (111-164)/(52-86) 128/57  SpO2:  [64 %-100 %] 99 %  Body mass index is 34 66 kg/m²  Input and Output Summary (last 24 hours):        Intake/Output Summary (Last 24 hours) at 17 1111  Last data filed at 17 0400   Gross per 24 hour   Intake              687 ml   Output              500 ml   Net              187 ml       Physical Exam:     Physical Exam:   General appearance: alert, appears stated age and cooperative  Head: Normocephalic, without obvious abnormality, atraumatic  Lungs: clear to auscultation bilaterally  Heart: regular rate and rhythm and Mechanical heart sounds noted  Abdomen: soft, non-tender; bowel sounds normal; no masses,  no organomegaly  Back: negative  Extremities: Has significant medial aspect right knee pain very tender to touch without erythema or redness or warmth  Neurologic: Grossly normal      Additional Data:     Labs:      Results from last 7 days  Lab Units 09/13/17  0648   WBC Thousand/uL 9 97   HEMOGLOBIN g/dL 9 4*   HEMATOCRIT % 27 5*   PLATELETS Thousands/uL 282   NEUTROS PCT % 81*   LYMPHS PCT % 9*   MONOS PCT % 6   EOS PCT % 3       Results from last 7 days  Lab Units 09/13/17  0648   SODIUM mmol/L 133*   POTASSIUM mmol/L 4 2   CHLORIDE mmol/L 102   CO2 mmol/L 24   BUN mg/dL 13   CREATININE mg/dL 0 97   CALCIUM mg/dL 8 5   GLUCOSE RANDOM mg/dL 250*       Results from last 7 days  Lab Units 09/11/17  0601   INR  1 06       * I Have Reviewed All Lab Data Listed Above  * Additional Pertinent Lab Tests Reviewed: All Labs For Current Hospital Admission Reviewed    Imaging:  Xr Chest 2 Views    Result Date: 9/2/2017  Narrative: CHEST INDICATION:  Weakness  COMPARISON:  None VIEWS:  Frontal and lateral projections IMAGES:  2 FINDINGS:  Sternotomy wires are intact  Prior prosthetic heart valve  Cardiac silhouette is within upper limits of normal in size  The lungs are clear  No pneumothorax or pleural effusion  Visualized osseous structures appear within normal limits for the patient's age  Impression: No active pulmonary disease  Workstation performed: [id number]     Xr Knee 1 Or 2 Vw Right    Result Date: 9/11/2017  Narrative: RIGHT KNEE INDICATION:  Right knee pain  COMPARISON: None VIEWS:  AP and lateral IMAGES:  2 FINDINGS: There is no acute fracture or dislocation  There is no joint effusion  There is mild degenerative change with medial compartment narrowing  No lytic or blastic lesions are seen  Soft tissues are unremarkable  Impression: No acute osseous abnormality  Mild degenerative change   Workstation performed: [id number]     Xr Foot 3+ Vw Left    Result Date: 9/3/2017  Narrative: LEFT FOOT INDICATION:  "eval OM, ulcer on heel of left foot " COMPARISON: Calcaneus radiographs 8/21/2017  VIEWS:  3 IMAGES:  3 FINDINGS: There is no acute fracture or dislocation  No definite osteomyelitis  Mild/moderate diffuse degenerative changes most prominent the tibiotalar joint space  Advanced atherosclerotic calcifications  Impression: No definite osteomyelitis  Workstation performed: [id number]     Xr Heel / Calcaneus 2+ Vw Left    Result Date: 9/7/2017  Narrative: LEFT HEEL INDICATION:  Heel ulcer  COMPARISON: Plain foot radiograph September 3, 2017 and heel images August 21, 2017  VIEWS:  2 IMAGES:  3 FINDINGS: There is no acute fracture or dislocation  Degenerative changes in the midfoot and hindfoot  There is ill-defined, vague lucency within the posterolateral calcaneus  There is no overlying cortical destruction  Given the history of ulceration in this area which probes to the bone (as per podiatry's notes), early osteomyelitis not excluded  Soft tissue swelling with ulceration along the plantar calcaneus  Impression: Ill-defined, vague lucency in the posterior calcaneus without overlying cortical destruction  Given the history, early osteomyelitis not excluded  Recommend follow-up MRI with gadolinium or nuclear medicine white blood cell scan for further  evaluation  ##imslh##imslh Workstation performed: [id number]     Xr Heel / Calcaneus 2+ Vw Left    Result Date: 8/23/2017  Narrative: LEFT HEEL INDICATION:  Ulcer on left heel  COMPARISON: None VIEWS:  2 IMAGES:  3 FINDINGS: There is no acute fracture or dislocation  A tiny plantar calcaneal spur noted with degenerative changes of the tibiotalar joint also noted  No periosteal reaction or osseous destruction to suggest osteomyelitis  The posterior plantar soft tissue swelling with no retained radiopaque foreign body or subcutaneous emphysema    There are vascular calcifications throughout the regional soft tissues  Impression: No acute osseous abnormality  Plantar soft tissue swelling  Workstation performed: [id number]     Vas Lower Limb Arterial Duplex, Complete Bilateral    Result Date: 9/8/2017  Narrative:  [facility] REPORT [facility]INICAL: Indications:  Bilateral Atherosclerosis with Ulceration [I70 25]  The patient is admitted with left plantar and lateral ankle ulcerations, and right posterior heel ulceration (not open)  History of right 4th, 5th toe amputations  Risk Factors: The patient has history of diabetes, hyperlipidemia and PAD  Clinical Right Pressure:  124/ mm Hg, Left Pressure:  140/ mm Hg  FINDINGS:  Segment                Right                 Left                                          Impression  PSV  EDV  Impression  PSV  EDV  Common Femoral Artery  Normal       94   15  Normal      101    0  Prox Profunda                       72   15               58    9  Prox SFA                           106    0               92   14  Mid SFA                             88    0               72    9  Dist SFA                            74    0  50-75%      164   25  Proximal Pop                       127    0               54   24  Distal Pop                          96   14               85    0  Dist Post Tibial                   107    0              113   25  Dist  Ant  Tibial                   37    0              149   24     CONCLUSION: Impression: Right Lower Limb: Diffuse atherosclerotic arterial disease throughout the femoropopliteal vessels with no definite evidence for focal stenosis  Findings suggest tibioperoneal disease  Ankle Pressure: >254 mm Hg , SIOBHAN: 1 81, supra-normal, consistent with poorly compressible vessels  Prior same  Metatarsal Pressure:  Could not be obtained due to thick bandaging in place  Right Great Toe Pressure 68 mm Hg ,, above healing level for a diabetic patient  Prior 78 mm Hg    Left Lower Limb: Findings suggest a 50-75% stenosis in the distal superficial femoral artery  Diffuse atherosclerotic arterial disease throughout the femoropopliteal vessels  Findings suggest tibioperoneal disease  Ankle Pressure >254 mm Hg , SIOBHAN: 1 81,supra-normal, consistent with poorly compressible vessels  Prior same  Left Metatarsal Pressure: 129  mm Hg  Left Great Toe Pressure 44 mm Hg , below healing level for a diabetic patient  Prior 87 mm Hg  In comparison to the study of 3/10/2015 , there is new findings and progression of disease in the left leg, as described above  SIGNATURE: Electronically Signed by: Minda Lazcano MD, [address] on 2017-09-08 03:33:32 PM    Imaging Reports Reviewed Today Include:  Reviewed  Imaging Personally Reviewed by Myself Includes:  No  Procedure: Xr Knee 1 Or 2 Vw Right    Result Date: 9/11/2017  Narrative: RIGHT KNEE INDICATION:  Right knee pain  COMPARISON: None VIEWS:  AP and lateral IMAGES:  2 FINDINGS: There is no acute fracture or dislocation  There is no joint effusion  There is mild degenerative change with medial compartment narrowing  No lytic or blastic lesions are seen  Soft tissues are unremarkable  Impression: No acute osseous abnormality  Mild degenerative change   Workstation performed: [id number]        Recent Cultures (last 7 days):       Results from last 7 days  Lab Units 09/08/17  1612   GRAM STAIN RESULT  No Polys or Bacteria seen       Last 24 Hours Medication List:     clopidogrel 75 mg Oral Daily   collagenase  Topical Daily   ferrous sulfate 325 mg Oral BID   fluticasone-salmeterol 1 puff Inhalation BID   gabapentin 100 mg Oral TID   insulin glargine 30 Units Subcutaneous HS   insulin lispro 1-6 Units Subcutaneous HS   insulin lispro 15 Units Subcutaneous Daily Before Breakfast   insulin lispro 2-12 Units Subcutaneous TID AC   insulin lispro 8 Units Subcutaneous Daily Before Lunch   insulin lispro 8 Units Subcutaneous Before Dinner   magnesium oxide 400 mg Oral Daily   oxybutynin 5 mg Oral BID   pravastatin 40 mg Oral Daily With Dinner   tiotropium 18 mcg Inhalation Daily        Today, Patient Was Seen By: Cal Fletcher MD    ** Please Note: Dragon 360 Dictation voice to text software may have been used in the creation of this document   **

## 2017-09-13 NOTE — PROGRESS NOTES
Progress Note - Tiarra Woody 78 y o  male MRN: [id number]    Unit/Bed#: E4 -01 Encounter: [id number]      CC: diabetes f/u    Subjective:   Tiarra Woody is a 78y o  year old male with type 2 diabetes  Feels well  No complaints  No hypoglycemia  Objective:     Vitals: Blood pressure 121/54, pulse 63, temperature (!) 97 2 °F (36 2 °C), temperature source Oral, resp  rate 17, height 5' 9 5" (1 765 m), weight 108 kg (238 lb 1 6 oz), SpO2 97 %  ,Body mass index is 34 66 kg/m²  Intake/Output Summary (Last 24 hours) at 09/13/17 1635  Last data filed at 09/13/17 0900   Gross per 24 hour   Intake              867 ml   Output              200 ml   Net              667 ml       Physical Exam:  General Appearance: awake, appears stated age and cooperative  Head: Normocephalic, without obvious abnormality, atraumatic  Extremities: moves all extremities  Skin: Skin color and temperature normal    Pulm: no labored breathing    Lab, Imaging and other studies: I have personally reviewed pertinent reports  POC Glucose (mg/dl)   Date Value   09/13/2017 203 (H)   09/13/2017 258 (H)   09/13/2017 248 (H)   09/12/2017 244 (H)   09/12/2017 166 (H)   09/12/2017 211 (H)   09/12/2017 207 (H)   09/11/2017 240 (H)   09/11/2017 220 (H)   09/11/2017 248 (H)       Assessment:  diabetes with hyperglycemia, left heel ulcer, stage 3 chronic kidney disease and peripheral arterial disease    Plan:  1  Type 2 diabetes with hyperglycemia-increase Lantus to 40 units and Humalog to 12 units with lunch and dinner  Continue to monitor blood sugar over time and make adjustments to the regimen if necessary  Due to being on intensive insulin therapy, the patient is at high risk of severe hypoglycemia and potential death  2   Left heel ulcer-management per Podiatry  3   Stage 3 chronic kidney disease is stable  4   Peripheral arterial disease-he is status post angioplasty yesterday        Portions of the record may have been created with voice recognition software  Occasional wrong word or "sound a like" substitutions may have occurred due to the inherent limitations of voice recognition software  Read the chart carefully and recognize, using context, where substitutions have occurred

## 2017-09-13 NOTE — PROGRESS NOTES
Progress Note - Podiatry  Marcus Donnelly 78 y o  male MRN: [id number]  Unit/Bed#: E4 -01 Encounter: [id number]    Assessment/Plan:  3  78 y o  y/o male s/p excisional debridement with partial calcanectomy and application of wound vac therapy on left heel by Dr Belkys Mcknight (DOS: 09/08/2017)  - wound vac left intact running at 75mmhg low continuous without any complications; to be changed 3x/week every Tu/Th/Sat  - per ID recommendation, continue to monitor patient off antibiotics while we wait results from clean margin however tissue culture with no growth  - WBS: NWB to LLE; PT/OT onboard and both recommend rehab   - c/w prevalon boots while in bed to offload as much as possible and prevent worsening of ulceration     2  Craft Stage 1 Ulceration to left lateral forefoot - POA  - podiatry to c/w LWC: maxorb + DSD; dressing changes to be performed 3x/week every Tu/Th/Sat; therefore left intact today   3  Right Stage 1 Pressure Ulceration - POA   - podiatry to c/w LWC: maxorb + DSD; dressing changes to be performed 3x/week every Tu/Th/Sat; therefore left intact today  - c/w prevalon boots while in bed to offload as much as possible and prevent worsening of ulceration     4  DM, type 2 with neuropathy: A1c = 6 6% - per endocrinology   - c/w humalog 15 units in AM and 8 units for lunch then lantus 30 units HS; in addition with ISS and accuchecks; BS range = low to mid 200s  - CC1 diet    5  PAD - per vascular   - s/p POD #1 LLE agram with angioplasty per IR   - LEADs reviewed: 50-75% stenosis of L distal SFA     6  CKD, stage 3 - per nephrology   - baseline creatinine 1 2-1 4; currently = 0 97  - c/w beckett catheter   - c/w oxybutynin       7  CHF - stable   8  Afib   - c/w heparin drip   9  Hx of Mechanical Heart Valve - AVR  - c/w heparin drip and per SLIM bridge back to coumadin which was held for OR procedures once discharge plan determined when cleared by vascular     10   Knee Pain - improved with no complaints   - xrays reviewed: mild degenerative changes noted     11  Hyponatremia   - at baseline; currently sodium at 133    12  Blood in Stool   - continue to monitor H&H; currently hemoglobin low; defer to SLIM whether transfusion or other treatment is needed       > appreciate medical management per internal medicine     Dispo: c/w inpatient stay awaiting clean margins       Subjective/Objective   Chief Complaint: "I'm doing good "     Subjective: 78 y o  y/o male was seen and evaluated at bedside eating breakfast  No acute events overnight with all vital signs stable  He has so complaints today, knee pain resolved, no pedal complaints, no blood in stool, denies any constitutional symptoms today  Compliant with prevalon boots while in bed     Objective:     Blood pressure 128/57, pulse 73, temperature 98 3 °F (36 8 °C), temperature source Tympanic, resp  rate 18, height 5' 9 5" (1 765 m), weight 108 kg (238 lb 1 6 oz), SpO2 99 %  ,Body mass index is 34 66 kg/m²  Invasive Devices     Peripheral Intravenous Line            Peripheral IV 09/13/17 Right Forearm less than 1 day          Drain            Urethral Catheter 18 Fr  10 days    Negative Pressure Wound Therapy (V A C ) Foot Left 4 days                  Physical Exam:   General: Alert, cooperative and no distress  Lungs: Non labored breathing  Lower Extremity:   1  Vascular: DP/PT pulses deferred at this time due to dressing intact; however cap refill to digits <5 secs; no pitting edema noted; skin temp WNL   2  Ortho: hx of 4th and 5th digit/partial ray resection to right foot; no calf tenderness noted   3  Neuro: at baseline  4  Derm: xerosis noted to digits with no ID macerations present; dressing to B/L feet left intact today   Wound vac to left foot running at 75mmHg low continuous without leaks ~50cc of sanguinous drainage in cannister       Lab, Imaging and other studies:   I have personally reviewed pertinent lab results      CBC:   Lab Results   Component Value Date    WBC 9 97 09/13/2017    HGB 9 4 (L) 09/13/2017    HCT 27 5 (L) 09/13/2017    MCV 85 09/13/2017     09/13/2017    MCH 29 0 09/13/2017    MCHC 34 2 09/13/2017    RDW 14 7 09/13/2017    MPV 8 5 (L) 09/13/2017    NRBC 0 09/13/2017   CMP:   Lab Results   Component Value Date     (L) 09/13/2017    K 4 2 09/13/2017     09/13/2017    CO2 24 09/13/2017    ANIONGAP 7 09/13/2017    BUN 13 09/13/2017    CREATININE 0 97 09/13/2017    GLUCOSE 250 (H) 09/13/2017    CALCIUM 8 5 09/13/2017    EGFR 77 09/13/2017       Imaging: I have personally reviewed pertinent films in PACS  EKG, Pathology, and Other Studies: I have personally reviewed pertinent reports    VTE Pharmacologic Prophylaxis: Heparin  VTE Mechanical Prophylaxis: sequential compression device

## 2017-09-13 NOTE — PROGRESS NOTES
Progress Note - Infectious Disease   Colleen Patricia 78 y o  male MRN: [id number]  Unit/Bed#: E4 -01 Encounter: [id number]      Impression/Recommendations:  1   Chronic left heel ulcer  X-ray suggested early osteomyelitis and wound debrided to bone  Postoperative day #5 status post washout/partial calcanectomy/VAC  OR cultures negative and no clinical signs of active infection  Unclear if there is any residual bone infection  Rec:                 · Continue to follow closely off antibiotics  · Follow up OR pathology of clean margin   If negative, no need for antibiotics  · Continue local wound care/VAC as per podiatry     2   PAD  With tissue loss and abnormal LEADs  Postprocedure day #1 status post PTA  Rec:  · Close vascular follow-up ongoing     3   DM with peripheral neuropathy  A1C 6 6  Risk factor for #1  Rec:  · Continue management per medicine service     4   Weakness  Suspect multifactorial, largely due to recent inactivity and deconditioning  Rec:                 · Continue PT while in house  · SNF upon discharge     Antibiotics:  None    Subjective:  Patient sleeping  No events noted  No documented fevers, chills, sweats, nausea, vomiting, or diarrhea  Objective:  Vitals:  HR:  [68-89] 73  Resp:  [4-23] 18  BP: (111-164)/(52-86) 128/57  SpO2:  [64 %-100 %] 99 %  Temp (24hrs), Av 4 °F (36 3 °C), Min:96 1 °F (35 6 °C), Max:98 3 °F (36 8 °C)  Current: Temperature: 98 3 °F (36 8 °C)    Physical Exam:   General:  No acute distress  Psychiatric:  Sleeping  Pulmonary:  Normal respiratory excursion without accessory muscle use  Abdomen:  Soft, nontender  Extremities:  No edema, VAC to left foot  Skin:  No rashes    Lab Results:  I have personally reviewed pertinent labs      Results from last 7 days  Lab Units 17  0648 17  0601 09/10/17  0542   SODIUM mmol/L 133* 137 135*   POTASSIUM mmol/L 4 2 4 0 4 2   CHLORIDE mmol/L 102 101 100   CO2 mmol/L 24 30 32   ANION GAP mmol/L 7 6 3*   BUN mg/dL 13 26* 29*   CREATININE mg/dL 0 97 1 21 1 22   EGFR ml/min/1 73sq m 77 59 58   GLUCOSE RANDOM mg/dL 250* 221* 168*   CALCIUM mg/dL 8 5 9 1 9 1       Results from last 7 days  Lab Units 09/13/17  0648 09/12/17  1102 09/11/17  0602 09/09/17  0521   WBC Thousand/uL 9 97  --  8 54 10 87*   HEMOGLOBIN g/dL 9 4* 10 1* 10 6* 10 7*   PLATELETS Thousands/uL 282  --  330 304       Results from last 7 days  Lab Units 09/08/17  1612   GRAM STAIN RESULT  No Polys or Bacteria seen       Imaging Studies:   I have personally reviewed pertinent imaging study reports and images in PACS  EKG, Pathology, and Other Studies:   I have personally reviewed pertinent reports

## 2017-09-13 NOTE — WOUND OSTOMY CARE
Progress Note - Wound   Robe Quiles 78 y o  male MRN: [id number]  Unit/Bed#: E4 -01 Encounter: [id number]      Assessment:   Patient seen for wound care team follow-up  Lying in bed, denies pain  Appears withdrawn, depressed  Wife at bedside  Wife reports patient has been OOB once with PT using slide board  Patient continues to be incontinent of stool (incontinent on assessment)  Chronic beckett catheter in place with urethral erosion (skin intact)--leaking small amounts of urine at times  Findings:  1  Present on admission left buttock pressure injury of unknown stage--pink, blanchable, fragile area with new epithelialization  Healed  2   Present on admission right buttock pressure injury of unknown stage--Healed  New epithelial tissue fragile, white and slightly macerated        Patient has ankle and heel pressure ulcers being followed by podiatry  VAC dressing intact to LLE and large dressing dry and intact to RLE  Prevalon boots intact  Plan:  1  Turn and reposition patient every 2 hours  2   Elevate heels off of bed in prevalon boots per podiatry  3   Apply Hydraguard lotion to b/l buttocks and sacrum TID & PRN  4   Sofcare cushion with OOB to chair  5   Moisturize skin daily with nourishing lotion  Wound care team will sign off at the time--patient has had no open areas      Plan of care reviewed with primary RN  Subjective:  Patient s/p excisional debridement with partial calcanectomy and wound VAC application to left heel 9/8/17  Had LLE angiogram with angioplasty on 9/12/17  Objective:    Vitals: Blood pressure 128/57, pulse 73, temperature 98 3 °F (36 8 °C), temperature source Tympanic, resp  rate 18, height 5' 9 5" (1 765 m), weight 108 kg (238 lb 1 6 oz), SpO2 99 %  ,Body mass index is 34 66 kg/m²      Pressure Ulcer 09/02/17 Buttocks Inner;Right red raw area (Active)   Staging Stage II 9/12/2017 11:00 PM   Wound Description Clean;Dry;Fragile 9/13/2017 10:30 AM Saira-wound Assessment Clean;Dry; Intact 9/13/2017 10:30 AM   Wound Length (cm)  9/2/2017  2:29 PM   Wound Width (cm)  9/2/2017  2:29 PM   Calculated Wound Area (cm^2)  9/2/2017  2:29 PM   Drainage Amount None 9/13/2017 10:30 AM   Treatment Offload; Turn & reposition;Cleansed 9/13/2017 10:30 AM   Dressing Moisture barrier 9/13/2017 10:30 AM   Patient Tolerance Tolerated well 9/13/2017 10:30 AM   Dressing Status Clean;Dry; Intact 9/6/2017  9:00 AM         Please contact the wound care team at extension [phone] with any questions    William Leroy BSN, RN, CCRN

## 2017-09-13 NOTE — SOCIAL WORK
Wound vac paper work on chart  [facility] has no beds today   Isabelle Donaldson will evaluate in am

## 2017-09-13 NOTE — PROGRESS NOTES
NEPHROLOGY PROGRESS NOTE   Shirin Duncan 78 y o  male MRN: [id number]  Unit/Bed#: E4 -01 Encounter: [id number]      ASSESSMENT & PLAN:    1  Chronic kidney disease with a baseline creatinine of 1 2-1 4  -creatinine stable after contrast load, 24 hours now   -will restart furosemide as as a history of diastolic heart failure    2  Chronic Rush catheter  -has been present for 2 years and follows up with [facility]    3  Bilateral lower extremity wounds  -podiatry, infectious Disease are following along as well    4  Peripheral vascular disease  -50-75% stenosis in the distal SFA  -further intervention per vascular team    5    Hyponatremia  -Improved      SUBJECTIVE:    Patient feels okay with no chest pain or shortness of Breath no fevers or chills nausea vomiting diarrhea or constipation    OBJECTIVE:  Current Weight: Weight - Scale: 108 kg (238 lb 1 6 oz)  Vitals:    09/13/17 0750   BP: 128/57   Pulse: 73   Resp: 18   Temp: 98 3 °F (36 8 °C)   SpO2: 99%       Intake/Output Summary (Last 24 hours) at 09/13/17 1332  Last data filed at 09/13/17 0900   Gross per 24 hour   Intake              867 ml   Output              500 ml   Net              367 ml     General: conscious, cooperative, in not acute distress  Eyes: conjunctivae pink, anicteric sclerae  ENT: lips and mucous membranes moist  Neck: supple, no JVD  Chest: clear breath sounds bilateral, no crackles, ronchus or wheezings  CVS: distinct S1 & S2, normal rate, regular rhythm  Abdomen: soft, non-tender, non-distended, normoactive bowel sounds  Extremities:  Chronic lower extremity skin changes  Skin: no rash  Neuro: awake, alert, oriented    Medications:    Current Facility-Administered Medications:     acetaminophen (TYLENOL) tablet 650 mg, 650 mg, Oral, Q6H PRN, Ora Chapa PA-C, 650 mg at 09/12/17 0905    clopidogrel (PLAVIX) tablet 75 mg, 75 mg, Oral, Daily, NEIL Henry, 75 mg at 09/13/17 1248    collagenase (SANTYL) ointment, , Topical, Daily, Scott Daly DPM    ferrous sulfate tablet 325 mg, 325 mg, Oral, BID, Ora Chapa PA-C, 325 mg at 09/13/17 0856    fluticasone-salmeterol (ADVAIR) 500-50 mcg/dose inhaler 1 puff, 1 puff, Inhalation, BID, Ora Chapa PA-C, 1 puff at 09/13/17 0857    furosemide (LASIX) tablet 40 mg, 40 mg, Oral, Daily, Kyle Valdez DO    gabapentin (NEURONTIN) capsule 100 mg, 100 mg, Oral, TID, Ora Chapa PA-C, 100 mg at 09/13/17 0856    heparin (porcine) 25,000 units in 250 mL infusion (premix), 3-30 Units/kg/hr (Order-Specific), Intravenous, Titrated, Nj Melara DO, Last Rate: 14 mL/hr at 09/12/17 2107, 14 Units/kg/hr at 09/12/17 2107    heparin (porcine) injection 4,000 Units, 4,000 Units, Intravenous, PRN, Nj Melara DO, 4,000 Units at 09/10/17 1800    heparin (porcine) injection 8,000 Units, 8,000 Units, Intravenous, PRN, Nj Melara DO    insulin glargine (LANTUS) subcutaneous injection 30 Units, 30 Units, Subcutaneous, HS, Kendall De La Torre MD, 30 Units at 09/12/17 2202    insulin lispro (HumaLOG) 100 units/mL subcutaneous injection 1-6 Units, 1-6 Units, Subcutaneous, HS, Kendall De La Torre MD, 3 Units at 09/12/17 2203    insulin lispro (HumaLOG) 100 units/mL subcutaneous injection 15 Units, 15 Units, Subcutaneous, Daily Before Breakfast, Nj Melara DO, 15 Units at 09/13/17 0858    insulin lispro (HumaLOG) 100 units/mL subcutaneous injection 2-12 Units, 2-12 Units, Subcutaneous, TID AC, 6 Units at 09/13/17 1247 **AND** Fingerstick Glucose (POCT), , , TID ACKendall MD    insulin lispro (HumaLOG) 100 units/mL subcutaneous injection 8 Units, 8 Units, Subcutaneous, Daily Before Lunch, Lorenza Santacruz MD, 8 Units at 09/13/17 1248    insulin lispro (HumaLOG) 100 units/mL subcutaneous injection 8 Units, 8 Units, Subcutaneous, Before Ericadevonte Sagastume MD, 8 Units at 09/11/17 1709    magnesium hydroxide (MILK OF MAGNESIA) 400 mg/5 mL oral suspension 30 mL, 30 mL, Oral, PRN, Ora Chapa PA-C    magnesium oxide (MAG-OX) tablet 400 mg, 400 mg, Oral, Daily, Ora Chapa PA-C, 400 mg at 09/13/17 0858    ondansetron (ZOFRAN) injection 4 mg, 4 mg, Intravenous, Q6H PRN, Ora Cahpa PA-C    oxybutynin (DITROPAN) tablet 5 mg, 5 mg, Oral, BID, Ora Chapa PA-C, 5 mg at 09/13/17 0900    pravastatin (PRAVACHOL) tablet 40 mg, 40 mg, Oral, Daily With Randcarri Chapa PA-C, 40 mg at 09/12/17 1832    tiotropium (SPIRIVA) capsule for inhaler 18 mcg, 18 mcg, Inhalation, Daily, Ora Chapa PA-C, 18 mcg at 09/13/17 0859    Invasive Devices:   Urethral Catheter 18 Fr   (Active)   Reasons to continue Urinary Catheter  Chronic urinary catheter 9/4/2017  8:40 PM   Site Assessment Clean;Skin intact 9/10/2017  7:30 PM   Collection Container Standard drainage bag 9/10/2017  7:30 PM   Securement Method Securing device (Describe) 9/10/2017  7:30 PM   Output (mL) 500 mL 9/11/2017  5:00 AM     Lab Results:     Results from last 7 days  Lab Units 09/13/17  0648 09/12/17  1102 09/11/17  0602 09/11/17  0601 09/10/17  0542 09/09/17  0521 09/08/17  0640   WBC Thousand/uL 9 97  --  8 54  --   --  10 87* 11 50*   HEMOGLOBIN g/dL 9 4* 10 1* 10 6*  --   --  10 7* 11 8*   HEMATOCRIT % 27 5* 29 7* 31 4*  --   --  31 7* 35 4*   PLATELETS Thousands/uL 282  --  330  --   --  304 355   SODIUM mmol/L 133*  --   --  137 135* 133* 134*   POTASSIUM mmol/L 4 2  --   --  4 0 4 2 4 2 4 2   CHLORIDE mmol/L 102  --   --  101 100 100 99*   CO2 mmol/L 24  --   --  30 32 32 32   BUN mg/dL 13  --   --  26* 29* 29* 28*   CREATININE mg/dL 0 97  --   --  1 21 1 22 1 26 1 19   CALCIUM mg/dL 8 5  --   --  9 1 9 1 8 9 9 2   MAGNESIUM mg/dL  --   --   --   --  2 1  --   --    GLUCOSE RANDOM mg/dL 250*  --   --  221* 168* 155* 106       Previous work up:  Please see previous notes

## 2017-09-14 ENCOUNTER — APPOINTMENT (INPATIENT)
Dept: NON INVASIVE DIAGNOSTICS | Facility: HOSPITAL | Age: 77
DRG: 616 | End: 2017-09-14
Payer: MEDICARE

## 2017-09-14 LAB
APTT PPP: 49 SECONDS (ref 23–35)
APTT PPP: 52 SECONDS (ref 23–35)
APTT PPP: >210 SECONDS (ref 23–35)
GLUCOSE SERPL-MCNC: 162 MG/DL (ref 65–140)
GLUCOSE SERPL-MCNC: 179 MG/DL (ref 65–140)
GLUCOSE SERPL-MCNC: 199 MG/DL (ref 65–140)
GLUCOSE SERPL-MCNC: 206 MG/DL (ref 65–140)

## 2017-09-14 PROCEDURE — 85730 THROMBOPLASTIN TIME PARTIAL: CPT | Performed by: INTERNAL MEDICINE

## 2017-09-14 PROCEDURE — 97530 THERAPEUTIC ACTIVITIES: CPT

## 2017-09-14 PROCEDURE — 85730 THROMBOPLASTIN TIME PARTIAL: CPT | Performed by: PODIATRIST

## 2017-09-14 PROCEDURE — 97110 THERAPEUTIC EXERCISES: CPT

## 2017-09-14 PROCEDURE — 93923 UPR/LXTR ART STDY 3+ LVLS: CPT

## 2017-09-14 PROCEDURE — 82948 REAGENT STRIP/BLOOD GLUCOSE: CPT

## 2017-09-14 RX ADMIN — FLUTICASONE PROPIONATE AND SALMETEROL 1 PUFF: 50; 500 POWDER RESPIRATORY (INHALATION) at 09:25

## 2017-09-14 RX ADMIN — INSULIN LISPRO 12 UNITS: 100 INJECTION, SOLUTION INTRAVENOUS; SUBCUTANEOUS at 17:49

## 2017-09-14 RX ADMIN — INSULIN GLARGINE 40 UNITS: 100 INJECTION, SOLUTION SUBCUTANEOUS at 21:32

## 2017-09-14 RX ADMIN — GABAPENTIN 100 MG: 100 CAPSULE ORAL at 21:31

## 2017-09-14 RX ADMIN — Medication 400 MG: at 09:27

## 2017-09-14 RX ADMIN — FLUTICASONE PROPIONATE AND SALMETEROL 1 PUFF: 50; 500 POWDER RESPIRATORY (INHALATION) at 21:31

## 2017-09-14 RX ADMIN — INSULIN LISPRO 4 UNITS: 100 INJECTION, SOLUTION INTRAVENOUS; SUBCUTANEOUS at 12:03

## 2017-09-14 RX ADMIN — FERROUS SULFATE TAB 325 MG (65 MG ELEMENTAL FE) 325 MG: 325 (65 FE) TAB at 09:27

## 2017-09-14 RX ADMIN — INSULIN LISPRO 2 UNITS: 100 INJECTION, SOLUTION INTRAVENOUS; SUBCUTANEOUS at 09:34

## 2017-09-14 RX ADMIN — HEPARIN SODIUM 4000 UNITS: 1000 INJECTION, SOLUTION INTRAVENOUS; SUBCUTANEOUS at 11:00

## 2017-09-14 RX ADMIN — OXYBUTYNIN CHLORIDE 5 MG: 5 TABLET ORAL at 09:31

## 2017-09-14 RX ADMIN — TIOTROPIUM BROMIDE 18 MCG: 18 CAPSULE ORAL; RESPIRATORY (INHALATION) at 09:26

## 2017-09-14 RX ADMIN — INSULIN LISPRO 2 UNITS: 100 INJECTION, SOLUTION INTRAVENOUS; SUBCUTANEOUS at 21:32

## 2017-09-14 RX ADMIN — FUROSEMIDE 40 MG: 40 TABLET ORAL at 09:31

## 2017-09-14 RX ADMIN — HEPARIN SODIUM 4000 UNITS: 1000 INJECTION, SOLUTION INTRAVENOUS; SUBCUTANEOUS at 21:32

## 2017-09-14 RX ADMIN — FERROUS SULFATE TAB 325 MG (65 MG ELEMENTAL FE) 325 MG: 325 (65 FE) TAB at 17:49

## 2017-09-14 RX ADMIN — CLOPIDOGREL BISULFATE 75 MG: 75 TABLET ORAL at 09:31

## 2017-09-14 RX ADMIN — INSULIN LISPRO 2 UNITS: 100 INJECTION, SOLUTION INTRAVENOUS; SUBCUTANEOUS at 17:50

## 2017-09-14 RX ADMIN — INSULIN LISPRO 15 UNITS: 100 INJECTION, SOLUTION INTRAVENOUS; SUBCUTANEOUS at 09:33

## 2017-09-14 RX ADMIN — OXYBUTYNIN CHLORIDE 5 MG: 5 TABLET ORAL at 17:49

## 2017-09-14 RX ADMIN — GABAPENTIN 100 MG: 100 CAPSULE ORAL at 09:27

## 2017-09-14 RX ADMIN — GABAPENTIN 100 MG: 100 CAPSULE ORAL at 17:49

## 2017-09-14 RX ADMIN — PRAVASTATIN SODIUM 40 MG: 40 TABLET ORAL at 17:49

## 2017-09-14 RX ADMIN — HEPARIN SODIUM AND DEXTROSE 16 UNITS/KG/HR: 10000; 5 INJECTION INTRAVENOUS at 13:26

## 2017-09-14 NOTE — PROGRESS NOTES
Progress Note - Colleen Patricia 78 y o  male MRN: [id number]    Unit/Bed#: E4 -01 Encounter: [id number]        S/P AVR   Assessment & Plan    Assessment:   S/p St  [facility] mechanical AVR 2007    Plan:   Heparin ---> coumadin          PAD (peripheral artery disease)   Assessment & Plan    Assessment:   PAD w/ Left heel tissue loss, OM, s/p L SFA/pop PTA 9/12/17  S/P L Heel debridement, partial calcanectomy, wound VAC placement 9/8    Plan:   Continue statin and Plavix  Post intervention ANTONIO with flat GTP pressure  Will discuss with Dr Thomas Weiner Doctor  Presently, continue to follow wound healing  Outpatient follow-up as noted  Diabetes mellitus   Assessment & Plan       Assessment:  Stable     Plan:  Continue current medical regimen        Chronic atrial fibrillation   Assessment & Plan       Assessment:  Stable     Plan:  Continue anticoagulation  Heparin--->coumadin        Stage 3 chronic kidney disease   Assessment & Plan    Assessment:   Stable    Plan:   Renal following   stable                  Subjective:   POD #2 L SFA/pop PTA (IR)  Patient without complaints  Denies left lower extremity rest pain  Denies right groin access site pain VAC changed by Podiatry today  VSS  Creat stable  Vitals:  /70   Pulse 81   Temp 98 3 °F (36 8 °C) (Tympanic)   Resp 18   Ht 5' 9 5" (1 765 m)   Wt 108 kg (238 lb 1 6 oz)   SpO2 98%   BMI 34 66 kg/m²      I/Os:  I/O last 3 completed shifts: In: 36 [P O :560; I V :687]  Out: 2000 [Urine:2000]  I/O this shift:   In: 480 [P O :480]  Out: 1300 [Urine:1300]    Lab Results and Cultures:   Lab Results   Component Value Date    WBC 9 97 09/13/2017    HGB 9 4 (L) 09/13/2017    HCT 27 5 (L) 09/13/2017    MCV 85 09/13/2017     09/13/2017     Lab Results   Component Value Date    GLUCOSE 250 (H) 09/13/2017    CALCIUM 8 5 09/13/2017     (L) 09/13/2017    K 4 2 09/13/2017    CO2 24 09/13/2017     09/13/2017    BUN 13 09/13/2017    CREATININE 0 97 09/13/2017     Lab Results   Component Value Date    INR 1 06 09/11/2017    INR 1 30 (H) 09/09/2017    INR 2 14 (H) 09/08/2017    PROTIME 13 8 09/11/2017    PROTIME 16 3 (H) 09/09/2017    PROTIME 24 1 (H) 09/08/2017        Blood Culture: No results found for: BLOODCX,   Urinalysis:   Lab Results   Component Value Date    COLORU Yellow 09/05/2017    CLARITYU Clear 09/05/2017    SPECGRAV <=1 005 09/05/2017    PHUR 6 5 09/05/2017    LEUKOCYTESUR Small (A) 09/05/2017    NITRITE Positive (A) 09/05/2017    PROTEINUA Negative 09/05/2017    GLUCOSEU Negative 09/05/2017    KETONESU Negative 09/05/2017    BILIRUBINUR Negative 09/05/2017    BLOODU Trace-lysed (A) 09/05/2017   ,   Urine Culture:   Lab Results   Component Value Date    URINECX >100,000 cfu/ml Mixed Contaminants X3 09/02/2017   ,   Wound Culure:   Lab Results   Component Value Date    WOUNDCULT 3+ Growth of Staphylococcus aureus 09/02/2017    WOUNDCULT 3+ Growth of Enterococcus faecalis 09/02/2017    WOUNDCULT 3+ Growth of Mixed Skin Sabiha 09/02/2017       Medications:  Current Facility-Administered Medications   Medication Dose Route Frequency    acetaminophen (TYLENOL) tablet 650 mg  650 mg Oral Q6H PRN    clopidogrel (PLAVIX) tablet 75 mg  75 mg Oral Daily    collagenase (SANTYL) ointment   Topical Daily    ferrous sulfate tablet 325 mg  325 mg Oral BID    fluticasone-salmeterol (ADVAIR) 500-50 mcg/dose inhaler 1 puff  1 puff Inhalation BID    furosemide (LASIX) tablet 40 mg  40 mg Oral Daily    gabapentin (NEURONTIN) capsule 100 mg  100 mg Oral TID    heparin (porcine) 25,000 units in 250 mL infusion (premix)  3-30 Units/kg/hr (Order-Specific) Intravenous Titrated    heparin (porcine) injection 4,000 Units  4,000 Units Intravenous PRN    heparin (porcine) injection 8,000 Units  8,000 Units Intravenous PRN    insulin glargine (LANTUS) subcutaneous injection 40 Units  40 Units Subcutaneous HS    insulin lispro (HumaLOG) 100 units/mL subcutaneous injection 1-6 Units  1-6 Units Subcutaneous HS    insulin lispro (HumaLOG) 100 units/mL subcutaneous injection 12 Units  12 Units Subcutaneous Daily Before Lunch    insulin lispro (HumaLOG) 100 units/mL subcutaneous injection 12 Units  12 Units Subcutaneous Before Dinner    insulin lispro (HumaLOG) 100 units/mL subcutaneous injection 15 Units  15 Units Subcutaneous Daily Before Breakfast    insulin lispro (HumaLOG) 100 units/mL subcutaneous injection 2-12 Units  2-12 Units Subcutaneous TID AC    magnesium hydroxide (MILK OF MAGNESIA) 400 mg/5 mL oral suspension 30 mL  30 mL Oral PRN    magnesium oxide (MAG-OX) tablet 400 mg  400 mg Oral Daily    ondansetron (ZOFRAN) injection 4 mg  4 mg Intravenous Q6H PRN    oxybutynin (DITROPAN) tablet 5 mg  5 mg Oral BID    pravastatin (PRAVACHOL) tablet 40 mg  40 mg Oral Daily With Dinner    tiotropium (SPIRIVA) capsule for inhaler 18 mcg  18 mcg Inhalation Daily       Imaging:  Post intervention limited LLE ANTONIO 9/14/2017:  Left SIOBHAN 1 79 consistent with poorly compressible vessels  Great toe pressure greatly attenuated unable to be obtained  Previously GTP 44  Final report pending      Physical Exam:    General appearance: alert, appears stated age, cooperative and no distress  Neurologic: Grossly normal  Neck: no adenopathy, no carotid bruit, no JVD and supple, symmetrical, trachea midline  Lungs: clear to auscultation bilaterally  Chest wall: no tenderness, Midline sternotomy scar noted well healed  Sternum stable  Heart: regular rate and rhythm, S1: normal, S2: normal prosthetic S2, no S3 or S4, no rub and No murmur  Abdomen: soft, non-tender; bowel sounds normal; no masses,  no organomegaly and Nondistended  No abdominal bruits  Extremities: Left foot VAC dressing in place with good seal   Bilateral lower extremities motor and sensory intact  No ulcerations of left toes    Right groin access site soft without hematoma, hemorrhage or pulsatile mass     Wound/Incision:  Left foot wound VAC dressing in place      Pulse exam:  Radial: Right: 2+ Left[de-identified] 2+  Femoral: Right: 2+ Left: 2+  Popliteal: Right: non-palpable Left: non-palpable  DP: Right: non-palpable Left: Obscured by dressing  PT: Right: non-palpable Left: Obscured by dressing      Carlos Reyes PA-C  9/14/2017  [facility], [phone]

## 2017-09-14 NOTE — PROGRESS NOTES
NEPHROLOGY PROGRESS NOTE   Lila Contreras 78 y o  male MRN: [id number]  Unit/Bed#: E4 -01 Encounter: [id number]      ASSESSMENT & PLAN:    1  Chronic kidney disease with a baseline creatinine of 1 2-1 4  -repeat BMP tomorrow  -creatinine now below baseline  -furosemide restarted    2  Chronic Rush catheter  -has been present for 2 years and follows up with [facility]    3  Bilateral lower extremity wounds  -podiatry, infectious Disease are following along as well    4  Peripheral vascular disease  -50-75% stenosis in the distal SFA  -intervention per vascular    5    Hyponatremia  -Improved    If repeat creatinine stable will see as needed  SUBJECTIVE:    Patient feels okay getting ready for rehab here in hospital today    OBJECTIVE:  Current Weight: Weight - Scale: 108 kg (238 lb 1 6 oz)  Vitals:    09/14/17 0817   BP: 144/67   Pulse: 82   Resp: 18   Temp: 98 7 °F (37 1 °C)   SpO2: 94%       Intake/Output Summary (Last 24 hours) at 09/14/17 1137  Last data filed at 09/14/17 0820   Gross per 24 hour   Intake              860 ml   Output             1800 ml   Net             -940 ml     General: conscious, cooperative, in not acute distress  Eyes: conjunctivae pink, anicteric sclerae  ENT: lips and mucous membranes moist  Neck: supple, no JVD  Chest: clear breath sounds bilateral, no crackles, ronchus or wheezings  CVS: distinct S1 & S2, normal rate, regular rhythm  Abdomen: soft, non-tender, non-distended, normoactive bowel sounds  Extremities:  Bilateral lower extremity leg dressing  Skin: no rash  Neuro: awake, alert, oriented    Medications:    Current Facility-Administered Medications:     acetaminophen (TYLENOL) tablet 650 mg, 650 mg, Oral, Q6H PRN, Ora Chapa PA-C, 650 mg at 09/12/17 0905    clopidogrel (PLAVIX) tablet 75 mg, 75 mg, Oral, Daily, NEIL Henry, 75 mg at 09/14/17 0931    collagenase (SANTYL) ointment, , Topical, Daily, Abdiel Gomez, DPM    ferrous sulfate tablet 325 mg, 325 mg, Oral, BID, Ora Chapa PA-C, 325 mg at 09/14/17 0927    fluticasone-salmeterol (ADVAIR) 500-50 mcg/dose inhaler 1 puff, 1 puff, Inhalation, BID, Ora Chapa PA-C, 1 puff at 09/14/17 0925    furosemide (LASIX) tablet 40 mg, 40 mg, Oral, Daily, Irvinessence Caponera, DO, 40 mg at 09/14/17 0931    gabapentin (NEURONTIN) capsule 100 mg, 100 mg, Oral, TID, Ora Chapa PA-C, 100 mg at 09/14/17 0927    heparin (porcine) 25,000 units in 250 mL infusion (premix), 3-30 Units/kg/hr (Order-Specific), Intravenous, Titrated, Brian Pali, DO, Last Rate: 16 mL/hr at 09/14/17 1101, 16 Units/kg/hr at 09/14/17 1101    heparin (porcine) injection 4,000 Units, 4,000 Units, Intravenous, PRN, Brian Pali, DO, 4,000 Units at 09/14/17 1100    heparin (porcine) injection 8,000 Units, 8,000 Units, Intravenous, PRN, Brian Pali, DO    insulin glargine (LANTUS) subcutaneous injection 40 Units, 40 Units, Subcutaneous, HS, Asuncion Scott MD, 40 Units at 09/13/17 2122    insulin lispro (HumaLOG) 100 units/mL subcutaneous injection 1-6 Units, 1-6 Units, Subcutaneous, HS, Asuncion Scott MD, 1 Units at 09/13/17 2123    insulin lispro (HumaLOG) 100 units/mL subcutaneous injection 12 Units, 12 Units, Subcutaneous, Daily Before Lunch, Asuncion Scott MD    insulin lispro (HumaLOG) 100 units/mL subcutaneous injection 12 Units, 12 Units, Subcutaneous, Before Dinner, Asuncion Scott MD    insulin lispro (HumaLOG) 100 units/mL subcutaneous injection 15 Units, 15 Units, Subcutaneous, Daily Before Breakfast, Brian MckeoniDO, 15 Units at 09/14/17 0933    insulin lispro (HumaLOG) 100 units/mL subcutaneous injection 2-12 Units, 2-12 Units, Subcutaneous, TID AC, 2 Units at 09/14/17 0934 **AND** Fingerstick Glucose (POCT), , , TID AC, Asuncion Scott MD    magnesium hydroxide (MILK OF MAGNESIA) 400 mg/5 mL oral suspension 30 mL, 30 mL, Oral, PRN, Ora Chapa PA-C    magnesium oxide (MAG-OX) tablet 400 mg, 400 mg, Oral, Daily, Ora Chapa PA-C, 400 mg at 09/14/17 0927    ondansetron (ZOFRAN) injection 4 mg, 4 mg, Intravenous, Q6H PRN, Ora Chapa PA-C    oxybutynin (DITROPAN) tablet 5 mg, 5 mg, Oral, BID, Ora Chapa PA-C, 5 mg at 09/14/17 0931    pravastatin (PRAVACHOL) tablet 40 mg, 40 mg, Oral, Daily With Marcosjack Chapa PA-C, 40 mg at 09/13/17 1614    tiotropium (SPIRIVA) capsule for inhaler 18 mcg, 18 mcg, Inhalation, Daily, Ora Chapa PA-C, 18 mcg at 09/14/17 0926    Invasive Devices:   Urethral Catheter 18 Fr   (Active)   Reasons to continue Urinary Catheter  Chronic urinary catheter 9/4/2017  8:40 PM   Site Assessment Clean;Skin intact 9/10/2017  7:30 PM   Collection Container Standard drainage bag 9/10/2017  7:30 PM   Securement Method Securing device (Describe) 9/10/2017  7:30 PM   Output (mL) 500 mL 9/11/2017  5:00 AM     Lab Results:     Results from last 7 days  Lab Units 09/13/17  0648 09/12/17  1102 09/11/17  0602 09/11/17  0601 09/10/17  0542 09/09/17  0521 09/08/17  0640   WBC Thousand/uL 9 97  --  8 54  --   --  10 87* 11 50*   HEMOGLOBIN g/dL 9 4* 10 1* 10 6*  --   --  10 7* 11 8*   HEMATOCRIT % 27 5* 29 7* 31 4*  --   --  31 7* 35 4*   PLATELETS Thousands/uL 282  --  330  --   --  304 355   SODIUM mmol/L 133*  --   --  137 135* 133* 134*   POTASSIUM mmol/L 4 2  --   --  4 0 4 2 4 2 4 2   CHLORIDE mmol/L 102  --   --  101 100 100 99*   CO2 mmol/L 24  --   --  30 32 32 32   BUN mg/dL 13  --   --  26* 29* 29* 28*   CREATININE mg/dL 0 97  --   --  1 21 1 22 1 26 1 19   CALCIUM mg/dL 8 5  --   --  9 1 9 1 8 9 9 2   MAGNESIUM mg/dL  --   --   --   --  2 1  --   --    GLUCOSE RANDOM mg/dL 250*  --   --  221* 168* 155* 106       Previous work up:  Please see previous notes

## 2017-09-14 NOTE — PLAN OF CARE
Problem: Nutrition/Hydration-ADULT  Goal: Nutrient/Hydration intake appropriate for improving, restoring or maintaining nutritional needs  Monitor and assess patient's nutrition/hydration status for malnutrition (ex- brittle hair, bruises, dry skin, pale skin and conjunctiva, muscle wasting, smooth red tongue, and disorientation)  Collaborate with interdisciplinary team and initiate plan and interventions as ordered  Monitor patient's weight and dietary intake as ordered or per policy  Utilize nutrition screening tool and intervene per policy  Determine patient's food preferences and provide high-protein, high-caloric foods as appropriate  INTERVENTIONS:  - Monitor oral intake, urinary output, labs, and treatment plans  - Assess nutrition and hydration status and recommend course of action  - Evaluate amount of meals eaten  - Assist patient with eating if necessary   - Allow adequate time for meals  - Recommend/ encourage appropriate diets, oral nutritional supplements, and vitamin/mineral supplements  - Order, calculate, and assess calorie counts as needed  - Recommend, monitor, and adjust tube feedings and TPN/PPN based on assessed needs  - Assess need for intravenous fluids  - Provide specific nutrition/hydration education as appropriate  - Include patient/family/caregiver in decisions related to nutrition   Outcome: Progressing      Problem: Potential for Falls  Goal: Patient will remain free of falls  INTERVENTIONS:  - Assess patient frequently for physical needs  -  Identify cognitive and physical deficits and behaviors that affect risk of falls    -  [place] fall precautions as indicated by assessment   - Educate patient/family on patient safety including physical limitations  - Instruct patient to call for assistance with activity based on assessment  - Modify environment to reduce risk of injury  - Consider OT/PT consult to assist with strengthening/mobility   Outcome: Progressing      Problem: Prexisting or High Potential for Compromised Skin Integrity  Goal: Skin integrity is maintained or improved  INTERVENTIONS:  - Identify patients at risk for skin breakdown  - Assess and monitor skin integrity  - Assess and monitor nutrition and hydration status  - Monitor labs (i e  albumin)  - Assess for incontinence   - Turn and reposition patient  - Assist with mobility/ambulation  - Relieve pressure over bony prominences  - Avoid friction and shearing  - Provide appropriate hygiene as needed including keeping skin clean and dry  - Evaluate need for skin moisturizer/barrier cream  - Collaborate with interdisciplinary team (i e  Nutrition, Rehabilitation, etc )   - Patient/family teaching   Outcome: Progressing      Problem: PAIN - ADULT  Goal: Verbalizes/displays adequate comfort level or baseline comfort level  Interventions:  - Encourage patient to monitor pain and request assistance  - Assess pain using appropriate pain scale  - Administer analgesics based on type and severity of pain and evaluate response  - Implement non-pharmacological measures as appropriate and evaluate response  - Consider cultural and social influences on pain and pain management  - Notify physician/advanced practitioner if interventions unsuccessful or patient reports new pain   Outcome: Progressing      Problem: INFECTION - ADULT  Goal: Absence or prevention of progression during hospitalization  INTERVENTIONS:  - Assess and monitor for signs and symptoms of infection  - Monitor lab/diagnostic results  - Monitor all insertion sites, i e  indwelling lines, tubes, and drains  - Monitor endotracheal (as able) and nasal secretions for changes in amount and color  - [place] appropriate cooling/warming therapies per order  - Administer medications as ordered  - Instruct and encourage patient and family to use good hand hygiene technique  - Identify and instruct in appropriate isolation precautions for identified infection/condition Outcome: Progressing      Problem: SAFETY ADULT  Goal: Maintain or return to baseline ADL function  INTERVENTIONS:  -  Assess patient's ability to carry out ADLs; assess patient's baseline for ADL function and identify physical deficits which impact ability to perform ADLs (bathing, care of mouth/teeth, toileting, grooming, dressing, etc )  - Assess/evaluate cause of self-care deficits   - Assess range of motion  - Assess patient's mobility; develop plan if impaired  - Assess patient's need for assistive devices and provide as appropriate  - Encourage maximum independence but intervene and supervise when necessary  ¯ Involve family in performance of ADLs  ¯ Assess for home care needs following discharge   ¯ Request OT consult to assist with ADL evaluation and planning for discharge  ¯ Provide patient education as appropriate   Outcome: Progressing    Goal: Maintain or return mobility status to optimal level  INTERVENTIONS:  - Assess patient's baseline mobility status (ambulation, transfers, stairs, etc )    - Identify cognitive and physical deficits and behaviors that affect mobility  - Identify mobility aids required to assist with transfers and/or ambulation (gait belt, sit-to-stand, lift, walker, cane, etc )  - [place] fall precautions as indicated by assessment  - Record patient progress and toleration of activity level on Mobility SBAR; progress patient to next Phase/Stage  - Instruct patient to call for assistance with activity based on assessment  - Request Rehabilitation consult to assist with strengthening/weightbearing, etc    Outcome: Progressing      Problem: DISCHARGE PLANNING  Goal: Discharge to home or other facility with appropriate resources  INTERVENTIONS:  - Identify barriers to discharge w/patient and caregiver  - Arrange for needed discharge resources and transportation as appropriate  - Identify discharge learning needs (meds, wound care, etc )  - Arrange for interpretive services to assist at discharge as needed  - Refer to Case Management Department for coordinating discharge planning if the patient needs post-hospital services based on physician/advanced practitioner order or complex needs related to functional status, cognitive ability, or social support system   Outcome: Progressing      Problem: Knowledge Deficit  Goal: Patient/family/caregiver demonstrates understanding of disease process, treatment plan, medications, and discharge instructions  Complete learning assessment and assess knowledge base    Interventions:  - Provide teaching at level of understanding  - Provide teaching via preferred learning methods   Outcome: Progressing      Problem: SKIN/TISSUE INTEGRITY - ADULT  Goal: Skin integrity remains intact  INTERVENTIONS  - Identify patients at risk for skin breakdown  - Assess and monitor skin integrity  - Assess and monitor nutrition and hydration status  - Monitor labs (i e  albumin)  - Assess for incontinence   - Turn and reposition patient  - Assist with mobility/ambulation  - Relieve pressure over bony prominences  - Avoid friction and shearing  - Provide appropriate hygiene as needed including keeping skin clean and dry  - Evaluate need for skin moisturizer/barrier cream  - Collaborate with interdisciplinary team (i e  Nutrition, Rehabilitation, etc )   - Patient/family teaching   Outcome: Progressing    Goal: Incision(s), wounds(s) or drain site(s) healing without S/S of infection  INTERVENTIONS  - Assess and document risk factors for skin impairment   - Assess and document dressing, incision, wound bed, drain sites and surrounding tissue  - Initiate Nutrition services consult and/or wound management as needed   Outcome: Progressing      Problem: DISCHARGE PLANNING - CARE MANAGEMENT  Goal: Discharge to post-acute care or home with appropriate resources  INTERVENTIONS:  - Conduct assessment to determine patient/family and health care team treatment goals, and need for post-acute services based on payer coverage, community resources, and patient preferences, and barriers to discharge  - Address psychosocial, clinical, and financial barriers to discharge as identified in assessment in conjunction with the patient/family and health care team  - Arrange appropriate level of post-acute services according to patients   needs and preference and payer coverage in collaboration with the physician and health care team  - Communicate with and update the patient/family, physician, and health care team regarding progress on the discharge plan  - Arrange appropriate transportation to post-acute venues   Outcome: Progressing

## 2017-09-14 NOTE — PROGRESS NOTES
Progress Note - Infectious Disease   Grant Ramsey 78 y o  male MRN: [id number]  Unit/Bed#: E4 -01 Encounter: [id number]      Impression/Recommendations:  1   Chronic left heel ulcer  X-ray suggested early osteomyelitis and wound debrided to bone  Postoperative day #6 status post washout/partial calcanectomy/VAC  OR cultures negative and no clinical signs of active infection  Unclear if there is any residual bone infection  Rec:                 · Continue to follow closely off antibiotics  · Follow up OR pathology of clean margin   If negative, no need for antibiotics  · Continue local wound care/VAC as per podiatry     2   PAD  With tissue loss and abnormal LEADs  Postprocedure day #2 status post PTA  Rec:  · Close vascular follow-up ongoing     3   DM with peripheral neuropathy  A1C 6 6  Risk factor for #1  Rec:  · Continue management per medicine service     4   Weakness  Suspect multifactorial, largely due to recent inactivity and deconditioning  Rec:                 · Continue PT while in house  · SNF upon discharge     The patient is stable from an ID standpoint    Antibiotics:  None    Subjective:  Patient seen on AM rounds  Feels well  No new events or complaints  Denies fevers, chills, or sweats  Denies nausea, vomiting, or diarrhea  Objective:  Vitals:  HR:  [62-82] 82  Resp:  [17-18] 18  BP: (116-144)/(51-67) 144/67  SpO2:  [94 %-97 %] 94 %  Temp (24hrs), Av 1 °F (36 7 °C), Min:97 2 °F (36 2 °C), Max:98 7 °F (37 1 °C)  Current: Temperature: 98 7 °F (37 1 °C)    Physical Exam:   General:  No acute distress  Psychiatric:  Awake and alert  Pulmonary:  Normal respiratory excursion without accessory muscle use  Abdomen:  Soft, nontender  Extremities:  No edema, VAC to left foot  Skin:  No rashes    Lab Results:  I have personally reviewed pertinent labs      Results from last 7 days  Lab Units 17  0648 17  0601 09/10/17  0542   SODIUM mmol/L 133* 137 135*   POTASSIUM mmol/L 4 2 4 0 4  2   CHLORIDE mmol/L 102 101 100   CO2 mmol/L 24 30 32   ANION GAP mmol/L 7 6 3*   BUN mg/dL 13 26* 29*   CREATININE mg/dL 0 97 1 21 1 22   EGFR ml/min/1 73sq m 77 59 58   GLUCOSE RANDOM mg/dL 250* 221* 168*   CALCIUM mg/dL 8 5 9 1 9 1       Results from last 7 days  Lab Units 09/13/17  0648 09/12/17  1102 09/11/17  0602 09/09/17  0521   WBC Thousand/uL 9 97  --  8 54 10 87*   HEMOGLOBIN g/dL 9 4* 10 1* 10 6* 10 7*   PLATELETS Thousands/uL 282  --  330 304       Results from last 7 days  Lab Units 09/08/17  1612   GRAM STAIN RESULT  No Polys or Bacteria seen       Imaging Studies:   I have personally reviewed pertinent imaging study reports and images in PACS  EKG, Pathology, and Other Studies:   I have personally reviewed pertinent reports

## 2017-09-14 NOTE — CASE MANAGEMENT
Continued Stay Review    Date: 9/14/17    Vital Signs: /67   Pulse 82   Temp 98 7 °F (37 1 °C) (Tympanic)   Resp 18   Ht 5' 9 5" (1 765 m)   Wt 108 kg (238 lb 1 6 oz)   SpO2 94%   BMI 34 66 kg/m²     Medications:   Scheduled Meds:   clopidogrel 75 mg Oral Daily   collagenase  Topical Daily   ferrous sulfate 325 mg Oral BID   fluticasone-salmeterol 1 puff Inhalation BID   furosemide 40 mg Oral Daily   gabapentin 100 mg Oral TID   insulin glargine 40 Units Subcutaneous HS   insulin lispro 1-6 Units Subcutaneous HS   insulin lispro 12 Units Subcutaneous Daily Before Lunch   insulin lispro 12 Units Subcutaneous Before Dinner   insulin lispro 15 Units Subcutaneous Daily Before Breakfast   insulin lispro 2-12 Units Subcutaneous TID AC   magnesium oxide 400 mg Oral Daily   oxybutynin 5 mg Oral BID   pravastatin 40 mg Oral Daily With Dinner   tiotropium 18 mcg Inhalation Daily     Continuous Infusions:   heparin (porcine) 3-30 Units/kg/hr (Order-Specific) Last Rate: 16 Units/kg/hr (09/14/17 1326)     PRN Meds:   acetaminophen    heparin (porcine)    heparin (porcine)    magnesium hydroxide    ondansetron    Abnormal Labs/Diagnostic Results: Results from last 7 days  Lab Units 09/13/17  0648 09/11/17  0601 09/10/17  0542   SODIUM mmol/L 133* 137 135*   POTASSIUM mmol/L 4 2 4 0 4 2   CHLORIDE mmol/L 102 101 100   CO2 mmol/L 24 30 32   ANION GAP mmol/L 7 6 3*   BUN mg/dL 13 26* 29*   CREATININE mg/dL 0 97 1 21 1 22   EGFR ml/min/1 73sq m 77 59 58   GLUCOSE RANDOM mg/dL 250* 221* 168*   CALCIUM mg/dL 8 5 9 1 9 1         Results from last 7 days  Lab Units 09/13/17  0648 09/12/17  1102 09/11/17  0602 09/09/17  0521   WBC Thousand/uL 9 97  --  8 54 10 87*   HEMOGLOBIN g/dL 9 4* 10 1* 10 6* 10 7*   PLATELETS Thousands/uL 282  --  330 304         Results from last 7 days  Lab Units 09/08/17  1612   GRAM STAIN RESULT   No Polys or Bacteria seen          Age/Sex: 78 y o  male     Assessment/Plan: Assessment/Plan:  1  78 y  o  y/o male s/p excisional debridement with partial calcanectomy and application of wound vac therapy on left heel by Dr Jason Grayson (DOS: 09/08/2017)  - wound vac changed today: 2 black sponges removed and 2 reapplied with 1 in wound and 1 for bridge running at 75mmhg low continuous without any complications; to be changed 3x/week every Tu/Th/Sat  - per ID recommendation, continue to monitor patient off antibiotics; clean margin with minimal acute and chronic inflammation with no malignancy   - WBS: NWB to LLE; PT/OT onboard and both recommend rehab; CM onboard awaiting placement   - c/w prevalon boots while in bed to offload as much as possible and prevent worsening of ulceration      2  [place] Stage 1 Ulceration to left lateral forefoot - POA  - podiatry to c/w LWC: dermagran + DSD; dressing changes to be performed 3x/week every Tu/Th/Sat   3  Right Stage 1 Pressure Ulceration - POA   - podiatry to c/w LWC: dermagrab + DSD; dressing changes to be performed 3x/week every Tu/Th/Sat  - c/w prevalon boots while in bed to offload as much as possible and prevent worsening of ulceration      4  DM, type 2 with neuropathy: A1c = 6 6% - per endocrinology   - c/w humalog 12 units during lunch and dinner and 8 units and increase lantus 40 units HS; in addition with ISS and accuchecks; BS range = low to mid 200s  - CC1 diet     5  PAD - per vascular   - s/p LLE agram with angioplasty per IR; 2 vessel runoff via AT and PT with small vessel disease  - LEADs reviewed: 50-75% stenosis of L distal SFA; repeat post agram LEADs: ordered, f/u with final results     6  CKD, stage 3 - per nephrology   - baseline creatinine 1 2-1 4; creatinine below baseline   - c/w beckett catheter   - c/w oxybutynin      7  CHF - stable   - c/w lasix   8  Afib   - c/w heparin drip   9   Hx of Mechanical Heart Valve - AVR  - c/w heparin drip and per SLIM bridge back to coumadin which was held for OR procedures once discharge plan determined when cleared by vascular   - c/w statin; and plavix started due to ASA allergy      10  Knee Pain - improved with no complaints   - xrays reviewed: mild degenerative changes noted   - per Ortho: not a good canditate for steroid injection due to hx of DM  - c/w PT/OT for management      11  Hyponatremia   - check BMP in AM 9/15     12   Blood in Stool   - continue to monitor H&H; currently hemoglobin low but patient continues to be asymptomatic     > appreciate medical management per internal medicine      Dispo: c/w inpatient stay awaiting for clean margin results and rehab placement        Discharge Plan: [facility]

## 2017-09-14 NOTE — OCCUPATIONAL THERAPY NOTE
Occupational Therapy Progress Note      Patient Name: Cristin Asher    AVHXK'[id number] Date: 9/14/2017    Problem List:   Patient Active Problem List   Diagnosis    Asthenia    Decubitus ulcer of heel, stage 3    Leukocytosis    Stage 3 chronic kidney disease    Chronic indwelling Rush catheter    Abnormal urinalysis    Chronic atrial fibrillation    Diabetes mellitus    Chronic diastolic heart failure    PAD (peripheral artery disease)    S/P AVR                09/14/17 1542   Restrictions/Precautions   Weight Bearing Precautions Per Order Yes   LLE Weight Bearing Per Order NWB   Braces or Orthoses (diabetic shoes)   Other Precautions Bed Alarm;Multiple lines;Telemetry; Fall Risk;Pain;Hard of hearing   Pain Assessment   Pain Assessment No/denies pain   Pain Score No Pain   ADL   Where Assessed Supine, bed   Grooming Assistance 5  Supervision/Setup   Grooming Deficit Setup;Supervision/safety; Increased time to complete   Therapeutic Exercise - ROM   UE-ROM Yes   ROM- Right Upper Extremities   R Shoulder AROM; Flexion; Extension;ABduction   R Elbow AROM;Elbow flexion;Elbow extension   R Wrist AROM; Wrist extension;Wrist flexion   R Hand AROM  (hand squeezes)   R Position (hand squeezes)   Equipment Therabands  (stress ball)   R Weight/Reps/Sets 2 sets x 15   RUE ROM Comment tolerated well   ROM - Left Upper Extremities    L Shoulder AROM; Flexion; Extension;ABduction   L Elbow AROM;Elbow flexion;Elbow extension   L Wrist AROM  (hand squeezes)   L Hand AROM  (hand squeezes)   Equipment Theraband  (stress ball)   L Weight/Reps/Sets 2 sets x 15 reps   LUE ROM Comment tolerated well   Coordination   Gross Motor WFL    Fine Motor WFL   Cognition   Overall Cognitive Status WFL   Arousal/Participation Alert; Cooperative   Attention Within functional limits   Orientation Level Oriented X4   Memory Decreased short term memory   Following Commands Follows one step commands with increased time or repetition   Comments pt wife reports pt has been depressed lately, pt w/ flat affect   Activity Tolerance   Activity Tolerance Patient tolerated treatment well   Medical Staff Made Aware appropriate to per RN, Viviana Ghosh Pt is a 78 y o male seen for OT treatment session focused on UE therapeutic exercises and grooming tasks  Per ortho Dr Pavithra Salas, pt to resume OT/PT, R knee w/ DJD  Pt w/ flat affect this session and pt wife reports, pt has been depressed recently  Pt  complete b/l UE exercises w/ use of theraband and exercise ball to increase strength and endurance for ADLs and functional transfers  Pt tolerated 2 sets x 15 w/ yellow resistance theraband in all planes  Pt completed long sitting in bed 5 times at 20 sec each w/ MOD assist initially to MIN assist to increase core strength  Pt continues to need assistance due to decreased strength and endurance, impaired core strength, decreased activity tolerance all causing a decline in ADLs, functional transfers and bed mobility  Will continue to follow to address OT goals  Plan   Treatment Interventions ADL retraining;Functional transfer training;UE strengthening/ROM; Endurance training;Patient/family training; Activityengagement   Goal Expiration Date 09/21/17   Treatment Day 4   OT Frequency 3-5x/wk   Recommendation   Discharge Recommendation Short Term Rehab     Documentation completed by: Antonio Del Toro MS, OTR/L

## 2017-09-14 NOTE — ASSESSMENT & PLAN NOTE
Assessment:   PAD w/ Left heel tissue loss, OM, s/p L SFA/pop PTA 9/12/17  S/P L Heel debridement, partial calcanectomy, wound VAC placement 9/8    Plan:   Continue statin and Plavix  Post intervention ANTONIO with flat GTP pressure  Will discuss with Dr Uriel Roland Doctor  Presently, continue to follow wound healing  Outpatient follow-up as noted

## 2017-09-14 NOTE — PLAN OF CARE
Problem: OCCUPATIONAL THERAPY ADULT  Goal: Performs self-care activities at highest level of function for planned discharge setting  See evaluation for individualized goals  Treatment Interventions: ADL retraining, Functional transfer training, UE strengthening/ROM, Endurance training, Patient/family training, Equipment evaluation/education, Compensatory technique education, Energy conservation, Activityengagement          See flowsheet documentation for full assessment, interventions and recommendations  Limitation: Decreased ADL status, Decreased UE strength, Decreased Safe judgement during ADL, Decreased endurance, Decreased self-care trans, Decreased high-level ADLs (NWB L LE w/ wound vac)  Prognosis: Fair  Assessment: Pt is a 78 y o male seen for OT treatment session focused on UE therapeutic exercises and grooming tasks  Per ortho Dr Rashi Inman, pt to resume OT/PT, R knee w/ DJD  Pt w/ flat affect this session and pt wife reports, pt has been depressed recently  Pt  complete b/l UE exercises w/ use of theraband and exercise ball to increase strength and endurance for ADLs and functional transfers  Pt tolerated 2 sets x 15 w/ yellow resistance theraband in all planes  Pt completed long sitting in bed 5 times at 20 sec each w/ MOD assist initially to MIN assist to increase core strength  Pt continues to need assistance due to decreased strength and endurance, impaired core strength, decreased activity tolerance all causing a decline in ADLs, functional transfers and bed mobility  Will continue to follow to address OT goals        Discharge Recommendation: Short Term Rehab         Comments: Emanuel Jeffrey MS, OTR/L

## 2017-09-14 NOTE — PROGRESS NOTES
Progress Note - Podiatry  Lila Contreras 78 y o  male MRN: [id number]  Unit/Bed#: E4 -01 Encounter: [id number]    Assessment/Plan:  1  78 y  o  y/o male s/p excisional debridement with partial calcanectomy and application of wound vac therapy on left heel by Dr Craig Ashraf (DOS: 09/08/2017)  - wound vac changed today: 2 black sponges removed and 2 reapplied with 1 in wound and 1 for bridge running at 75mmhg low continuous without any complications; to be changed 3x/week every Tu/Th/Sat  - per ID recommendation, continue to monitor patient off antibiotics; clean margin with minimal acute and chronic inflammation with no malignancy   - WBS: NWB to LLE; PT/OT onboard and both recommend rehab; CM onboard awaiting placement   - c/w prevalon boots while in bed to offload as much as possible and prevent worsening of ulceration      2  Bettey Speaker Stage 1 Ulceration to left lateral forefoot - POA  - podiatry to c/w LWC: dermagran + DSD; dressing changes to be performed 3x/week every Tu/Th/Sat   3  Right Stage 1 Pressure Ulceration - POA   - podiatry to c/w LWC: dermagrab + DSD; dressing changes to be performed 3x/week every Tu/Th/Sat  - c/w prevalon boots while in bed to offload as much as possible and prevent worsening of ulceration      4  DM, type 2 with neuropathy: A1c = 6 6% - per endocrinology   - c/w humalog 12 units during lunch and dinner and 8 units and increase lantus 40 units HS; in addition with ISS and accuchecks; BS range = low to mid 200s  - CC1 diet     5  PAD - per vascular   - s/p LLE agram with angioplasty per IR; 2 vessel runoff via AT and PT with small vessel disease  - LEADs reviewed: 50-75% stenosis of L distal SFA; repeat post agram LEADs: ordered, f/u with final results     6  CKD, stage 3 - per nephrology   - baseline creatinine 1 2-1 4; creatinine below baseline   - c/w beckett catheter   - c/w oxybutynin      7  CHF - stable   - c/w lasix   8  Afib   - c/w heparin drip   9   Hx of Mechanical Heart Valve - AVR  - c/w heparin drip and per SLIM bridge back to coumadin which was held for OR procedures once discharge plan determined when cleared by vascular   - c/w statin; and plavix started due to ASA allergy      10  Knee Pain - improved with no complaints   - xrays reviewed: mild degenerative changes noted   - per Ortho: not a good canditate for steroid injection due to hx of DM  - c/w PT/OT for management      11  Hyponatremia   - check BMP in AM 9/15    12  Blood in Stool   - continue to monitor H&H; currently hemoglobin low but patient continues to be asymptomatic     > appreciate medical management per internal medicine      Dispo: c/w inpatient stay for wound management of left heel       Subjective/Objective   Chief Complaint: "I fee good "    Subjective: 78 y o  y/o male was seen and evaluated at bedside  No acute events overnight with all vital signs stable  Denies any pedal complaints  Compliant with prevalon boots while in bed  Knee pain improved  Denies any constitutional symptoms  Objective:     Blood pressure 144/67, pulse 82, temperature 98 7 °F (37 1 °C), temperature source Tympanic, resp  rate 18, height 5' 9 5" (1 765 m), weight 108 kg (238 lb 1 6 oz), SpO2 94 %  ,Body mass index is 34 66 kg/m²  Invasive Devices     Peripheral Intravenous Line            Peripheral IV 09/13/17 Right Forearm 1 day          Drain            Urethral Catheter 18 Fr  11 days    Negative Pressure Wound Therapy (V A C ) Foot Left 5 days                  Physical Exam:   General: Alert, cooperative and no distress  Lungs: Non labored breathing  Lower Extremity:   1  Vascular: cap refill <3 secs; no pitting edema, skin temp WNL   2  Ortho: at baseline, no calf tenderness; 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   3  Neuro: gross sensation intact B/L; epicritic sensation absent B/L  4   Derm: severe xerosis to B/L feet; no ID macerations noted   > R posterior heel ulceration: dry and stable, no drainage, no acute signs of infection   > L plantar-lateral forefoot ulceration: mostly granular, no active drainage, no malodor, no acute signs of infection  > L postero-lateral heel ulceration: measuring 4 0x2 2x0 6cm; mixture of necrotic, fibrotic and granular wound base, + malodor, + probe to bone, bogginess noted to wound edge extending about 1 5cm; no purulence, no ascending cellulitis, no edema, no acute signs of infection noted           Lab, Imaging and other studies:   I have personally reviewed pertinent lab results  Imaging: I have personally reviewed pertinent films in PACS  EKG, Pathology, and Other Studies: I have personally reviewed pertinent reports    VTE Pharmacologic Prophylaxis: Heparin  VTE Mechanical Prophylaxis: sequential compression device

## 2017-09-14 NOTE — PLAN OF CARE
Problem: Potential for Falls  Goal: Patient will remain free of falls  INTERVENTIONS:  - Assess patient frequently for physical needs  -  Identify cognitive and physical deficits and behaviors that affect risk of falls    -  [place] fall precautions as indicated by assessment   - Educate patient/family on patient safety including physical limitations  - Instruct patient to call for assistance with activity based on assessment  - Modify environment to reduce risk of injury  - Consider OT/PT consult to assist with strengthening/mobility   Outcome: Progressing      Problem: Prexisting or High Potential for Compromised Skin Integrity  Goal: Skin integrity is maintained or improved  INTERVENTIONS:  - Identify patients at risk for skin breakdown  - Assess and monitor skin integrity  - Assess and monitor nutrition and hydration status  - Monitor labs (i e  albumin)  - Assess for incontinence   - Turn and reposition patient  - Assist with mobility/ambulation  - Relieve pressure over bony prominences  - Avoid friction and shearing  - Provide appropriate hygiene as needed including keeping skin clean and dry  - Evaluate need for skin moisturizer/barrier cream  - Collaborate with interdisciplinary team (i e  Nutrition, Rehabilitation, etc )   - Patient/family teaching   Outcome: Progressing      Problem: PAIN - ADULT  Goal: Verbalizes/displays adequate comfort level or baseline comfort level  Interventions:  - Encourage patient to monitor pain and request assistance  - Assess pain using appropriate pain scale  - Administer analgesics based on type and severity of pain and evaluate response  - Implement non-pharmacological measures as appropriate and evaluate response  - Consider cultural and social influences on pain and pain management  - Notify physician/advanced practitioner if interventions unsuccessful or patient reports new pain   Outcome: Progressing      Problem: INFECTION - ADULT  Goal: Absence or prevention of progression during hospitalization  INTERVENTIONS:  - Assess and monitor for signs and symptoms of infection  - Monitor lab/diagnostic results  - Monitor all insertion sites, i e  indwelling lines, tubes, and drains  - Monitor endotracheal (as able) and nasal secretions for changes in amount and color  - [place] appropriate cooling/warming therapies per order  - Administer medications as ordered  - Instruct and encourage patient and family to use good hand hygiene technique  - Identify and instruct in appropriate isolation precautions for identified infection/condition   Outcome: Progressing      Problem: SAFETY ADULT  Goal: Maintain or return to baseline ADL function  INTERVENTIONS:  -  Assess patient's ability to carry out ADLs; assess patient's baseline for ADL function and identify physical deficits which impact ability to perform ADLs (bathing, care of mouth/teeth, toileting, grooming, dressing, etc )  - Assess/evaluate cause of self-care deficits   - Assess range of motion  - Assess patient's mobility; develop plan if impaired  - Assess patient's need for assistive devices and provide as appropriate  - Encourage maximum independence but intervene and supervise when necessary  ¯ Involve family in performance of ADLs  ¯ Assess for home care needs following discharge   ¯ Request OT consult to assist with ADL evaluation and planning for discharge  ¯ Provide patient education as appropriate   Outcome: Progressing    Goal: Maintain or return mobility status to optimal level  INTERVENTIONS:  - Assess patient's baseline mobility status (ambulation, transfers, stairs, etc )    - Identify cognitive and physical deficits and behaviors that affect mobility  - Identify mobility aids required to assist with transfers and/or ambulation (gait belt, sit-to-stand, lift, walker, cane, etc )  - [place] fall precautions as indicated by assessment  - Record patient progress and toleration of activity level on Mobility SBAR; progress patient to next Phase/Stage  - Instruct patient to call for assistance with activity based on assessment  - Request Rehabilitation consult to assist with strengthening/weightbearing, etc    Outcome: Progressing      Problem: Knowledge Deficit  Goal: Patient/family/caregiver demonstrates understanding of disease process, treatment plan, medications, and discharge instructions  Complete learning assessment and assess knowledge base    Interventions:  - Provide teaching at level of understanding  - Provide teaching via preferred learning methods   Outcome: Progressing      Problem: SKIN/TISSUE INTEGRITY - ADULT  Goal: Skin integrity remains intact  INTERVENTIONS  - Identify patients at risk for skin breakdown  - Assess and monitor skin integrity  - Assess and monitor nutrition and hydration status  - Monitor labs (i e  albumin)  - Assess for incontinence   - Turn and reposition patient  - Assist with mobility/ambulation  - Relieve pressure over bony prominences  - Avoid friction and shearing  - Provide appropriate hygiene as needed including keeping skin clean and dry  - Evaluate need for skin moisturizer/barrier cream  - Collaborate with interdisciplinary team (i e  Nutrition, Rehabilitation, etc )   - Patient/family teaching   Outcome: Progressing    Goal: Incision(s), wounds(s) or drain site(s) healing without S/S of infection  INTERVENTIONS  - Assess and document risk factors for skin impairment   - Assess and document dressing, incision, wound bed, drain sites and surrounding tissue  - Initiate Nutrition services consult and/or wound management as needed   Outcome: Progressing

## 2017-09-14 NOTE — SUBJECTIVE & OBJECTIVE
Subjective:   POD #2 L SFA/pop PTA (IR)  Patient without complaints  Denies left lower extremity rest pain  Denies right groin access site pain VAC changed by Podiatry today  VSS  Creat stable  Vitals:  /70   Pulse 81   Temp 98 3 °F (36 8 °C) (Tympanic)   Resp 18   Ht 5' 9 5" (1 765 m)   Wt 108 kg (238 lb 1 6 oz)   SpO2 98%   BMI 34 66 kg/m²     I/Os:  I/O last 3 completed shifts: In: 36 [P O :560; I V :687]  Out: 2000 [Urine:2000]  I/O this shift:   In: 480 [P O :480]  Out: 1300 [Urine:1300]    Lab Results and Cultures:   Lab Results   Component Value Date    WBC 9 97 09/13/2017    HGB 9 4 (L) 09/13/2017    HCT 27 5 (L) 09/13/2017    MCV 85 09/13/2017     09/13/2017     Lab Results   Component Value Date    GLUCOSE 250 (H) 09/13/2017    CALCIUM 8 5 09/13/2017     (L) 09/13/2017    K 4 2 09/13/2017    CO2 24 09/13/2017     09/13/2017    BUN 13 09/13/2017    CREATININE 0 97 09/13/2017     Lab Results   Component Value Date    INR 1 06 09/11/2017    INR 1 30 (H) 09/09/2017    INR 2 14 (H) 09/08/2017    PROTIME 13 8 09/11/2017    PROTIME 16 3 (H) 09/09/2017    PROTIME 24 1 (H) 09/08/2017        Blood Culture: No results found for: BLOODCX,   Urinalysis:   Lab Results   Component Value Date    COLORU Yellow 09/05/2017    CLARITYU Clear 09/05/2017    SPECGRAV <=1 005 09/05/2017    PHUR 6 5 09/05/2017    LEUKOCYTESUR Small (A) 09/05/2017    NITRITE Positive (A) 09/05/2017    PROTEINUA Negative 09/05/2017    GLUCOSEU Negative 09/05/2017    KETONESU Negative 09/05/2017    BILIRUBINUR Negative 09/05/2017    BLOODU Trace-lysed (A) 09/05/2017   ,   Urine Culture:   Lab Results   Component Value Date    URINECX >100,000 cfu/ml Mixed Contaminants X3 09/02/2017   ,   Wound [place]:   Lab Results   Component Value Date    WOUNDCULT 3+ Growth of Staphylococcus aureus 09/02/2017    WOUNDCULT 3+ Growth of Enterococcus faecalis 09/02/2017    WOUNDCULT 3+ Growth of Mixed Skin [place] 09/02/2017 Medications:  Current Facility-Administered Medications   Medication Dose Route Frequency    acetaminophen (TYLENOL) tablet 650 mg  650 mg Oral Q6H PRN    clopidogrel (PLAVIX) tablet 75 mg  75 mg Oral Daily    collagenase (SANTYL) ointment   Topical Daily    ferrous sulfate tablet 325 mg  325 mg Oral BID    fluticasone-salmeterol (ADVAIR) 500-50 mcg/dose inhaler 1 puff  1 puff Inhalation BID    furosemide (LASIX) tablet 40 mg  40 mg Oral Daily    gabapentin (NEURONTIN) capsule 100 mg  100 mg Oral TID    heparin (porcine) 25,000 units in 250 mL infusion (premix)  3-30 Units/kg/hr (Order-Specific) Intravenous Titrated    heparin (porcine) injection 4,000 Units  4,000 Units Intravenous PRN    heparin (porcine) injection 8,000 Units  8,000 Units Intravenous PRN    insulin glargine (LANTUS) subcutaneous injection 40 Units  40 Units Subcutaneous HS    insulin lispro (HumaLOG) 100 units/mL subcutaneous injection 1-6 Units  1-6 Units Subcutaneous HS    insulin lispro (HumaLOG) 100 units/mL subcutaneous injection 12 Units  12 Units Subcutaneous Daily Before Lunch    insulin lispro (HumaLOG) 100 units/mL subcutaneous injection 12 Units  12 Units Subcutaneous Before Dinner    insulin lispro (HumaLOG) 100 units/mL subcutaneous injection 15 Units  15 Units Subcutaneous Daily Before Breakfast    insulin lispro (HumaLOG) 100 units/mL subcutaneous injection 2-12 Units  2-12 Units Subcutaneous TID AC    magnesium hydroxide (MILK OF MAGNESIA) 400 mg/5 mL oral suspension 30 mL  30 mL Oral PRN    magnesium oxide (MAG-OX) tablet 400 mg  400 mg Oral Daily    ondansetron (ZOFRAN) injection 4 mg  4 mg Intravenous Q6H PRN    oxybutynin (DITROPAN) tablet 5 mg  5 mg Oral BID    pravastatin (PRAVACHOL) tablet 40 mg  40 mg Oral Daily With Dinner    tiotropium (SPIRIVA) capsule for inhaler 18 mcg  18 mcg Inhalation Daily       Imaging:  Post intervention limited LLE ANTONIO 9/14/2017:  Left SIOBHAN 1 79 consistent with poorly compressible vessels  Great toe pressure greatly attenuated unable to be obtained  Previously GTP 44  Final report pending      Physical Exam:    General appearance: alert, appears stated age, cooperative and no distress  Neurologic: Grossly normal  Neck: no adenopathy, no carotid bruit, no JVD and supple, symmetrical, trachea midline  Lungs: clear to auscultation bilaterally  Chest wall: no tenderness, Midline sternotomy scar noted well healed  Sternum stable  Heart: regular rate and rhythm, S1: normal, S2: normal prosthetic S2, no S3 or S4, no rub and No murmur  Abdomen: soft, non-tender; bowel sounds normal; no masses,  no organomegaly and Nondistended  No abdominal bruits  Extremities: Left foot VAC dressing in place with good seal   Bilateral lower extremities motor and sensory intact  No ulcerations of left toes  Right groin access site soft without hematoma, hemorrhage or pulsatile mass  Wound/Incision:  Left foot wound VAC dressing in place      Pulse exam:  Radial: Right: 2+ Left[de-identified] 2+  Femoral: Right: 2+ Left: 2+  Popliteal: Right: non-palpable Left: non-palpable  DP: Right: non-palpable Left: Obscured by dressing  PT: Right: non-palpable Left: Obscured by dressing      Noe Browning PA-C  9/14/2017  [facility], [phone]

## 2017-09-15 LAB
ANION GAP SERPL CALCULATED.3IONS-SCNC: 7 MMOL/L (ref 4–13)
APTT PPP: 111 SECONDS (ref 23–35)
APTT PPP: 33 SECONDS (ref 23–35)
APTT PPP: 61 SECONDS (ref 23–35)
BUN SERPL-MCNC: 17 MG/DL (ref 5–25)
CALCIUM SERPL-MCNC: 9.3 MG/DL (ref 8.3–10.1)
CHLORIDE SERPL-SCNC: 102 MMOL/L (ref 100–108)
CO2 SERPL-SCNC: 28 MMOL/L (ref 21–32)
CREAT SERPL-MCNC: 1.12 MG/DL (ref 0.6–1.3)
GFR SERPL CREATININE-BSD FRML MDRD: 64 ML/MIN/1.73SQ M
GLUCOSE SERPL-MCNC: 137 MG/DL (ref 65–140)
GLUCOSE SERPL-MCNC: 160 MG/DL (ref 65–140)
GLUCOSE SERPL-MCNC: 166 MG/DL (ref 65–140)
GLUCOSE SERPL-MCNC: 207 MG/DL (ref 65–140)
GLUCOSE SERPL-MCNC: 244 MG/DL (ref 65–140)
HCT VFR BLD AUTO: 28.5 % (ref 36.5–49.3)
HGB BLD-MCNC: 9.7 G/DL (ref 12–17)
INR PPP: 1.06 (ref 0.86–1.16)
POTASSIUM SERPL-SCNC: 3.4 MMOL/L (ref 3.5–5.3)
PROTHROMBIN TIME: 13.8 SECONDS (ref 12.1–14.4)
SODIUM SERPL-SCNC: 137 MMOL/L (ref 136–145)

## 2017-09-15 PROCEDURE — 80048 BASIC METABOLIC PNL TOTAL CA: CPT | Performed by: PODIATRIST

## 2017-09-15 PROCEDURE — 85610 PROTHROMBIN TIME: CPT | Performed by: PODIATRIST

## 2017-09-15 PROCEDURE — 97110 THERAPEUTIC EXERCISES: CPT

## 2017-09-15 PROCEDURE — 87077 CULTURE AEROBIC IDENTIFY: CPT | Performed by: PODIATRIST

## 2017-09-15 PROCEDURE — 82948 REAGENT STRIP/BLOOD GLUCOSE: CPT

## 2017-09-15 PROCEDURE — 87186 SC STD MICRODIL/AGAR DIL: CPT | Performed by: PODIATRIST

## 2017-09-15 PROCEDURE — 85014 HEMATOCRIT: CPT | Performed by: PODIATRIST

## 2017-09-15 PROCEDURE — 87205 SMEAR GRAM STAIN: CPT | Performed by: PODIATRIST

## 2017-09-15 PROCEDURE — 85730 THROMBOPLASTIN TIME PARTIAL: CPT | Performed by: PODIATRIST

## 2017-09-15 PROCEDURE — 85018 HEMOGLOBIN: CPT | Performed by: PODIATRIST

## 2017-09-15 PROCEDURE — 87147 CULTURE TYPE IMMUNOLOGIC: CPT | Performed by: PODIATRIST

## 2017-09-15 PROCEDURE — 87176 TISSUE HOMOGENIZATION CULTR: CPT | Performed by: PODIATRIST

## 2017-09-15 PROCEDURE — 87070 CULTURE OTHR SPECIMN AEROBIC: CPT | Performed by: PODIATRIST

## 2017-09-15 RX ORDER — WARFARIN SODIUM 7.5 MG/1
7.5 TABLET ORAL
Status: DISCONTINUED | OUTPATIENT
Start: 2017-09-15 | End: 2017-09-18

## 2017-09-15 RX ORDER — WARFARIN SODIUM 5 MG/1
5 TABLET ORAL
Status: DISCONTINUED | OUTPATIENT
Start: 2017-09-18 | End: 2017-09-18

## 2017-09-15 RX ORDER — SODIUM HYPOCHLORITE 2.5 MG/ML
1 SOLUTION TOPICAL DAILY
Status: DISCONTINUED | OUTPATIENT
Start: 2017-09-15 | End: 2017-10-03 | Stop reason: HOSPADM

## 2017-09-15 RX ADMIN — COLLAGENASE SANTYL: 250 OINTMENT TOPICAL at 09:00

## 2017-09-15 RX ADMIN — Medication 400 MG: at 08:52

## 2017-09-15 RX ADMIN — OXYBUTYNIN CHLORIDE 5 MG: 5 TABLET ORAL at 10:04

## 2017-09-15 RX ADMIN — FERROUS SULFATE TAB 325 MG (65 MG ELEMENTAL FE) 325 MG: 325 (65 FE) TAB at 17:02

## 2017-09-15 RX ADMIN — GABAPENTIN 100 MG: 100 CAPSULE ORAL at 08:52

## 2017-09-15 RX ADMIN — WARFARIN SODIUM 7.5 MG: 7.5 TABLET ORAL at 17:02

## 2017-09-15 RX ADMIN — INSULIN LISPRO 2 UNITS: 100 INJECTION, SOLUTION INTRAVENOUS; SUBCUTANEOUS at 08:53

## 2017-09-15 RX ADMIN — HEPARIN SODIUM 8000 UNITS: 1000 INJECTION, SOLUTION INTRAVENOUS; SUBCUTANEOUS at 14:02

## 2017-09-15 RX ADMIN — TIOTROPIUM BROMIDE 18 MCG: 18 CAPSULE ORAL; RESPIRATORY (INHALATION) at 10:04

## 2017-09-15 RX ADMIN — OXYBUTYNIN CHLORIDE 5 MG: 5 TABLET ORAL at 17:01

## 2017-09-15 RX ADMIN — FUROSEMIDE 40 MG: 40 TABLET ORAL at 08:52

## 2017-09-15 RX ADMIN — GABAPENTIN 100 MG: 100 CAPSULE ORAL at 21:49

## 2017-09-15 RX ADMIN — INSULIN LISPRO 2 UNITS: 100 INJECTION, SOLUTION INTRAVENOUS; SUBCUTANEOUS at 21:48

## 2017-09-15 RX ADMIN — HEPARIN SODIUM AND DEXTROSE 18 UNITS/KG/HR: 10000; 5 INJECTION INTRAVENOUS at 03:52

## 2017-09-15 RX ADMIN — CLOPIDOGREL BISULFATE 75 MG: 75 TABLET ORAL at 08:52

## 2017-09-15 RX ADMIN — GABAPENTIN 100 MG: 100 CAPSULE ORAL at 17:01

## 2017-09-15 RX ADMIN — INSULIN GLARGINE 40 UNITS: 100 INJECTION, SOLUTION SUBCUTANEOUS at 21:48

## 2017-09-15 RX ADMIN — FLUTICASONE PROPIONATE AND SALMETEROL 1 PUFF: 50; 500 POWDER RESPIRATORY (INHALATION) at 08:55

## 2017-09-15 RX ADMIN — INSULIN LISPRO 4 UNITS: 100 INJECTION, SOLUTION INTRAVENOUS; SUBCUTANEOUS at 12:56

## 2017-09-15 RX ADMIN — HEPARIN SODIUM AND DEXTROSE 19 UNITS/KG/HR: 10000; 5 INJECTION INTRAVENOUS at 22:15

## 2017-09-15 RX ADMIN — INSULIN LISPRO 15 UNITS: 100 INJECTION, SOLUTION INTRAVENOUS; SUBCUTANEOUS at 08:52

## 2017-09-15 RX ADMIN — FERROUS SULFATE TAB 325 MG (65 MG ELEMENTAL FE) 325 MG: 325 (65 FE) TAB at 08:52

## 2017-09-15 RX ADMIN — FLUTICASONE PROPIONATE AND SALMETEROL 1 PUFF: 50; 500 POWDER RESPIRATORY (INHALATION) at 21:48

## 2017-09-15 RX ADMIN — HEPARIN SODIUM AND DEXTROSE 19 UNITS/KG/HR: 10000; 5 INJECTION INTRAVENOUS at 14:04

## 2017-09-15 RX ADMIN — PRAVASTATIN SODIUM 40 MG: 40 TABLET ORAL at 17:02

## 2017-09-15 RX ADMIN — INSULIN LISPRO 12 UNITS: 100 INJECTION, SOLUTION INTRAVENOUS; SUBCUTANEOUS at 17:02

## 2017-09-15 RX ADMIN — HYOSCYAMINE SULFATE 1 APPLICATION: 16 SOLUTION at 17:01

## 2017-09-15 NOTE — PHYSICAL THERAPY NOTE
PT PROGRESS NOTE     09/15/17 1615   Pain Assessment   Pain Score No Pain   Restrictions/Precautions   RLE Weight Bearing Per Order WBAT   LLE Weight Bearing Per Order NWB   Other Precautions Fall Risk;Pain;Multiple lines;Hard of hearing  (wound vac)   General   Chart Reviewed Yes   Response to Previous Treatment Patient with no complaints from previous session  Family/Caregiver Present No   Cognition   Overall Cognitive Status WFL   Arousal/Participation Alert   Attention Within functional limits   Following Commands Follows one step commands without difficulty   Subjective   Subjective Pt agreeable to therapy  Exercises   Quad Sets Supine;10 reps;AROM; Bilateral   Glute Sets Supine;10 reps;AROM; Bilateral   Ankle Pumps Supine;10 reps;AROM; Bilateral   Assessment   Prognosis Fair   Problem List Decreased strength;Decreased endurance; Impaired balance;Decreased mobility;Pain;Decreased skin integrity;Orthopedic restrictions; Obesity; Impaired hearing  (NWB L foot)   Assessment PT reconsulted on 9/15 to resume services  Ortho cleared pt to continue PT & OT as well  No medical status change from previous PT session  Pt still NWB to LLE  Pt tolerated above mentioned thera  ex well  PT session terminated as podiatry team arrived for wound care/dressing change  Will continue PT per POC  Continue sliding board transfers for safe transfers OOB<>chair & to maintain NWB to L foot  Pt continue to benefit from inpt rehab at D/C   following  Nsg staff to continue to mobilized pt as tolerated to prevent decline in function  Nsg notified  Barriers to Discharge Inaccessible home environment;Decreased caregiver support   Goals   Patient Goals none stated   STG Expiration Date 09/21/17   Treatment Day 4   Plan   Treatment/Interventions Functional transfer training;LE strengthening/ROM; Therapeutic exercise; Endurance training;Patient/family training;Bed mobility;Gait training;Spoke to nursing   Progress Slow progress, multiple tests/procedures   PT Frequency 5x/wk   Recommendation   Recommendation Short-term skilled PT   Equipment Recommended Wheelchair   PT - OK to Discharge Yes  (to inpt rehab)   Ary Hughes PT

## 2017-09-15 NOTE — TREATMENT PLAN
Podiatry Update:     Patient seen with attending today  Wound vac discontinued (2 black sponges removed) today due to worsening appearance for wound  There is a boggy appearance with malodor present  After verbal consent obtained, excisional debridement performed using a 15 and 10 blade down to the level of bone to remove all necrotic and nonviable tissue to reveal a healthier wound base of less than 20 sq cm  New tissue culture was obtained and sent  Wound vac holiday for at least 24 hours, will re-evaluate wound tomorrow, further OR debridement of soft tissue and bone may be necessary  Indrain's wet to dry applied to left heel wound  Nursing communications to perform dressing changes 2x/day  Possibly worsening of wound could be attributed to poor circulation, therefore will not apply ACE wraps  Infectious Disease has signed off at this, however, due to the worsening nature of wound will speak with them to provide recommendations for antibiotics  Also, the left lateral submet 5 wound was evaluated, noted to have a superficial purulence with macerated skin edge at the distal aspect of wound  Using a 15 blade, eschar was debrided and explored  There was no sinus tract or further purulence noted  Dressing reapplied: maxorb and dry sterile dressing

## 2017-09-15 NOTE — PLAN OF CARE
Problem: Nutrition/Hydration-ADULT  Goal: Nutrient/Hydration intake appropriate for improving, restoring or maintaining nutritional needs  Monitor and assess patient's nutrition/hydration status for malnutrition (ex- brittle hair, bruises, dry skin, pale skin and conjunctiva, muscle wasting, smooth red tongue, and disorientation)  Collaborate with interdisciplinary team and initiate plan and interventions as ordered  Monitor patient's weight and dietary intake as ordered or per policy  Utilize nutrition screening tool and intervene per policy  Determine patient's food preferences and provide high-protein, high-caloric foods as appropriate  INTERVENTIONS:  - Monitor oral intake, urinary output, labs, and treatment plans  - Assess nutrition and hydration status and recommend course of action  - Evaluate amount of meals eaten  - Assist patient with eating if necessary   - Allow adequate time for meals  - Recommend/ encourage appropriate diets, oral nutritional supplements, and vitamin/mineral supplements  - Order, calculate, and assess calorie counts as needed  - Recommend, monitor, and adjust tube feedings and TPN/PPN based on assessed needs  - Assess need for intravenous fluids  - Provide specific nutrition/hydration education as appropriate  - Include patient/family/caregiver in decisions related to nutrition   Outcome: Progressing      Problem: Potential for Falls  Goal: Patient will remain free of falls  INTERVENTIONS:  - Assess patient frequently for physical needs  -  Identify cognitive and physical deficits and behaviors that affect risk of falls    -  [place] fall precautions as indicated by assessment   - Educate patient/family on patient safety including physical limitations  - Instruct patient to call for assistance with activity based on assessment  - Modify environment to reduce risk of injury  - Consider OT/PT consult to assist with strengthening/mobility   Outcome: Progressing      Problem: Prexisting or High Potential for Compromised Skin Integrity  Goal: Skin integrity is maintained or improved  INTERVENTIONS:  - Identify patients at risk for skin breakdown  - Assess and monitor skin integrity  - Assess and monitor nutrition and hydration status  - Monitor labs (i e  albumin)  - Assess for incontinence   - Turn and reposition patient  - Assist with mobility/ambulation  - Relieve pressure over bony prominences  - Avoid friction and shearing  - Provide appropriate hygiene as needed including keeping skin clean and dry  - Evaluate need for skin moisturizer/barrier cream  - Collaborate with interdisciplinary team (i e  Nutrition, Rehabilitation, etc )   - Patient/family teaching   Outcome: Progressing      Problem: PAIN - ADULT  Goal: Verbalizes/displays adequate comfort level or baseline comfort level  Interventions:  - Encourage patient to monitor pain and request assistance  - Assess pain using appropriate pain scale  - Administer analgesics based on type and severity of pain and evaluate response  - Implement non-pharmacological measures as appropriate and evaluate response  - Consider cultural and social influences on pain and pain management  - Notify physician/advanced practitioner if interventions unsuccessful or patient reports new pain   Outcome: Progressing      Problem: INFECTION - ADULT  Goal: Absence or prevention of progression during hospitalization  INTERVENTIONS:  - Assess and monitor for signs and symptoms of infection  - Monitor lab/diagnostic results  - Monitor all insertion sites, i e  indwelling lines, tubes, and drains  - Monitor endotracheal (as able) and nasal secretions for changes in amount and color  - [place] appropriate cooling/warming therapies per order  - Administer medications as ordered  - Instruct and encourage patient and family to use good hand hygiene technique  - Identify and instruct in appropriate isolation precautions for identified infection/condition Outcome: Progressing      Problem: SAFETY ADULT  Goal: Maintain or return to baseline ADL function  INTERVENTIONS:  -  Assess patient's ability to carry out ADLs; assess patient's baseline for ADL function and identify physical deficits which impact ability to perform ADLs (bathing, care of mouth/teeth, toileting, grooming, dressing, etc )  - Assess/evaluate cause of self-care deficits   - Assess range of motion  - Assess patient's mobility; develop plan if impaired  - Assess patient's need for assistive devices and provide as appropriate  - Encourage maximum independence but intervene and supervise when necessary  ¯ Involve family in performance of ADLs  ¯ Assess for home care needs following discharge   ¯ Request OT consult to assist with ADL evaluation and planning for discharge  ¯ Provide patient education as appropriate   Outcome: Progressing    Goal: Maintain or return mobility status to optimal level  INTERVENTIONS:  - Assess patient's baseline mobility status (ambulation, transfers, stairs, etc )    - Identify cognitive and physical deficits and behaviors that affect mobility  - Identify mobility aids required to assist with transfers and/or ambulation (gait belt, sit-to-stand, lift, walker, cane, etc )  - [place] fall precautions as indicated by assessment  - Record patient progress and toleration of activity level on Mobility SBAR; progress patient to next Phase/Stage  - Instruct patient to call for assistance with activity based on assessment  - Request Rehabilitation consult to assist with strengthening/weightbearing, etc    Outcome: Progressing      Problem: DISCHARGE PLANNING  Goal: Discharge to home or other facility with appropriate resources  INTERVENTIONS:  - Identify barriers to discharge w/patient and caregiver  - Arrange for needed discharge resources and transportation as appropriate  - Identify discharge learning needs (meds, wound care, etc )  - Arrange for interpretive services to assist at discharge as needed  - Refer to Case Management Department for coordinating discharge planning if the patient needs post-hospital services based on physician/advanced practitioner order or complex needs related to functional status, cognitive ability, or social support system   Outcome: Progressing      Problem: Knowledge Deficit  Goal: Patient/family/caregiver demonstrates understanding of disease process, treatment plan, medications, and discharge instructions  Complete learning assessment and assess knowledge base    Interventions:  - Provide teaching at level of understanding  - Provide teaching via preferred learning methods   Outcome: Progressing      Problem: SKIN/TISSUE INTEGRITY - ADULT  Goal: Skin integrity remains intact  INTERVENTIONS  - Identify patients at risk for skin breakdown  - Assess and monitor skin integrity  - Assess and monitor nutrition and hydration status  - Monitor labs (i e  albumin)  - Assess for incontinence   - Turn and reposition patient  - Assist with mobility/ambulation  - Relieve pressure over bony prominences  - Avoid friction and shearing  - Provide appropriate hygiene as needed including keeping skin clean and dry  - Evaluate need for skin moisturizer/barrier cream  - Collaborate with interdisciplinary team (i e  Nutrition, Rehabilitation, etc )   - Patient/family teaching   Outcome: Progressing    Goal: Incision(s), wounds(s) or drain site(s) healing without S/S of infection  INTERVENTIONS  - Assess and document risk factors for skin impairment   - Assess and document dressing, incision, wound bed, drain sites and surrounding tissue  - Initiate Nutrition services consult and/or wound management as needed   Outcome: Progressing      Problem: DISCHARGE PLANNING - CARE MANAGEMENT  Goal: Discharge to post-acute care or home with appropriate resources  INTERVENTIONS:  - Conduct assessment to determine patient/family and health care team treatment goals, and need for post-acute services based on payer coverage, community resources, and patient preferences, and barriers to discharge  - Address psychosocial, clinical, and financial barriers to discharge as identified in assessment in conjunction with the patient/family and health care team  - Arrange appropriate level of post-acute services according to patients   needs and preference and payer coverage in collaboration with the physician and health care team  - Communicate with and update the patient/family, physician, and health care team regarding progress on the discharge plan  - Arrange appropriate transportation to post-acute venues   Outcome: Progressing

## 2017-09-15 NOTE — PLAN OF CARE
Problem: PHYSICAL THERAPY ADULT  Goal: Performs mobility at highest level of function for planned discharge setting  See evaluation for individualized goals  Treatment/Interventions: Functional transfer training, LE strengthening/ROM, Elevations, Therapeutic exercise, Endurance training, Patient/family training, Equipment eval/education, Bed mobility, Gait training, OT  Equipment Recommended: Walker (RW)       See flowsheet documentation for full assessment, interventions and recommendations  Outcome: Progressing  Prognosis: Fair  Problem List: Decreased strength, Decreased endurance, Impaired balance, Decreased mobility, Pain, Decreased skin integrity, Orthopedic restrictions, Obesity, Impaired hearing (NWB L foot)  Assessment: PT reconsulted on 9/15 to resume services  Ortho cleared pt to continue PT & OT as well  No medical status change from previous PT session  Pt still NWB to LLE  Pt tolerated above mentioned thera  ex well  PT session terminated as podiatry team arrived for wound care/dressing change  Will continue PT per POC  Continue sliding board transfers for safe transfers OOB<>chair & to maintain NWB to L foot  Pt continue to benefit from inpt rehab at D/C  CM following  Nsg staff to continue to mobilized pt as tolerated to prevent decline in function  Nsg notified  Barriers to Discharge: Inaccessible home environment, Decreased caregiver support     Recommendation: Short-term skilled PT     PT - OK to Discharge: Yes (to inpt rehab)    See flowsheet documentation for full assessment

## 2017-09-15 NOTE — PROGRESS NOTES
Progress Note - Podiatry  Abisai Bryson 78 y o  male MRN: [id number]  Unit/Bed#: E4 -01 Encounter: [id number]    Assessment/Plan:  1  78 y  o  y/o male s/p excisional debridement with partial calcanectomy and application of wound vac therapy on left heel by Dr Marcos Mo (DOS: 09/08/2017)  - wound vac changed yesterday: 2 black sponges removed and 2 reapplied with 1 in wound and 1 for bridge running at 75mmhg low continuous without any complications; to be changed 3x/week every Tu/Th/Sat  - per ID recommendation, continue to monitor patient off antibiotics; clean margin with minimal acute and chronic inflammation with no malignancy   - WBS: NWB to LLE; PT/OT onboard and both recommend rehab; CM onboard awaiting placement   - c/w prevalon boots while in bed to offload as much as possible and prevent worsening of ulceration      2  Leslie Deck Stage 1 Ulceration to left lateral forefoot - POA  - podiatry to c/w LWC: dermagran + DSD; dressing changes to be performed 3x/week every Tu/Th/Sat   3  Right Stage 1 Pressure Ulceration - POA   - podiatry to c/w LWC: dermagrab + DSD; dressing changes to be performed 3x/week every Tu/Th/Sat  - c/w prevalon boots while in bed to offload as much as possible and prevent worsening of ulceration      4  DM, type 2 with neuropathy: A1c = 6 6% - per endocrinology   - c/w humalog 12 units during lunch and dinner and 8 units and increase lantus 40 units HS; in addition with ISS and accuchecks; BS range = low to mid 200s  - CC1 diet     5  PAD - per vascular   - s/p LLE agram with angioplasty per IR; 2 vessel runoff via AT and PT with small vessel disease  - LEADs reviewed: 50-75% stenosis of L distal SFA; repeat post agram LEADs are limited as SIOBHAN are non compressible, met pressure unobtainable due to vac dressings, and GTP flat line; vascular recommending LWC at this point and monitor progress of wound healing      6   CKD, stage 3 - per nephrology   - baseline creatinine 1 2-1 4; creatinine around baseline (1 12)   - c/w beckett catheter   - c/w oxybutynin      7  CHF - stable   - c/w lasix   8  Afib   - c/w heparin drip   9  Hx of Mechanical Heart Valve - AVR  - c/w heparin drip, coumadin restarted today to begin bridge  - c/w statin; and plavix started due to ASA allergy      10  Knee Pain - improved with no complaints   - xrays reviewed: mild degenerative changes noted   - per Ortho: not a good canditate for steroid injection due to hx of DM  - c/w PT/OT for management      11  Hyponatremia   - check BMP in AM 9/15     12  Blood in Stool   - H&H for today pending     13  R knee pain, likely secondary to DJD  - per Ortho not a good candidate for steroid injection, appreciate their recommendations      > appreciate medical management per internal medicine      Dispo: awaiting placement    Subjective/Objective   Chief Complaint:   Chief Complaint   Patient presents with    Weakness - Generalized     pt woke up this morning just feeling very weak and unable to get up   pt denies fevers, n/v   denies any pain or sick contacts  Subjective: 78 y o  y/o male was seen and evaluated at bedside  Offers no O/N complaints    Blood pressure 142/60, pulse 67, temperature 97 9 °F (36 6 °C), temperature source Tympanic, resp  rate 18, height 5' 9 5" (1 765 m), weight 108 kg (238 lb 1 6 oz), SpO2 96 %  ,Body mass index is 34 66 kg/m²  Invasive Devices     Peripheral Intravenous Line            Peripheral IV 09/13/17 Right Forearm 2 days          Drain            Urethral Catheter 18 Fr  12 days    Negative Pressure Wound Therapy (V A C ) Foot Left 6 days                Physical Exam:   General: Alert, cooperative and no distress  Lungs: Non labored breathing  Heart: Positive S1, S2  Abdomen: Soft, non-tender  Extremity: NVS and motor function at baselin b/l  Wound vac running at 75mmHg continuous with minima leak  Dressings to b/l LE dry clean and intact             Lab, Imaging and other studies: I have personally reviewed pertinent lab results  , CBC: No results found for: WBC, HGB, HCT, MCV, PLT, ADJUSTEDWBC, MCH, MCHC, RDW, MPV, NRBC, CMP:   Lab Results   Component Value Date     09/15/2017    K 3 4 (L) 09/15/2017     09/15/2017    CO2 28 09/15/2017    ANIONGAP 7 09/15/2017    BUN 17 09/15/2017    CREATININE 1 12 09/15/2017    GLUCOSE 166 (H) 09/15/2017    CALCIUM 9 3 09/15/2017    EGFR 64 09/15/2017       Imaging: I have personally reviewed pertinent films in PACS  EKG, Pathology, and Other Studies: I have personally reviewed pertinent reports    VTE Pharmacologic Prophylaxis: Heparin

## 2017-09-15 NOTE — PROGRESS NOTES
[facility] Internal Medicine Progress Note  Patient: Akira Peres 78 y o  male   MRN: [id number]  PCP: Enriqueta Styles MD  Unit/Bed#: E4 -32 Encounter: [id number]  Date Of Visit: 09/15/17    Assessment:    Principal Problem:    Asthenia  Active Problems:    Decubitus ulcer of heel, stage 3    Leukocytosis    Stage 3 chronic kidney disease    Chronic indwelling Rush catheter    Abnormal urinalysis    Chronic atrial fibrillation    Diabetes mellitus    Chronic diastolic heart failure    PAD (peripheral artery disease)    S/P AVR      Plan:    · Left heel decubitus ulcer status post I and D and partial calcanectomy currently with wound VAC nonweightbearing status per Podiatry will need rehab  · History of mechanical heart valve  AVR   ,present on heparin drip had been on warfarin in preparation for possible OR intervention as needed/recommendation with history of AFib is to resume warfarin once discharge plan stable  · Peripheral vascular disease underwent left SFA angioplasty yesterday via IR  · Chronic diastolic heart failure stable  · Chronic indwelling Rush catheter on oxybutynin  · Type 2 diabetes mellitus present insulin maintenance is Humalog 12 units at lunch and dinner otherwise coverage at meals with 15 units and 40 units of Lantus changed by Endocrinology  · Diabetic neuropathy continue gabapentin  · Bilateral lower extremity wounds left partial calcanectomy with wound VAC application secondary to osteomyelitis nonweightbearing to left lower extremity wound cultures growing MSSA and Enterococcus off antibiotics presently bone culture thus far negative also right posterior heel ulceration mostly healed dressed  · Chronic kidney disease will receive IV hydration preparation for angiography now scheduled for tomorrow and Mucomyst  · Bright red blood per rectum noted in stool small volume had recent colonoscopy this past year unremarkable and denies history of hemorrhoids will continue to monitor check H&H and CBC in a m /warfarin restarted with heparin bridge as Podiatry and vascular not proposing any imminent interventions  · Right knee pain to medial aspect especially with x-ray showing no osseous abnormalities does not appear to be a gouty flare  orthopedic consultation believes most E are osteoarthritic and due to immobility as most of pain appears to be in the medial joint js and the patient will be eventually weight-bearing in his rehab assignment hopefully further physical therapy may diminish      VTE Pharmacologic Prophylaxis:   Pharmacologic: Heparin Drip  Mechanical VTE Prophylaxis in Place: No    Discussions with Specialists or Other Care Team Provider: no    Time Spent for Care: 45 minutes  More than 50% of total time spent on counseling and coordination of care as described above  Subjective:  No new complaints shortness of breath or chest pain noted was noted to have bright red blood around stool specimen today without significant volume with clots no abdominal pain and recent colonoscopy unremarkable  Patient seems despondent and depressed over his situation and recent cancellation or postpone it to rehab      Objective:     Vitals:   Temp (24hrs), Av 2 °F (36 8 °C), Min:97 9 °F (36 6 °C), Max:98 4 °F (36 9 °C)    HR:  [63-81] 67  Resp:  [18] 18  BP: (118-142)/(60-70) 142/60  SpO2:  [95 %-98 %] 96 %  Body mass index is 34 66 kg/m²  Input and Output Summary (last 24 hours):        Intake/Output Summary (Last 24 hours) at 09/15/17 1041  Last data filed at 09/15/17 8648   Gross per 24 hour   Intake              375 ml   Output             2915 ml   Net            -2540 ml       Physical Exam:     Physical Exam:   General appearance: alert, appears stated age and cooperative  Head: Normocephalic, without obvious abnormality, atraumatic  Lungs: clear to auscultation bilaterally  Heart: regular rate and rhythm and Mechanical heart sounds noted  Abdomen: soft, non-tender; bowel sounds normal; no masses,  no organomegaly  Back: negative  Extremities: Has significant medial aspect right knee pain very tender to touch without erythema or redness or warmth  Neurologic: Grossly normal      Additional Data:     Labs:      Results from last 7 days  Lab Units 09/13/17  0648   WBC Thousand/uL 9 97   HEMOGLOBIN g/dL 9 4*   HEMATOCRIT % 27 5*   PLATELETS Thousands/uL 282   NEUTROS PCT % 81*   LYMPHS PCT % 9*   MONOS PCT % 6   EOS PCT % 3       Results from last 7 days  Lab Units 09/15/17  0351   SODIUM mmol/L 137   POTASSIUM mmol/L 3 4*   CHLORIDE mmol/L 102   CO2 mmol/L 28   BUN mg/dL 17   CREATININE mg/dL 1 12   CALCIUM mg/dL 9 3   GLUCOSE RANDOM mg/dL 166*       Results from last 7 days  Lab Units 09/11/17  0601   INR  1 06       * I Have Reviewed All Lab Data Listed Above  * Additional Pertinent Lab Tests Reviewed: All Labs For Current Hospital Admission Reviewed    Imaging:  Xr Chest 2 Views    Result Date: 9/2/2017  Narrative: CHEST INDICATION:  Weakness  COMPARISON:  None VIEWS:  Frontal and lateral projections IMAGES:  2 FINDINGS:  Sternotomy wires are intact  Prior prosthetic heart valve  Cardiac silhouette is within upper limits of normal in size  The lungs are clear  No pneumothorax or pleural effusion  Visualized osseous structures appear within normal limits for the patient's age  Impression: No active pulmonary disease  Workstation performed: [id number]     Xr Knee 1 Or 2 Vw Right    Result Date: 9/11/2017  Narrative: RIGHT KNEE INDICATION:  Right knee pain  COMPARISON: None VIEWS:  AP and lateral IMAGES:  2 FINDINGS: There is no acute fracture or dislocation  There is no joint effusion  There is mild degenerative change with medial compartment narrowing  No lytic or blastic lesions are seen  Soft tissues are unremarkable  Impression: No acute osseous abnormality  Mild degenerative change   Workstation performed: [id number]     Xr Foot 3+ Vw Left    Result Date: 9/3/2017  Narrative: LEFT FOOT INDICATION:  "eval OM, ulcer on heel of left foot " COMPARISON: Calcaneus radiographs 8/21/2017  VIEWS:  3 IMAGES:  3 FINDINGS: There is no acute fracture or dislocation  No definite osteomyelitis  Mild/moderate diffuse degenerative changes most prominent the tibiotalar joint space  Advanced atherosclerotic calcifications  Impression: No definite osteomyelitis  Workstation performed: [id number]     Xr Heel / Calcaneus 2+ Vw Left    Result Date: 9/7/2017  Narrative: LEFT HEEL INDICATION:  Heel ulcer  COMPARISON: Plain foot radiograph September 3, 2017 and heel images August 21, 2017  VIEWS:  2 IMAGES:  3 FINDINGS: There is no acute fracture or dislocation  Degenerative changes in the midfoot and hindfoot  There is ill-defined, vague lucency within the posterolateral calcaneus  There is no overlying cortical destruction  Given the history of ulceration in this area which probes to the bone (as per podiatry's notes), early osteomyelitis not excluded  Soft tissue swelling with ulceration along the plantar calcaneus  Impression: Ill-defined, vague lucency in the posterior calcaneus without overlying cortical destruction  Given the history, early osteomyelitis not excluded  Recommend follow-up MRI with gadolinium or nuclear medicine white blood cell scan for further  evaluation  ##imslh##imslh Workstation performed: [id number]     Xr Heel / Calcaneus 2+ Vw Left    Result Date: 8/23/2017  Narrative: LEFT HEEL INDICATION:  Ulcer on left heel  COMPARISON: None VIEWS:  2 IMAGES:  3 FINDINGS: There is no acute fracture or dislocation  A tiny plantar calcaneal spur noted with degenerative changes of the tibiotalar joint also noted  No periosteal reaction or osseous destruction to suggest osteomyelitis  The posterior plantar soft tissue swelling with no retained radiopaque foreign body or subcutaneous emphysema  There are vascular calcifications throughout the regional soft tissues  Impression: No acute osseous abnormality  Plantar soft tissue swelling  Workstation performed: [id number]     Vas Lower Limb Arterial Duplex, Complete Bilateral    Result Date: 9/8/2017  Narrative:  [facility] REPORT CLINICAL: Indications:  Bilateral Atherosclerosis with Ulceration [I70 25]  The patient is admitted with left plantar and lateral ankle ulcerations, and right posterior heel ulceration (not open)  History of right 4th, 5th toe amputations  Risk Factors: The patient has history of diabetes, hyperlipidemia and PAD  Clinical Right Pressure:  124/ mm Hg, Left Pressure:  140/ mm Hg  FINDINGS:  Segment                Right                 Left                                          Impression  PSV  EDV  Impression  PSV  EDV  Common Femoral Artery  Normal       94   15  Normal      101    0  Prox Profunda                       72   15               58    9  Prox SFA                           106    0               92   14  Mid SFA                             88    0               72    9  Dist SFA                            74    0  50-75%      164   25  Proximal Pop                       127    0               54   24  Distal Pop                          96   14               85    0  Dist Post Tibial                   107    0              113   25  Dist  Ant  Tibial                   37    0              149   24     CONCLUSION: Impression: Right Lower Limb: Diffuse atherosclerotic arterial disease throughout the femoropopliteal vessels with no definite evidence for focal stenosis  Findings suggest tibioperoneal disease  Ankle Pressure: >254 mm Hg , SIOBHAN: 1 81, supra-normal, consistent with poorly compressible vessels  Prior same  Metatarsal Pressure:  Could not be obtained due to thick bandaging in place  Right Great Toe Pressure 68 mm Hg ,, above healing level for a diabetic patient  Prior 78 mm Hg    Left Lower Limb: Findings suggest a 50-75% stenosis in the distal superficial femoral artery  Diffuse atherosclerotic arterial disease throughout the femoropopliteal vessels  Findings suggest tibioperoneal disease  Ankle Pressure >254 mm Hg , SIOBHAN: 1 81,supra-normal, consistent with poorly compressible vessels  Prior same  Left Metatarsal Pressure: 129  mm Hg  Left Great Toe Pressure 44 mm Hg , below healing level for a diabetic patient  Prior 87 mm Hg  In comparison to the study of 3/10/2015 , there is new findings and progression of disease in the left leg, as described above  SIGNATURE: Electronically Signed by: Paris Diaz MD, [address] on 2017-09-08 03:33:32 PM    Imaging Reports Reviewed Today Include:  Reviewed  Imaging Personally Reviewed by Myself Includes:  No  Procedure: Xr Knee 1 Or 2 Vw Right    Result Date: 9/11/2017  Narrative: RIGHT KNEE INDICATION:  Right knee pain  COMPARISON: None VIEWS:  AP and lateral IMAGES:  2 FINDINGS: There is no acute fracture or dislocation  There is no joint effusion  There is mild degenerative change with medial compartment narrowing  No lytic or blastic lesions are seen  Soft tissues are unremarkable  Impression: No acute osseous abnormality  Mild degenerative change   Workstation performed: [id number]        Recent Cultures (last 7 days):       Results from last 7 days  Lab Units 09/08/17  1612   GRAM STAIN RESULT  No Polys or Bacteria seen       Last 24 Hours Medication List:     clopidogrel 75 mg Oral Daily   collagenase  Topical Daily   ferrous sulfate 325 mg Oral BID   fluticasone-salmeterol 1 puff Inhalation BID   furosemide 40 mg Oral Daily   gabapentin 100 mg Oral TID   insulin glargine 40 Units Subcutaneous HS   insulin lispro 1-6 Units Subcutaneous HS   insulin lispro 12 Units Subcutaneous Daily Before Lunch   insulin lispro 12 Units Subcutaneous Before Dinner   insulin lispro 15 Units Subcutaneous Daily Before Breakfast   insulin lispro 2-12 Units Subcutaneous TID AC   magnesium oxide 400 mg Oral Daily   oxybutynin 5 mg Oral BID pravastatin 40 mg Oral Daily With Dinner   tiotropium 18 mcg Inhalation Daily   [START ON 9/18/2017] warfarin 5 mg Oral Once per day on Mon Thu   warfarin 7 5 mg Oral Once per day on Sun Tue Wed Fri Sat        Today, Patient Was Seen By: Clementine Loyd MD    ** Please Note: Dragon 360 Dictation voice to text software may have been used in the creation of this document   **

## 2017-09-15 NOTE — PROGRESS NOTES
Progress Note - Infectious Disease   Robe Quiles 78 y o  male MRN: [id number]  Unit/Bed#: E4 -01 Encounter: [id number]      Impression/Recommendations:  1   Chronic left heel ulcer  X-ray suggested early osteomyelitis and wound debrided to bone  Postoperative day #7 status post washout/partial calcanectomy/VAC  OR cultures/path negative and no clinical signs of active infection in OR  Doubt there is any bone infection  Rec:                 · Continue to follow closely off antibiotics  · Continue local wound care/VAC as per podiatry     2   PAD  With tissue loss and abnormal LEADs  Postprocedure day #3 status post PTA  Rec:  · Close vascular follow-up ongoing     3   DM with peripheral neuropathy  A1C 6 6  Risk factor for #1  Rec:  · Continue management per medicine service     The patient is stable from an ID standpoint  Discussed in detail with the patient and the podiatry team  We will sign off for now  Please call with new questions      Antibiotics:  None    Subjective:  Patient seen on AM rounds  Denies fevers, chills, or sweats  Denies nausea, vomiting, or diarrhea  Objective:  Vitals:  HR:  [63-81] 67  Resp:  [18] 18  BP: (118-142)/(60-70) 142/60  SpO2:  [95 %-98 %] 96 %  Temp (24hrs), Av 2 °F (36 8 °C), Min:97 9 °F (36 6 °C), Max:98 4 °F (36 9 °C)  Current: Temperature: 97 9 °F (36 6 °C)    Physical Exam:   General:  No acute distress  Psychiatric:  Awake and alert  Pulmonary:  Normal respiratory excursion without accessory muscle use  Abdomen:  Soft, nontender  Extremities:  No edema, VAC to left foot  Skin:  No rashes    Lab Results:  I have personally reviewed pertinent labs      Results from last 7 days  Lab Units 09/15/17  0351 17  0648 17  0601   SODIUM mmol/L 137 133* 137   POTASSIUM mmol/L 3 4* 4 2 4 0   CHLORIDE mmol/L 102 102 101   CO2 mmol/L 28 24 30   ANION GAP mmol/L 7 7 6   BUN mg/dL 17 13 26*   CREATININE mg/dL 1 12 0 97 1 21   EGFR ml/min/1 73sq m 64 77 59 GLUCOSE RANDOM mg/dL 166* 250* 221*   CALCIUM mg/dL 9 3 8 5 9 1       Results from last 7 days  Lab Units 09/13/17  0648 09/12/17  1102 09/11/17  0602 09/09/17  0521   WBC Thousand/uL 9 97  --  8 54 10 87*   HEMOGLOBIN g/dL 9 4* 10 1* 10 6* 10 7*   PLATELETS Thousands/uL 282  --  330 304       Results from last 7 days  Lab Units 09/08/17  1612   GRAM STAIN RESULT  No Polys or Bacteria seen       Imaging Studies:   I have personally reviewed pertinent imaging study reports and images in PACS      EKG, Pathology, and Other Studies:   I have personally reviewed pertinent reports                        '

## 2017-09-16 LAB
ANION GAP SERPL CALCULATED.3IONS-SCNC: 11 MMOL/L (ref 4–13)
APTT PPP: 113 SECONDS (ref 23–35)
APTT PPP: 23 SECONDS (ref 23–35)
APTT PPP: 76 SECONDS (ref 23–35)
BASOPHILS # BLD AUTO: 0.05 THOUSANDS/ΜL (ref 0–0.1)
BASOPHILS NFR BLD AUTO: 1 % (ref 0–1)
BUN SERPL-MCNC: 19 MG/DL (ref 5–25)
CALCIUM SERPL-MCNC: 9.1 MG/DL (ref 8.3–10.1)
CHLORIDE SERPL-SCNC: 99 MMOL/L (ref 100–108)
CO2 SERPL-SCNC: 28 MMOL/L (ref 21–32)
CREAT SERPL-MCNC: 1.16 MG/DL (ref 0.6–1.3)
EOSINOPHIL # BLD AUTO: 0.34 THOUSAND/ΜL (ref 0–0.61)
EOSINOPHIL NFR BLD AUTO: 4 % (ref 0–6)
ERYTHROCYTE [DISTWIDTH] IN BLOOD BY AUTOMATED COUNT: 14.9 % (ref 11.6–15.1)
GFR SERPL CREATININE-BSD FRML MDRD: 62 ML/MIN/1.73SQ M
GLUCOSE SERPL-MCNC: 141 MG/DL (ref 65–140)
GLUCOSE SERPL-MCNC: 203 MG/DL (ref 65–140)
GLUCOSE SERPL-MCNC: 224 MG/DL (ref 65–140)
GLUCOSE SERPL-MCNC: 249 MG/DL (ref 65–140)
GLUCOSE SERPL-MCNC: 304 MG/DL (ref 65–140)
HCT VFR BLD AUTO: 27.3 % (ref 36.5–49.3)
HGB BLD-MCNC: 9.3 G/DL (ref 12–17)
INR PPP: 1.04 (ref 0.86–1.16)
LYMPHOCYTES # BLD AUTO: 1.19 THOUSANDS/ΜL (ref 0.6–4.47)
LYMPHOCYTES NFR BLD AUTO: 12 % (ref 14–44)
MCH RBC QN AUTO: 29 PG (ref 26.8–34.3)
MCHC RBC AUTO-ENTMCNC: 34.1 G/DL (ref 31.4–37.4)
MCV RBC AUTO: 85 FL (ref 82–98)
MONOCYTES # BLD AUTO: 0.7 THOUSAND/ΜL (ref 0.17–1.22)
MONOCYTES NFR BLD AUTO: 7 % (ref 4–12)
NEUTROPHILS # BLD AUTO: 7.43 THOUSANDS/ΜL (ref 1.85–7.62)
NEUTS SEG NFR BLD AUTO: 76 % (ref 43–75)
NRBC BLD AUTO-RTO: 0 /100 WBCS
PLATELET # BLD AUTO: 289 THOUSANDS/UL (ref 149–390)
PMV BLD AUTO: 8.7 FL (ref 8.9–12.7)
POTASSIUM SERPL-SCNC: 3.3 MMOL/L (ref 3.5–5.3)
PROTHROMBIN TIME: 13.6 SECONDS (ref 12.1–14.4)
RBC # BLD AUTO: 3.21 MILLION/UL (ref 3.88–5.62)
SODIUM SERPL-SCNC: 138 MMOL/L (ref 136–145)
WBC # BLD AUTO: 9.71 THOUSAND/UL (ref 4.31–10.16)

## 2017-09-16 PROCEDURE — 82948 REAGENT STRIP/BLOOD GLUCOSE: CPT

## 2017-09-16 PROCEDURE — 85025 COMPLETE CBC W/AUTO DIFF WBC: CPT | Performed by: PODIATRIST

## 2017-09-16 PROCEDURE — 85610 PROTHROMBIN TIME: CPT | Performed by: PODIATRIST

## 2017-09-16 PROCEDURE — 85730 THROMBOPLASTIN TIME PARTIAL: CPT | Performed by: PODIATRIST

## 2017-09-16 PROCEDURE — 80048 BASIC METABOLIC PNL TOTAL CA: CPT | Performed by: PODIATRIST

## 2017-09-16 RX ADMIN — INSULIN LISPRO 15 UNITS: 100 INJECTION, SOLUTION INTRAVENOUS; SUBCUTANEOUS at 08:59

## 2017-09-16 RX ADMIN — PRAVASTATIN SODIUM 40 MG: 40 TABLET ORAL at 16:54

## 2017-09-16 RX ADMIN — INSULIN GLARGINE 40 UNITS: 100 INJECTION, SOLUTION SUBCUTANEOUS at 21:48

## 2017-09-16 RX ADMIN — INSULIN LISPRO 8 UNITS: 100 INJECTION, SOLUTION INTRAVENOUS; SUBCUTANEOUS at 12:09

## 2017-09-16 RX ADMIN — INSULIN LISPRO 4 UNITS: 100 INJECTION, SOLUTION INTRAVENOUS; SUBCUTANEOUS at 08:59

## 2017-09-16 RX ADMIN — CLOPIDOGREL BISULFATE 75 MG: 75 TABLET ORAL at 08:58

## 2017-09-16 RX ADMIN — FERROUS SULFATE TAB 325 MG (65 MG ELEMENTAL FE) 325 MG: 325 (65 FE) TAB at 17:01

## 2017-09-16 RX ADMIN — INSULIN LISPRO 12 UNITS: 100 INJECTION, SOLUTION INTRAVENOUS; SUBCUTANEOUS at 16:55

## 2017-09-16 RX ADMIN — FERROUS SULFATE TAB 325 MG (65 MG ELEMENTAL FE) 325 MG: 325 (65 FE) TAB at 08:58

## 2017-09-16 RX ADMIN — GABAPENTIN 100 MG: 100 CAPSULE ORAL at 16:54

## 2017-09-16 RX ADMIN — TIOTROPIUM BROMIDE 18 MCG: 18 CAPSULE ORAL; RESPIRATORY (INHALATION) at 09:00

## 2017-09-16 RX ADMIN — OXYBUTYNIN CHLORIDE 5 MG: 5 TABLET ORAL at 17:02

## 2017-09-16 RX ADMIN — HEPARIN SODIUM 8000 UNITS: 1000 INJECTION, SOLUTION INTRAVENOUS; SUBCUTANEOUS at 18:42

## 2017-09-16 RX ADMIN — HYOSCYAMINE SULFATE 1 APPLICATION: 16 SOLUTION at 10:00

## 2017-09-16 RX ADMIN — GABAPENTIN 100 MG: 100 CAPSULE ORAL at 21:48

## 2017-09-16 RX ADMIN — OXYBUTYNIN CHLORIDE 5 MG: 5 TABLET ORAL at 09:00

## 2017-09-16 RX ADMIN — GABAPENTIN 100 MG: 100 CAPSULE ORAL at 08:58

## 2017-09-16 RX ADMIN — INSULIN LISPRO 3 UNITS: 100 INJECTION, SOLUTION INTRAVENOUS; SUBCUTANEOUS at 21:49

## 2017-09-16 RX ADMIN — HEPARIN SODIUM AND DEXTROSE 16 UNITS/KG/HR: 10000; 5 INJECTION INTRAVENOUS at 16:53

## 2017-09-16 RX ADMIN — FLUTICASONE PROPIONATE AND SALMETEROL 1 PUFF: 50; 500 POWDER RESPIRATORY (INHALATION) at 09:00

## 2017-09-16 RX ADMIN — FLUTICASONE PROPIONATE AND SALMETEROL 1 PUFF: 50; 500 POWDER RESPIRATORY (INHALATION) at 21:48

## 2017-09-16 RX ADMIN — Medication 400 MG: at 08:58

## 2017-09-16 RX ADMIN — WARFARIN SODIUM 7.5 MG: 7.5 TABLET ORAL at 17:01

## 2017-09-16 RX ADMIN — FUROSEMIDE 40 MG: 40 TABLET ORAL at 08:58

## 2017-09-16 NOTE — PLAN OF CARE
Problem: Nutrition/Hydration-ADULT  Goal: Nutrient/Hydration intake appropriate for improving, restoring or maintaining nutritional needs  Monitor and assess patient's nutrition/hydration status for malnutrition (ex- brittle hair, bruises, dry skin, pale skin and conjunctiva, muscle wasting, smooth red tongue, and disorientation)  Collaborate with interdisciplinary team and initiate plan and interventions as ordered  Monitor patient's weight and dietary intake as ordered or per policy  Utilize nutrition screening tool and intervene per policy  Determine patient's food preferences and provide high-protein, high-caloric foods as appropriate  INTERVENTIONS:  - Monitor oral intake, urinary output, labs, and treatment plans  - Assess nutrition and hydration status and recommend course of action  - Evaluate amount of meals eaten  - Assist patient with eating if necessary   - Allow adequate time for meals  - Recommend/ encourage appropriate diets, oral nutritional supplements, and vitamin/mineral supplements  - Order, calculate, and assess calorie counts as needed  - Recommend, monitor, and adjust tube feedings and TPN/PPN based on assessed needs  - Assess need for intravenous fluids  - Provide specific nutrition/hydration education as appropriate  - Include patient/family/caregiver in decisions related to nutrition   Outcome: Progressing      Problem: Potential for Falls  Goal: Patient will remain free of falls  INTERVENTIONS:  - Assess patient frequently for physical needs  -  Identify cognitive and physical deficits and behaviors that affect risk of falls    -  [place] fall precautions as indicated by assessment   - Educate patient/family on patient safety including physical limitations  - Instruct patient to call for assistance with activity based on assessment  - Modify environment to reduce risk of injury  - Consider OT/PT consult to assist with strengthening/mobility   Outcome: Progressing      Problem: Prexisting or High Potential for Compromised Skin Integrity  Goal: Skin integrity is maintained or improved  INTERVENTIONS:  - Identify patients at risk for skin breakdown  - Assess and monitor skin integrity  - Assess and monitor nutrition and hydration status  - Monitor labs (i e  albumin)  - Assess for incontinence   - Turn and reposition patient  - Assist with mobility/ambulation  - Relieve pressure over bony prominences  - Avoid friction and shearing  - Provide appropriate hygiene as needed including keeping skin clean and dry  - Evaluate need for skin moisturizer/barrier cream  - Collaborate with interdisciplinary team (i e  Nutrition, Rehabilitation, etc )   - Patient/family teaching   Outcome: Progressing      Problem: PAIN - ADULT  Goal: Verbalizes/displays adequate comfort level or baseline comfort level  Interventions:  - Encourage patient to monitor pain and request assistance  - Assess pain using appropriate pain scale  - Administer analgesics based on type and severity of pain and evaluate response  - Implement non-pharmacological measures as appropriate and evaluate response  - Consider cultural and social influences on pain and pain management  - Notify physician/advanced practitioner if interventions unsuccessful or patient reports new pain   Outcome: Progressing      Problem: INFECTION - ADULT  Goal: Absence or prevention of progression during hospitalization  INTERVENTIONS:  - Assess and monitor for signs and symptoms of infection  - Monitor lab/diagnostic results  - Monitor all insertion sites, i e  indwelling lines, tubes, and drains  - Monitor endotracheal (as able) and nasal secretions for changes in amount and color  - [place] appropriate cooling/warming therapies per order  - Administer medications as ordered  - Instruct and encourage patient and family to use good hand hygiene technique  - Identify and instruct in appropriate isolation precautions for identified infection/condition Outcome: Progressing      Problem: SAFETY ADULT  Goal: Maintain or return to baseline ADL function  INTERVENTIONS:  -  Assess patient's ability to carry out ADLs; assess patient's baseline for ADL function and identify physical deficits which impact ability to perform ADLs (bathing, care of mouth/teeth, toileting, grooming, dressing, etc )  - Assess/evaluate cause of self-care deficits   - Assess range of motion  - Assess patient's mobility; develop plan if impaired  - Assess patient's need for assistive devices and provide as appropriate  - Encourage maximum independence but intervene and supervise when necessary  ¯ Involve family in performance of ADLs  ¯ Assess for home care needs following discharge   ¯ Request OT consult to assist with ADL evaluation and planning for discharge  ¯ Provide patient education as appropriate   Outcome: Progressing    Goal: Maintain or return mobility status to optimal level  INTERVENTIONS:  - Assess patient's baseline mobility status (ambulation, transfers, stairs, etc )    - Identify cognitive and physical deficits and behaviors that affect mobility  - Identify mobility aids required to assist with transfers and/or ambulation (gait belt, sit-to-stand, lift, walker, cane, etc )  - [place] fall precautions as indicated by assessment  - Record patient progress and toleration of activity level on Mobility SBAR; progress patient to next Phase/Stage  - Instruct patient to call for assistance with activity based on assessment  - Request Rehabilitation consult to assist with strengthening/weightbearing, etc    Outcome: Progressing      Problem: DISCHARGE PLANNING  Goal: Discharge to home or other facility with appropriate resources  INTERVENTIONS:  - Identify barriers to discharge w/patient and caregiver  - Arrange for needed discharge resources and transportation as appropriate  - Identify discharge learning needs (meds, wound care, etc )  - Arrange for interpretive services to assist at discharge as needed  - Refer to Case Management Department for coordinating discharge planning if the patient needs post-hospital services based on physician/advanced practitioner order or complex needs related to functional status, cognitive ability, or social support system   Outcome: Progressing      Problem: Knowledge Deficit  Goal: Patient/family/caregiver demonstrates understanding of disease process, treatment plan, medications, and discharge instructions  Complete learning assessment and assess knowledge base    Interventions:  - Provide teaching at level of understanding  - Provide teaching via preferred learning methods   Outcome: Progressing      Problem: SKIN/TISSUE INTEGRITY - ADULT  Goal: Skin integrity remains intact  INTERVENTIONS  - Identify patients at risk for skin breakdown  - Assess and monitor skin integrity  - Assess and monitor nutrition and hydration status  - Monitor labs (i e  albumin)  - Assess for incontinence   - Turn and reposition patient  - Assist with mobility/ambulation  - Relieve pressure over bony prominences  - Avoid friction and shearing  - Provide appropriate hygiene as needed including keeping skin clean and dry  - Evaluate need for skin moisturizer/barrier cream  - Collaborate with interdisciplinary team (i e  Nutrition, Rehabilitation, etc )   - Patient/family teaching   Outcome: Progressing    Goal: Incision(s), wounds(s) or drain site(s) healing without S/S of infection  INTERVENTIONS  - Assess and document risk factors for skin impairment   - Assess and document dressing, incision, wound bed, drain sites and surrounding tissue  - Initiate Nutrition services consult and/or wound management as needed   Outcome: Progressing      Problem: DISCHARGE PLANNING - CARE MANAGEMENT  Goal: Discharge to post-acute care or home with appropriate resources  INTERVENTIONS:  - Conduct assessment to determine patient/family and health care team treatment goals, and need for post-acute services based on payer coverage, community resources, and patient preferences, and barriers to discharge  - Address psychosocial, clinical, and financial barriers to discharge as identified in assessment in conjunction with the patient/family and health care team  - Arrange appropriate level of post-acute services according to patients   needs and preference and payer coverage in collaboration with the physician and health care team  - Communicate with and update the patient/family, physician, and health care team regarding progress on the discharge plan  - Arrange appropriate transportation to post-acute venues   Outcome: Progressing

## 2017-09-16 NOTE — PROGRESS NOTES
Progress Note - Podiatry  Trinity Garza 78 y o  male MRN: [id number]  Unit/Bed#: E4 -01 Encounter: [id number]    Assessment/Plan:  1  78 y  o  y/o male s/p excisional debridement with partial calcanectomy and application of wound vac therapy on left heel by Dr Sulaiman Conde (DOS: 09/08/2017)  - Wound vac was discontinued yesterday due to worsening of the wound  Wound is boggy in appearance with malodor present  - Will wait for ID recommendations for abx  - Tissue Cx: Pending  - WBS: NWB to LLE; PT/OT onboard and both recommend rehab; CM onboard awaiting placement   - c/w prevalon boots while in bed to offload as much as possible and prevent worsening of ulceration  -  Wound vac will be discontinued until at least Monday, further OR debridement of soft tissue and bone may be necessary     - Dressing changed with Dakin's wet to dry applied to left heel wound  Nursing communications to perform dressing changes 2x/day  Possibly worsening of wound could be attributed to poor circulation, therefore will not apply ACE wraps       2  Napoleon Mon Stage 1 Ulceration to left lateral forefoot - POA  -  podiatry to c/w LWC:maxsorb + DSD    3  Right Stage 1 Pressure Ulceration - POA   - podiatry to c/w LWC: dermagran + DSD; dressing changes to be performed 3x/week every Tu/Th/Sat  - c/w prevalon boots while in bed to offload as much as possible and prevent worsening of ulceration      4  DM, type 2 with neuropathy: A1c = 6 6% - per endocrinology   - c/w humalog 12 units during lunch and dinner and 15 units and increase lantus 40 units HS; in addition with ISS and accuchecks; BS range = low to mid 200s  - CC1 diet     5   PAD - per vascular   - s/p LLE agram with angioplasty per IR; 2 vessel runoff via AT and PT with small vessel disease  - LEADs reviewed: 50-75% stenosis of L distal SFA; repeat post agram LEADs are limited as SIOBHAN are non compressible, met pressure unobtainable due to vac dressings, and GTP flat line; vascular recommending [address] at this point and monitor progress of wound healing      6  CKD, stage 3 - per nephrology   - baseline creatinine 1 2-1 4; creatinine around baseline (1 16)   - c/w beckett catheter   - c/w oxybutynin      7  CHF - stable   - c/w lasix   8  Afib   - c/w heparin drip   9  Hx of Mechanical Heart Valve - AVR  - c/w heparin drip, coumadin restarted today to begin bridge  - c/w statin; and plavix started due to ASA allergy      10  Knee Pain - improved with no complaints   - xrays reviewed: mild degenerative changes noted   - per Ortho: not a good canditate for steroid injection due to hx of DM  - c/w PT/OT for management      11  Blood in Stool   - H&H 9 3, 27 3     12  R knee pain, likely secondary to DJD  - per Ortho not a good candidate for steroid injection, appreciate their recommendations      > appreciate medical management per internal medicine      Dispo: Possible surgical debridement of wound     Subjective/Objective   Chief Complaint:   Chief Complaint   Patient presents with    Weakness - Generalized     pt woke up this morning just feeling very weak and unable to get up   pt denies fevers, n/v   denies any pain or sick contacts  Subjective: 78 y o  y/o male was seen and evaluated at bedside In NAD, non-toxic in appearance  He denies any recent N/V/F/C/SOB/CP  Blood pressure 100/65, pulse 74, temperature 97 5 °F (36 4 °C), temperature source Tympanic, resp  rate 18, height 5' 9 5" (1 765 m), weight 108 kg (238 lb 1 6 oz), SpO2 98 %  ,Body mass index is 34 66 kg/m²  Invasive Devices     Peripheral Intravenous Line            Peripheral IV 09/15/17 Right Forearm less than 1 day          Drain            Urethral Catheter 18 Fr  13 days    Negative Pressure Wound Therapy (V A C ) Foot Left 7 days                Physical Exam:   General: Alert, cooperative and no distress  Lungs: Non labored breathing  Heart: Positive S1, S2  Abdomen: Soft, non-tender    Extremity:     NVS and MSK function at baseline B/l  No calf tenderness noted B/l  LLE: Heel wound appears boggy, with malodor present  No clinical signs of infection noted  Wound bed is necrotic, and fibrotic in appearance  Mild purulence was expressed from the wound  RLE:  Dressing is clean dry and intact with no strike through present  Wound appears stable, with no clinical signs of infection  Left heel    Lab, Imaging and other studies:   I have personally reviewed pertinent lab results  , CBC:   Lab Results   Component Value Date    HGB 9 7 (L) 09/15/2017    HCT 28 5 (L) 09/15/2017   , CMP: No results found for: NA, K, CL, CO2, ANIONGAP, BUN, CREATININE, GLUCOSE, CALCIUM, AST, ALT, ALKPHOS, PROT, ALBUMIN, BILITOT, EGFR    Imaging: I have personally reviewed pertinent films in PACS  EKG, Pathology, and Other Studies: I have personally reviewed pertinent reports    VTE Pharmacologic Prophylaxis: Heparin

## 2017-09-17 LAB
ANION GAP SERPL CALCULATED.3IONS-SCNC: 7 MMOL/L (ref 4–13)
APTT PPP: 171 SECONDS (ref 23–35)
APTT PPP: 41 SECONDS (ref 23–35)
APTT PPP: 50 SECONDS (ref 23–35)
BUN SERPL-MCNC: 20 MG/DL (ref 5–25)
CALCIUM SERPL-MCNC: 9.1 MG/DL (ref 8.3–10.1)
CHLORIDE SERPL-SCNC: 101 MMOL/L (ref 100–108)
CO2 SERPL-SCNC: 31 MMOL/L (ref 21–32)
CREAT SERPL-MCNC: 1.09 MG/DL (ref 0.6–1.3)
ERYTHROCYTE [DISTWIDTH] IN BLOOD BY AUTOMATED COUNT: 14.9 % (ref 11.6–15.1)
GFR SERPL CREATININE-BSD FRML MDRD: 67 ML/MIN/1.73SQ M
GLUCOSE SERPL-MCNC: 184 MG/DL (ref 65–140)
GLUCOSE SERPL-MCNC: 185 MG/DL (ref 65–140)
GLUCOSE SERPL-MCNC: 204 MG/DL (ref 65–140)
GLUCOSE SERPL-MCNC: 221 MG/DL (ref 65–140)
GLUCOSE SERPL-MCNC: 230 MG/DL (ref 65–140)
HCT VFR BLD AUTO: 28.1 % (ref 36.5–49.3)
HGB BLD-MCNC: 9.4 G/DL (ref 12–17)
MCH RBC QN AUTO: 28.4 PG (ref 26.8–34.3)
MCHC RBC AUTO-ENTMCNC: 33.5 G/DL (ref 31.4–37.4)
MCV RBC AUTO: 85 FL (ref 82–98)
PLATELET # BLD AUTO: 303 THOUSANDS/UL (ref 149–390)
PMV BLD AUTO: 8.6 FL (ref 8.9–12.7)
POTASSIUM SERPL-SCNC: 3.4 MMOL/L (ref 3.5–5.3)
RBC # BLD AUTO: 3.31 MILLION/UL (ref 3.88–5.62)
SODIUM SERPL-SCNC: 139 MMOL/L (ref 136–145)
WBC # BLD AUTO: 9.11 THOUSAND/UL (ref 4.31–10.16)

## 2017-09-17 PROCEDURE — 80048 BASIC METABOLIC PNL TOTAL CA: CPT | Performed by: PODIATRIST

## 2017-09-17 PROCEDURE — 85027 COMPLETE CBC AUTOMATED: CPT | Performed by: PODIATRIST

## 2017-09-17 PROCEDURE — 85730 THROMBOPLASTIN TIME PARTIAL: CPT | Performed by: PODIATRIST

## 2017-09-17 PROCEDURE — 82948 REAGENT STRIP/BLOOD GLUCOSE: CPT

## 2017-09-17 RX ORDER — POTASSIUM CHLORIDE 20MEQ/15ML
20 LIQUID (ML) ORAL ONCE
Status: COMPLETED | OUTPATIENT
Start: 2017-09-17 | End: 2017-09-17

## 2017-09-17 RX ADMIN — INSULIN LISPRO 15 UNITS: 100 INJECTION, SOLUTION INTRAVENOUS; SUBCUTANEOUS at 08:32

## 2017-09-17 RX ADMIN — INSULIN LISPRO 12 UNITS: 100 INJECTION, SOLUTION INTRAVENOUS; SUBCUTANEOUS at 17:22

## 2017-09-17 RX ADMIN — CLOPIDOGREL BISULFATE 75 MG: 75 TABLET ORAL at 08:33

## 2017-09-17 RX ADMIN — HEPARIN SODIUM 4000 UNITS: 1000 INJECTION, SOLUTION INTRAVENOUS; SUBCUTANEOUS at 10:38

## 2017-09-17 RX ADMIN — Medication 400 MG: at 08:32

## 2017-09-17 RX ADMIN — FERROUS SULFATE TAB 325 MG (65 MG ELEMENTAL FE) 325 MG: 325 (65 FE) TAB at 08:32

## 2017-09-17 RX ADMIN — PRAVASTATIN SODIUM 40 MG: 40 TABLET ORAL at 17:23

## 2017-09-17 RX ADMIN — INSULIN LISPRO 4 UNITS: 100 INJECTION, SOLUTION INTRAVENOUS; SUBCUTANEOUS at 12:23

## 2017-09-17 RX ADMIN — POTASSIUM CHLORIDE 20 MEQ: 20 SOLUTION ORAL at 12:23

## 2017-09-17 RX ADMIN — HYOSCYAMINE SULFATE 1 APPLICATION: 16 SOLUTION at 09:30

## 2017-09-17 RX ADMIN — INSULIN LISPRO 4 UNITS: 100 INJECTION, SOLUTION INTRAVENOUS; SUBCUTANEOUS at 08:32

## 2017-09-17 RX ADMIN — HEPARIN SODIUM 8000 UNITS: 1000 INJECTION, SOLUTION INTRAVENOUS; SUBCUTANEOUS at 18:40

## 2017-09-17 RX ADMIN — INSULIN LISPRO 1 UNITS: 100 INJECTION, SOLUTION INTRAVENOUS; SUBCUTANEOUS at 21:04

## 2017-09-17 RX ADMIN — INSULIN LISPRO 4 UNITS: 100 INJECTION, SOLUTION INTRAVENOUS; SUBCUTANEOUS at 17:22

## 2017-09-17 RX ADMIN — GABAPENTIN 100 MG: 100 CAPSULE ORAL at 20:59

## 2017-09-17 RX ADMIN — OXYBUTYNIN CHLORIDE 5 MG: 5 TABLET ORAL at 08:33

## 2017-09-17 RX ADMIN — GABAPENTIN 100 MG: 100 CAPSULE ORAL at 17:23

## 2017-09-17 RX ADMIN — FLUTICASONE PROPIONATE AND SALMETEROL 1 PUFF: 50; 500 POWDER RESPIRATORY (INHALATION) at 08:32

## 2017-09-17 RX ADMIN — TIOTROPIUM BROMIDE 18 MCG: 18 CAPSULE ORAL; RESPIRATORY (INHALATION) at 08:32

## 2017-09-17 RX ADMIN — GABAPENTIN 100 MG: 100 CAPSULE ORAL at 08:32

## 2017-09-17 RX ADMIN — FERROUS SULFATE TAB 325 MG (65 MG ELEMENTAL FE) 325 MG: 325 (65 FE) TAB at 17:23

## 2017-09-17 RX ADMIN — INSULIN GLARGINE 40 UNITS: 100 INJECTION, SOLUTION SUBCUTANEOUS at 21:30

## 2017-09-17 RX ADMIN — FLUTICASONE PROPIONATE AND SALMETEROL 1 PUFF: 50; 500 POWDER RESPIRATORY (INHALATION) at 20:59

## 2017-09-17 RX ADMIN — FUROSEMIDE 40 MG: 40 TABLET ORAL at 08:33

## 2017-09-17 RX ADMIN — WARFARIN SODIUM 7.5 MG: 7.5 TABLET ORAL at 17:23

## 2017-09-17 RX ADMIN — HEPARIN SODIUM AND DEXTROSE 17 UNITS/KG/HR: 10000; 5 INJECTION INTRAVENOUS at 08:31

## 2017-09-17 RX ADMIN — OXYBUTYNIN CHLORIDE 5 MG: 5 TABLET ORAL at 17:23

## 2017-09-17 RX ADMIN — HEPARIN SODIUM AND DEXTROSE 23 UNITS/KG/HR: 10000; 5 INJECTION INTRAVENOUS at 20:49

## 2017-09-17 NOTE — PROGRESS NOTES
[address] Internal Medicine Progress Note  Patient: Trini Lopez 78 y o  male   MRN: [id number]  PCP: Vanna Closs, MD  Unit/Bed#: E4 - Encounter: [id number]  Date Of Visit: 09/17/17    Assessment:    Principal Problem:    Asthenia  Active Problems:    Decubitus ulcer of heel, stage 3    Leukocytosis    Stage 3 chronic kidney disease    Chronic indwelling Rush catheter    Abnormal urinalysis    Chronic atrial fibrillation    Diabetes mellitus    Chronic diastolic heart failure    PAD (peripheral artery disease)    S/P AVR      Plan:    · Left heel decubitus ulcer status post I and D and partial calcanectomy currently with wound VAC nonweightbearing status per Podiatry will need rehab  · History of mechanical heart valve  AVR   ,present on heparin drip had been on warfarin in preparation for possible OR intervention as needed/recommendation with history of AFib is to resume warfarin once discharge plan stable  · Peripheral vascular disease underwent left SFA angioplasty yesterday via IR  · Chronic diastolic heart failure stable  · Chronic indwelling Rush catheter on oxybutynin  · Type 2 diabetes mellitus present insulin maintenance is Humalog 12 units at lunch and dinner otherwise coverage at meals with 15 units and 40 units of Lantus changed by Endocrinology  · Diabetic neuropathy continue gabapentin  · Bilateral lower extremity wounds left partial calcanectomy with wound VAC application secondary to osteomyelitis nonweightbearing to left lower extremity wound cultures growing MSSA and Enterococcus off antibiotics presently bone culture thus far negative also right posterior heel ulceration mostly healed dressed  · Chronic kidney disease will receive IV hydration preparation for angiography now scheduled for tomorrow and Mucomyst  · Bright red blood per rectum noted in stool small volume had recent colonoscopy this past year unremarkable and denies history of hemorrhoids will continue to monitor check H&H and CBC in a m /warfarin restarted with heparin bridge as Podiatry and vascular not proposing any imminent interventions will check serial PT INRs once reaches 2 0 can discontinue heparin  · Right knee pain to medial aspect especially with x-ray showing no osseous abnormalities does not appear to be a gouty flare  orthopedic consultation believes most E are osteoarthritic and due to immobility as most of pain appears to be in the medial joint js and the patient will be eventually weight-bearing in his rehab assignment hopefully further physical therapy may diminish      VTE Pharmacologic Prophylaxis:   Pharmacologic: Heparin Drip  Mechanical VTE Prophylaxis in Place: No    Discussions with Specialists or Other Care Team Provider: no    Time Spent for Care: 45 minutes  More than 50% of total time spent on counseling and coordination of care as described above  Subjective:  No new complaints shortness of breath or chest pain noted was noted to have bright red blood around stool specimen today without significant volume with clots no abdominal pain and recent colonoscopy unremarkable  Patient seems despondent and depressed over his situation and recent cancellation or postpone it to rehab      Objective:     Vitals:   Temp (24hrs), Av 3 °F (37 4 °C), Min:97 6 °F (36 4 °C), Max:100 5 °F (38 1 °C)    HR:  [66-68] 68  Resp:  [18] 18  BP: (124-147)/(56-69) 147/69  SpO2:  [97 %-99 %] 97 %  Body mass index is 34 66 kg/m²  Input and Output Summary (last 24 hours):        Intake/Output Summary (Last 24 hours) at 17 1039  Last data filed at 17 0701   Gross per 24 hour   Intake                0 ml   Output             1900 ml   Net            -1900 ml       Physical Exam:     Physical Exam:   General appearance: alert, appears stated age and cooperative  Head: Normocephalic, without obvious abnormality, atraumatic  Lungs: clear to auscultation bilaterally  Heart: regular rate and rhythm and Mechanical heart sounds noted  Abdomen: soft, non-tender; bowel sounds normal; no masses,  no organomegaly  Back: negative  Extremities: Has significant medial aspect right knee pain very tender to touch without erythema or redness or warmth  Neurologic: Grossly normal      Additional Data:     Labs:      Results from last 7 days  Lab Units 09/17/17  0542 09/16/17  0646   WBC Thousand/uL 9 11 9 71   HEMOGLOBIN g/dL 9 4* 9 3*   HEMATOCRIT % 28 1* 27 3*   PLATELETS Thousands/uL 303 289   NEUTROS PCT %  --  76*   LYMPHS PCT %  --  12*   MONOS PCT %  --  7   EOS PCT %  --  4       Results from last 7 days  Lab Units 09/17/17  0542   SODIUM mmol/L 139   POTASSIUM mmol/L 3 4*   CHLORIDE mmol/L 101   CO2 mmol/L 31   BUN mg/dL 20   CREATININE mg/dL 1 09   CALCIUM mg/dL 9 1   GLUCOSE RANDOM mg/dL 185*       Results from last 7 days  Lab Units 09/16/17  0957   INR  1 04       * I Have Reviewed All Lab Data Listed Above  * Additional Pertinent Lab Tests Reviewed: All Labs For Current Hospital Admission Reviewed    Imaging:  Xr Chest 2 Views    Result Date: 9/2/2017  Narrative: CHEST INDICATION:  Weakness  COMPARISON:  None VIEWS:  Frontal and lateral projections IMAGES:  2 FINDINGS:  Sternotomy wires are intact  Prior prosthetic heart valve  Cardiac silhouette is within upper limits of normal in size  The lungs are clear  No pneumothorax or pleural effusion  Visualized osseous structures appear within normal limits for the patient's age  Impression: No active pulmonary disease  Workstation performed: [id number]     Xr Knee 1 Or 2 Vw Right    Result Date: 9/11/2017  Narrative: RIGHT KNEE INDICATION:  Right knee pain  COMPARISON: None VIEWS:  AP and lateral IMAGES:  2 FINDINGS: There is no acute fracture or dislocation  There is no joint effusion  There is mild degenerative change with medial compartment narrowing  No lytic or blastic lesions are seen  Soft tissues are unremarkable       Impression: No acute osseous abnormality  Mild degenerative change  Workstation performed: [id number]     Xr Foot 3+ Vw Left    Result Date: 9/3/2017  Narrative: LEFT FOOT INDICATION:  "eval OM, ulcer on heel of left foot " COMPARISON: Calcaneus radiographs 8/21/2017  VIEWS:  3 IMAGES:  3 FINDINGS: There is no acute fracture or dislocation  No definite osteomyelitis  Mild/moderate diffuse degenerative changes most prominent the tibiotalar joint space  Advanced atherosclerotic calcifications  Impression: No definite osteomyelitis  Workstation performed: [id number]     Xr Heel / Calcaneus 2+ Vw Left    Result Date: 9/7/2017  Narrative: LEFT HEEL INDICATION:  Heel ulcer  COMPARISON: Plain foot radiograph September 3, 2017 and heel images August 21, 2017  VIEWS:  2 IMAGES:  3 FINDINGS: There is no acute fracture or dislocation  Degenerative changes in the midfoot and hindfoot  There is ill-defined, vague lucency within the posterolateral calcaneus  There is no overlying cortical destruction  Given the history of ulceration in this area which probes to the bone (as per podiatry's notes), early osteomyelitis not excluded  Soft tissue swelling with ulceration along the plantar calcaneus  Impression: Ill-defined, vague lucency in the posterior calcaneus without overlying cortical destruction  Given the history, early osteomyelitis not excluded  Recommend follow-up MRI with gadolinium or nuclear medicine white blood cell scan for further  evaluation  ##imslh##imslh Workstation performed: [id number]     Xr Heel / Calcaneus 2+ Vw Left    Result Date: 8/23/2017  Narrative: LEFT HEEL INDICATION:  Ulcer on left heel  COMPARISON: None VIEWS:  2 IMAGES:  3 FINDINGS: There is no acute fracture or dislocation  A tiny plantar calcaneal spur noted with degenerative changes of the tibiotalar joint also noted  No periosteal reaction or osseous destruction to suggest osteomyelitis   The posterior plantar soft tissue swelling with no retained radiopaque foreign body or subcutaneous emphysema  There are vascular calcifications throughout the regional soft tissues  Impression: No acute osseous abnormality  Plantar soft tissue swelling  Workstation performed: [id number]     Vas Lower Limb Arterial Duplex, Complete Bilateral    Result Date: 9/8/2017  Narrative:  [facility] REPORT [facility]INICAL: Indications:  Bilateral Atherosclerosis with Ulceration [I70 25]  The patient is admitted with left plantar and lateral ankle ulcerations, and right posterior heel ulceration (not open)  History of right 4th, 5th toe amputations  Risk Factors: The patient has history of diabetes, hyperlipidemia and PAD  Clinical Right Pressure:  124/ mm Hg, Left Pressure:  140/ mm Hg  FINDINGS:  Segment                Right                 Left                                          Impression  PSV  EDV  Impression  PSV  EDV  Common Femoral Artery  Normal       94   15  Normal      101    0  Prox Profunda                       72   15               58    9  Prox SFA                           106    0               92   14  Mid SFA                             88    0               72    9  Dist SFA                            74    0  50-75%      164   25  Proximal Pop                       127    0               54   24  Distal Pop                          96   14               85    0  Dist Post Tibial                   107    0              113   25  Dist  Ant  Tibial                   37    0              149   24     CONCLUSION: Impression: Right Lower Limb: Diffuse atherosclerotic arterial disease throughout the femoropopliteal vessels with no definite evidence for focal stenosis  Findings suggest tibioperoneal disease  Ankle Pressure: >254 mm Hg , SIOBHAN: 1 81, supra-normal, consistent with poorly compressible vessels  Prior same  Metatarsal Pressure:  Could not be obtained due to thick bandaging in place   Right Great Toe Pressure 68 mm Hg ,, above healing level for a diabetic patient  Prior 78 mm Hg  Left Lower Limb: Findings suggest a 50-75% stenosis in the distal superficial femoral artery  Diffuse atherosclerotic arterial disease throughout the femoropopliteal vessels  Findings suggest tibioperoneal disease  Ankle Pressure >254 mm Hg , SIOBHAN: 1 81,supra-normal, consistent with poorly compressible vessels  Prior same  Left Metatarsal Pressure: 129  mm Hg  Left Great Toe Pressure 44 mm Hg , below healing level for a diabetic patient  Prior 87 mm Hg  In comparison to the study of 3/10/2015 , there is new findings and progression of disease in the left leg, as described above  SIGNATURE: Electronically Signed by: Bert Soriano MD, [address] on 2017-09-08 03:33:32 PM    Imaging Reports Reviewed Today Include:  Reviewed  Imaging Personally Reviewed by Myself Includes:  No  Procedure: Xr Knee 1 Or 2 Vw Right    Result Date: 9/11/2017  Narrative: RIGHT KNEE INDICATION:  Right knee pain  COMPARISON: None VIEWS:  AP and lateral IMAGES:  2 FINDINGS: There is no acute fracture or dislocation  There is no joint effusion  There is mild degenerative change with medial compartment narrowing  No lytic or blastic lesions are seen  Soft tissues are unremarkable  Impression: No acute osseous abnormality  Mild degenerative change   Workstation performed: [id number]        Recent Cultures (last 7 days):       Results from last 7 days  Lab Units 09/15/17  1634   GRAM STAIN RESULT  Rare Polys  2+ Gram positive cocci in pairs  Rare Gram negative rods       Last 24 Hours Medication List:     clopidogrel 75 mg Oral Daily   collagenase  Topical Daily   ferrous sulfate 325 mg Oral BID   fluticasone-salmeterol 1 puff Inhalation BID   furosemide 40 mg Oral Daily   gabapentin 100 mg Oral TID   insulin glargine 40 Units Subcutaneous HS   insulin lispro 1-6 Units Subcutaneous HS   insulin lispro 12 Units Subcutaneous Daily Before Lunch   insulin lispro 12 Units Subcutaneous Before Dinner   insulin lispro 15 Units Subcutaneous Daily Before Breakfast   insulin lispro 2-12 Units Subcutaneous TID AC   magnesium oxide 400 mg Oral Daily   oxybutynin 5 mg Oral BID   pravastatin 40 mg Oral Daily With Dinner   sodium hypochlorite 1 application Irrigation Daily   tiotropium 18 mcg Inhalation Daily   [START ON 9/18/2017] warfarin 5 mg Oral Once per day on Mon Thu   warfarin 7 5 mg Oral Once per day on Sun Tue Wed Fri Sat        Today, Patient Was Seen By: Neha Hopper MD    ** Please Note: Dragon 360 Dictation voice to text software may have been used in the creation of this document   **

## 2017-09-17 NOTE — PROGRESS NOTES
Progress Note - Podiatry  Elieser Bumps 78 y o  male MRN: [id number]  Unit/Bed#: E4 -01 Encounter: [id number]    Assessment/Plan:  1  78 y  o  y/o male s/p excisional debridement with partial calcanectomy and application of wound vac therapy on left heel by Dr Ladonna Davis (DOS: 09/08/2017)  - Spoke to ID, they believe the wound is necrotic due to vascular, not infection  Will not re-start antibiotic  - Tissue Cx: results show clean margins  - WBS: NWB to LLE; PT/OT onboard and both recommend rehab; CM onboard awaiting placement   - c/w prevalon boots while in bed to offload as much as possible and prevent worsening of ulceration  -  Wound vac will be discontinued until at least Monday, further OR debridement of soft tissue and bone may be necessary     - Dressing changed with Dakin's wet to dry applied to left heel wound   Nursing communications to perform dressing changes 2x/day  Possibly worsening of wound could be attributed to poor circulation, therefore will not apply ACE wraps       2  Yoel Vásquez Stage 1 Ulceration to left lateral forefoot - POA  -  podiatry to c/w LWC:maxsorb + DSD     3  Right Stage 1 Pressure Ulceration - POA   - podiatry to c/w LWC: dermagran + DSD; dressing changes to be performed 3x/week every Tu/Th/Sat  - c/w prevalon boots while in bed to offload as much as possible and prevent worsening of ulceration      4  DM, type 2 with neuropathy: A1c = 6 6% - per endocrinology   - c/w humalog 12 units during lunch and dinner and 15 units and increase lantus 40 units HS; in addition with ISS and accuchecks; BS range = low to mid 200s  - CC1 diet     5   PAD - per vascular   - s/p LLE agram with angioplasty per IR; 2 vessel runoff via AT and PT with small vessel disease  - LEADs reviewed: 50-75% stenosis of L distal SFA; repeat post agram LEADs are limited as SIOBHAN are non compressible, met pressure unobtainable due to vac dressings, and GTP flat line; vascular recommending LWC at this point and monitor progress of wound healing      6  CKD, stage 3 - per nephrology   - baseline creatinine 1 2-1 4; creatinine around baseline (1 09)  - c/w oxybutynin      7  CHF - stable   - c/w lasix   8  Afib   - c/w heparin drip   9  Hx of Mechanical Heart Valve - AVR  - c/w heparin drip, coumadin restarted to begin bridge  - c/w statin; and plavix started due to ASA allergy      10  Knee Pain - improved with no complaints   - xrays reviewed: mild degenerative changes noted   - per Ortho: not a good canditate for steroid injection due to hx of DM  - c/w PT/OT for management      11  Blood in Stool   - H&H 9 4, 28 1     12  R knee pain, likely secondary to DJD  - per Ortho not a good candidate for steroid injection, appreciate their recommendations      > appreciate medical management per internal medicine      Dispo: Possible surgical debridement of wound     Subjective/Objective   Chief Complaint:   Chief Complaint   Patient presents with    Weakness - Generalized     pt woke up this morning just feeling very weak and unable to get up   pt denies fevers, n/v   denies any pain or sick contacts  Subjective: 78 y o  y/o male was seen and evaluated at bedside in NAD, non-toxic in appearance  He denies any recent N/V/F/C/SOB/CP  Blood pressure 124/56, pulse 66, temperature 100 °F (37 8 °C), resp  rate 18, height 5' 9 5" (1 765 m), weight 108 kg (238 lb 1 6 oz), SpO2 98 %  ,Body mass index is 34 66 kg/m²  Invasive Devices     Peripheral Intravenous Line            Peripheral IV 09/15/17 Right Forearm 1 day          Drain            Urethral Catheter 18 Fr  14 days    Negative Pressure Wound Therapy (V A C ) Foot Left 8 days                Physical Exam:   General: Alert, cooperative and no distress  Lungs: Non labored breathing  Heart: Positive S1, S2  Abdomen: Soft, non-tender  Extremity:     NVS and MSK function at baseline B/l  LLE: Heel wound continues to appears boggy, with mild malodor present   No clinical signs of infection noted  No ascending cellulitis  Wound bed is necrotic, and fibrotic in appearance  RLE: Dressing is c/d/i with no strike through noted to the outer dressing  Lab, Imaging and other studies:   I have personally reviewed pertinent lab results  , CBC:   Lab Results   Component Value Date    WBC 9 11 09/17/2017    HGB 9 4 (L) 09/17/2017    HCT 28 1 (L) 09/17/2017    MCV 85 09/17/2017     09/17/2017    MCH 28 4 09/17/2017    MCHC 33 5 09/17/2017    RDW 14 9 09/17/2017    MPV 8 6 (L) 09/17/2017    NRBC 0 09/16/2017   , CMP:   Lab Results   Component Value Date     09/16/2017    K 3 3 (L) 09/16/2017    CL 99 (L) 09/16/2017    CO2 28 09/16/2017    ANIONGAP 11 09/16/2017    BUN 19 09/16/2017    CREATININE 1 16 09/16/2017    GLUCOSE 203 (H) 09/16/2017    CALCIUM 9 1 09/16/2017    EGFR 62 09/16/2017       Imaging: I have personally reviewed pertinent films in PACS  EKG, Pathology, and Other Studies: I have personally reviewed pertinent reports    VTE Pharmacologic Prophylaxis: Warfarin (Coumadin)

## 2017-09-18 LAB
ANION GAP SERPL CALCULATED.3IONS-SCNC: 5 MMOL/L (ref 4–13)
APTT PPP: 124 SECONDS (ref 23–35)
APTT PPP: 88 SECONDS (ref 23–35)
APTT PPP: >210 SECONDS (ref 23–35)
BACTERIA TISS AEROBE CULT: NORMAL
BACTERIA TISS AEROBE CULT: NORMAL
BUN SERPL-MCNC: 18 MG/DL (ref 5–25)
CALCIUM SERPL-MCNC: 9.1 MG/DL (ref 8.3–10.1)
CHLORIDE SERPL-SCNC: 99 MMOL/L (ref 100–108)
CO2 SERPL-SCNC: 32 MMOL/L (ref 21–32)
CREAT SERPL-MCNC: 1.05 MG/DL (ref 0.6–1.3)
ERYTHROCYTE [DISTWIDTH] IN BLOOD BY AUTOMATED COUNT: 15 % (ref 11.6–15.1)
GFR SERPL CREATININE-BSD FRML MDRD: 70 ML/MIN/1.73SQ M
GLUCOSE SERPL-MCNC: 131 MG/DL (ref 65–140)
GLUCOSE SERPL-MCNC: 175 MG/DL (ref 65–140)
GLUCOSE SERPL-MCNC: 184 MG/DL (ref 65–140)
GLUCOSE SERPL-MCNC: 227 MG/DL (ref 65–140)
GLUCOSE SERPL-MCNC: 251 MG/DL (ref 65–140)
GRAM STN SPEC: NORMAL
HCT VFR BLD AUTO: 28.2 % (ref 36.5–49.3)
HGB BLD-MCNC: 9.5 G/DL (ref 12–17)
INR PPP: 1.25 (ref 0.86–1.16)
MCH RBC QN AUTO: 28.6 PG (ref 26.8–34.3)
MCHC RBC AUTO-ENTMCNC: 33.7 G/DL (ref 31.4–37.4)
MCV RBC AUTO: 85 FL (ref 82–98)
PLATELET # BLD AUTO: 313 THOUSANDS/UL (ref 149–390)
PMV BLD AUTO: 8.7 FL (ref 8.9–12.7)
POTASSIUM SERPL-SCNC: 3.5 MMOL/L (ref 3.5–5.3)
PROTHROMBIN TIME: 15.8 SECONDS (ref 12.1–14.4)
RBC # BLD AUTO: 3.32 MILLION/UL (ref 3.88–5.62)
SODIUM SERPL-SCNC: 136 MMOL/L (ref 136–145)
WBC # BLD AUTO: 10.35 THOUSAND/UL (ref 4.31–10.16)

## 2017-09-18 PROCEDURE — 85730 THROMBOPLASTIN TIME PARTIAL: CPT | Performed by: PODIATRIST

## 2017-09-18 PROCEDURE — 82948 REAGENT STRIP/BLOOD GLUCOSE: CPT

## 2017-09-18 PROCEDURE — 85610 PROTHROMBIN TIME: CPT | Performed by: PODIATRIST

## 2017-09-18 PROCEDURE — 85730 THROMBOPLASTIN TIME PARTIAL: CPT | Performed by: INTERNAL MEDICINE

## 2017-09-18 PROCEDURE — 80048 BASIC METABOLIC PNL TOTAL CA: CPT | Performed by: PODIATRIST

## 2017-09-18 PROCEDURE — 85027 COMPLETE CBC AUTOMATED: CPT | Performed by: PODIATRIST

## 2017-09-18 RX ADMIN — FUROSEMIDE 40 MG: 40 TABLET ORAL at 09:10

## 2017-09-18 RX ADMIN — PRAVASTATIN SODIUM 40 MG: 40 TABLET ORAL at 16:20

## 2017-09-18 RX ADMIN — INSULIN LISPRO 3 UNITS: 100 INJECTION, SOLUTION INTRAVENOUS; SUBCUTANEOUS at 21:28

## 2017-09-18 RX ADMIN — INSULIN LISPRO 4 UNITS: 100 INJECTION, SOLUTION INTRAVENOUS; SUBCUTANEOUS at 16:24

## 2017-09-18 RX ADMIN — FERROUS SULFATE TAB 325 MG (65 MG ELEMENTAL FE) 325 MG: 325 (65 FE) TAB at 09:10

## 2017-09-18 RX ADMIN — OXYBUTYNIN CHLORIDE 5 MG: 5 TABLET ORAL at 09:10

## 2017-09-18 RX ADMIN — TIOTROPIUM BROMIDE 18 MCG: 18 CAPSULE ORAL; RESPIRATORY (INHALATION) at 09:11

## 2017-09-18 RX ADMIN — FLUTICASONE PROPIONATE AND SALMETEROL 1 PUFF: 50; 500 POWDER RESPIRATORY (INHALATION) at 09:10

## 2017-09-18 RX ADMIN — GABAPENTIN 100 MG: 100 CAPSULE ORAL at 16:21

## 2017-09-18 RX ADMIN — GABAPENTIN 100 MG: 100 CAPSULE ORAL at 09:10

## 2017-09-18 RX ADMIN — OXYBUTYNIN CHLORIDE 5 MG: 5 TABLET ORAL at 17:54

## 2017-09-18 RX ADMIN — HEPARIN SODIUM AND DEXTROSE 20 UNITS/KG/HR: 10000; 5 INJECTION INTRAVENOUS at 11:52

## 2017-09-18 RX ADMIN — GABAPENTIN 100 MG: 100 CAPSULE ORAL at 21:29

## 2017-09-18 RX ADMIN — CLOPIDOGREL BISULFATE 75 MG: 75 TABLET ORAL at 09:10

## 2017-09-18 RX ADMIN — FERROUS SULFATE TAB 325 MG (65 MG ELEMENTAL FE) 325 MG: 325 (65 FE) TAB at 17:54

## 2017-09-18 RX ADMIN — FLUTICASONE PROPIONATE AND SALMETEROL 1 PUFF: 50; 500 POWDER RESPIRATORY (INHALATION) at 21:28

## 2017-09-18 RX ADMIN — INSULIN LISPRO 12 UNITS: 100 INJECTION, SOLUTION INTRAVENOUS; SUBCUTANEOUS at 16:24

## 2017-09-18 RX ADMIN — Medication 400 MG: at 09:10

## 2017-09-18 RX ADMIN — HYOSCYAMINE SULFATE 1 APPLICATION: 16 SOLUTION at 09:25

## 2017-09-18 RX ADMIN — INSULIN LISPRO 2 UNITS: 100 INJECTION, SOLUTION INTRAVENOUS; SUBCUTANEOUS at 11:56

## 2017-09-18 RX ADMIN — COLLAGENASE SANTYL: 250 OINTMENT TOPICAL at 09:26

## 2017-09-18 RX ADMIN — INSULIN LISPRO 15 UNITS: 100 INJECTION, SOLUTION INTRAVENOUS; SUBCUTANEOUS at 09:11

## 2017-09-18 RX ADMIN — INSULIN GLARGINE 40 UNITS: 100 INJECTION, SOLUTION SUBCUTANEOUS at 21:28

## 2017-09-18 NOTE — PROGRESS NOTES
[facility] Progress Note  Patient: Tiarra Woody 78 y o  male   MRN: [id number]  PCP: Gee Mcneill MD  Unit/Bed#: E4 -16 Encounter: [id number]  Date Of Visit: 09/18/17    Assessment:    Principal Problem:    Asthenia  Active Problems:    Decubitus ulcer of heel, stage 3    Leukocytosis    Stage 3 chronic kidney disease    Chronic indwelling Beckett catheter    Abnormal urinalysis    Chronic atrial fibrillation    Diabetes mellitus    Chronic diastolic heart failure    PAD (peripheral artery disease)    S/P AVR      Plan:    · L heel decubitus ulcer s/p I&D and partial calcanectomy (DOS: 9/8/17)  · Wound vac currently discontinued, possible debridement in OR necessary per podiatry  · Wound necrosis noted per podiatry but likely due to poor vasculature and not infection per ID  Will not restart abx  · Tissue cx: clean margins with minimal acute/chronic inflammation with no malignancy  · H/o mechanical heart valve AVR with h/o A fib:   · Pt was on heparin gtt with warfarin restarted with heparin bridge, however as pt may go to the OR per podiatry will hold warfarin and c/w hep gtt  · Last INR 1 25 in the AM  C/w serial PT/INR  · PVD: s/p SFA angioplasty 9/14  · Chronic diastolic heart failure, stable  · Chronic indwelling beckett catheter: c/w oxybutynin  · Diabetes Mellitus, type 2: c/w humalog 15 units for breakfast coverage, 12 units for lunch/dinner coverage, and lantus 40 units  · Diabetic neuropathy: c/w gabapentin  · CKD stage 3: stable, creatinine 1 09  · Bright red blood noted in stool: resolved, unremarkable colonoscopy and denies h/o hemorrhoids  Monitor labs, Hgb 9 4  · Right knee pain: likely OA and 2/2 immobility per ortho  Rehab/PT hopefully to diminish pain    VTE Pharmacologic Prophylaxis:   Pharmacologic: Heparin Drip  Mechanical VTE Prophylaxis in Place: No  Time Spent for Care: 30 minutes    More than 50% of total time spent on counseling and coordination of care as described above     Current Length of Stay: 16 day(s)    Current Patient Status: Inpatient     Code Status: Level 1 - Full Code      Subjective:   Pt seen at bedside with wife  Pt is anxious about his foot and is awaiting podiatry recommendations  Pt denies any episodes of n/v/f/c/sob  No new complaints    Objective:     Vitals:   Temp (24hrs), Av 8 °F (36 6 °C), Min:97 5 °F (36 4 °C), Max:98 3 °F (36 8 °C)    HR:  [60-78] 70  Resp:  [18] 18  BP: (134-146)/(60-71) 136/60  SpO2:  [95 %-96 %] 96 %  Body mass index is 34 66 kg/m²  Input and Output Summary (last 24 hours): Intake/Output Summary (Last 24 hours) at 17 0837  Last data filed at 17 0831   Gross per 24 hour   Intake             1069 ml   Output             3625 ml   Net            -2556 ml       Physical Exam:     Physical Exam   Constitutional: He is oriented to person, place, and time  He appears well-developed and well-nourished  No distress  HENT:   Head: Normocephalic and atraumatic  Eyes: EOM are normal  No scleral icterus  Neck: Normal range of motion  Cardiovascular: Normal rate and regular rhythm  Mechanical valvular clicks noted   Pulmonary/Chest: Effort normal and breath sounds normal  No respiratory distress  He has no wheezes  Abdominal: Soft  Bowel sounds are normal  There is no tenderness  Musculoskeletal:   Dressing c/d/i   Neurological: He is alert and oriented to person, place, and time  Skin: Skin is warm and dry  He is not diaphoretic  Psychiatric: He has a normal mood and affect   His behavior is normal  Thought content normal        Additional Data:     Labs:      Results from last 7 days  Lab Units 17  0542 17  0646   WBC Thousand/uL 9 11 9 71   HEMOGLOBIN g/dL 9 4* 9 3*   HEMATOCRIT % 28 1* 27 3*   PLATELETS Thousands/uL 303 289   NEUTROS PCT %  --  76*   LYMPHS PCT %  --  12*   MONOS PCT %  --  7   EOS PCT %  --  4       Results from last 7 days  Lab Units 17  0542   SODIUM mmol/L 139   POTASSIUM mmol/L 3 4*   CHLORIDE mmol/L 101   CO2 mmol/L 31   BUN mg/dL 20   CREATININE mg/dL 1 09   CALCIUM mg/dL 9 1   GLUCOSE RANDOM mg/dL 185*       Results from last 7 days  Lab Units 09/16/17  0957   INR  1 04       * I Have Reviewed All Lab Data Listed Above  * Additional Pertinent Lab Tests Reviewed: All Labs Within Last 24 Hours Reviewed    Imaging:      Recent Cultures (last 7 days):       Results from last 7 days  Lab Units 09/15/17  1634   GRAM STAIN RESULT  Rare Polys  2+ Gram positive cocci in pairs  Rare Gram negative rods       Last 24 Hours Medication List:     clopidogrel 75 mg Oral Daily   collagenase  Topical Daily   ferrous sulfate 325 mg Oral BID   fluticasone-salmeterol 1 puff Inhalation BID   furosemide 40 mg Oral Daily   gabapentin 100 mg Oral TID   insulin glargine 40 Units Subcutaneous HS   insulin lispro 1-6 Units Subcutaneous HS   insulin lispro 12 Units Subcutaneous Daily Before Lunch   insulin lispro 12 Units Subcutaneous Before Dinner   insulin lispro 15 Units Subcutaneous Daily Before Breakfast   insulin lispro 2-12 Units Subcutaneous TID AC   magnesium oxide 400 mg Oral Daily   oxybutynin 5 mg Oral BID   pravastatin 40 mg Oral Daily With Dinner   sodium hypochlorite 1 application Irrigation Daily   tiotropium 18 mcg Inhalation Daily   warfarin 5 mg Oral Once per day on Mon Thu   warfarin 7 5 mg Oral Once per day on Sun Tue Wed Fri Sat        Today, Patient Was Seen By: Sherree Councilman, DPM    ** Please Note: This note has been constructed using a voice recognition system   **

## 2017-09-18 NOTE — CONSULTS
Please refer to full consult 9/18/17          Jaci Rose PA-C  Vascular Surgery  [facility], [phone]

## 2017-09-18 NOTE — PROGRESS NOTES
Progress Note - Podiatry  Colleen Patricia 78 y o  male MRN: [id number]  Unit/Bed#: E4 -01 Encounter: [id number]    Assessment/Plan:  1  78 y  o  y/o male s/p excisional debridement with partial calcanectomy and application of wound vac therapy on left heel by Dr Nahum Ross (DOS: 09/08/2017)  - Spoke to Dr Ganga Harrell, who recommends to monitor off antibiotics   - wound continues to be necrotic/fibrotic and stable without cellulitis or purulence   - Dr Niya Nixon discussed treatment options with patient and his wife  - will await vascular input regarding healing outcome   - Dressing changed with Dakin's wet to dry applied to left heel wound     - Tissue Cx: results show s aureus and enterococcus   - WBS: NWB to LLE; PT/OT onboard and both recommend rehab; CM onboard awaiting placement   - c/w prevalon boots while in bed to offload as much as possible and prevent worsening of ulceration     2  Mouna Rocha Stage 1 Ulceration to left lateral forefoot - POA  -  podiatry to c/w LWC: maxsorb + DSD     3  Right Stage 1 Pressure Ulceration - POA   - podiatry to c/w LWC: dermagran + DSD; dressing changes to be performed 3x/week every Tu/Th/Sat  - c/w prevalon boots while in bed to offload as much as possible and prevent worsening of ulceration      4  DM, type 2 with neuropathy: A1c = 6 6% - per endocrinology   - c/w humalog 12 units during lunch and dinner and 15 units and increase lantus 40 units HS; in addition with ISS and accuchecks; BS range = low to mid 200s  - CC1 diet     5   PAD - per vascular   - s/p LLE agram with angioplasty per IR; 2 vessel runoff via AT and PT with small vessel disease  - LEADs reviewed: 50-75% stenosis of L distal SFA; repeat post agram LEADs are limited as SIOBHAN are non compressible, met pressure unobtainable due to vac dressings, and GTP flat line;   - per Dr Weber Bookniyaer attestation of note on 9/14: LLE flow is optimized, if wound does not heal it is likely secondary to small vessel disease in the foot    6  CKD, stage 3 - per nephrology   - baseline creatinine 1 2-1 4; creatinine around baseline  - c/w oxybutynin      7  CHF - stable   - c/w lasix   8  Afib   - c/w heparin drip   9  Hx of Mechanical Heart Valve - AVR  - c/w heparin drip   - c/w statin; and plavix started due to ASA allergy      10  Knee Pain - improved with no complaints   - xrays reviewed: mild degenerative changes noted   - per Ortho: not a good canditate for steroid injection due to hx of DM  - c/w PT/OT for management      11  Blood in Stool - resolved   - hemoglobin has been stable     12  R knee pain, likely secondary to DJD  - per Ortho not a good candidate for steroid injection, appreciate their recommendations      > appreciate medical management per internal medicine      Dispo: Possible surgical debridement of wound        Subjective/Objective   Chief Complaint:   Chief Complaint   Patient presents with    Weakness - Generalized     pt woke up this morning just feeling very weak and unable to get up   pt denies fevers, n/v   denies any pain or sick contacts  Subjective: 78 y o  y/o male was seen and evaluated at bedside  Denies any complains at this time  Blood pressure 136/60, pulse 70, temperature 97 5 °F (36 4 °C), temperature source Tympanic, resp  rate 18, height 5' 9 5" (1 765 m), weight 108 kg (238 lb 1 6 oz), SpO2 96 %  ,Body mass index is 34 66 kg/m²  Invasive Devices     Peripheral Intravenous Line            Peripheral IV 09/15/17 Right Forearm 2 days          Drain            Urethral Catheter 18 Fr  15 days    Negative Pressure Wound Therapy (V A C ) Foot Left 9 days                Physical Exam:   General: Alert, cooperative and no distress  Lungs: Non labored breathing  Heart: Positive S1, S2  Abdomen: Soft, non-tender  Extremity: NVS and motor function at baseline b/l  RLE: dressings d/c/i  LLE: posterior heel wound is necrotic with some fibrotic tissue  There is no active purulence or malodor   No ascending cellulitis  Right plantar lateral forefoot wound is fibrogranular and stable  Left posterior heel wound             Lab, Imaging and other studies:   I have personally reviewed pertinent lab results  , CBC:   Lab Results   Component Value Date    WBC 10 35 (H) 09/18/2017    HGB 9 5 (L) 09/18/2017    HCT 28 2 (L) 09/18/2017    MCV 85 09/18/2017     09/18/2017    MCH 28 6 09/18/2017    MCHC 33 7 09/18/2017    RDW 15 0 09/18/2017    MPV 8 7 (L) 09/18/2017   , CMP:   Lab Results   Component Value Date     09/18/2017    K 3 5 09/18/2017    CL 99 (L) 09/18/2017    CO2 32 09/18/2017    ANIONGAP 5 09/18/2017    BUN 18 09/18/2017    CREATININE 1 05 09/18/2017    GLUCOSE 175 (H) 09/18/2017    CALCIUM 9 1 09/18/2017    EGFR 70 09/18/2017       Imaging: I have personally reviewed pertinent films in PACS  EKG, Pathology, and Other Studies: I have personally reviewed pertinent reports

## 2017-09-18 NOTE — CONSULTS
Consult Note - Vascular Surgery   Mare Meek 78 y o  male MRN: [id number]    Unit/Bed#: E4 -01 Encounter: [id number]    Assessment:  Postoperative left heel wound necrosis  Left heel osteomyelitis, s/p debridement, partial calcanectomy and wound VAC placement 9/8/2017  PAD with LLE tissue loss, s/p L SFA/pop PTA 9/12/2017  Right heel ulceration  Type II DM  CKD III  Chronic atrial fibrillation  CAD, s/p CABG w/LLE GSV harvest 2007  AS, s/p mechanical AVR 2007 on chronic Coumadin therapy    Plan: Will review angiogram images with Dr Burlene Gilford and recommendations to follow  Previously reviewed images with Dr Froylan Amato Doctor which demonstrated poor distal foot perfusion due to small vessel disease and foot and felt to be optimized for revascularization standpoint  Continue local wound care per Podiatry  Continue Plavix and statin  Heparin drip secondary to mechanical AVR  Consulting Service:  Podiatry    Chief Complaint:  Surgical wound necrosis left heel    HPI: Mare Meek is a 78 y o  male with history of type II DM, CKD III, chronic atrial fibrillation, CAD and aortic stenosis,s/p CABG/AVR (mechanical) 2007, chronic anticoagulation therapy, ASA intolerance and PAD w/LLE tissue loss and OM s/p L SFA/popliteal PTA 9/12/17 who vascular surgery is now reconsulted due to postoperative wound necrosis of left partial calcanectomy site  Patient was admitted on 9/2/2017 with generalized weakness and inability to bear weight on his feet  He has been followed as outpatient for bilateral [facility] ulcerations [facility] under the care of Dr Ronan De Luna  Right heel wound was improving but the patient developed worsening of the left heel ulceration and the development of osteomyelitis  Patient underwent operative debridement of the left heel with partial calcanectomy and wound VAC placement on 9/8/2017    Preoperative ANTONIO demonstrated 50-75% L SFA stenosis, noncompressible SIOBHAN,  and GTP 44   Patient is status post angiogram with left SFA/popliteal PTA by IR 9/12/2017  Post intervention SIOBHAN waveform analysis demonstrated noncompressible SIOBHAN and flat line GTP  Review of the angiogram images demonstrated predominant 2 vessel runoff via AT and PT with incomplete plantar arch and small vessel disease of left foot  Vascular surgery is now reconsulted secondary to poor postoperative wound healing with wound necrosis and malodor  Wound VAC was removed yesterday and the patient is now undergoing wet-to-dry dressings  Patient denies left lower extremity rest pain  Review of Systems:  General: negative  Cardiovascular: no chest pain or dyspnea on exertion  Respiratory: no cough, shortness of breath, or wheezing  Gastrointestinal: no abdominal pain, change in bowel habits, or black or bloody stools  Genitourinary ROS:  Chronic indwelling Rush catheter  Musculoskeletal ROS:  As per the above HPI  Neurological ROS: no TIA or stroke symptoms  Hematological and Lymphatic ROS: negative  Dermatological ROS: As per the above HPI  Psychological ROS: negative  Ophthalmic ROS: negative  ENT ROS: negative    Past Medical History:  Past Medical History:   Diagnosis Date    Anemia     Aortic stenosis     Atrial fibrillation     Chronic kidney disease     stage 3    COPD (chronic obstructive pulmonary disease)     Diabetes mellitus     Hyperlipidemia     Hypertension     Sleep apnea        Past Surgical History:  Past Surgical History:   Procedure Laterality Date    WOUND DEBRIDEMENT Left 9/8/2017    Procedure: DEBRIDEMENT WOUND [facility] OUT), I&D, PARTIAL CALCANECTOMY, APPLICATION OF WOUND VAC;  Surgeon: Virginia Worrell DPM;  Location: [facility] OR;  Service: Podiatry       Social History:  History   Alcohol Use No     History   Drug Use No     History   Smoking Status    Former Smoker   Smokeless Tobacco    Not on file       Family History:  History reviewed  No pertinent family history  Allergies:   Allergies   Allergen Reactions    Aspirin      Chest tightness       Medications:  Current Facility-Administered Medications   Medication Dose Route Frequency    acetaminophen (TYLENOL) tablet 650 mg  650 mg Oral Q6H PRN    clopidogrel (PLAVIX) tablet 75 mg  75 mg Oral Daily    collagenase (SANTYL) ointment   Topical Daily    ferrous sulfate tablet 325 mg  325 mg Oral BID    fluticasone-salmeterol (ADVAIR) 500-50 mcg/dose inhaler 1 puff  1 puff Inhalation BID    furosemide (LASIX) tablet 40 mg  40 mg Oral Daily    gabapentin (NEURONTIN) capsule 100 mg  100 mg Oral TID    heparin (porcine) 25,000 units in 250 mL infusion (premix)  3-30 Units/kg/hr (Order-Specific) Intravenous Titrated    heparin (porcine) injection 4,000 Units  4,000 Units Intravenous PRN    heparin (porcine) injection 8,000 Units  8,000 Units Intravenous PRN    insulin glargine (LANTUS) subcutaneous injection 40 Units  40 Units Subcutaneous HS    insulin lispro (HumaLOG) 100 units/mL subcutaneous injection 1-6 Units  1-6 Units Subcutaneous HS    insulin lispro (HumaLOG) 100 units/mL subcutaneous injection 12 Units  12 Units Subcutaneous Daily Before Lunch    insulin lispro (HumaLOG) 100 units/mL subcutaneous injection 12 Units  12 Units Subcutaneous Before Dinner    insulin lispro (HumaLOG) 100 units/mL subcutaneous injection 15 Units  15 Units Subcutaneous Daily Before Breakfast    insulin lispro (HumaLOG) 100 units/mL subcutaneous injection 2-12 Units  2-12 Units Subcutaneous TID AC    magnesium hydroxide (MILK OF MAGNESIA) 400 mg/5 mL oral suspension 30 mL  30 mL Oral PRN    magnesium oxide (MAG-OX) tablet 400 mg  400 mg Oral Daily    ondansetron (ZOFRAN) injection 4 mg  4 mg Intravenous Q6H PRN    oxybutynin (DITROPAN) tablet 5 mg  5 mg Oral BID    pravastatin (PRAVACHOL) tablet 40 mg  40 mg Oral Daily With Dinner    sodium hypochlorite (DAKIN'S HALF-STRENGTH) 0 25 % topical solution 1 application  1 application Irrigation Daily    tiotropium [facility]) capsule for inhaler 18 mcg  18 mcg Inhalation Daily       Vitals:  /60   Pulse 70   Temp 97 5 °F (36 4 °C) (Tympanic)   Resp 18   Ht 5' 9 5" (1 765 m)   Wt 108 kg (238 lb 1 6 oz)   SpO2 96%   BMI 34 66 kg/m²     I/Os:  I/O last 24 hours: In: 1069 [P O :840; I V :229]  Out: 4275 [Urine:4275]    Lab Results and Cultures:   Lab Results   Component Value Date    WBC 10 35 (H) 09/18/2017    HGB 9 5 (L) 09/18/2017    HCT 28 2 (L) 09/18/2017    MCV 85 09/18/2017     09/18/2017     Lab Results   Component Value Date    GLUCOSE 175 (H) 09/18/2017    CALCIUM 9 1 09/18/2017     09/18/2017    K 3 5 09/18/2017    CO2 32 09/18/2017    CL 99 (L) 09/18/2017    BUN 18 09/18/2017    CREATININE 1 05 09/18/2017     Lab Results   Component Value Date    INR 1 25 (H) 09/18/2017    INR 1 04 09/16/2017    INR 1 06 09/15/2017    PROTIME 15 8 (H) 09/18/2017    PROTIME 13 6 09/16/2017    PROTIME 13 8 09/15/2017       Lipid Panel: No results found for: CHOL,     Blood Culture: No results found for: BLOODCX,   Urinalysis:   Lab Results   Component Value Date    COLORU Yellow 09/05/2017    CLARITYU Clear 09/05/2017    SPECGRAV <=1 005 09/05/2017    PHUR 6 5 09/05/2017    LEUKOCYTESUR Small (A) 09/05/2017    NITRITE Positive (A) 09/05/2017    PROTEINUA Negative 09/05/2017    GLUCOSEU Negative 09/05/2017    KETONESU Negative 09/05/2017    BILIRUBINUR Negative 09/05/2017    BLOODU Trace-lysed (A) 09/05/2017   ,   Urine Culture:   Lab Results   Component Value Date    URINECX >100,000 cfu/ml Mixed Contaminants X3 09/02/2017   ,   Wound Culure:   Lab Results   Component Value Date    WOUNDCULT 3+ Growth of Staphylococcus aureus 09/02/2017    WOUNDCULT 3+ Growth of Enterococcus faecalis 09/02/2017    WOUNDCULT 3+ Growth of Mixed Skin Sabiha 09/02/2017       Imaging:  LLE SIOBHAN and waveform analysis 9/14/2017:  "FINDINGS:  Left           P    Ant   Tibial  254    Ankle        254       CONCLUSION:  Impression  LEFT LOWER LIMB  Ankle Pressure: Greater than 254 mm Hg, SIOBHAN: 1 79, supra- normal, consistent  with poorly compressible vessels, previous study same findings  The PVR tracing and pressure at the metatarsal level was deferred due to wound  VAC  The PPG waveform of the great toe is severely attenuated, therefore, pressure  is unobtainable suggesting poor distal perfusion, previous study 44 mm Hg  In comparison to the study of 9/7/2017, there is no significant interval change  in the disease process "    Abdominal aortogram with left lower extremity runoff 9/12/2017:  PTA of left SFA/popliteal stenosis with 2 vessel runoff via AT and PT  Incomplete pedal arch  Full report as noted below:  "Findings: Aortoiliac:  - Two patent renal arteries with no significant stenosis, bilaterally  - Other than tortuosity of the iliac arteries, there is no significant aortoiliac atherosclerotic change  No significant stenoses seen  Left lower extremity:  - Patent common femoral artery with mild atherosclerotic irregularity   - The left SFA is patent with mild grade diffuse atherosclerotic contour irregularity and calcifications  - At the level of the adductor canal, there is a 2 cm segment with moderate to high-grade stenosis  - Patent left popliteal artery with no significant stenosis  - 3 vessel left lower extremity runoff with the anterior tibial artery being dominant  The posterior tibial artery becomes diminutive at the level of the ankle joint  The peroneal artery is patent but diminutive  The dorsalis pedis artery is patent  The   pedal arch is incomplete  IMPRESSION:  Impression:  1  Mild aortoiliac atherosclerotic changes with tortuosity but no significant stenosis  2  Patent left SFA with mild atherosclerotic contour irregularity  Distal SFA/popliteal artery 2 cm moderate to high-grade stenosis treated with angioplasty using a 6 mm x 6 cm using a LUTONIX drug coated balloon   Follow-up imaging showed good angiographic result  3  Three-vessel left lower extremity runoff with anterior tibial artery dominant  The dorsalis pedis artery is patent  Diminutive peroneal artery  Atherosclerotic posterior tibial artery  Incomplete pedal arch "    Physical Exam:    General appearance: alert, appears stated age, cooperative and no distress  Skin: Skin color, texture, turgor normal  No rashes or lesions  Neurologic: Grossly normal  Head: Normocephalic, without obvious abnormality, atraumatic  Eyes: PERRL  EOMI, sclerae nonicteric  Throat: lips, mucosa, and tongue normal; teeth and gums normal  Neck: no adenopathy, no carotid bruit, no JVD and supple, symmetrical, trachea midline  Back: symmetric, no curvature  ROM normal  No CVA tenderness  Lungs: clear to auscultation bilaterally  Chest wall: no tenderness, Midline sternotomy scar noted, well healed  Sternum stable  Heart: regular rate and rhythm, S1, S2 normal, no murmur, click, rub or gallop  Abdomen: soft, non-tender; bowel sounds normal; no masses,  no organomegaly and Nondistended  No abdominal bruits  Extremities: no edema, redness or tenderness in the calves or thighs and Dressing intact left heel  Stasis changes noted to left anterior shin but no evidence erythema  Dressing intact right foot  Left lower extremity motor sensory intact  No cyanosis or pallor of left toes      Wound/Incision:        LLE/left heel    Pulse exam:  Radial: Right: 2+ Left[de-identified] 2+  Femoral: Right: 2+ Left: 2+  Popliteal: Right: non-palpable Left: 2+  DP: Right: doppler signal Left: Obscured by dressing  PT: Right: doppler signal Left: doppler signal   Doppler signals:  Left:  Biphasic PT, AT, peroneal      Carlos Reyes PA-C  9/18/2017   [facility], [phone]

## 2017-09-19 LAB
APTT PPP: 106 SECONDS (ref 23–35)
APTT PPP: 69 SECONDS (ref 23–35)
APTT PPP: 74 SECONDS (ref 23–35)
APTT PPP: 80 SECONDS (ref 23–35)
GLUCOSE SERPL-MCNC: 101 MG/DL (ref 65–140)
GLUCOSE SERPL-MCNC: 155 MG/DL (ref 65–140)
GLUCOSE SERPL-MCNC: 177 MG/DL (ref 65–140)
GLUCOSE SERPL-MCNC: 218 MG/DL (ref 65–140)

## 2017-09-19 PROCEDURE — 97535 SELF CARE MNGMENT TRAINING: CPT

## 2017-09-19 PROCEDURE — 97532 HB COGNITIVE SKILLS DEVELOPMENT: CPT

## 2017-09-19 PROCEDURE — 97530 THERAPEUTIC ACTIVITIES: CPT

## 2017-09-19 PROCEDURE — 85730 THROMBOPLASTIN TIME PARTIAL: CPT | Performed by: HOSPITALIST

## 2017-09-19 PROCEDURE — 82948 REAGENT STRIP/BLOOD GLUCOSE: CPT

## 2017-09-19 PROCEDURE — 97110 THERAPEUTIC EXERCISES: CPT

## 2017-09-19 RX ADMIN — HEPARIN SODIUM AND DEXTROSE 13 UNITS/KG/HR: 10000; 5 INJECTION INTRAVENOUS at 23:01

## 2017-09-19 RX ADMIN — PRAVASTATIN SODIUM 40 MG: 40 TABLET ORAL at 16:45

## 2017-09-19 RX ADMIN — CLOPIDOGREL BISULFATE 75 MG: 75 TABLET ORAL at 08:52

## 2017-09-19 RX ADMIN — GABAPENTIN 100 MG: 100 CAPSULE ORAL at 16:44

## 2017-09-19 RX ADMIN — INSULIN LISPRO 15 UNITS: 100 INJECTION, SOLUTION INTRAVENOUS; SUBCUTANEOUS at 08:50

## 2017-09-19 RX ADMIN — FLUTICASONE PROPIONATE AND SALMETEROL 1 PUFF: 50; 500 POWDER RESPIRATORY (INHALATION) at 08:50

## 2017-09-19 RX ADMIN — INSULIN GLARGINE 40 UNITS: 100 INJECTION, SOLUTION SUBCUTANEOUS at 23:35

## 2017-09-19 RX ADMIN — INSULIN LISPRO 2 UNITS: 100 INJECTION, SOLUTION INTRAVENOUS; SUBCUTANEOUS at 12:03

## 2017-09-19 RX ADMIN — INSULIN LISPRO 4 UNITS: 100 INJECTION, SOLUTION INTRAVENOUS; SUBCUTANEOUS at 08:51

## 2017-09-19 RX ADMIN — Medication 400 MG: at 08:52

## 2017-09-19 RX ADMIN — INSULIN LISPRO 1 UNITS: 100 INJECTION, SOLUTION INTRAVENOUS; SUBCUTANEOUS at 23:38

## 2017-09-19 RX ADMIN — GABAPENTIN 100 MG: 100 CAPSULE ORAL at 08:52

## 2017-09-19 RX ADMIN — HEPARIN SODIUM AND DEXTROSE 13 UNITS/KG/HR: 10000; 5 INJECTION INTRAVENOUS at 16:34

## 2017-09-19 RX ADMIN — FERROUS SULFATE TAB 325 MG (65 MG ELEMENTAL FE) 325 MG: 325 (65 FE) TAB at 08:52

## 2017-09-19 RX ADMIN — GABAPENTIN 100 MG: 100 CAPSULE ORAL at 20:44

## 2017-09-19 RX ADMIN — COLLAGENASE SANTYL: 250 OINTMENT TOPICAL at 08:55

## 2017-09-19 RX ADMIN — INSULIN LISPRO 12 UNITS: 100 INJECTION, SOLUTION INTRAVENOUS; SUBCUTANEOUS at 16:45

## 2017-09-19 RX ADMIN — FERROUS SULFATE TAB 325 MG (65 MG ELEMENTAL FE) 325 MG: 325 (65 FE) TAB at 17:00

## 2017-09-19 RX ADMIN — OXYBUTYNIN CHLORIDE 5 MG: 5 TABLET ORAL at 08:53

## 2017-09-19 RX ADMIN — HEPARIN SODIUM AND DEXTROSE 17 UNITS/KG/HR: 10000; 5 INJECTION INTRAVENOUS at 04:31

## 2017-09-19 RX ADMIN — FLUTICASONE PROPIONATE AND SALMETEROL 1 PUFF: 50; 500 POWDER RESPIRATORY (INHALATION) at 20:44

## 2017-09-19 RX ADMIN — OXYBUTYNIN CHLORIDE 5 MG: 5 TABLET ORAL at 17:00

## 2017-09-19 RX ADMIN — HYOSCYAMINE SULFATE 1 APPLICATION: 16 SOLUTION at 08:54

## 2017-09-19 RX ADMIN — FUROSEMIDE 40 MG: 40 TABLET ORAL at 08:52

## 2017-09-19 RX ADMIN — TIOTROPIUM BROMIDE 18 MCG: 18 CAPSULE ORAL; RESPIRATORY (INHALATION) at 08:50

## 2017-09-19 NOTE — PROGRESS NOTES
Progress Note - Podiatry  Yojana Newman 78 y o  male MRN: [id number]  Unit/Bed#: E4 -01 Encounter: [id number]    Assessment/Plan:  1  A 76 y  o  y/o male s/p excisional debridement with partial calcanectomy and application of wound vac therapy on left heel by Dr Merrill Schmitt (DOS: 09/08/2017)  - per vascular patient optimized from revascularization standpoint and he has poor distal perfusion secondary to small vessel disease  No further endovascular or surgical revascularization options  **Spoke with patient and wife in detail about all treatment options: [address] - palliative case, podiatric debridement, vs BKA  Patient is amendable to BKA, however, patient and wife would like to set up a meeting with vascular and podiatry together before finalizing plan  Wife is available after 6 tomorrow and anytime Thursday and Friday  - c/w monitor off antibiotics per ID recommendations   - wound appearance continues to be necrotic with appearance of erythema to periwound with superficial abscess with purulent drainage after deroofing of blister     - c/w LWC with dakins wet to dry to left heel; 2x/day with nursing, podiatry to see patient daily  - Tissue Cx: results show s aureus and enterococcus   - WBS: NWB to LLE; PT/OT onboard and both recommend rehab; CM onboard awaiting placement   - c/w prevalon boots while in bed to offload as much as possible and prevent worsening of ulceration     2  Beola Gianotti Stage 1 Ulceration to left lateral forefoot - POA  -  podiatry to c/w LWC: maxsorb + DSD     3  Right Stage 1 Pressure Ulceration - POA   - wound continues to be stable with dry scab formation; therefore dressing changed to betadine + DSD; NC to do daily, with podiatry to monitor on a q3d basis - next to be 9/22  - c/w prevalon boots while in bed to offload as much as possible and prevent worsening of ulceration      4   DM, type 2 with neuropathy: A1c = 6 6% - per endocrinology   - c/w humalog 15 units for breakfast coverage, 12 units for lunch/dinner coverage, and lantus 40 units  - CC1 diet     5  PAD - per vascular   - s/p LLE agram with angioplasty per IR; 2 vessel runoff via AT and PT with small vessel disease  - LEADs reviewed: 50-75% stenosis of L distal SFA; repeat post agram LEADs are limited as SIOBHAN are non compressible, met pressure unobtainable due to vac dressings, and GTP flat line;   - no further revascularization options      6  CKD, stage 3 - per nephrology   - baseline creatinine 1 2-1 4; creatinine around baseline  - c/w oxybutynin      7  CHF - stable   - c/w lasix   8  Afib   - c/w heparin drip   9  Hx of Mechanical Heart Valve - AVR  - c/w heparin drip   - c/w statin; and plavix started due to ASA allergy      10  Knee Pain - improved with no complaints   - xrays reviewed: mild degenerative changes noted   - per Ortho: not a good canditate for steroid injection due to hx of DM  - c/w PT/OT for management      11  Blood in Stool - resolved   - hemoglobin has been stable     12  R knee pain, likely secondary to DJD  - per Ortho not a good candidate for steroid injection, appreciate their recommendations      Dispo: continue with inpatient stay for surgical intervention for possible BKA     Subjective/Objective   Chief Complaint: "I'm doing fine "     Subjective: 78 y o  y/o male was seen and evaluated at bedside in NAD  Patient's wife is at bedside  Blood pressure 149/86, pulse 73, temperature 97 5 °F (36 4 °C), temperature source Tympanic, resp  rate 18, height 5' 9 5" (1 765 m), weight 108 kg (238 lb 1 6 oz), SpO2 98 %  ,Body mass index is 34 66 kg/m²  Invasive Devices     Peripheral Intravenous Line            Peripheral IV 09/19/17 Left Wrist less than 1 day          Drain            Urethral Catheter 18 Fr  16 days                Physical Exam:   General: Alert, cooperative and no distress  Lungs: Non labored breathing  Heart: Positive S1, S2  Abdomen: Soft, non-tender    Extremity:   NVS and motor function at baseline b/l  RLE: scab formation; dry and stable, no acute signs of infection noted   LLE: posterior heel wound is necrotic with some fibrotic tissue  There is no active purulence or malodor  There is erythema medially on the heel with superficial abscess  Left plantar lateral forefoot wound is a mixture of fibrotic, granular, and necrotic wound base, serosanguinous drainage, but stable without acute signs of infection noted        Left lateral     Right posterior heel     Left medial heel - where blister was deroofed and erythema present     Left posterior heel         Lab, Imaging and other studies: I have personally reviewed pertinent lab results  Imaging: I have personally reviewed pertinent films in PACS  EKG, Pathology, and Other Studies: I have personally reviewed pertinent reports

## 2017-09-19 NOTE — CASE MANAGEMENT
Continued Stay Review    Date:  9/19/2917    Vital Signs: /86   Pulse 73   Temp 97 5 °F (36 4 °C) (Tympanic)   Resp 18   Ht 5' 9 5" (1 765 m)   Wt 108 kg (238 lb 1 6 oz)   SpO2 98%   BMI 34 66 kg/m²     Medications:   Scheduled Meds:   clopidogrel 75 mg Oral Daily   collagenase  Topical Daily   ferrous sulfate 325 mg Oral BID   fluticasone-salmeterol 1 puff Inhalation BID   furosemide 40 mg Oral Daily   gabapentin 100 mg Oral TID   insulin glargine 40 Units Subcutaneous HS   insulin lispro 1-6 Units Subcutaneous HS   insulin lispro 12 Units Subcutaneous Daily Before Lunch   insulin lispro 12 Units Subcutaneous Before Dinner   insulin lispro 15 Units Subcutaneous Daily Before Breakfast   insulin lispro 2-12 Units Subcutaneous TID AC   magnesium oxide 400 mg Oral Daily   oxybutynin 5 mg Oral BID   pravastatin 40 mg Oral Daily With Dinner   sodium hypochlorite 1 application Irrigation Daily   tiotropium 18 mcg Inhalation Daily     Continuous Infusions:   heparin (porcine) 3-30 Units/kg/hr (Order-Specific) Last Rate: 13 Units/kg/hr (09/19/17 1634)     PRN Meds:   acetaminophen    heparin (porcine)    heparin (porcine)    magnesium hydroxide    ondansetron    Abnormal Labs/Diagnostic Results:     Age/Sex: 78 y o  male     Assessment/Plan:   Optimized from Vascular standpoint  Debridement  Vs BKA  To be determined  Monitor off abxs  Wound CAre  Continue on Heparin Drip in anticipation of surgery    Discharge Plan: to be determined

## 2017-09-19 NOTE — PHYSICAL THERAPY NOTE
PT PROGRESS NOTE     09/19/17 1448   Pain Assessment   Pain Assessment 0-10   Pain Score 5   Pain Location Knee   Pain Orientation Right   Hospital Pain Intervention(s) Repositioned; Emotional support   Diversional Activities Television   Restrictions/Precautions   RLE Weight Bearing Per Order WBAT   LLE Weight Bearing Per Order NWB   Other Precautions Cognitive;WBS;Multiple lines; Fall Risk;Pain   General   Chart Reviewed Yes   Response to Previous Treatment Patient with no complaints from previous session  Family/Caregiver Present No   Cognition   Arousal/Participation Alert   Attention Attends with cues to redirect   Following Commands Follows one step commands with increased time or repetition   Subjective   Subjective Pt agreeable to therapy  Bed Mobility   Additional Comments unable to assess as pt sitting at EOB w/ OT upon my arrival   Transfers   Sit to Stand 2  Maximal assistance   Additional items Assist x 2;Armrests; Increased time required;Verbal cues   Stand to Sit 3  Moderate assistance   Additional items Assist x 2;Armrests; Increased time required;Verbal cues   Sliding Board transfer 2  Maximal assistance   Additional items Assist x 2; Increased time required;Verbal cues  (require cues & redirection to carry out approp techniques)   Additional Comments ; pt unable to maintain NWB to L foot during standing   Ambulation/Elevation   Gait pattern Not appropriate   Balance   Static Sitting Fair   Static Standing Poor -   Ambulatory Zero   Endurance Deficit   Endurance Deficit Yes   Endurance Deficit Description pain, fatigue, weakness   Activity Tolerance   Activity Tolerance Patient limited by fatigue;Patient limited by pain   Nurse Made Aware Shivani   Exercises   Hip Flexion Sitting;10 reps;AROM; Bilateral   Hip Abduction Sitting;10 reps;AROM; Bilateral   Hip Adduction Sitting;10 reps;AROM; Bilateral   Knee AROM Long Arc Quad Sitting;10 reps;AROM   Ankle Pumps Sitting;10 reps;AROM; Bilateral   Assessment Prognosis Fair   Problem List Decreased strength;Decreased endurance; Impaired balance;Decreased mobility;Obesity;Pain;Decreased skin integrity;Orthopedic restrictions;Decreased cognition  (NWB LLE)   Assessment Pt require maxAx2 for sliding board transfers & sit<>stand transitions + cues for techniques  Pt demonstrate slow processing w/ motor planning during sliding board transfers, require inc cues & redirection to carry out appropriate techniques  Pt unable to maintain NWB to L foot during standing activities 2* to generalized weakness, dec balance & R knee pain  Hence will continue sliding board transfers to minimized WB to L foot as much as possible  R knee pain relieved at rest  Pt tolerated above mentioned thera  ex well  Pt tolerated OOB in chair at end of session w/o issues  Call bell in reach  BLE elevated on foot stool  Will continue PT per POC  Will continue to recommend inpt rehab at D/C  [facility] staff to continue to mobilized pt as tolerated to prevent decline in function  KAJAL Parrish instructed to utilized sliding board for bed<>chair transfers w/ good understanding  Barriers to Discharge Decreased caregiver support; Inaccessible home environment   Goals   Patient Goals none stated   STG Expiration Date 09/21/17   Treatment Day 5   Plan   Treatment/Interventions Functional transfer training;LE strengthening/ROM; Therapeutic exercise; Endurance training;Patient/family training;Bed mobility;Spoke to nursing;OT   Progress Slow progress, multiple tests/procedures   PT Frequency 5x/wk   Recommendation   Recommendation Short-term skilled PT   Equipment Recommended Wheelchair   PT - OK to Discharge Yes  (to inpt rehab)   Kavitha Carpenter, PT

## 2017-09-19 NOTE — PROGRESS NOTES
Progress Note - Vascular Surgery     Assessment:  Postoperative left heel wound necrosis  Left heel osteomyelitis, s/p debridement, partial calcanectomy and wound VAC placement 9/8/2017  PAD with LLE tissue loss, s/p L SFA/pop PTA 9/12/2017  Right heel ulceration  Type II DM  CKD III  Chronic atrial fibrillation  CAD, s/p CABG w/LLE GSV harvest 2007  AS, s/p mechanical AVR 2007 on chronic Coumadin therapy       Plan:  Case discussed and angiogram images reviewed with Dr Elana Rich  Angiogram images also reviewed at vascular case conference last evening  Patient optimized from a revascularization standpoint  Poor distal foot perfusion due to small vessel disease  No further endovascular or surgical revascularization options  If poor healing persists, may require amputation  Continue local wound care per Podiatry  Continue Plavix and statin  Anticoagulation secondary to mechanical AVR  Subjective:  Patient without new complaints  VSS    Vitals:  /67   Pulse 65   Temp 98 2 °F (36 8 °C) (Tympanic)   Resp 18   Ht 5' 9 5" (1 765 m)   Wt 108 kg (238 lb 1 6 oz)   SpO2 96%   BMI 34 66 kg/m²     I/Os:  I/O last 3 completed shifts: In: 229 [I V :229]  Out: 4975 [Urine:4975]  No intake/output data recorded      Lab Results and Cultures:   Lab Results   Component Value Date    WBC 10 35 (H) 09/18/2017    HGB 9 5 (L) 09/18/2017    HCT 28 2 (L) 09/18/2017    MCV 85 09/18/2017     09/18/2017     Lab Results   Component Value Date    GLUCOSE 175 (H) 09/18/2017    CALCIUM 9 1 09/18/2017     09/18/2017    K 3 5 09/18/2017    CO2 32 09/18/2017    CL 99 (L) 09/18/2017    BUN 18 09/18/2017    CREATININE 1 05 09/18/2017     Lab Results   Component Value Date    INR 1 25 (H) 09/18/2017    INR 1 04 09/16/2017    INR 1 06 09/15/2017    PROTIME 15 8 (H) 09/18/2017    PROTIME 13 6 09/16/2017    PROTIME 13 8 09/15/2017        Blood Culture: No results found for: BLOODCX,   Urinalysis:   Lab Results   Component Value Date    COLORU Yellow 09/05/2017    CLARITYU Clear 09/05/2017    SPECGRAV <=1 005 09/05/2017    PHUR 6 5 09/05/2017    LEUKOCYTESUR Small (A) 09/05/2017    NITRITE Positive (A) 09/05/2017    PROTEINUA Negative 09/05/2017    GLUCOSEU Negative 09/05/2017    KETONESU Negative 09/05/2017    BILIRUBINUR Negative 09/05/2017    BLOODU Trace-lysed (A) 09/05/2017   ,   Urine Culture:   Lab Results   Component Value Date    URINECX >100,000 cfu/ml Mixed Contaminants X3 09/02/2017   ,   Wound Culure:   Lab Results   Component Value Date    WOUNDCULT 3+ Growth of Staphylococcus aureus 09/02/2017    WOUNDCULT 3+ Growth of Enterococcus faecalis 09/02/2017    WOUNDCULT 3+ Growth of Mixed Skin Sabiha 09/02/2017       Medications:  Current Facility-Administered Medications   Medication Dose Route Frequency    acetaminophen (TYLENOL) tablet 650 mg  650 mg Oral Q6H PRN    clopidogrel (PLAVIX) tablet 75 mg  75 mg Oral Daily    collagenase (SANTYL) ointment   Topical Daily    ferrous sulfate tablet 325 mg  325 mg Oral BID    fluticasone-salmeterol (ADVAIR) 500-50 mcg/dose inhaler 1 puff  1 puff Inhalation BID    furosemide (LASIX) tablet 40 mg  40 mg Oral Daily    gabapentin (NEURONTIN) capsule 100 mg  100 mg Oral TID    heparin (porcine) 25,000 units in 250 mL infusion (premix)  3-30 Units/kg/hr (Order-Specific) Intravenous Titrated    heparin (porcine) injection 4,000 Units  4,000 Units Intravenous PRN    heparin (porcine) injection 8,000 Units  8,000 Units Intravenous PRN    insulin glargine (LANTUS) subcutaneous injection 40 Units  40 Units Subcutaneous HS    insulin lispro (HumaLOG) 100 units/mL subcutaneous injection 1-6 Units  1-6 Units Subcutaneous HS    insulin lispro (HumaLOG) 100 units/mL subcutaneous injection 12 Units  12 Units Subcutaneous Daily Before Lunch    insulin lispro (HumaLOG) 100 units/mL subcutaneous injection 12 Units  12 Units Subcutaneous Before Dinner    insulin lispro (HumaLOG) 100 units/mL subcutaneous injection 15 Units  15 Units Subcutaneous Daily Before Breakfast    insulin lispro (HumaLOG) 100 units/mL subcutaneous injection 2-12 Units  2-12 Units Subcutaneous TID AC    magnesium hydroxide (MILK OF MAGNESIA) 400 mg/5 mL oral suspension 30 mL  30 mL Oral PRN    magnesium oxide (MAG-OX) tablet 400 mg  400 mg Oral Daily    ondansetron (ZOFRAN) injection 4 mg  4 mg Intravenous Q6H PRN    oxybutynin (DITROPAN) tablet 5 mg  5 mg Oral BID    pravastatin (PRAVACHOL) tablet 40 mg  40 mg Oral Daily With Dinner    sodium hypochlorite (DAKIN'S HALF-STRENGTH) 0 25 % topical solution 1 application  1 application Irrigation Daily    tiotropium (SPIRIVA) capsule for inhaler 18 mcg  18 mcg Inhalation Daily       Imaging:  No new imaging studies for review    Physical Exam:    General appearance: alert, appears stated age, cooperative and no distress  Neurologic: Grossly normal  Neck: no adenopathy, no carotid bruit, no JVD and supple, symmetrical, trachea midline  Lungs: clear to auscultation bilaterally  Chest wall: no tenderness, Midline sternotomy scar well healed  Sternum stable  Heart: regular rate and rhythm, S1: normal, S2: normal prosthetic S2, no S3 or S4, no rub and No murmur  Abdomen: soft, non-tender; bowel sounds normal; no masses,  no organomegaly and Nondistended  No abdominal bruits  Extremities: no edema, redness or tenderness in the calves or thighs and Dressing intact left heel  Stasis changes noted to left anterior shin but no evidence of erythema  Dressing intact right foot  Left lower extremity motor and sensory intact  No cyanosis or pallor left toes  GSV harvest site incision left medial proximal lower leg    Wound/Incision:  Dressing intact bilateral foot    Clinical image from 9/18/2017 as noted below:    Left heel    Pulse exam:  Radial: Right: 2+ Left[de-identified] 2+  Femoral: Right: 2+ Left: 2+  Popliteal: Right: non-palpable Left: 2+  DP: Right: doppler signal Left: Obscured by dressing  PT: Right: doppler signal Left: doppler signal    Yannick Fragoso PA-C  9/19/2017   [facility], [phone]

## 2017-09-19 NOTE — PLAN OF CARE
Problem: PHYSICAL THERAPY ADULT  Goal: Performs mobility at highest level of function for planned discharge setting  See evaluation for individualized goals  Treatment/Interventions: Functional transfer training, LE strengthening/ROM, Elevations, Therapeutic exercise, Endurance training, Patient/family training, Equipment eval/education, Bed mobility, Gait training, OT  Equipment Recommended: Walker (RW)       See flowsheet documentation for full assessment, interventions and recommendations  Outcome: Progressing  Prognosis: Fair  Problem List: Decreased strength, Decreased endurance, Impaired balance, Decreased mobility, Obesity, Pain, Decreased skin integrity, Orthopedic restrictions, Decreased cognition (NWB LLE)  Assessment: Pt require maxAx2 for sliding board transfers & sit<>stand transitions + cues for techniques  Pt demonstrate slow processing w/ motor planning during sliding board transfers, require inc cues & redirection to carry out appropriate techniques  Pt unable to maintain NWB to L foot during standing activities 2* to generalized weakness, dec balance & R knee pain  Hence will continue sliding board transfers to minimized WB to L foot as much as possible  R knee pain relieved at rest  Pt tolerated above mentioned thera  ex well  Pt tolerated OOB in chair at end of session w/o issues  Call bell in reach  BLE elevated on foot stool  Will continue PT per POC  Will continue to recommend inpt rehab at D/C  Nsg staff to continue to mobilized pt as tolerated to prevent decline in function  KAJAL Parrish instructed to utilized sliding board for bed<>chair transfers w/ good understanding  Barriers to Discharge: Decreased caregiver support, Inaccessible home environment     Recommendation: Short-term skilled PT     PT - OK to Discharge: Yes (to inpt rehab)    See flowsheet documentation for full assessment

## 2017-09-19 NOTE — PLAN OF CARE
Problem: OCCUPATIONAL THERAPY ADULT  Goal: Performs self-care activities at highest level of function for planned discharge setting  See evaluation for individualized goals  Treatment Interventions: ADL retraining, Functional transfer training, UE strengthening/ROM, Endurance training, Patient/family training, Equipment evaluation/education, Compensatory technique education, Energy conservation, Activityengagement          See flowsheet documentation for full assessment, interventions and recommendations  Outcome: Progressing  Limitation: Decreased ADL status, Decreased UE strength, Decreased Safe judgement during ADL, Decreased endurance, Decreased self-care trans, Decreased high-level ADLs (NWB L LE w/ wound vac)  Prognosis: Fair  Assessment: Pt was seen for skilled OT with focus on completion of self care tasks, functional transfers and review of current plan of care  Limited motor planning noted with completon of sliding board transfer for simulated self toileting  Pt incontinent of bowel this tx session  See above levels of A required for all functional tasks  Pt will benefit from further rehab with focus on achieving optimal performance levels with all functional tasks        Discharge Recommendation: Short Term Rehab         Comments: KALLIE James

## 2017-09-19 NOTE — OCCUPATIONAL THERAPY NOTE
Occupational Therapy Treatment Note:         09/19/17 1443   Restrictions/Precautions   Weight Bearing Precautions Per Order Yes   RUE Weight Bearing Per Order FWB   LUE Weight Bearing Per Order FWB   RLE Weight Bearing Per Order WBAT   LLE Weight Bearing Per Order NWB   Braces or Orthoses (diabetic shoes)   Other Precautions Fall Risk;Pain;Cognitive   Pain Assessment   Pain Assessment No/denies pain   Pain Score No Pain   Pain Type Acute pain   Pain Location Knee   Pain Orientation Right   ADL   Where Assessed Supine, bed   Grooming Assistance 5  Supervision/Setup   Grooming Deficit Setup   LB Bathing Assistance 2  Maximal Assistance   LB Bathing Deficit Setup;Steadying;Verbal cueing;Supervision/safety; Increased time to complete;Perineal area; Buttocks; Left lower leg including foot;Right lower leg including foot   LB Bathing Comments Limited stand tolerance noted  Pt unable to maintain NWB status to LLE instance  LB Dressing Assistance 2  Maximal Assistance   LB Dressing Deficit Setup;Don/doff R sock   LB Dressing Comments Limited functional reach noted  Toileting Assistance  Other (Comment)  (Pt incontinent of stool this tx session  )   Toileting Deficit Setup;Steadying;Verbal cueing;Supervison/safety; Increased time to complete;Perineal hygiene;Clothing management up;Clothing management down   Toileting Comments Limited functional reach noted with activity  Functional Standing Tolerance   Time 2 mins  Activity viky care instance  Comments Pt required cues to fully support self with BUE and only use RLE to stand on with need for viky care  Bed Mobility   Supine to Sit 3  Moderate assistance   Additional items Assist x 1;Bedrails; Increased time required;LE management;Verbal cues   Additional items Assist x 1;Verbal cues;LE management; Increased time required; Bedrails   Additional Comments No loss of sitting balance at EOB      Transfers   Sit to Stand 2  Maximal assistance   Additional items Assist x 2;Increased time required;Verbal cues   Stand to Sit 3  Moderate assistance   Additional items Assist x 2;Bedrails;Armrests; Increased time required;Verbal cues   Sliding Board transfer 2  Maximal assistance   Additional items Assist x 2;Armrests; Increased time required;Verbal cues   Additional Comments Slow processing noted with motor planning use of sliding board  Redirection and cues required to carry over appropriate techs  Therapeutic Excerise-Strength   UE Strength Yes   Cognition   Overall Cognitive Status WFL   Arousal/Participation Alert; Responsive   Attention Attends with cues to redirect   Orientation Level Oriented to person;Oriented to place   Memory Decreased short term memory;Decreased recall of precautions   Following Commands Follows one step commands with increased time or repetition   Comments limited focus to tasks noted  Assessment   Assessment Pt was seen for skilled OT with focus on completion of self care tasks, functional transfers and review of current plan of care  Limited motor planning noted with completon of sliding board transfer for simulated self toileting  Pt incontinent of bowel this tx session  See above levels of A required for all functional tasks  Pt will benefit from further rehab with focus on achieving optimal performance levels with all functional tasks  Plan   Treatment Interventions ADL retraining;Functional transfer training;Cognitive reorientation;UE strengthening/ROM; Endurance training   Goal Expiration Date 09/21/17   Treatment Day 5   OT Frequency 3-5x/wk   Recommendation   Discharge Recommendation Short Term Rehab   KALLIE Orozco

## 2017-09-19 NOTE — PROGRESS NOTES
[facility] Internal Medicine Progress Note  Patient: Chayo Lane 78 y o  male   MRN: [id number]  PCP: Deepak Sarabia MD  Unit/Bed#: E4 -09 Encounter: [id number]  Date Of Visit: 09/19/17    Assessment:    Principal Problem:    Asthenia  Active Problems:    Decubitus ulcer of heel, stage 3    Leukocytosis    Stage 3 chronic kidney disease    Chronic indwelling Beckett catheter    Abnormal urinalysis    Chronic atrial fibrillation    Diabetes mellitus    Chronic diastolic heart failure    PAD (peripheral artery disease)    S/P AVR      Plan:    · Left heel decubitus ulcer s/p I&D and partial calcanectomy (DOS: 9/8/17)  ? Wound necrosis noted per podiatry but likely due to poor vasculature and not infection per ID  Will not restart abx as wound looks stable per podiatry    ? Patient deemed optimized from a revascularization standpoint and no vascular intervention options as patient has small vessel disease  Podiatry to continue local wound care  May require proximal amputation if poor healing persists, however if pt refuses BKA, podiatry may take pt to OR for I&D  ? Final tissue cx 9/15:  MSSA and Enterococcus  · H/o mechanical heart valve AVR with h/o A fib:   ? Pt was on heparin gtt with warfarin restarted with heparin bridge, however as pt may go to the OR (i&d vs BKA) will hold warfarin and c/w hep gtt, statin and plavix  ? Last INR 1 25 on 9/18 AM  C/w serial PT/INR  · PVD: s/p SFA angioplasty 9/14  · Chronic diastolic heart failure, stable  · Chronic indwelling beckett catheter: c/w oxybutynin  · Diabetes Mellitus, type 2: c/w humalog 15 units for breakfast coverage, 12 units for lunch/dinner coverage, and lantus 40 units  Mgmt per endocrinology  · Diabetic neuropathy: c/w gabapentin  · CKD stage 3: stable, creatinine 1 05, GFR 70  · Bright red blood noted in stool: resolved, unremarkable colonoscopy and denies h/o hemorrhoids   Continue to monitor labs, Hgb 9 5  · Right knee pain, improved: likely OA and 2/2 immobility per ortho  C/w rehab/PT      VTE Pharmacologic Prophylaxis:   Pharmacologic: Heparin Drip  Time Spent for Care: 30 minutes  More than 50% of total time spent on counseling and coordination of care as described above  Current Length of Stay: 17 day(s)  Current Patient Status: Inpatient   Code Status: Level 1 - Full Code      Subjective:   Pt seen at bedside, sitting comfortably, NAD  Pt denies n/v/f/c/sob  Pt states he is awaiting final podiatry recommendations as to surgical options, and that he is aware of risk of limb loss  I discussed briefly with pt that he is optimized from a vascular standpoint and no vascular intervention is planned at this time  Objective:     Vitals:   Temp (24hrs), Av 1 °F (36 7 °C), Min:97 5 °F (36 4 °C), Max:98 6 °F (37 °C)    HR:  [65-73] 65  Resp:  [18] 18  BP: (124-170)/(58-67) 142/66  SpO2:  [96 %-98 %] 96 %  Body mass index is 34 66 kg/m²  Input and Output Summary (last 24 hours): Intake/Output Summary (Last 24 hours) at 17 1251  Last data filed at 17 0902   Gross per 24 hour   Intake              180 ml   Output             3125 ml   Net            -2945 ml       Physical Exam:     Physical Exam   Constitutional: He is oriented to person, place, and time  He appears well-developed and well-nourished  HENT:   Head: Normocephalic and atraumatic  Eyes: EOM are normal  No scleral icterus  Neck: Normal range of motion  Neck supple  Cardiovascular: Normal rate and regular rhythm  Mechanical heart sounds noted   Pulmonary/Chest: Effort normal and breath sounds normal  No respiratory distress  He has no wheezes  Abdominal: Soft  There is no tenderness  Musculoskeletal: He exhibits no tenderness  Dressing to left foot- c/d/i   Neurological: He is alert and oriented to person, place, and time  Skin: Skin is warm and dry  Psychiatric: He has a normal mood and affect   His behavior is normal  Thought content normal  Additional Data:     Labs:      Results from last 7 days  Lab Units 09/18/17  0904  09/16/17  0646   WBC Thousand/uL 10 35*  < > 9 71   HEMOGLOBIN g/dL 9 5*  < > 9 3*   HEMATOCRIT % 28 2*  < > 27 3*   PLATELETS Thousands/uL 313  < > 289   NEUTROS PCT %  --   --  76*   LYMPHS PCT %  --   --  12*   MONOS PCT %  --   --  7   EOS PCT %  --   --  4   < > = values in this interval not displayed  Results from last 7 days  Lab Units 09/18/17  0904   SODIUM mmol/L 136   POTASSIUM mmol/L 3 5   CHLORIDE mmol/L 99*   CO2 mmol/L 32   BUN mg/dL 18   CREATININE mg/dL 1 05   CALCIUM mg/dL 9 1   GLUCOSE RANDOM mg/dL 175*       Results from last 7 days  Lab Units 09/18/17  0904   INR  1 25*       * I Have Reviewed All Lab Data Listed Above  * Additional Pertinent Lab Tests Reviewed: All Labs Within Last 24 Hours Reviewed    Imaging:    Recent Cultures (last 7 days):       Results from last 7 days  Lab Units 09/15/17  1634   GRAM STAIN RESULT  Rare Polys  2+ Gram positive cocci in pairs  Rare Gram negative rods       Last 24 Hours Medication List:     clopidogrel 75 mg Oral Daily   collagenase  Topical Daily   ferrous sulfate 325 mg Oral BID   fluticasone-salmeterol 1 puff Inhalation BID   furosemide 40 mg Oral Daily   gabapentin 100 mg Oral TID   insulin glargine 40 Units Subcutaneous HS   insulin lispro 1-6 Units Subcutaneous HS   insulin lispro 12 Units Subcutaneous Daily Before Lunch   insulin lispro 12 Units Subcutaneous Before Dinner   insulin lispro 15 Units Subcutaneous Daily Before Breakfast   insulin lispro 2-12 Units Subcutaneous TID AC   magnesium oxide 400 mg Oral Daily   oxybutynin 5 mg Oral BID   pravastatin 40 mg Oral Daily With Dinner   sodium hypochlorite 1 application Irrigation Daily   tiotropium 18 mcg Inhalation Daily        Today, Patient Was Seen By: Crista Michelle DPM    ** Please Note: This note has been constructed using a voice recognition system   **

## 2017-09-20 LAB
ANION GAP SERPL CALCULATED.3IONS-SCNC: 7 MMOL/L (ref 4–13)
APTT PPP: 69 SECONDS (ref 23–35)
BASOPHILS # BLD AUTO: 0.05 THOUSANDS/ΜL (ref 0–0.1)
BASOPHILS NFR BLD AUTO: 1 % (ref 0–1)
BUN SERPL-MCNC: 17 MG/DL (ref 5–25)
CALCIUM SERPL-MCNC: 9.5 MG/DL (ref 8.3–10.1)
CHLORIDE SERPL-SCNC: 99 MMOL/L (ref 100–108)
CO2 SERPL-SCNC: 32 MMOL/L (ref 21–32)
CREAT SERPL-MCNC: 1.11 MG/DL (ref 0.6–1.3)
EOSINOPHIL # BLD AUTO: 0.38 THOUSAND/ΜL (ref 0–0.61)
EOSINOPHIL NFR BLD AUTO: 4 % (ref 0–6)
ERYTHROCYTE [DISTWIDTH] IN BLOOD BY AUTOMATED COUNT: 14.9 % (ref 11.6–15.1)
GFR SERPL CREATININE-BSD FRML MDRD: 65 ML/MIN/1.73SQ M
GLUCOSE SERPL-MCNC: 130 MG/DL (ref 65–140)
GLUCOSE SERPL-MCNC: 131 MG/DL (ref 65–140)
GLUCOSE SERPL-MCNC: 152 MG/DL (ref 65–140)
GLUCOSE SERPL-MCNC: 188 MG/DL (ref 65–140)
GLUCOSE SERPL-MCNC: 205 MG/DL (ref 65–140)
HCT VFR BLD AUTO: 28.3 % (ref 36.5–49.3)
HGB BLD-MCNC: 9.4 G/DL (ref 12–17)
INR PPP: 1.25 (ref 0.86–1.16)
LYMPHOCYTES # BLD AUTO: 1.1 THOUSANDS/ΜL (ref 0.6–4.47)
LYMPHOCYTES NFR BLD AUTO: 11 % (ref 14–44)
MCH RBC QN AUTO: 28.3 PG (ref 26.8–34.3)
MCHC RBC AUTO-ENTMCNC: 33.2 G/DL (ref 31.4–37.4)
MCV RBC AUTO: 85 FL (ref 82–98)
MONOCYTES # BLD AUTO: 0.73 THOUSAND/ΜL (ref 0.17–1.22)
MONOCYTES NFR BLD AUTO: 7 % (ref 4–12)
NEUTROPHILS # BLD AUTO: 7.98 THOUSANDS/ΜL (ref 1.85–7.62)
NEUTS SEG NFR BLD AUTO: 77 % (ref 43–75)
NRBC BLD AUTO-RTO: 0 /100 WBCS
PLATELET # BLD AUTO: 293 THOUSANDS/UL (ref 149–390)
PMV BLD AUTO: 8.5 FL (ref 8.9–12.7)
POTASSIUM SERPL-SCNC: 3.8 MMOL/L (ref 3.5–5.3)
PROTHROMBIN TIME: 15.8 SECONDS (ref 12.1–14.4)
RBC # BLD AUTO: 3.32 MILLION/UL (ref 3.88–5.62)
SODIUM SERPL-SCNC: 138 MMOL/L (ref 136–145)
WBC # BLD AUTO: 10.24 THOUSAND/UL (ref 4.31–10.16)

## 2017-09-20 PROCEDURE — 80048 BASIC METABOLIC PNL TOTAL CA: CPT | Performed by: HOSPITALIST

## 2017-09-20 PROCEDURE — 85730 THROMBOPLASTIN TIME PARTIAL: CPT | Performed by: HOSPITALIST

## 2017-09-20 PROCEDURE — 85610 PROTHROMBIN TIME: CPT | Performed by: HOSPITALIST

## 2017-09-20 PROCEDURE — 85025 COMPLETE CBC W/AUTO DIFF WBC: CPT | Performed by: HOSPITALIST

## 2017-09-20 PROCEDURE — 82948 REAGENT STRIP/BLOOD GLUCOSE: CPT

## 2017-09-20 RX ADMIN — TIOTROPIUM BROMIDE 18 MCG: 18 CAPSULE ORAL; RESPIRATORY (INHALATION) at 08:15

## 2017-09-20 RX ADMIN — INSULIN LISPRO 2 UNITS: 100 INJECTION, SOLUTION INTRAVENOUS; SUBCUTANEOUS at 12:17

## 2017-09-20 RX ADMIN — PRAVASTATIN SODIUM 40 MG: 40 TABLET ORAL at 17:28

## 2017-09-20 RX ADMIN — INSULIN LISPRO 12 UNITS: 100 INJECTION, SOLUTION INTRAVENOUS; SUBCUTANEOUS at 17:28

## 2017-09-20 RX ADMIN — CLOPIDOGREL BISULFATE 75 MG: 75 TABLET ORAL at 08:08

## 2017-09-20 RX ADMIN — INSULIN LISPRO 4 UNITS: 100 INJECTION, SOLUTION INTRAVENOUS; SUBCUTANEOUS at 17:29

## 2017-09-20 RX ADMIN — FERROUS SULFATE TAB 325 MG (65 MG ELEMENTAL FE) 325 MG: 325 (65 FE) TAB at 08:08

## 2017-09-20 RX ADMIN — GABAPENTIN 100 MG: 100 CAPSULE ORAL at 17:28

## 2017-09-20 RX ADMIN — FUROSEMIDE 40 MG: 40 TABLET ORAL at 08:08

## 2017-09-20 RX ADMIN — FLUTICASONE PROPIONATE AND SALMETEROL 1 PUFF: 50; 500 POWDER RESPIRATORY (INHALATION) at 08:15

## 2017-09-20 RX ADMIN — FLUTICASONE PROPIONATE AND SALMETEROL 1 PUFF: 50; 500 POWDER RESPIRATORY (INHALATION) at 21:00

## 2017-09-20 RX ADMIN — INSULIN LISPRO 1 UNITS: 100 INJECTION, SOLUTION INTRAVENOUS; SUBCUTANEOUS at 22:30

## 2017-09-20 RX ADMIN — OXYBUTYNIN CHLORIDE 5 MG: 5 TABLET ORAL at 17:28

## 2017-09-20 RX ADMIN — GABAPENTIN 100 MG: 100 CAPSULE ORAL at 22:28

## 2017-09-20 RX ADMIN — GABAPENTIN 100 MG: 100 CAPSULE ORAL at 08:08

## 2017-09-20 RX ADMIN — INSULIN LISPRO 15 UNITS: 100 INJECTION, SOLUTION INTRAVENOUS; SUBCUTANEOUS at 08:15

## 2017-09-20 RX ADMIN — HEPARIN SODIUM AND DEXTROSE 13 UNITS/KG/HR: 10000; 5 INJECTION INTRAVENOUS at 18:13

## 2017-09-20 RX ADMIN — INSULIN GLARGINE 40 UNITS: 100 INJECTION, SOLUTION SUBCUTANEOUS at 22:35

## 2017-09-20 RX ADMIN — Medication 400 MG: at 08:08

## 2017-09-20 RX ADMIN — OXYBUTYNIN CHLORIDE 5 MG: 5 TABLET ORAL at 08:13

## 2017-09-20 RX ADMIN — FERROUS SULFATE TAB 325 MG (65 MG ELEMENTAL FE) 325 MG: 325 (65 FE) TAB at 17:28

## 2017-09-20 NOTE — PROGRESS NOTES
[address] Internal Medicine Progress Note  Patient: Princess Brown 78 y o  male   MRN: [id number]  PCP: Alycia Zavala MD  Unit/Bed#: E4 -12 Encounter: [id number]  Date Of Visit: 09/20/17    Assessment:    Principal Problem:    Asthenia  Active Problems:    Decubitus ulcer of heel, stage 3    Leukocytosis    Stage 3 chronic kidney disease    Chronic indwelling Beckett catheter    Abnormal urinalysis    Chronic atrial fibrillation    Diabetes mellitus    Chronic diastolic heart failure    PAD (peripheral artery disease)    S/P AVR      Plan:    · Left heel decubitus ulcer s/p I&D and partial calcanectomy (DOS: 9/8/17)  ? As patient optimized from a revascularization standpoint and no vascular intervention options at this time, both Podiatry and vascular recommending BKA  ? Patient seems to be agreeable at this time and surgery scheduling taking place per vascular  · H/o mechanical heart valve AVR with h/o A fib:   ? Continue to hold warfarin in anticipation for surgery  c/w hep gtt, statin and plavix  ? Plan to bridge with Lovenox and restart Coumadin after surgery  ? Last INR 1 25 in AM  C/w serial PT/INR  · PVD: s/p SFA angioplasty 9/14  · Chronic diastolic heart failure, stable  · Chronic indwelling beckett catheter: c/w oxybutynin  · Diabetes Mellitus, type 2: c/w humalog 15 units for breakfast coverage, 12 units for lunch/dinner coverage, and lantus 40 units  Sugars stable  Mgmt per endocrinology  · Diabetic neuropathy: c/w gabapentin  · CKD stage 3: stable  · Bright red blood noted in stool: resolved, unremarkable colonoscopy and denies h/o hemorrhoids  Continue to monitor labs, Hgb 9 5  · Right knee pain, improved: likely OA and 2/2 immobility per ortho  C/w rehab/PT         VTE Pharmacologic Prophylaxis:   Pharmacologic: Heparin Drip  Mechanical VTE Prophylaxis in Place: No  Time Spent for Care: 30 minutes    More than 50% of total time spent on counseling and coordination of care as described above     Current Length of Stay: 18 day(s)    Current Patient Status: Inpatient   Certification Statement: The patient will continue to require additional inpatient hospital stay due to surgical intervention  Code Status: Level 1 - Full Code      Subjective:   Patient seen at bedside with wife  Patient states that he spoke with vascular nurse practitioner  Wife and pt understand meeting with both vascular/podiatry at the same time is difficult and spoke to vascular separately  Patient and wife has accepted BKA at this time, but patient would like to speak with the surgeon at some point  Pt denies any new complaints and denies n/v/f/c/sob    Objective:     Vitals:   Temp (24hrs), Av 5 °F (36 9 °C), Min:97 5 °F (36 4 °C), Max:99 7 °F (37 6 °C)    HR:  [72-75] 75  Resp:  [18] 18  BP: (136-149)/(63-86) 136/63  SpO2:  [96 %-98 %] 97 %  Body mass index is 34 66 kg/m²  Input and Output Summary (last 24 hours): Intake/Output Summary (Last 24 hours) at 17 1246  Last data filed at 17 4672   Gross per 24 hour   Intake              490 ml   Output             2675 ml   Net            -2185 ml       Physical Exam:     Physical Exam   Constitutional: He is oriented to person, place, and time  He appears well-developed and well-nourished  No distress  HENT:   Head: Normocephalic and atraumatic  Eyes: EOM are normal  Right eye exhibits no discharge  Left eye exhibits no discharge  No scleral icterus  Neck: Normal range of motion  Neck supple  Cardiovascular: Normal rate and regular rhythm  Mechanical heart sounds noted   Pulmonary/Chest: Effort normal and breath sounds normal  No respiratory distress  Abdominal: Soft  He exhibits no distension  Musculoskeletal:   Dressing to left foot   Neurological: He is alert and oriented to person, place, and time  Skin: Skin is warm and dry  Psychiatric: He has a normal mood and affect   His behavior is normal  Thought content normal  Additional Data:     Labs:      Results from last 7 days  Lab Units 09/20/17  0419   WBC Thousand/uL 10 24*   HEMOGLOBIN g/dL 9 4*   HEMATOCRIT % 28 3*   PLATELETS Thousands/uL 293   NEUTROS PCT % 77*   LYMPHS PCT % 11*   MONOS PCT % 7   EOS PCT % 4       Results from last 7 days  Lab Units 09/20/17  0419   SODIUM mmol/L 138   POTASSIUM mmol/L 3 8   CHLORIDE mmol/L 99*   CO2 mmol/L 32   BUN mg/dL 17   CREATININE mg/dL 1 11   CALCIUM mg/dL 9 5   GLUCOSE RANDOM mg/dL 130       Results from last 7 days  Lab Units 09/20/17  0423   INR  1 25*       * I Have Reviewed All Lab Data Listed Above  * Additional Pertinent Lab Tests Reviewed: All Labs Within Last 24 Hours Reviewed    Imaging:    Recent Cultures (last 7 days):       Results from last 7 days  Lab Units 09/15/17  1634   GRAM STAIN RESULT  Rare Polys  2+ Gram positive cocci in pairs  Rare Gram negative rods       Last 24 Hours Medication List:     clopidogrel 75 mg Oral Daily   collagenase  Topical Daily   ferrous sulfate 325 mg Oral BID   fluticasone-salmeterol 1 puff Inhalation BID   furosemide 40 mg Oral Daily   gabapentin 100 mg Oral TID   insulin glargine 40 Units Subcutaneous HS   insulin lispro 1-6 Units Subcutaneous HS   insulin lispro 12 Units Subcutaneous Daily Before Lunch   insulin lispro 12 Units Subcutaneous Before Dinner   insulin lispro 15 Units Subcutaneous Daily Before Breakfast   insulin lispro 2-12 Units Subcutaneous TID AC   magnesium oxide 400 mg Oral Daily   oxybutynin 5 mg Oral BID   pravastatin 40 mg Oral Daily With Dinner   sodium hypochlorite 1 application Irrigation Daily   tiotropium 18 mcg Inhalation Daily        Today, Patient Was Seen By: Massiel Bo DPM    ** Please Note: This note has been constructed using a voice recognition system   **

## 2017-09-20 NOTE — PROGRESS NOTES
Progress Note - Podiatry  Yojana Newman 78 y o  male MRN: [id number]  Unit/Bed#: E4 -01 Encounter: [id number]    Assessment:  1  A 76 y  o  y/o male s/p excisional debridement with partial calcanectomy and application of wound vac therapy on left heel by Dr Merrill Schmitt (DOS: 09/08/2017)  - per vascular patient optimized from revascularization standpoint and he has poor distal perfusion secondary to small vessel disease  No further endovascular or surgical revascularization options  **Spoke with CHEKO Gauthier Cap with vascular and Dr Lau Lab, plan is to meet around noon today in patient room to discuss final treatment plan  - c/w monitor off antibiotics per ID recommendations   - c/w LWC with dakins wet to dry to left heel; 2x/day with nursing, podiatry to see patient daily; change with attending later today  - Tissue Cx: results show s aureus and enterococcus   - WBS: NWB to LLE; PT/OT onboard and both recommend rehab; CM onboard awaiting placement   - c/w prevalon boots while in bed to offload as much as possible and prevent worsening of ulceration     2  Beola Gianotti Stage 1 Ulceration to left lateral forefoot - POA  -  podiatry to c/w LWC: maxsorb + DSD     3  Right Stage 1 Pressure Ulceration - POA   - wound continues to be stable with dry scab formation; therefore dressing changed to betadine + DSD; NC to do daily, with podiatry to monitor on a q3d basis - next to be 9/22  - c/w prevalon boots while in bed to offload as much as possible and prevent worsening of ulceration      4  DM, type 2 with neuropathy: A1c = 6 6% - per endocrinology   - c/w humalog 15 units for breakfast coverage, 12 units for lunch/dinner coverage, and lantus 40 units  - CC1 diet     5   PAD - per vascular   - s/p LLE agram with angioplasty per IR; 2 vessel runoff via AT and PT with small vessel disease  - LEADs reviewed: 50-75% stenosis of L distal SFA; repeat post agram LEADs are limited as SIOBHAN are non compressible, met pressure unobtainable due to vac dressings, and GTP flat line;   - no further revascularization options      6  CKD, stage 3 - per nephrology   - baseline creatinine 1 2-1 4; creatinine around baseline  - c/w oxybutynin      7  CHF - stable   - c/w lasix   8  Afib   - c/w heparin drip   9  Hx of Mechanical Heart Valve - AVR  - c/w heparin drip   - c/w statin; and plavix started due to ASA allergy      10  Knee Pain - improved with no complaints   - xrays reviewed: mild degenerative changes noted   - per Ortho: not a good canditate for steroid injection due to hx of DM  - c/w PT/OT for management      11  Blood in Stool - resolved   - hemoglobin has been stable       Dispo: continue with inpatient stay for surgical intervention for possible BKA with vascular surgery pending family discuss with vascular surgery and podiatric surgical teams       Subjective/Objective   Chief Complaint: "I'm fine "     Subjective: 78 y o  y/o male was seen and evaluated at bedside eating breakfast  No acute events overnight with all vital signs stable  Denies pain or other constitutional symptoms     Objective:     Blood pressure 136/63, pulse 75, temperature 99 7 °F (37 6 °C), temperature source Tympanic, resp  rate 18, height 5' 9 5" (1 765 m), weight 108 kg (238 lb 1 6 oz), SpO2 97 %  ,Body mass index is 34 66 kg/m²  Invasive Devices     Peripheral Intravenous Line            Peripheral IV 09/19/17 Left Wrist 1 day          Drain            Urethral Catheter 18 Fr  17 days                  Physical Exam:   General: Alert, cooperative and no distress  Lungs: Non labored breathing  Lower Extremity:   1  Vascular: at baseline  2  Ortho: at baseline; no calf tenderness; 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    3  Neuro: at baseline  4  Derm: dressing left intact to B/L LE       Lab, Imaging and other studies:   I have personally reviewed pertinent lab results      CBC:   Lab Results   Component Value Date    WBC 10 24 (H) 09/20/2017    HGB 9 4 (L) 09/20/2017    HCT 28 3 (L) 09/20/2017    MCV 85 09/20/2017     09/20/2017    MCH 28 3 09/20/2017    MCHC 33 2 09/20/2017    RDW 14 9 09/20/2017    MPV 8 5 (L) 09/20/2017    NRBC 0 09/20/2017   CMP:   Lab Results   Component Value Date     09/20/2017    K 3 8 09/20/2017    CL 99 (L) 09/20/2017    CO2 32 09/20/2017    ANIONGAP 7 09/20/2017    BUN 17 09/20/2017    CREATININE 1 11 09/20/2017    GLUCOSE 130 09/20/2017    CALCIUM 9 5 09/20/2017    EGFR 65 09/20/2017       Imaging: I have personally reviewed pertinent films in PACS  EKG, Pathology, and Other Studies: I have personally reviewed pertinent reports    VTE Pharmacologic Prophylaxis: Heparin  VTE Mechanical Prophylaxis: sequential compression device

## 2017-09-20 NOTE — PROGRESS NOTES
Progress Note - Vascular Surgery       Assessment:  Postoperative left heel wound necrosis  Left heel osteomyelitis, s/p debridement, partial calcanectomy and wound VAC placement 9/8/2017  PAD with LLE tissue loss, s/p L SFA/pop PTA 9/12/2017  Right heel ulceration  Type II DM  CKD III  Chronic atrial fibrillation  CAD, s/p CABG w/LLE GSV harvest 2007  AS, s/p mechanical AVR 2007 on chronic Coumadin therapy    Plan:  Patient continues to have poor healing status post excisional debridement and partial calcanectomy of left heel 9/8/17despite revascularization  He will require a BKA which he is starting to except at this point  Met with patient and podiatry residents reviewed the surgical and postoperative course for below the knee amputation  He is agreeable to BKA  Will work on scheduling   ______________________________________________________________________    Subjective:  Patient is in bed  Requested meeting with vascular and podiatry to discuss BKA  Unfortunately his wife is at home taking care of ill pet and could not be present  Vitals:  /63   Pulse 75   Temp 99 7 °F (37 6 °C) (Tympanic)   Resp 18   Ht 5' 9 5" (1 765 m)   Wt 108 kg (238 lb 1 6 oz)   SpO2 97%   BMI 34 66 kg/m²     I/Os:  I/O last 3 completed shifts:   In: 670 [P O :670]  Out: 3875 [Urine:3875]  I/O this shift:  In: -   Out: 475 [Urine:475]    Lab Results and Cultures:   CBC with diff:   Lab Results   Component Value Date    WBC 10 24 (H) 09/20/2017    HGB 9 4 (L) 09/20/2017    HCT 28 3 (L) 09/20/2017    MCV 85 09/20/2017     09/20/2017    MCH 28 3 09/20/2017    MCHC 33 2 09/20/2017    RDW 14 9 09/20/2017    MPV 8 5 (L) 09/20/2017    NRBC 0 09/20/2017   ,   BMP/CMP:  Lab Results   Component Value Date     09/20/2017    K 3 8 09/20/2017    CL 99 (L) 09/20/2017    CO2 32 09/20/2017    ANIONGAP 7 09/20/2017    BUN 17 09/20/2017    CREATININE 1 11 09/20/2017    GLUCOSE 130 09/20/2017    CALCIUM 9 5 09/20/2017    AST 15 09/02/2017    ALT 25 09/02/2017    ALKPHOS 99 09/02/2017    PROT 7 3 09/02/2017    ALBUMIN 3 4 (L) 09/02/2017    BILITOT 0 34 09/02/2017    EGFR 65 09/20/2017   ,   Lipid Panel: No results found for: CHOL,   Coags:   Lab Results   Component Value Date    PTT 69 (H) 09/20/2017    INR 1 25 (H) 09/20/2017   ,     Blood Culture: No results found for: BLOODCX,   Urinalysis:   Lab Results   Component Value Date    COLORU Yellow 09/05/2017    CLARITYU Clear 09/05/2017    SPECGRAV <=1 005 09/05/2017    PHUR 6 5 09/05/2017    LEUKOCYTESUR Small (A) 09/05/2017    NITRITE Positive (A) 09/05/2017    PROTEINUA Negative 09/05/2017    GLUCOSEU Negative 09/05/2017    KETONESU Negative 09/05/2017    BILIRUBINUR Negative 09/05/2017    BLOODU Trace-lysed (A) 09/05/2017   ,   Urine Culture:   Lab Results   Component Value Date    URINECX >100,000 cfu/ml Mixed Contaminants X3 09/02/2017   ,   Wound Culure:   Lab Results   Component Value Date    WOUNDCULT 3+ Growth of Staphylococcus aureus 09/02/2017    WOUNDCULT 3+ Growth of Enterococcus faecalis 09/02/2017    WOUNDCULT 3+ Growth of Mixed Skin Sabiha 09/02/2017       Medications:  Current Facility-Administered Medications   Medication Dose Route Frequency    acetaminophen (TYLENOL) tablet 650 mg  650 mg Oral Q6H PRN    clopidogrel (PLAVIX) tablet 75 mg  75 mg Oral Daily    collagenase (SANTYL) ointment   Topical Daily    ferrous sulfate tablet 325 mg  325 mg Oral BID    fluticasone-salmeterol (ADVAIR) 500-50 mcg/dose inhaler 1 puff  1 puff Inhalation BID    furosemide (LASIX) tablet 40 mg  40 mg Oral Daily    gabapentin (NEURONTIN) capsule 100 mg  100 mg Oral TID    heparin (porcine) 25,000 units in 250 mL infusion (premix)  3-30 Units/kg/hr (Order-Specific) Intravenous Titrated    heparin (porcine) injection 4,000 Units  4,000 Units Intravenous PRN    heparin (porcine) injection 8,000 Units  8,000 Units Intravenous PRN    insulin glargine (LANTUS) subcutaneous injection 40 Units  40 Units Subcutaneous HS    insulin lispro (HumaLOG) 100 units/mL subcutaneous injection 1-6 Units  1-6 Units Subcutaneous HS    insulin lispro (HumaLOG) 100 units/mL subcutaneous injection 12 Units  12 Units Subcutaneous Daily Before Lunch    insulin lispro (HumaLOG) 100 units/mL subcutaneous injection 12 Units  12 Units Subcutaneous Before Dinner    insulin lispro (HumaLOG) 100 units/mL subcutaneous injection 15 Units  15 Units Subcutaneous Daily Before Breakfast    insulin lispro (HumaLOG) 100 units/mL subcutaneous injection 2-12 Units  2-12 Units Subcutaneous TID AC    magnesium hydroxide (MILK OF MAGNESIA) 400 mg/5 mL oral suspension 30 mL  30 mL Oral PRN    magnesium oxide (MAG-OX) tablet 400 mg  400 mg Oral Daily    ondansetron (ZOFRAN) injection 4 mg  4 mg Intravenous Q6H PRN    oxybutynin (DITROPAN) tablet 5 mg  5 mg Oral BID    pravastatin (PRAVACHOL) tablet 40 mg  40 mg Oral Daily With Dinner    sodium hypochlorite (DAKIN'S HALF-STRENGTH) 0 25 % topical solution 1 application  1 application Irrigation Daily    tiotropium (SPIRIVA) capsule for inhaler 18 mcg  18 mcg Inhalation Daily       Physical Exam:    General: Awake alert, oriented   NAD   CV: [id number]  Respiratory: CTA B/L  Abdominal: moderately obese, soft, non tender +BS  Extremities: left heel necrosis, image in chart   Neurologic: grossly intact         NEIL Vo  9/20/2017  [facility]: [phone]

## 2017-09-21 LAB
APTT PPP: 50 SECONDS (ref 23–35)
APTT PPP: 66 SECONDS (ref 23–35)
APTT PPP: 67 SECONDS (ref 23–35)
GLUCOSE SERPL-MCNC: 146 MG/DL (ref 65–140)
GLUCOSE SERPL-MCNC: 156 MG/DL (ref 65–140)
GLUCOSE SERPL-MCNC: 188 MG/DL (ref 65–140)
GLUCOSE SERPL-MCNC: 248 MG/DL (ref 65–140)
INR PPP: 1.12 (ref 0.86–1.16)
PROTHROMBIN TIME: 14.4 SECONDS (ref 12.1–14.4)

## 2017-09-21 PROCEDURE — 85730 THROMBOPLASTIN TIME PARTIAL: CPT | Performed by: HOSPITALIST

## 2017-09-21 PROCEDURE — 85610 PROTHROMBIN TIME: CPT | Performed by: HOSPITALIST

## 2017-09-21 PROCEDURE — 82948 REAGENT STRIP/BLOOD GLUCOSE: CPT

## 2017-09-21 PROCEDURE — 97116 GAIT TRAINING THERAPY: CPT

## 2017-09-21 PROCEDURE — 97110 THERAPEUTIC EXERCISES: CPT

## 2017-09-21 PROCEDURE — 97530 THERAPEUTIC ACTIVITIES: CPT

## 2017-09-21 RX ADMIN — FERROUS SULFATE TAB 325 MG (65 MG ELEMENTAL FE) 325 MG: 325 (65 FE) TAB at 09:34

## 2017-09-21 RX ADMIN — INSULIN LISPRO 2 UNITS: 100 INJECTION, SOLUTION INTRAVENOUS; SUBCUTANEOUS at 09:35

## 2017-09-21 RX ADMIN — TIOTROPIUM BROMIDE 18 MCG: 18 CAPSULE ORAL; RESPIRATORY (INHALATION) at 09:35

## 2017-09-21 RX ADMIN — HEPARIN SODIUM AND DEXTROSE 15 UNITS/KG/HR: 10000; 5 INJECTION INTRAVENOUS at 15:19

## 2017-09-21 RX ADMIN — HEPARIN SODIUM 4000 UNITS: 1000 INJECTION, SOLUTION INTRAVENOUS; SUBCUTANEOUS at 09:38

## 2017-09-21 RX ADMIN — FLUTICASONE PROPIONATE AND SALMETEROL 1 PUFF: 50; 500 POWDER RESPIRATORY (INHALATION) at 09:35

## 2017-09-21 RX ADMIN — FERROUS SULFATE TAB 325 MG (65 MG ELEMENTAL FE) 325 MG: 325 (65 FE) TAB at 17:15

## 2017-09-21 RX ADMIN — GABAPENTIN 100 MG: 100 CAPSULE ORAL at 17:15

## 2017-09-21 RX ADMIN — OXYBUTYNIN CHLORIDE 5 MG: 5 TABLET ORAL at 17:15

## 2017-09-21 RX ADMIN — GABAPENTIN 100 MG: 100 CAPSULE ORAL at 21:20

## 2017-09-21 RX ADMIN — Medication 400 MG: at 09:33

## 2017-09-21 RX ADMIN — OXYBUTYNIN CHLORIDE 5 MG: 5 TABLET ORAL at 09:33

## 2017-09-21 RX ADMIN — INSULIN LISPRO 4 UNITS: 100 INJECTION, SOLUTION INTRAVENOUS; SUBCUTANEOUS at 11:45

## 2017-09-21 RX ADMIN — INSULIN LISPRO 12 UNITS: 100 INJECTION, SOLUTION INTRAVENOUS; SUBCUTANEOUS at 17:15

## 2017-09-21 RX ADMIN — HYOSCYAMINE SULFATE 1 APPLICATION: 16 SOLUTION at 09:36

## 2017-09-21 RX ADMIN — INSULIN LISPRO 15 UNITS: 100 INJECTION, SOLUTION INTRAVENOUS; SUBCUTANEOUS at 09:34

## 2017-09-21 RX ADMIN — PRAVASTATIN SODIUM 40 MG: 40 TABLET ORAL at 17:15

## 2017-09-21 RX ADMIN — CLOPIDOGREL BISULFATE 75 MG: 75 TABLET ORAL at 09:34

## 2017-09-21 RX ADMIN — COLLAGENASE SANTYL: 250 OINTMENT TOPICAL at 09:33

## 2017-09-21 RX ADMIN — FLUTICASONE PROPIONATE AND SALMETEROL 1 PUFF: 50; 500 POWDER RESPIRATORY (INHALATION) at 19:48

## 2017-09-21 RX ADMIN — FUROSEMIDE 40 MG: 40 TABLET ORAL at 09:34

## 2017-09-21 RX ADMIN — GABAPENTIN 100 MG: 100 CAPSULE ORAL at 09:34

## 2017-09-21 RX ADMIN — INSULIN LISPRO 2 UNITS: 100 INJECTION, SOLUTION INTRAVENOUS; SUBCUTANEOUS at 17:16

## 2017-09-21 RX ADMIN — INSULIN GLARGINE 40 UNITS: 100 INJECTION, SOLUTION SUBCUTANEOUS at 21:25

## 2017-09-21 NOTE — PROGRESS NOTES
Progress Note - Tiarra Woody 78 y o  male MRN: [id number]    Unit/Bed#: E4 -01 Encounter: [id number]      Assessment/Plan:    1-Left heel decubitus ulcer s/p I&D and partial calcanectomy (DOS: 9/8/17)-deemed by vascular and Podiatry to be nonsalvageable  both Podiatry and vascular recommending BKA  Patient agreeable at this time and surgery plans to do it tomorrow if OR time available  2-H/o mechanical heart valve AVR with h/o A fib: Continue to hold warfarin in anticipation for surgery  c/w hep gtt,-this will need to be discontinued 6 hours prior to surgery Plan to bridge with Lovenox and restart Coumadin after surgery-INR goal 2-3    3-PVD: s/p SFA angioplasty 9/14    4-Chronic diastolic heart failure, stable-continue furosemide 40 mg daily    5-Chronic indwelling beckett catheter: c/w oxybutynin    6-Diabetes Mellitus, type 2: c/w humalog 15 units for breakfast coverage, 12 units for lunch/dinner coverage, and lantus 40 units     This will need to be adjusted with giving Lantus 20 units at night holding the morning and afternoon coverage of Humalog and covering with a sliding scale for surgery    7-Diabetic neuropathy: c/w gabapentin    8-CKD stage 3: stable    Awaiting call back from surgery to determine whether patient is going to the OR tomorrow or early next week    Subjective:  Patient is stable he is now agreeable to left BKA  Provisional plan is for September 22nd depending on availability  Will need to adjust insulin if a surgery is  Tomorrow  Physical Exam:   Vitals: Blood pressure 143/72, pulse 74, temperature 97 9 °F (36 6 °C), temperature source Tympanic, resp  rate 18, height 5' 9 5" (1 765 m), weight 108 kg (238 lb 1 6 oz), SpO2 97 %  ,Body mass index is 34 66 kg/m²  Gen:  Pleasant, non-tachypnic, non-dyspnic  Conversant  Heart: regular rate and rhythm, S1S2 present, no murmur, rub or gallop  Lungs: clear to ausculatation bilaterally  No wheezing, crackless, or rhonchi   No accessory muscle use or respiratory distress  Abd: soft, non-tender, non-distended  NABS, no guarding, rebound or peritoneal signs  Extremities:  Left foot wrapped  Neuro: awake, alert and oriented  Cranial nerves 2-12 intact  Strength and sensation grossly intact  Skin: warm and dry: no petechiae, purpura and rash      LABS:     Results from last 7 days  Lab Units 09/20/17 0419 09/18/17  0904 09/17/17  0542   WBC Thousand/uL 10 24* 10 35* 9 11   HEMOGLOBIN g/dL 9 4* 9 5* 9 4*   HEMATOCRIT % 28 3* 28 2* 28 1*   PLATELETS Thousands/uL 293 313 303       Results from last 7 days  Lab Units 09/20/17 0419 09/18/17  0904 09/17/17  0542   SODIUM mmol/L 138 136 139   POTASSIUM mmol/L 3 8 3 5 3 4*   CHLORIDE mmol/L 99* 99* 101   CO2 mmol/L 32 32 31   BUN mg/dL 17 18 20   CREATININE mg/dL 1 11 1 05 1 09   GLUCOSE RANDOM mg/dL 130 175* 185*   CALCIUM mg/dL 9 5 9 1 9 1       Intake/Output Summary (Last 24 hours) at 09/21/17 1411  Last data filed at 09/21/17 5993   Gross per 24 hour   Intake                0 ml   Output             1750 ml   Net            -1750 ml           clopidogrel 75 mg Oral Daily   collagenase  Topical Daily   ferrous sulfate 325 mg Oral BID   fluticasone-salmeterol 1 puff Inhalation BID   furosemide 40 mg Oral Daily   gabapentin 100 mg Oral TID   insulin glargine 40 Units Subcutaneous HS   insulin lispro 1-6 Units Subcutaneous HS   insulin lispro 12 Units Subcutaneous Daily Before Lunch   insulin lispro 12 Units Subcutaneous Before Dinner   insulin lispro 15 Units Subcutaneous Daily Before Breakfast   insulin lispro 2-12 Units Subcutaneous TID AC   magnesium oxide 400 mg Oral Daily   oxybutynin 5 mg Oral BID   pravastatin 40 mg Oral Daily With Dinner   sodium hypochlorite 1 application Irrigation Daily   tiotropium 18 mcg Inhalation Daily       Family update- wife in the room-updated    VTE Pharmacologic Prophylaxis:  Heparin

## 2017-09-21 NOTE — ASSESSMENT & PLAN NOTE
Assessment:  Left heel ulcer, decubitus, stage III  Deemed by vascular and Podiatry to be nonsalvageable  General surgery consulted regarding potential left BKA  Plan:  Dr Lloyd Beaver will attempt to do this left BKA in the operating room tomorrow September 22, 2017, depending on OR availability  I discussed this with Internal Medicine, Dr Nicki Valdez, who will adjust insulin if Dr Rosalynd Spatz informs him that the case will in fact be done tomorrow  He is on a heparin drip which will need to be discontinued and Plavix which was last given this morning  Vascular surgery is able to do this amputation on the schedule next week, if the OR is not able to accommodate tomorrow for scheduling wise

## 2017-09-21 NOTE — PHYSICAL THERAPY NOTE
Physical Therapy Progress Note     09/21/17 0938   Pain Assessment   Pain Assessment No/denies pain   Pain Score No Pain   Hospital Pain Intervention(s) Ambulation/increased activity;Repositioned   Response to Interventions Poorly Tolerated  Restrictions/Precautions   Weight Bearing Precautions Per Order Yes   LLE Weight Bearing Per Order NWB   Other Precautions Cognitive; Fall Risk;Pain;Hard of hearing;Multiple lines;Telemetry   General   Chart Reviewed Yes   Response to Previous Treatment Patient reporting fatigue but able to participate  Family/Caregiver Present Yes   Subjective   Subjective Willing to participate in therapy this AM    Bed Mobility   Rolling R 3  Moderate assistance   Additional items Assist x 1;Bedrails; Increased time required;Verbal cues;LE management   Rolling L 3  Moderate assistance   Additional items Assist x 1;Bedrails; Increased time required;Verbal cues;LE management   Supine to Sit 3  Moderate assistance   Additional items Assist x 1;HOB elevated; Bedrails; Increased time required;Verbal cues;LE management   Sit to Supine 2  Maximal assistance   Additional items Assist x 2;Bedrails; Increased time required;Verbal cues;LE management   Transfers   Sit to Stand 2  Maximal assistance   Additional items Assist x 2;Bedrails; Increased time required;Verbal cues   Stand to Sit 2  Maximal assistance   Additional items Assist x 2;Bedrails; Increased time required;Verbal cues   Ambulation/Elevation   Gait pattern Not appropriate   Balance   Static Sitting Fair   Dynamic Sitting Fair -   Static Standing Poor -   Dynamic Standing Poor -   Ambulatory Zero   Endurance Deficit   Endurance Deficit Yes   Endurance Deficit Description pain/fatigue   Activity Tolerance   Activity Tolerance Patient limited by fatigue;Patient limited by pain   Nurse Made Aware Yes   Exercises   TKR Sitting;10 reps;AROM; Bilateral   Assessment   Prognosis Fair   Problem List Decreased strength;Decreased range of motion;Decreased endurance; Impaired balance;Decreased mobility; Decreased cognition; Impaired judgement;Decreased safety awareness; Obesity; Impaired tone;Decreased skin integrity;Orthopedic restrictions   Assessment Pt  supine in bed upon my arrival  Pt  incontinent requiring A of 2 for pericare with rolling A provided by therapist  Positioned seated at EOB with A of 1  Attempted standing trials x 2 with RW and max A of 2 with cueing to remain NWB for LLE  Noted non-compliance of WBS requiring return to seated at EOB  Noted pt  incontinent again, requiring repositioning supine in bed with A of 2 for pericare  Repositioned supine in bed at end of treatment session  At this time due to noted decreased skin integrity, not appropriate for sliding board transfer  At this time pt  appropriate to be seated at EOB, however with transfers for nursing staff will require steve lift if being positioned in bedside chair  PT will continue to recommend STR upon d/c for continued improvement of noted impairments above  Barriers to Discharge Inaccessible home environment;Decreased caregiver support   Goals   Patient Goals None stated  STG Expiration Date 09/21/17   Treatment Day 6   Plan   Treatment/Interventions Functional transfer training;LE strengthening/ROM; Therapeutic exercise; Endurance training;Bed mobility;Spoke to nursing;Spoke to case management; Family   Progress Slow progress, decreased activity tolerance   PT Frequency 5x/wk   Recommendation   Recommendation Short-term skilled PT   PT - OK to Discharge Yes  (if d/c to STR when medically stable )     Samara Duran PTA

## 2017-09-21 NOTE — PROGRESS NOTES
Progress Note - Podiatry  Hasmukh Celestin 78 y o  male MRN: [id number]  Unit/Bed#: E4 -01 Encounter: [id number]    Assessment/Plan:  1  A 76 y  o  y/o male s/p excisional debridement with partial calcanectomy and application of wound vac therapy on left heel by Dr Pastora Fu (DOS: 09/08/2017)  - per vascular patient optimized from revascularization standpoint and he has poor distal perfusion secondary to small vessel disease  No further endovascular or surgical revascularization options  - patient understands the risks and agrees with proximal amputation of his left lower extremity   - vascular and general surgery working on scheduling the amputation   - c/w monitor off antibiotics per ID recommendations   - c/w LWC with dakins wet to dry to left heel; 2x/day with nursing, podiatry to see patient daily; change with attending later today  - Tissue Cx: results show s aureus and enterococcus   - WBS: NWB to LLE; PT/OT onboard and both recommend rehab; CM onboard for placement after the amputation   - c/w prevalon boots while in bed to offload as much as possible and prevent worsening of ulceration     2  Jayden Fleischer Stage 1 Ulceration to left lateral forefoot - POA  -  podiatry to c/w LWC: maxsorb + DSD     3  Right Stage 1 Pressure Ulceration - POA   - wound continues to be stable with dry scab formation; therefore dressing changed to betadine + DSD; NC to do daily, with podiatry to monitor on a q3d basis - next to be 9/22  - c/w prevalon boots while in bed to offload as much as possible and prevent worsening of ulceration      4  DM, type 2 with neuropathy: A1c = 6 6% - per endocrinology   - c/w insulin regimen per SLIM  - CC1 diet     5   PAD - per vascular   - s/p LLE agram with angioplasty per IR; 2 vessel runoff via AT and PT with small vessel disease  - LEADs reviewed: 50-75% stenosis of L distal SFA; repeat post agram LEADs are limited as SIOBHAN are non compressible, met pressure unobtainable due to vac dressings, and GTP flat line;   - no further revascularization options      6  CKD, stage 3 - per nephrology   - baseline creatinine 1 2-1 4  - c/w oxybutynin      7  CHF - stable   - c/w lasix   8  Afib   - c/w heparin drip   9  Hx of Mechanical Heart Valve - AVR  - c/w heparin drip   - c/w statin; and plavix started due to ASA allergy      >Medical management per primary service, SLIM     Subjective/Objective   Chief Complaint:   Chief Complaint   Patient presents with    Weakness - Generalized     pt woke up this morning just feeling very weak and unable to get up   pt denies fevers, n/v   denies any pain or sick contacts  Subjective: 78 y o  y/o male was seen and evaluated at bedside  NAD  His daughter and wife are at bedside  Denies any complains  Appears to be in good spirit  Blood pressure 143/72, pulse 74, temperature 97 9 °F (36 6 °C), temperature source Tympanic, resp  rate 18, height 5' 9 5" (1 765 m), weight 108 kg (238 lb 1 6 oz), SpO2 97 %  ,Body mass index is 34 66 kg/m²  Invasive Devices     Peripheral Intravenous Line            Peripheral IV 09/19/17 Left Wrist 2 days          Drain            Urethral Catheter 18 Fr  18 days                Physical Exam:   General: Alert, cooperative and no distress  Lungs: Non labored breathing  Heart: Positive S1, S2  Abdomen: Soft, non-tender  Extremity:   1  Vascular: at baseline  2  Ortho: at baseline; no calf tenderness; 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

## 2017-09-21 NOTE — PHYSICAL THERAPY NOTE
UPDATED PT GOALS     Initial PT goals   No goals met  Updated goals stated below  Spoke w/ PTA Darius re: pt's progress, tx intervention & D/C planning  Will continue PT per POC  Will continue to recommend inpt rehab at D/C  Jj Andrewdevonte, PT     17 [id number]   Pain Assessment   Pain Assessment No/denies pain   Pain Score No Pain   Hospital Pain Intervention(s) Ambulation/increased activity;Repositioned   Response to Interventions Poorly Tolerated  Restrictions/Precautions   Weight Bearing Precautions Per Order Yes   LLE Weight Bearing Per Order NWB   Other Precautions Cognitive; Fall Risk;Pain;Hard of hearing;Multiple lines;Telemetry   General   Family/Caregiver Present Yes   Bed Mobility   Rolling R 3  Moderate assistance   Additional items Assist x 1;Bedrails; Increased time required;Verbal cues;LE management   Rolling L 3  Moderate assistance   Additional items Assist x 1;Bedrails; Increased time required;Verbal cues;LE management   Supine to Sit 3  Moderate assistance   Additional items Assist x 1;HOB elevated; Bedrails; Increased time required;Verbal cues;LE management   Sit to Supine 2  Maximal assistance   Additional items Assist x 2;Bedrails; Increased time required;Verbal cues;LE management   Transfers   Sit to Stand 2  Maximal assistance   Additional items Assist x 2;Bedrails; Increased time required;Verbal cues   Stand to Sit 2  Maximal assistance   Additional items Assist x 2;Bedrails; Increased time required;Verbal cues   Ambulation/Elevation   Gait pattern Not appropriate   Balance   Static Sitting Fair   Dynamic Sitting Fair -   Static Standing Poor -   Dynamic Standing Poor -   Ambulatory Zero   Endurance Deficit   Endurance Deficit Yes   Endurance Deficit Description pain/fatigue   Activity Tolerance   Activity Tolerance Patient limited by fatigue;Patient limited by pain   Nurse Made Aware Yes   Assessment   Prognosis Fair   Problem List Decreased strength;Decreased range of motion;Decreased endurance; Impaired balance;Decreased mobility; Decreased cognition; Impaired judgement;Decreased safety awareness; Obesity; Impaired tone;Decreased skin integrity;Orthopedic restrictions   Assessment Pt  supine in bed upon my arrival  Pt  incontinent requiring A of 2 for pericare with rolling A provided by therapist  Positioned seated at EOB with A of 1  Attempted standing trials x 2 with RW and max A of 2 with cueing to remain NWB for LLE  Noted non-compliance of WBS requiring return to seated at EOB  Noted pt  incontinent again, requiring repositioning supine in bed with A of 2 for pericare  Repositioned supine in bed at end of treatment session  At this time due to noted decreased skin integrity, not appropriate for sliding board transfer  At this time pt  appropriate to be seated at EOB, however with transfers for nursing staff will require steve lift if being positioned in bedside chair  PT will continue to recommend STR upon d/c for continued improvement of noted impairments above  Barriers to Discharge Inaccessible home environment;Decreased caregiver support   Goals   Patient Goals None stated  STG Expiration Date 09/28/17   Short Term Goal #1 Updated goals to be in 7days: 1) inc strength & balance by 1/2 grade; 2) inc bed mobility to minAx1; 3) inc sit<>stand transitions w/ modAx1; 4) progress to SPT w/ minAx2; 5) sliding board transfers w/ minAx2; 6) maintain % of the time   Treatment Day 6   Plan   Treatment/Interventions Functional transfer training;LE strengthening/ROM; Therapeutic exercise; Endurance training;Bed mobility;Spoke to nursing;Spoke to case management; Family   PT Frequency 5x/wk   Recommendation   Recommendation Short-term skilled PT   PT - OK to Discharge Yes  (if d/c to STR when medically stable )

## 2017-09-21 NOTE — PLAN OF CARE
Problem: PHYSICAL THERAPY ADULT  Goal: Performs mobility at highest level of function for planned discharge setting  See evaluation for individualized goals  Treatment/Interventions: Functional transfer training, LE strengthening/ROM, Elevations, Therapeutic exercise, Endurance training, Patient/family training, Equipment eval/education, Bed mobility, Gait training, OT  Equipment Recommended: Walker (RW)       See flowsheet documentation for full assessment, interventions and recommendations  Outcome: Progressing  Prognosis: Fair  Problem List: Decreased strength, Decreased range of motion, Decreased endurance, Impaired balance, Decreased mobility, Decreased cognition, Impaired judgement, Decreased safety awareness, Obesity, Impaired tone, Decreased skin integrity, Orthopedic restrictions  Assessment: Pt  supine in bed upon my arrival  Pt  incontinent requiring A of 2 for pericare with rolling A provided by therapist  Positioned seated at EOB with A of 1  Attempted standing trials x 2 with RW and max A of 2 with cueing to remain NWB for LLE  Noted non-compliance of WBS requiring return to seated at EOB  Noted pt  incontinent again, requiring repositioning supine in bed with A of 2 for pericare  Repositioned supine in bed at end of treatment session  At this time due to noted decreased skin integrity, not appropriate for sliding board transfer  At this time pt  appropriate to be seated at EOB, however with transfers for nursing staff will require steve lift if being positioned in bedside chair  PT will continue to recommend STR upon d/c for continued improvement of noted impairments above  Barriers to Discharge: Inaccessible home environment, Decreased caregiver support     Recommendation: Short-term skilled PT     PT - OK to Discharge: Yes (if d/c to STR when medically stable )    See flowsheet documentation for full assessment

## 2017-09-21 NOTE — PLAN OF CARE
Problem: Nutrition/Hydration-ADULT  Goal: Nutrient/Hydration intake appropriate for improving, restoring or maintaining nutritional needs  Monitor and assess patient's nutrition/hydration status for malnutrition (ex- brittle hair, bruises, dry skin, pale skin and conjunctiva, muscle wasting, smooth red tongue, and disorientation)  Collaborate with interdisciplinary team and initiate plan and interventions as ordered  Monitor patient's weight and dietary intake as ordered or per policy  Utilize nutrition screening tool and intervene per policy  Determine patient's food preferences and provide high-protein, high-caloric foods as appropriate  INTERVENTIONS:  - Monitor oral intake, urinary output, labs, and treatment plans  - Assess nutrition and hydration status and recommend course of action  - Evaluate amount of meals eaten  - Assist patient with eating if necessary   - Allow adequate time for meals  - Recommend/ encourage appropriate diets, oral nutritional supplements, and vitamin/mineral supplements  - Order, calculate, and assess calorie counts as needed  - Recommend, monitor, and adjust tube feedings and TPN/PPN based on assessed needs  - Assess need for intravenous fluids  - Provide specific nutrition/hydration education as appropriate  - Include patient/family/caregiver in decisions related to nutrition   Outcome: Progressing      Problem: Potential for Falls  Goal: Patient will remain free of falls  INTERVENTIONS:  - Assess patient frequently for physical needs  -  Identify cognitive and physical deficits and behaviors that affect risk of falls    -  [place] fall precautions as indicated by assessment   - Educate patient/family on patient safety including physical limitations  - Instruct patient to call for assistance with activity based on assessment  - Modify environment to reduce risk of injury  - Consider OT/PT consult to assist with strengthening/mobility   Outcome: Progressing      Problem: Prexisting or High Potential for Compromised Skin Integrity  Goal: Skin integrity is maintained or improved  INTERVENTIONS:  - Identify patients at risk for skin breakdown  - Assess and monitor skin integrity  - Assess and monitor nutrition and hydration status  - Monitor labs (i e  albumin)  - Assess for incontinence   - Turn and reposition patient  - Assist with mobility/ambulation  - Relieve pressure over bony prominences  - Avoid friction and shearing  - Provide appropriate hygiene as needed including keeping skin clean and dry  - Evaluate need for skin moisturizer/barrier cream  - Collaborate with interdisciplinary team (i e  Nutrition, Rehabilitation, etc )   - Patient/family teaching   Outcome: Progressing      Problem: PAIN - ADULT  Goal: Verbalizes/displays adequate comfort level or baseline comfort level  Interventions:  - Encourage patient to monitor pain and request assistance  - Assess pain using appropriate pain scale  - Administer analgesics based on type and severity of pain and evaluate response  - Implement non-pharmacological measures as appropriate and evaluate response  - Consider cultural and social influences on pain and pain management  - Notify physician/advanced practitioner if interventions unsuccessful or patient reports new pain   Outcome: Progressing      Problem: INFECTION - ADULT  Goal: Absence or prevention of progression during hospitalization  INTERVENTIONS:  - Assess and monitor for signs and symptoms of infection  - Monitor lab/diagnostic results  - Monitor all insertion sites, i e  indwelling lines, tubes, and drains  - Monitor endotracheal (as able) and nasal secretions for changes in amount and color  - [place] appropriate cooling/warming therapies per order  - Administer medications as ordered  - Instruct and encourage patient and family to use good hand hygiene technique  - Identify and instruct in appropriate isolation precautions for identified infection/condition Outcome: Progressing      Problem: SAFETY ADULT  Goal: Maintain or return to baseline ADL function  INTERVENTIONS:  -  Assess patient's ability to carry out ADLs; assess patient's baseline for ADL function and identify physical deficits which impact ability to perform ADLs (bathing, care of mouth/teeth, toileting, grooming, dressing, etc )  - Assess/evaluate cause of self-care deficits   - Assess range of motion  - Assess patient's mobility; develop plan if impaired  - Assess patient's need for assistive devices and provide as appropriate  - Encourage maximum independence but intervene and supervise when necessary  ¯ Involve family in performance of ADLs  ¯ Assess for home care needs following discharge   ¯ Request OT consult to assist with ADL evaluation and planning for discharge  ¯ Provide patient education as appropriate   Outcome: Progressing    Goal: Maintain or return mobility status to optimal level  INTERVENTIONS:  - Assess patient's baseline mobility status (ambulation, transfers, stairs, etc )    - Identify cognitive and physical deficits and behaviors that affect mobility  - Identify mobility aids required to assist with transfers and/or ambulation (gait belt, sit-to-stand, lift, walker, cane, etc )  - [place] fall precautions as indicated by assessment  - Record patient progress and toleration of activity level on Mobility SBAR; progress patient to next Phase/Stage  - Instruct patient to call for assistance with activity based on assessment  - Request Rehabilitation consult to assist with strengthening/weightbearing, etc    Outcome: Progressing      Problem: DISCHARGE PLANNING  Goal: Discharge to home or other facility with appropriate resources  INTERVENTIONS:  - Identify barriers to discharge w/patient and caregiver  - Arrange for needed discharge resources and transportation as appropriate  - Identify discharge learning needs (meds, wound care, etc )  - Arrange for interpretive services to assist at discharge as needed  - Refer to Case Management Department for coordinating discharge planning if the patient needs post-hospital services based on physician/advanced practitioner order or complex needs related to functional status, cognitive ability, or social support system   Outcome: Progressing      Problem: Knowledge Deficit  Goal: Patient/family/caregiver demonstrates understanding of disease process, treatment plan, medications, and discharge instructions  Complete learning assessment and assess knowledge base    Interventions:  - Provide teaching at level of understanding  - Provide teaching via preferred learning methods   Outcome: Progressing      Problem: SKIN/TISSUE INTEGRITY - ADULT  Goal: Skin integrity remains intact  INTERVENTIONS  - Identify patients at risk for skin breakdown  - Assess and monitor skin integrity  - Assess and monitor nutrition and hydration status  - Monitor labs (i e  albumin)  - Assess for incontinence   - Turn and reposition patient  - Assist with mobility/ambulation  - Relieve pressure over bony prominences  - Avoid friction and shearing  - Provide appropriate hygiene as needed including keeping skin clean and dry  - Evaluate need for skin moisturizer/barrier cream  - Collaborate with interdisciplinary team (i e  Nutrition, Rehabilitation, etc )   - Patient/family teaching   Outcome: Progressing    Goal: Incision(s), wounds(s) or drain site(s) healing without S/S of infection  INTERVENTIONS  - Assess and document risk factors for skin impairment   - Assess and document dressing, incision, wound bed, drain sites and surrounding tissue  - Initiate Nutrition services consult and/or wound management as needed   Outcome: Progressing      Problem: DISCHARGE PLANNING - CARE MANAGEMENT  Goal: Discharge to post-acute care or home with appropriate resources  INTERVENTIONS:  - Conduct assessment to determine patient/family and health care team treatment goals, and need for post-acute services based on payer coverage, community resources, and patient preferences, and barriers to discharge  - Address psychosocial, clinical, and financial barriers to discharge as identified in assessment in conjunction with the patient/family and health care team  - Arrange appropriate level of post-acute services according to patients   needs and preference and payer coverage in collaboration with the physician and health care team  - Communicate with and update the patient/family, physician, and health care team regarding progress on the discharge plan  - Arrange appropriate transportation to post-acute venues   Outcome: Progressing

## 2017-09-21 NOTE — CONSULTS
Consult - General Surgery - Trinity Garza 78 y o  male MRN: [id number]    Unit/Bed#: E4 -01 Encounter: [id number]    Reason for Consultation: Left BKA    Assessment/Plan:    Decubitus ulcer of heel, stage 3   Assessment & Plan       Assessment:  Left heel ulcer, decubitus, stage III  Deemed by vascular and Podiatry to be nonsalvageable  General surgery consulted regarding potential left BKA  Plan:  Dr Heraclio Eastman will attempt to do this left BKA in the operating room tomorrow September 22, 2017, depending on OR availability  I discussed this with Internal Medicine, Dr Hari Gusman, who will adjust insulin if Dr Vicky Boland informs him that the case will in fact be done tomorrow  He is on a heparin drip which will need to be discontinued and Plavix which was last given this morning  Vascular surgery is able to do this amputation on the schedule next week, if the OR is not able to accommodate tomorrow for scheduling wise  Addendum: No availability in the schedule to do this Friday  Very limited availability with no surgeon available on Monday  Discussed with Jacquelyn Marie, nurse practitioner for vascular  She is aware of the situation  Dr Heraclio Eastman is aware of the situation  I also discussed with Dr Hari Gusman and he knows to be the heparin drip running and not to make any changes in insulin or other medications  Vascular we'll see their earliest availability for scheduling this amputation early next week  Osman Almazan PA-C  Date: 9/21/2017 Time: 9:48 AM   Pager: [phone]    Thank you for this consultation, we will follow along with you during this hospitalization  Subjective    CC:  I need a left foot amputation    HPI: Trinity Garza is a 78 y o  male who is currently admitted for left heel decubitus ulcer status post I and D and partial calcanectomy on September 8, 2017  From a vascular standpoint he has been optimized in his revascularization    Vascular and Podiatry teams are recommending left below-the-knee amputation for this non salvageable limb  General Surgery consulted regarding BKA  Vascular surgery is able to do it but cannot logistically find our time until next week  General surgery is consulted to see if they can be performed sooner for optimal patient care  Patient currently denies pain in the foot  Denies any chest pain any or shortness of breath  No nausea or vomiting  Eating without difficulty  Moving his bowels regularly  Historical Information   Past Medical History:   Diagnosis Date    Anemia     Aortic stenosis     Atrial fibrillation     Chronic kidney disease     stage 3    COPD (chronic obstructive pulmonary disease)     Diabetes mellitus     Hyperlipidemia     Hypertension     Sleep apnea      Past Surgical History:   Procedure Laterality Date    WOUND DEBRIDEMENT Left 9/8/2017    Procedure: DEBRIDEMENT WOUND [facility] OUT), I&D, PARTIAL CALCANECTOMY, APPLICATION OF WOUND VAC;  Surgeon: Sulaiman Conde DPM;  Location: [facility];  Service: Podiatry     Social History   History   Alcohol Use No     History   Drug Use No     History   Smoking Status    Former Smoker   Smokeless Tobacco    Not on file     Family History: non-contributory    Meds/Allergies   Prior to Admission Medications   Prescriptions Last Dose Informant Patient Reported? Taking?    Cholecalciferol (VITAMIN D3 PO)   Yes Yes   Sig: Take 5,000 Units by mouth daily   Insulin Disposable Pump (V-GO 40) KIT   Yes Yes   Sig: by Does not apply route   MAGNESIUM OXIDE PO   Yes Yes   Sig: Take 400 mg by mouth daily   Multiple Vitamins-Minerals (CENTRUM SILVER PO)   Yes Yes   Sig: Take 1 tablet by mouth daily   ferrous sulfate 325 (65 Fe) mg tablet   Yes Yes   Sig: Take 325 mg by mouth 2 (two) times a day   fluticasone-salmeterol (ADVAIR) 500-50 mcg/dose   Yes Yes   Sig: Inhale 1 puff 2 (two) times a day   furosemide (LASIX) 40 mg tablet   Yes Yes   Sig: Take 40 mg by mouth daily   gabapentin (NEURONTIN) 100 mg capsule   Yes Yes   Sig: Take 100 mg by mouth 3 (three) times a day   insulin aspart (NovoLOG) 100 units/mL injection   Yes Yes   Sig: Inject under the skin 3 (three) times a day before meals   insulin glargine (LANTUS) 100 units/mL subcutaneous injection   Yes Yes   Sig: Inject 24 Units under the skin 2 (two) times a day     oxybutynin (DITROPAN) 5 mg tablet   Yes Yes   Sig: Take 5 mg by mouth 2 (two) times a day   simvastatin (ZOCOR) 40 mg tablet   Yes Yes   Sig: Take 40 mg by mouth daily   tiotropium (SPIRIVA) 18 mcg inhalation capsule   Yes Yes   Sig: Place 18 mcg into inhaler and inhale daily   warfarin (COUMADIN) 5 mg tablet   Yes Yes   Sig: Take 5 mg by mouth daily   warfarin (COUMADIN) 7 5 mg tablet   Yes Yes   Sig: Take 7 5 mg by mouth daily      Facility-Administered Medications: None     Allergies   Allergen Reactions    Aspirin      Chest tightness       Objective    Vitals:  Vitals:    09/20/17 0737 09/20/17 1533 09/20/17 2355 09/21/17 0748   BP: 136/63 129/58 132/60 143/72   Pulse: 75 71 69 74   Resp: 18 18 18 18   Temp: 99 7 °F (37 6 °C) 98 6 °F (37 °C) 98 8 °F (37 1 °C) 97 9 °F (36 6 °C)   TempSrc: Tympanic Tympanic Tympanic Tympanic   SpO2: 97% 97% 94% 97%   Weight:       Height:           I/O       09/19 0701 - 09/20 0700 09/20 0701 - 09/21 0700 09/21 0701 - 09/22 0700    P  O  670      Total Intake(mL/kg) 670 (6 2)      Urine (mL/kg/hr) 2875 (1 1) 2225 (0 9)     Stool  0 (0)     Total Output 2875 2225      Net -2205 -2225             Unmeasured Stool Occurrence  1 x 1 x          Invasive Devices     Peripheral Intravenous Line            Peripheral IV 09/19/17 Left Wrist 2 days          Drain            Urethral Catheter 18 Fr  18 days                    REVIEW OF SYSTEMS  General:   No Fever or chills; No significant weight loss or gain  EENT:   No ear pain, facial swelling; No sneezing, sore throat  Skin:   No rashes, color changes     Respiratory:     No shortness of breath, cough, wheezing, stridor  Cardiovascular:     No chest pain, palpitations  Gastrointestinal:    No nausea, vomiting, diarrhea; No abdominal pain except as described in HPI  Musculoskeletal:     No arthralgias, myalgias, swelling  Neurologic:   No dizziness, numbness, weakness  No speech difficulties  Psych:   No agitation, suicidal ideations    Otherwise, All other twelve-point review of systems normal          PHYSICAL EXAM    General Appearance:    Alert, cooperative, no distress, appears stated age, obese, resting comfortably in bed with family at the bedside    Head:    Normocephalic, without obvious abnormality, atraumatic   Nose:   Nares normal   Throat:   Lips, mucosa moist & normal, pharynx clear   Neck:   Trachea midline, soft & supple, symmetrical,    Chest/Breast:   Def   Lungs:     Clear to auscultation bilaterally, respirations unlabored   Heart:    Regular rate and rhythm, S1 and S2 normal, no murmur, rub    or gallop   Abdomen:     Soft, non-tender, non distended, bowel sounds active all four quadrants, no masses, no organomegaly  Obese, rotund, benign, umbilical hernia, reducible   Back:  Full range of motion, no flank or CVA tenderness B/L   Genitalia:    Deferred   Rectal:    Deferred   Extremities:   Extremities normal, atraumatic, no cyanosis or edema   Skin:   Skin color, texture, turgor normal, no rashes or lesions   Neurologic:    Skin:   CNII-XII grossly intact  Normal strength, sensation, speech intact     Left foot with dressings in place  Compression stocking on  Heel ulcer is dressed currently  Dressing was left intact           Lab Results:     Results from last 7 days  Lab Units 09/20/17  0419 09/18/17  0904 09/17/17  0542 09/16/17  0646   WBC Thousand/uL 10 24* 10 35* 9 11 9 71   HEMOGLOBIN g/dL 9 4* 9 5* 9 4* 9 3*   HEMATOCRIT % 28 3* 28 2* 28 1* 27 3*   PLATELETS Thousands/uL 293 313 303 289   NEUTROS PCT % 77*  --   --  76*   MONOS PCT % 7  --   --  7 Results from last 7 days  Lab Units 09/20/17  0419 09/18/17  0904 09/17/17  0542   SODIUM mmol/L 138 136 139   POTASSIUM mmol/L 3 8 3 5 3 4*   CHLORIDE mmol/L 99* 99* 101   CO2 mmol/L 32 32 31   BUN mg/dL 17 18 20   CREATININE mg/dL 1 11 1 05 1 09   CALCIUM mg/dL 9 5 9 1 9 1   GLUCOSE RANDOM mg/dL 130 175* 185*       Code Status: Level 1 - Full Code    Rodriguez Doyle PA-C  Date: 9/21/2017 Time: 9:48 AM   Pager: [phone]

## 2017-09-22 LAB
ANION GAP SERPL CALCULATED.3IONS-SCNC: 7 MMOL/L (ref 4–13)
APTT PPP: 70 SECONDS (ref 23–35)
BUN SERPL-MCNC: 21 MG/DL (ref 5–25)
CALCIUM SERPL-MCNC: 9.8 MG/DL (ref 8.3–10.1)
CHLORIDE SERPL-SCNC: 97 MMOL/L (ref 100–108)
CO2 SERPL-SCNC: 32 MMOL/L (ref 21–32)
CREAT SERPL-MCNC: 1.16 MG/DL (ref 0.6–1.3)
GFR SERPL CREATININE-BSD FRML MDRD: 62 ML/MIN/1.73SQ M
GLUCOSE SERPL-MCNC: 154 MG/DL (ref 65–140)
GLUCOSE SERPL-MCNC: 179 MG/DL (ref 65–140)
GLUCOSE SERPL-MCNC: 251 MG/DL (ref 65–140)
GLUCOSE SERPL-MCNC: 281 MG/DL (ref 65–140)
GLUCOSE SERPL-MCNC: 285 MG/DL (ref 65–140)
INR PPP: 1.11 (ref 0.86–1.16)
INR PPP: 1.11 (ref 0.86–1.16)
POTASSIUM SERPL-SCNC: 3.9 MMOL/L (ref 3.5–5.3)
PROTHROMBIN TIME: 14.3 SECONDS (ref 12.1–14.4)
PROTHROMBIN TIME: 14.3 SECONDS (ref 12.1–14.4)
SODIUM SERPL-SCNC: 136 MMOL/L (ref 136–145)

## 2017-09-22 PROCEDURE — 85730 THROMBOPLASTIN TIME PARTIAL: CPT | Performed by: HOSPITALIST

## 2017-09-22 PROCEDURE — 85610 PROTHROMBIN TIME: CPT | Performed by: HOSPITALIST

## 2017-09-22 PROCEDURE — 82948 REAGENT STRIP/BLOOD GLUCOSE: CPT

## 2017-09-22 PROCEDURE — 80048 BASIC METABOLIC PNL TOTAL CA: CPT | Performed by: HOSPITALIST

## 2017-09-22 RX ORDER — AMMONIUM LACTATE 12 G/100G
CREAM TOPICAL 2 TIMES DAILY PRN
Status: DISCONTINUED | OUTPATIENT
Start: 2017-09-22 | End: 2017-09-22

## 2017-09-22 RX ORDER — AMMONIUM LACTATE 12 G/100G
CREAM TOPICAL 2 TIMES DAILY
Status: DISCONTINUED | OUTPATIENT
Start: 2017-09-22 | End: 2017-09-23 | Stop reason: CLARIF

## 2017-09-22 RX ADMIN — INSULIN LISPRO 15 UNITS: 100 INJECTION, SOLUTION INTRAVENOUS; SUBCUTANEOUS at 09:16

## 2017-09-22 RX ADMIN — INSULIN LISPRO 2 UNITS: 100 INJECTION, SOLUTION INTRAVENOUS; SUBCUTANEOUS at 09:17

## 2017-09-22 RX ADMIN — FERROUS SULFATE TAB 325 MG (65 MG ELEMENTAL FE) 325 MG: 325 (65 FE) TAB at 09:14

## 2017-09-22 RX ADMIN — GABAPENTIN 100 MG: 100 CAPSULE ORAL at 16:42

## 2017-09-22 RX ADMIN — OXYBUTYNIN CHLORIDE 5 MG: 5 TABLET ORAL at 09:15

## 2017-09-22 RX ADMIN — GABAPENTIN 100 MG: 100 CAPSULE ORAL at 09:15

## 2017-09-22 RX ADMIN — FLUTICASONE PROPIONATE AND SALMETEROL 1 PUFF: 50; 500 POWDER RESPIRATORY (INHALATION) at 09:14

## 2017-09-22 RX ADMIN — INSULIN GLARGINE 40 UNITS: 100 INJECTION, SOLUTION SUBCUTANEOUS at 22:01

## 2017-09-22 RX ADMIN — Medication 400 MG: at 09:14

## 2017-09-22 RX ADMIN — FERROUS SULFATE TAB 325 MG (65 MG ELEMENTAL FE) 325 MG: 325 (65 FE) TAB at 17:45

## 2017-09-22 RX ADMIN — TIOTROPIUM BROMIDE 18 MCG: 18 CAPSULE ORAL; RESPIRATORY (INHALATION) at 09:12

## 2017-09-22 RX ADMIN — HEPARIN SODIUM AND DEXTROSE 15 UNITS/KG/HR: 10000; 5 INJECTION INTRAVENOUS at 09:10

## 2017-09-22 RX ADMIN — FLUTICASONE PROPIONATE AND SALMETEROL 1 PUFF: 50; 500 POWDER RESPIRATORY (INHALATION) at 22:00

## 2017-09-22 RX ADMIN — INSULIN LISPRO 6 UNITS: 100 INJECTION, SOLUTION INTRAVENOUS; SUBCUTANEOUS at 12:54

## 2017-09-22 RX ADMIN — CLOPIDOGREL BISULFATE 75 MG: 75 TABLET ORAL at 09:14

## 2017-09-22 RX ADMIN — GABAPENTIN 100 MG: 100 CAPSULE ORAL at 22:00

## 2017-09-22 RX ADMIN — HYOSCYAMINE SULFATE 1 APPLICATION: 16 SOLUTION at 09:19

## 2017-09-22 RX ADMIN — FUROSEMIDE 40 MG: 40 TABLET ORAL at 09:15

## 2017-09-22 RX ADMIN — PRAVASTATIN SODIUM 40 MG: 40 TABLET ORAL at 16:42

## 2017-09-22 RX ADMIN — INSULIN LISPRO 12 UNITS: 100 INJECTION, SOLUTION INTRAVENOUS; SUBCUTANEOUS at 16:42

## 2017-09-22 RX ADMIN — INSULIN LISPRO 6 UNITS: 100 INJECTION, SOLUTION INTRAVENOUS; SUBCUTANEOUS at 16:43

## 2017-09-22 RX ADMIN — OXYBUTYNIN CHLORIDE 5 MG: 5 TABLET ORAL at 17:45

## 2017-09-22 RX ADMIN — INSULIN LISPRO 3 UNITS: 100 INJECTION, SOLUTION INTRAVENOUS; SUBCUTANEOUS at 22:01

## 2017-09-22 NOTE — CONSULTS
Consult - Cardiology   Domi Olivas 78 y o  male MRN: [id number]  Unit/Bed#: E4 -01 Encounter: [id number]        Reason For Consult:  Cardiac risk stratification for below-knee-amputation               Assessment and Plan:      1  Left lower extremity ulceration/osteomyelitis with tissue loss:   - Unimproved despite stenting/revascularization   - Ongoing care by Infectious Disease, Podiatry   - BKA forthcoming Tuesday, 9/26/2017   2   Cardiac risk for BKA:   - This patient likely poses an intermediate risk for his planned procedure as conferred by his comorbidities  Favorably, BKA is a modest risk procedure and the patient has been overall stable with regard to his cardiac comorbidities  He has had a relatively recent stress imaging examination with no ischemia  No preoperative cardiac testing is currently planned  Suggest ECG p r n  for any complaint of chest pain  Avoid anemia with hemoglobin less than 8 0   3  History of mechanical AVR:   - Patient previously on warfarin, now held in anticipation of surgery  Agree with perioperative heparin drip while Coumadin being held  Resume postoperatively when acceptable to surgery continuing until INR greater than 2 0  4  Permanent Atrial fibrillation:   - Rate controlled on no AV blocking agents-probable component of conduction system disease   - Anticoagulation as above  5  History of CAD, CABG -2007, PCI-2013 (unspecified vessel):   - No recent sign/symptom of coronary insufficiency  - On no beta-blocker due to low normal heart rate trend in and probable sick sinus syndrome  Statin therapy ongoing    - Had been on no antiplatelet agent preceding hospitalization in the setting of warfarin use  Recent resumption of clopidogrel in the aftermath of peripheral arterial stenting ~~> defer to vascular surgery regarding duration of therapy      History Of Present Illness: This gentleman is a 47-year-old with known peripheral vascular disease    He was admitted to the hospital on 9/2/2017 with ulcerations of his feet  With later diagnosis of left heel osteomyelitis he had undergone recent debridement and partial calcanectomy on 9/8/2017  A left SFA/popliteal angioplasty was performed on 9/12/2017  The patient has had continued poor healing and wound necrosis despite revascularization  A below-knee-amputation is now planned for Tuesday 9/26/2017 by vascular surgeon Dr Shelby Greene  Our consultation is now requested for operative risk stratification  With the exception of his Podiatry care it appears this gentleman has had most of his other care in the [facility] system  His usual cardiologist is Dr Alcira Grider of [facility]  Review of last office note from the patient's usual cardiologist dated 2/3/2017 indicates pertinent cardiac problems including:  Permanent atrial fibrillation, CAD - prior CABG, chronic diastolic CHF, history of aortic stenosis-status post mechanical AVR  At that visit it was recommended that the patient abandon clopidogrel continuing only anticoagulant therapy for his AFib  His heart rate was seemingly well controlled in the absence of AV blocking therapy with a trend in the 60-70 range suggesting some probable AV everardo conduction disease  His chronic diastolic CHF was well compensated without then recent acute CHF  He was maintained on a daily dose of furosemide  Ace inhibitors and angiotensin receptor blockers were previously excluded from his regimen because of the patient's predisposition to orthostatic hypotension and good control of his systemic blood pressures in their absence  Regarding his CAD - he had had been stable without sign or symptom of coronary insufficiency  Per my conversation with the patient he denies any hospitalizations for any cardiac related problems including uncontrolled dysrhythmia, coronary insufficiency, or heart failure, in several years    Per my personal review of the Care everywhere system this also to be corroborated  He has for the most part unaware of his heart rate rhythm but denies any recent perceived tachycardia  As above he has had no recent chest pain nor dyspnea  For the last 6 months his functional abilities have been most impacted by his lower extremity ulcerations  He tells me that preceding those he had been able to walk at least 1 block without difficulty  Past Medical History:        Past Medical History:   Diagnosis Date    Anemia     Aortic stenosis     Atrial fibrillation     Chronic kidney disease     stage 3    COPD (chronic obstructive pulmonary disease)     Diabetes mellitus     Hyperlipidemia     Hypertension     Sleep apnea     Past Surgical History:   Procedure Laterality Date    WOUND DEBRIDEMENT Left 9/8/2017    Procedure: DEBRIDEMENT WOUND [facility] OUT), I&D, PARTIAL CALCANECTOMY, APPLICATION OF WOUND VAC;  Surgeon: Gretchen Mcqueen DPM;  Location: [facility];  Service: Podiatry        Allergy:        Allergies   Allergen Reactions    Aspirin      Chest tightness       Medications:       Prior to Admission medications    Medication Sig Start Date End Date Taking?  Authorizing Provider   Cholecalciferol (VITAMIN D3 PO) Take 5,000 Units by mouth daily   Yes Historical Provider, MD   ferrous sulfate 325 (65 Fe) mg tablet Take 325 mg by mouth 2 (two) times a day   Yes Historical Provider, MD   fluticasone-salmeterol (ADVAIR) 500-50 mcg/dose Inhale 1 puff 2 (two) times a day   Yes Historical Provider, MD   furosemide (LASIX) 40 mg tablet Take 40 mg by mouth daily   Yes Historical Provider, MD   gabapentin (NEURONTIN) 100 mg capsule Take 100 mg by mouth 3 (three) times a day   Yes Historical Provider, MD   insulin aspart (NovoLOG) 100 units/mL injection Inject under the skin 3 (three) times a day before meals   Yes Historical Provider, MD   Insulin Disposable Pump (V-GO 40) KIT by Does not apply route   Yes Historical Provider, MD   insulin glargine (LANTUS) 100 units/mL subcutaneous injection Inject 24 Units under the skin 2 (two) times a day     Yes Historical Provider, MD   MAGNESIUM OXIDE PO Take 400 mg by mouth daily   Yes Historical Provider, MD   Multiple Vitamins-Minerals (CENTRUM SILVER PO) Take 1 tablet by mouth daily   Yes Historical Provider, MD   oxybutynin (DITROPAN) 5 mg tablet Take 5 mg by mouth 2 (two) times a day   Yes Historical Provider, MD   simvastatin (ZOCOR) 40 mg tablet Take 40 mg by mouth daily   Yes Historical Provider, MD   tiotropium (SPIRIVA) 18 mcg inhalation capsule Place 18 mcg into inhaler and inhale daily   Yes Historical Provider, MD   warfarin (COUMADIN) 5 mg tablet Take 5 mg by mouth daily   Yes Historical Provider, MD   warfarin (COUMADIN) 7 5 mg tablet Take 7 5 mg by mouth daily   Yes Historical Provider, MD       Family History:     History reviewed  No pertinent family history  Social History:       Social History     Social History    Marital status: /Civil Union     Spouse name: N/A    Number of children: N/A    Years of education: N/A     Social History Main Topics    Smoking status: Former Smoker    Smokeless tobacco: None    Alcohol use No    Drug use: No    Sexual activity: Not Asked     Other Topics Concern    None     Social History Narrative    None       ROS:  Symptoms per HPI  Limited mobility and weight-bearing prior to admission  Arthralgias    Exam:  General:  Alert, cooperative, comfortable appearing  Head: Normocephalic, atraumatic  Eyes:  EOMI  Pupils - equal, round, reactive to light and accomodation  No icterus  Normal Conjunctiva  Oropharynx:  Clear with moist mucosa no lesion  Neck:  Currently without JVD, trachea midline,  Heart:  Irregular with controlled rate and crisp aortic prosthetic closure click transmitted over entire precordium  Lungs:  Clear without rales, rhonchi, or wheeze    Abdomen: protuberent, normal findings: bowel sounds normal, no masses palpable, no organomegaly and soft, non-tender  Lower Limbs:  No edema  Prevalon boots in place bilaterally and not removed  Left foot fully bandaged  Pulses[de-identified]  RLE - DP: present 2+                 LLE - not assessed  Musculoskeletal: Independent movement of limbs observed, Formal ROM and strength eval not performed  Neurologic:    Oriented to: person , place, situation  Cranial Nerves: grossly intact - vision, smell, taste, and hearing  were not tested  Motor function: grossly normal,symmetric   Sensation: was not tested  DATA:          Echocardiogram:         [facility] 7/29/2016:  Normal left ventricular chamber size  Normal left ventricular systolic function with no regional wall motion abnormalities  LVEF = 65%  Normal left atrial size  Mechanical aortic prosthetic valve  Mean aortic valve gradient = 13  Normal device function with no gradient nor regurgitation  Mitral annular calcification  Normal pulmonary artery systolic pressure  Small pericardial effusion without hemodynamic compromise  Compared to echocardiogram of 2/19/2015, there was no appreciable change  Ischemic Testing:  Stress test 2/23/2015:  No fixed or reversible perfusion defects demonstrated  Left ventricle is normal in size  There is paradoxic motion of the septum consistent with previous cardiac surgery  There is normal motion of the remaining wall segments  Calculated LVEF = 48%    Cardiac catheterization with PCI, November 2013:  Left main normal   Lad normal  Left circumflex 80% stenosis  Mid and distal RCA with 60% stenoses  SVG-% occluded  Pulmonary capillary wedge pressure -23, PA pressure 62/30 a, RA 18   Cardiac index -2 4  Normally functioning mechanical aortic valve          Lab Studies:           Lab Units 09/20/17  0419 09/18/17  0904 09/17/17  0542   WBC Thousand/uL 10 24* 10 35* 9 11   HEMOGLOBIN g/dL 9 4* 9 5* 9 4*   HEMATOCRIT % 28 3* 28 2* 28 1*   PLATELETS Thousands/uL 293 313 303   ,   Results from last 7 days  Lab Units 09/22/17  0558 09/20/17  0419 09/18/17  0904   SODIUM mmol/L 136 138 136   POTASSIUM mmol/L 3 9 3 8 3 5   CHLORIDE mmol/L 97* 99* 99*   CO2 mmol/L 32 32 32   BUN mg/dL 21 17 18   CREATININE mg/dL 1 16 1 11 1 05   CALCIUM mg/dL 9 8 9 5 9 1   GLUCOSE RANDOM mg/dL 154* 130 175*

## 2017-09-22 NOTE — PROGRESS NOTES
[address] Internal Medicine Progress Note  Patient: Domi Olivas 78 y o  male   MRN: [id number]  PCP: Jessica Conn MD  Unit/Bed#: E4 -08 Encounter: [id number]  Date Of Visit: 09/22/17    Addendum:   Pt scheduled to go to OR for BKA on Tuesday PM 9/26 with Dr Tiarra Lozano  Spoke with podiatry who spoke with surgery service  Assessment:    Principal Problem:    Asthenia  Active Problems:    Decubitus ulcer of heel, stage 3    Leukocytosis    Stage 3 chronic kidney disease    Chronic indwelling Beckett catheter    Abnormal urinalysis    Chronic atrial fibrillation    Diabetes mellitus    Chronic diastolic heart failure    PAD (peripheral artery disease)    S/P AVR      Plan:  · Left heel decubitus ulcer s/p I&D and partial calcanectomy (DOS: 9/8/17)  ? Pt seen by gen surgery service for BKA who unfortunately do not have OR availability today  ? Pt likely to go early next week per general surgery  ? Podiatry to render local wound care in the meantime  · H/o mechanical heart valve AVR with h/o A fib:   ? Continue to hold warfarin in anticipation for surgery  c/w hep gtt, statin and plavix  Heparin drip will be held before surgery  ? Plan to bridge with Lovenox and restart Coumadin after surgery with INR goal of 2-3  ? c/w serial labs  · PVD: s/p SFA angioplasty 9/14  · Chronic diastolic heart failure, stable: c/w furosemide  · Chronic indwelling beckett catheter: c/w oxybutynin  · Diabetes Mellitus, type 2: c/w humalog 15 units for breakfast coverage, 12 units for lunch/dinner coverage, and lantus 40 units  Will cut lantus to 20units, breakfast/lunch coverage and cover with ISS on day of surgery  · Diabetic neuropathy: c/w gabapentin  · CKD stage 3: stable  · Bright red blood noted in stool: resolved, unremarkable colonoscopy and denies h/o hemorrhoids  stable Hgb at 9 4  · Right knee pain, improved: likely OA and 2/2 immobility per ortho   C/w rehab/PT      VTE Pharmacologic Prophylaxis:   Pharmacologic: Heparin Drip  Mechanical VTE Prophylaxis in Place: Yes  Time Spent for Care: 30 minutes  More than 50% of total time spent on counseling and coordination of care as described above  Current Length of Stay: 20 day(s)    Current Patient Status: Inpatient   Certification Statement: The patient will continue to require additional inpatient hospital stay due to general surgery surgical intervention for BKA  Code Status: Level 1 - Full Code      Subjective:   Pt seen at bedside  Pt is awaiting his surgery early next week  Pt denies n/v/f/c/sob  Pt states he has been having regular bowel movements  No new complaints at this time  Objective:     Vitals:   Temp (24hrs), Av 8 °F (37 1 °C), Min:98 7 °F (37 1 °C), Max:98 8 °F (37 1 °C)    HR:  [68-73] 68  Resp:  [18] 18  BP: (111-125)/(53-58) 111/58  SpO2:  [90 %-99 %] 99 %  Body mass index is 34 66 kg/m²  Input and Output Summary (last 24 hours): Intake/Output Summary (Last 24 hours) at 17 0813  Last data filed at 17 0401   Gross per 24 hour   Intake                0 ml   Output             1600 ml   Net            -1600 ml       Physical Exam:     Physical Exam   Constitutional: He is oriented to person, place, and time  He appears well-developed and well-nourished  No distress  HENT:   Head: Normocephalic and atraumatic  Eyes: EOM are normal  No scleral icterus  Neck: Normal range of motion  Neck supple  Cardiovascular: Normal rate and regular rhythm  Pulmonary/Chest: Effort normal and breath sounds normal  No respiratory distress  Abdominal: Soft  There is no tenderness  Musculoskeletal:   Dressing to left leg c/d/i   Neurological: He is alert and oriented to person, place, and time  Skin: Skin is warm and dry  Psychiatric: He has a normal mood and affect   His behavior is normal  Thought content normal        Additional Data:     Labs:      Results from last 7 days  Lab Units 17  0419   WBC Thousand/uL 10 24*   HEMOGLOBIN g/dL 9 4*   HEMATOCRIT % 28 3*   PLATELETS Thousands/uL 293   NEUTROS PCT % 77*   LYMPHS PCT % 11*   MONOS PCT % 7   EOS PCT % 4       Results from last 7 days  Lab Units 09/22/17  0558   SODIUM mmol/L 136   POTASSIUM mmol/L 3 9   CHLORIDE mmol/L 97*   CO2 mmol/L 32   BUN mg/dL 21   CREATININE mg/dL 1 16   CALCIUM mg/dL 9 8   GLUCOSE RANDOM mg/dL 154*       Results from last 7 days  Lab Units 09/22/17  0559   INR  1 11  1 11       * I Have Reviewed All Lab Data Listed Above  * Additional Pertinent Lab Tests Reviewed: All Labs Within Last 24 Hours Reviewed    Imaging:    Recent Cultures (last 7 days):       Results from last 7 days  Lab Units 09/15/17  1634   GRAM STAIN RESULT  Rare Polys  2+ Gram positive cocci in pairs  Rare Gram negative rods       Last 24 Hours Medication List:     clopidogrel 75 mg Oral Daily   collagenase  Topical Daily   ferrous sulfate 325 mg Oral BID   fluticasone-salmeterol 1 puff Inhalation BID   furosemide 40 mg Oral Daily   gabapentin 100 mg Oral TID   insulin glargine 40 Units Subcutaneous HS   insulin lispro 1-6 Units Subcutaneous HS   insulin lispro 12 Units Subcutaneous Daily Before Lunch   insulin lispro 12 Units Subcutaneous Before Dinner   insulin lispro 15 Units Subcutaneous Daily Before Breakfast   insulin lispro 2-12 Units Subcutaneous TID AC   magnesium oxide 400 mg Oral Daily   oxybutynin 5 mg Oral BID   pravastatin 40 mg Oral Daily With Dinner   sodium hypochlorite 1 application Irrigation Daily   tiotropium 18 mcg Inhalation Daily        Today, Patient Was Seen By: Norm Becerril DPM    ** Please Note: This note has been constructed using a voice recognition system   **

## 2017-09-22 NOTE — PROGRESS NOTES
Progress Note - Podiatry  Abisai Bryson 78 y o  male MRN: [id number]  Unit/Bed#: E4 -01 Encounter: [id number]    Assessment:  3  78 y o  y/o male with necrosis of left posterior heel ulcer which is deemed nonsalvageable   2  Craft Stage 1 Ulceration to left lateral forefoot - POA  3  Right unstageable posterior heel ulceration - POA  4  DM, type 2 with neuropathy: A1c - 6 6%  5  PAD - per vascular   6  CKD, stage 3 - stable  7  Xerosis     Plan:  - podiatry to c/w [address] to left foot until LLE BKA with: dakin's wet to dry dressing changes to be performed 2x/day with nursing; podiatry to monitor on a daily basis  ** spoke with [facility] with vascular personally, surgery for LLE BKA scheduled for Tuesday with Dr Lola Nevarez at [facility]; family, Dr Saulo Rivera and nursing, and [facility] all aware of this  - right heel wound: c/w [address] with betadine paint; NC to be done daily with application of amlactin lotion for xerosis     - c/w offloading of heel in prevalon boots while in bed to prevent worsening of ulcerations   - WBS: NWB to LLE, no restrictions to RLE; PT/OT onboard - recommending short-term skilled rehab facility  - continue to monitor off antibiotics     > medical management per primary    Dispo: continue with inpatient stay for surgical intervention for LLE BKA with vascular on Tuesday 9/26    Subjective/Objective   Chief Complaint: upset with the delay of surgery    Subjective: 78 y o  y/o male was seen and evaluated at bedside  No acute events overnight with all vital signs stable  He is upset with the delay of surgery, because he was hoping to get it over with as soon as possible  However, has no complaints with pain or other concerns  Wife at bedside  Objective:     Blood pressure 111/58, pulse 68, temperature 98 7 °F (37 1 °C), temperature source Tympanic, resp  rate 18, height 5' 9 5" (1 765 m), weight 108 kg (238 lb 1 6 oz), SpO2 99 %  ,Body mass index is 34 66 kg/m²      Invasive Devices     Peripheral Intravenous Line Peripheral IV 09/19/17 Left Wrist 3 days          Drain            Urethral Catheter 18 Fr  19 days                  Physical Exam:   General: Alert, cooperative and no distress  Lungs: Non labored breathing  Lower Extremity:   1  Vascular: DP/PT pulses nonpalpable however doppler signals present; cap refill x7 WNL; skin temp WNL; no pitting edema noted   2  Ortho: no calf tenderness noted   > right with hx of 4th and 5th partial ray resections   3  Neuro: gross sensation diminished B/L; epicritic sensation absent B/L  4  Derm: xerosis noted to B/L feet with hyperkeratotic regions on the lateral right foot; no ID macerations noted   > right posterior heel wound: dry and stable with scab formation; no drainage, no malodor, no erythema, no edema, no acute signs of infection noted   > left lateral forefoot: dry and stable without drainage, mixture of granular and necrotic wound base, but is dry and stable, no acute sign of infection noted; no change in size   > left posterior heel: mixture of fibrotic and necrotic wound base, no change in size, + malodor, + drainage, no ascending cellulitis, no edema, + probe to bone        Lab, Imaging and other studies:   I have personally reviewed pertinent lab results  CMP:   Lab Results   Component Value Date     09/22/2017    K 3 9 09/22/2017    CL 97 (L) 09/22/2017    CO2 32 09/22/2017    ANIONGAP 7 09/22/2017    BUN 21 09/22/2017    CREATININE 1 16 09/22/2017    GLUCOSE 154 (H) 09/22/2017    CALCIUM 9 8 09/22/2017    EGFR 62 09/22/2017       Imaging: I have personally reviewed pertinent films in PACS  EKG, Pathology, and Other Studies: I have personally reviewed pertinent reports    VTE Pharmacologic Prophylaxis: Heparin  VTE Mechanical Prophylaxis: sequential compression device

## 2017-09-22 NOTE — PROGRESS NOTES
Progress Note - Vascular Surgery       Assessment:  Postoperative left heel wound necrosis  Left heel osteomyelitis, s/p debridement, partial calcanectomy and wound VAC placement 9/8/2017  PAD with LLE tissue loss, s/p L SFA/pop PTA 9/12/17  Right heel ulceration  DM II  CKD III  Chronic atrial fibrillation  CAD, s/p CABG w/ LLE GSV harvest 2007  AS, s/p mechanical AVR 2007     Plan:  -Despite revascularization he continues with poor healing/ wound necrosis  Will require BKA  Patient is agreeable to proceeding  Planning for L BKA on Tuesday 9/26 by Dr Jailene Nunn     -Due to cardiac history would appreciate cardiac risk stratification prior to BKA  -Continue to hold Coumadin in anticipation of BKA, on Heparin gtt  -Local wound care per podiatry  ______________________________________________________________________    Subjective:  Patient seen in bed  Denies any pain  He is agreeable to proceeding with BKA  Hemodynamically stable  Vitals:  /58   Pulse 68   Temp 98 7 °F (37 1 °C) (Tympanic)   Resp 18   Ht 5' 9 5" (1 765 m)   Wt 108 kg (238 lb 1 6 oz)   SpO2 99%   BMI 34 66 kg/m²     I/Os:  I/O last 3 completed shifts:  In: -   Out: 3350 [Urine:3350]  No intake/output data recorded      Lab Results and Cultures:   CBC with diff:   Lab Results   Component Value Date    WBC 10 24 (H) 09/20/2017    HGB 9 4 (L) 09/20/2017    HCT 28 3 (L) 09/20/2017    MCV 85 09/20/2017     09/20/2017    MCH 28 3 09/20/2017    MCHC 33 2 09/20/2017    RDW 14 9 09/20/2017    MPV 8 5 (L) 09/20/2017    NRBC 0 09/20/2017   ,   BMP/CMP:  Lab Results   Component Value Date     09/22/2017    K 3 9 09/22/2017    CL 97 (L) 09/22/2017    CO2 32 09/22/2017    ANIONGAP 7 09/22/2017    BUN 21 09/22/2017    CREATININE 1 16 09/22/2017    GLUCOSE 154 (H) 09/22/2017    CALCIUM 9 8 09/22/2017    AST 15 09/02/2017    ALT 25 09/02/2017    ALKPHOS 99 09/02/2017    PROT 7 3 09/02/2017    ALBUMIN 3 4 (L) 09/02/2017    BILITOT 0 34 09/02/2017    EGFR 62 09/22/2017   ,   Lipid Panel: No results found for: CHOL,   Coags:   Lab Results   Component Value Date    PTT 70 (H) 09/22/2017    INR 1 11 09/22/2017    INR 1 11 09/22/2017   ,     Blood Culture: No results found for: BLOODCX,   Urinalysis:   Lab Results   Component Value Date    COLORU Yellow 09/05/2017    CLARITYU Clear 09/05/2017    SPECGRAV <=1 005 09/05/2017    PHUR 6 5 09/05/2017    LEUKOCYTESUR Small (A) 09/05/2017    NITRITE Positive (A) 09/05/2017    PROTEINUA Negative 09/05/2017    GLUCOSEU Negative 09/05/2017    KETONESU Negative 09/05/2017    BILIRUBINUR Negative 09/05/2017    BLOODU Trace-lysed (A) 09/05/2017   ,   Urine Culture:   Lab Results   Component Value Date    URINECX >100,000 cfu/ml Mixed Contaminants X3 09/02/2017   ,   Wound Culure:   Lab Results   Component Value Date    WOUNDCULT 3+ Growth of Staphylococcus aureus 09/02/2017    WOUNDCULT 3+ Growth of Enterococcus faecalis 09/02/2017    WOUNDCULT 3+ Growth of Mixed Skin Sabiha 09/02/2017       Medications:  Current Facility-Administered Medications   Medication Dose Route Frequency    acetaminophen (TYLENOL) tablet 650 mg  650 mg Oral Q6H PRN    ammonium lactate (LAC-HYDRIN) 12 % cream   Topical BID    clopidogrel (PLAVIX) tablet 75 mg  75 mg Oral Daily    ferrous sulfate tablet 325 mg  325 mg Oral BID    fluticasone-salmeterol (ADVAIR) 500-50 mcg/dose inhaler 1 puff  1 puff Inhalation BID    furosemide (LASIX) tablet 40 mg  40 mg Oral Daily    gabapentin (NEURONTIN) capsule 100 mg  100 mg Oral TID    heparin (porcine) 25,000 units in 250 mL infusion (premix)  3-30 Units/kg/hr (Order-Specific) Intravenous Titrated    heparin (porcine) injection 4,000 Units  4,000 Units Intravenous PRN    heparin (porcine) injection 8,000 Units  8,000 Units Intravenous PRN    insulin glargine (LANTUS) subcutaneous injection 40 Units  40 Units Subcutaneous HS    insulin lispro (HumaLOG) 100 units/mL subcutaneous injection 1-6 Units  1-6 Units Subcutaneous HS    insulin lispro (HumaLOG) 100 units/mL subcutaneous injection 12 Units  12 Units Subcutaneous Daily Before Lunch    insulin lispro (HumaLOG) 100 units/mL subcutaneous injection 12 Units  12 Units Subcutaneous Before Dinner    insulin lispro (HumaLOG) 100 units/mL subcutaneous injection 15 Units  15 Units Subcutaneous Daily Before Breakfast    insulin lispro (HumaLOG) 100 units/mL subcutaneous injection 2-12 Units  2-12 Units Subcutaneous TID AC    magnesium hydroxide (MILK OF MAGNESIA) 400 mg/5 mL oral suspension 30 mL  30 mL Oral PRN    magnesium oxide (MAG-OX) tablet 400 mg  400 mg Oral Daily    ondansetron (ZOFRAN) injection 4 mg  4 mg Intravenous Q6H PRN    oxybutynin (DITROPAN) tablet 5 mg  5 mg Oral BID    pravastatin (PRAVACHOL) tablet 40 mg  40 mg Oral Daily With Dinner    sodium hypochlorite (DAKIN'S HALF-STRENGTH) 0 25 % topical solution 1 application  1 application Irrigation Daily    tiotropium (SPIRIVA) capsule for inhaler 18 mcg  18 mcg Inhalation Daily       Physical Exam:    General: alert, oriented, cooperative with exam, no distress  CV: RRR,   Respiratory: CTA bilaterally, no rhonchi, rales or wheeze  Respirations even and unlabored  Abdominal: Soft, obese, non-tender, +bowel sounds  Extremities: Left foot dressing in place with no gross drainage, dry eschar to right heel  No edema      Neurologic: Grossly normal      NEIL Briggs  9/22/2017  [facility]: [phone]

## 2017-09-23 LAB
APTT PPP: 78 SECONDS (ref 23–35)
GLUCOSE SERPL-MCNC: 193 MG/DL (ref 65–140)
GLUCOSE SERPL-MCNC: 210 MG/DL (ref 65–140)
GLUCOSE SERPL-MCNC: 218 MG/DL (ref 65–140)
GLUCOSE SERPL-MCNC: 252 MG/DL (ref 65–140)
GLUCOSE SERPL-MCNC: 276 MG/DL (ref 65–140)

## 2017-09-23 PROCEDURE — 85730 THROMBOPLASTIN TIME PARTIAL: CPT | Performed by: HOSPITALIST

## 2017-09-23 PROCEDURE — 82948 REAGENT STRIP/BLOOD GLUCOSE: CPT

## 2017-09-23 RX ORDER — AMMONIUM LACTATE 12 G/100G
LOTION TOPICAL 2 TIMES DAILY
Status: DISCONTINUED | OUTPATIENT
Start: 2017-09-23 | End: 2017-10-03 | Stop reason: HOSPADM

## 2017-09-23 RX ADMIN — ACETAMINOPHEN 650 MG: 325 TABLET, FILM COATED ORAL at 12:59

## 2017-09-23 RX ADMIN — FUROSEMIDE 40 MG: 40 TABLET ORAL at 10:11

## 2017-09-23 RX ADMIN — INSULIN LISPRO 12 UNITS: 100 INJECTION, SOLUTION INTRAVENOUS; SUBCUTANEOUS at 16:59

## 2017-09-23 RX ADMIN — INSULIN LISPRO 15 UNITS: 100 INJECTION, SOLUTION INTRAVENOUS; SUBCUTANEOUS at 07:58

## 2017-09-23 RX ADMIN — GABAPENTIN 100 MG: 100 CAPSULE ORAL at 16:59

## 2017-09-23 RX ADMIN — INSULIN LISPRO 3 UNITS: 100 INJECTION, SOLUTION INTRAVENOUS; SUBCUTANEOUS at 21:45

## 2017-09-23 RX ADMIN — FERROUS SULFATE TAB 325 MG (65 MG ELEMENTAL FE) 325 MG: 325 (65 FE) TAB at 17:01

## 2017-09-23 RX ADMIN — FERROUS SULFATE TAB 325 MG (65 MG ELEMENTAL FE) 325 MG: 325 (65 FE) TAB at 10:11

## 2017-09-23 RX ADMIN — HEPARIN SODIUM AND DEXTROSE 15 UNITS/KG/HR: 10000; 5 INJECTION INTRAVENOUS at 22:50

## 2017-09-23 RX ADMIN — HEPARIN SODIUM AND DEXTROSE 15 UNITS/KG/HR: 10000; 5 INJECTION INTRAVENOUS at 04:15

## 2017-09-23 RX ADMIN — AMMONIUM LACTATE: 120 CREAM TOPICAL at 10:14

## 2017-09-23 RX ADMIN — TIOTROPIUM BROMIDE 18 MCG: 18 CAPSULE ORAL; RESPIRATORY (INHALATION) at 12:48

## 2017-09-23 RX ADMIN — INSULIN LISPRO 6 UNITS: 100 INJECTION, SOLUTION INTRAVENOUS; SUBCUTANEOUS at 11:44

## 2017-09-23 RX ADMIN — Medication 400 MG: at 10:11

## 2017-09-23 RX ADMIN — PRAVASTATIN SODIUM 40 MG: 40 TABLET ORAL at 16:59

## 2017-09-23 RX ADMIN — HYOSCYAMINE SULFATE 1 APPLICATION: 16 SOLUTION at 10:14

## 2017-09-23 RX ADMIN — INSULIN LISPRO 4 UNITS: 100 INJECTION, SOLUTION INTRAVENOUS; SUBCUTANEOUS at 16:59

## 2017-09-23 RX ADMIN — CLOPIDOGREL BISULFATE 75 MG: 75 TABLET ORAL at 10:11

## 2017-09-23 RX ADMIN — INSULIN GLARGINE 40 UNITS: 100 INJECTION, SOLUTION SUBCUTANEOUS at 21:45

## 2017-09-23 RX ADMIN — GABAPENTIN 100 MG: 100 CAPSULE ORAL at 21:45

## 2017-09-23 RX ADMIN — FLUTICASONE PROPIONATE AND SALMETEROL 1 PUFF: 50; 500 POWDER RESPIRATORY (INHALATION) at 07:56

## 2017-09-23 RX ADMIN — Medication: at 17:58

## 2017-09-23 RX ADMIN — OXYBUTYNIN CHLORIDE 5 MG: 5 TABLET ORAL at 10:11

## 2017-09-23 RX ADMIN — FLUTICASONE PROPIONATE AND SALMETEROL 1 PUFF: 50; 500 POWDER RESPIRATORY (INHALATION) at 21:45

## 2017-09-23 RX ADMIN — INSULIN LISPRO 4 UNITS: 100 INJECTION, SOLUTION INTRAVENOUS; SUBCUTANEOUS at 07:59

## 2017-09-23 RX ADMIN — OXYBUTYNIN CHLORIDE 5 MG: 5 TABLET ORAL at 17:01

## 2017-09-23 RX ADMIN — GABAPENTIN 100 MG: 100 CAPSULE ORAL at 10:10

## 2017-09-23 NOTE — PROGRESS NOTES
Progress Note - Podiatry  Inis Heather 78 y o  male MRN: [id number]  Unit/Bed#: E4 -01 Encounter: [id number]      Assessment:  3  78 y o  y/o male with necrosis of left posterior heel ulcer which is deemed nonsalvageable   2  Craft Stage 1 Ulceration to left lateral forefoot - POA  3  Right unstageable posterior heel ulceration - POA  4  DM, type 2 with neuropathy: A1c - 6 6%  5  PAD - per vascular   6  CKD, stage 3 - stable  7  Xerosis      Plan:  - podiatry to c/w [address] to left foot until LLE BKA with: dakin's wet to dry dressing changes to be performed 2x/day with nursing; podiatry to monitor on a daily basis  - surgery for L BKA scheduled for Tuesday with Dr Elvira Silver at [facility]; family, Dr Michael Keller and nursing, and [facility] all aware of this  - right heel wound: c/w [address] with betadine paint; NC to be done daily with application of amlactin lotion for xerosis    - c/w offloading of heel in prevalon boots while in bed to prevent worsening of ulcerations   - WBS: NWB to LLE, no restrictions to RLE; PT/OT onboard - recommending short-term skilled rehab facility  - continue to monitor off antibiotics      > medical management per primary     Dispo: continue with inpatient stay for surgical intervention for LLE BKA with vascular on Tuesday 9/26    Subjective/Objective   Chief Complaint:   Chief Complaint   Patient presents with    Weakness - Generalized     pt woke up this morning just feeling very weak and unable to get up   pt denies fevers, n/v   denies any pain or sick contacts  Subjective: 78 y o  y/o male was seen and evaluated at bedside  Blood pressure 129/75, pulse 70, temperature (!) 96 2 °F (35 7 °C), temperature source Tympanic, resp  rate 18, height 5' 9 5" (1 765 m), weight 108 kg (238 lb 1 6 oz), SpO2 98 %  ,Body mass index is 34 66 kg/m²      Invasive Devices     Peripheral Intravenous Line            Peripheral IV 09/19/17 Left Wrist 4 days          Drain            Urethral Catheter 18 Fr  20 days Physical Exam:   General: Alert, cooperative and no distress  Lungs: Non labored breathing  Heart: Positive S1, S2  Abdomen: Soft, non-tender  Extremity:     Wounds stable and unchanged  No tuan pus, no ascending cellulitis  Lab, Imaging and other studies:   CBC: No results found for: WBC, HGB, HCT, MCV, PLT, ADJUSTEDWBC, MCH, MCHC, RDW, MPV, NRBC    Imaging: I have personally reviewed pertinent films in PACS  EKG, Pathology, and Other Studies: I have personally reviewed pertinent reports    VTE Pharmacologic Prophylaxis: Heparin

## 2017-09-23 NOTE — PROGRESS NOTES
Progress Note - Salem Cooks 78 y o  male MRN: [id number]    Unit/Bed#: E4 -01 Encounter: [id number]      Assessment/Plan:    1-Left heel decubitus ulcer s/p I&D and partial calcanectomy (DOS: 9/8/17)-deemed by vascular and Podiatry to be nonsalvageable  both Podiatry and vascular recommending BKA  Patient agreeable -BKA planned for 09/26/2017  Cleared by Cardiology for surgery       2-H/o mechanical heart valve AVR with h/o A fib: Continue to hold warfarin in anticipation for surgery  c/w hep gtt,-this will need to be discontinued 6 hours prior to surgery Plan to bridge with Lovenox and restart Coumadin after surgery-INR goal 2-3     3-PVD: s/p SFA angioplasty 9/14     4-Chronic diastolic heart failure, stable-continue furosemide 40 mg daily     5-Chronic indwelling beckett catheter: c/w oxybutynin     6-Diabetes Mellitus, type 2: c/w humalog 15 units for breakfast coverage, 12 units for lunch/dinner coverage, and lantus 40 units     This will need to be adjusted with giving Lantus 20 units at night holding the morning and afternoon coverage of Humalog and covering with a sliding scale for surgery     7-Diabetic neuropathy: c/w gabapentin     8-CKD stage 3: stable       Subjective:  Patient doing well  No complaints currently  Surgery planned for 09/26/2017  Patient continues on a heparin drip  Coumadin held  Cleared by Cardiology for surgery  Physical Exam:   Vitals: Blood pressure 129/75, pulse 70, temperature (!) 96 2 °F (35 7 °C), temperature source Tympanic, resp  rate 18, height 5' 9 5" (1 765 m), weight 108 kg (238 lb 1 6 oz), SpO2 98 %  ,Body mass index is 34 66 kg/m²  Gen:  Pleasant, non-tachypnic, non-dyspnic  Conversant  Heart: regular rate and rhythm, S1S2 present, no murmur, rub or gallop  Lungs: clear to ausculatation bilaterally  No wheezing, crackless, or rhonchi  No accessory muscle use or respiratory distress  Abd: soft, non-tender, non-distended   NABS, no guarding, rebound or peritoneal signs  Extremities:  Left foot wrapped  Neuro: awake, alert and oriented  Cranial nerves 2-12 intact  Strength and sensation grossly intact  Skin: warm and dry: no petechiae, purpura and rash      LABS:     Results from last 7 days  Lab Units 09/20/17  0419 09/18/17  0904 09/17/17  0542   WBC Thousand/uL 10 24* 10 35* 9 11   HEMOGLOBIN g/dL 9 4* 9 5* 9 4*   HEMATOCRIT % 28 3* 28 2* 28 1*   PLATELETS Thousands/uL 293 313 303       Results from last 7 days  Lab Units 09/22/17  0558 09/20/17  0419 09/18/17  0904   SODIUM mmol/L 136 138 136   POTASSIUM mmol/L 3 9 3 8 3 5   CHLORIDE mmol/L 97* 99* 99*   CO2 mmol/L 32 32 32   BUN mg/dL 21 17 18   CREATININE mg/dL 1 16 1 11 1 05   GLUCOSE RANDOM mg/dL 154* 130 175*   CALCIUM mg/dL 9 8 9 5 9 1       Intake/Output Summary (Last 24 hours) at 09/23/17 1201  Last data filed at 09/23/17 0900   Gross per 24 hour   Intake              180 ml   Output             1850 ml   Net            -1670 ml           ammonium lactate  Topical BID   clopidogrel 75 mg Oral Daily   ferrous sulfate 325 mg Oral BID   fluticasone-salmeterol 1 puff Inhalation BID   furosemide 40 mg Oral Daily   gabapentin 100 mg Oral TID   insulin glargine 40 Units Subcutaneous HS   insulin lispro 1-6 Units Subcutaneous HS   insulin lispro 12 Units Subcutaneous Daily Before Lunch   insulin lispro 12 Units Subcutaneous Before Dinner   insulin lispro 15 Units Subcutaneous Daily Before Breakfast   insulin lispro 2-12 Units Subcutaneous TID AC   magnesium oxide 400 mg Oral Daily   oxybutynin 5 mg Oral BID   pravastatin 40 mg Oral Daily With Dinner   sodium hypochlorite 1 application Irrigation Daily   tiotropium 18 mcg Inhalation Daily       Family update- wife in the room-updated

## 2017-09-23 NOTE — PROGRESS NOTES
Progress Note - Vascular Surgery       Assessment:  Left flail osteomyelitis, status post debridement, partial calcanectomy and wound VAC placement 9/8/2017  PAD with LLE tissue loss, status post L SFA /pop PTA 9/12/2017  Diabetes  CKD  Chronic AFib  Status post CABG 2007  Mechanical AVR 2007      Plan: Of waiting for left BKA on Tuesday 9/26/2017 by Dr Ramandeep Tatum  Continue to hold Coumadin preop, on heparin GTT  Local wound care per Podiatry  Patient was seen by Cardiology and poses an intermediate risk for his planned procedure secondary to his multiple comorbidities  He is cleared by Cardiology for procedure  Suggested to avoid any rehab with hemoglobin less than 8  0   ______________________________________________________________________    Subjective:  Patient is comfortable, denies any pain  No fever no chills no major events overnight  Vitals:  /75   Pulse 70   Temp (!) 96 2 °F (35 7 °C) (Tympanic)   Resp 18   Ht 5' 9 5" (1 765 m)   Wt 108 kg (238 lb 1 6 oz)   SpO2 98%   BMI 34 66 kg/m²     I/Os:  I/O last 3 completed shifts:  In: -   Out: 4450 [Urine:4450]  No intake/output data recorded      Lab Results and Cultures:   CBC with diff:   Lab Results   Component Value Date    WBC 10 24 (H) 09/20/2017    HGB 9 4 (L) 09/20/2017    HCT 28 3 (L) 09/20/2017    MCV 85 09/20/2017     09/20/2017    MCH 28 3 09/20/2017    MCHC 33 2 09/20/2017    RDW 14 9 09/20/2017    MPV 8 5 (L) 09/20/2017    NRBC 0 09/20/2017   ,   BMP/CMP:  Lab Results   Component Value Date     09/22/2017    K 3 9 09/22/2017    CL 97 (L) 09/22/2017    CO2 32 09/22/2017    ANIONGAP 7 09/22/2017    BUN 21 09/22/2017    CREATININE 1 16 09/22/2017    GLUCOSE 154 (H) 09/22/2017    CALCIUM 9 8 09/22/2017    AST 15 09/02/2017    ALT 25 09/02/2017    ALKPHOS 99 09/02/2017    PROT 7 3 09/02/2017    ALBUMIN 3 4 (L) 09/02/2017    BILITOT 0 34 09/02/2017    EGFR 62 09/22/2017   ,   Lipid Panel: No results found for: CHOL,   Coags: Lab Results   Component Value Date    PTT 78 (H) 09/23/2017    INR 1 11 09/22/2017    INR 1 11 09/22/2017   ,     Blood Culture: No results found for: BLOODCX,   Urinalysis:   Lab Results   Component Value Date    COLORU Yellow 09/05/2017    CLARITYU Clear 09/05/2017    SPECGRAV <=1 005 09/05/2017    PHUR 6 5 09/05/2017    LEUKOCYTESUR Small (A) 09/05/2017    NITRITE Positive (A) 09/05/2017    PROTEINUA Negative 09/05/2017    GLUCOSEU Negative 09/05/2017    KETONESU Negative 09/05/2017    BILIRUBINUR Negative 09/05/2017    BLOODU Trace-lysed (A) 09/05/2017   ,   Urine Culture:   Lab Results   Component Value Date    URINECX >100,000 cfu/ml Mixed Contaminants X3 09/02/2017   ,   Wound Culure:   Lab Results   Component Value Date    WOUNDCULT 3+ Growth of Staphylococcus aureus 09/02/2017    WOUNDCULT 3+ Growth of Enterococcus faecalis 09/02/2017    WOUNDCULT 3+ Growth of Mixed Skin Sabiha 09/02/2017       Medications:  Current Facility-Administered Medications   Medication Dose Route Frequency    acetaminophen (TYLENOL) tablet 650 mg  650 mg Oral Q6H PRN    ammonium lactate (LAC-HYDRIN) 12 % cream   Topical BID    clopidogrel (PLAVIX) tablet 75 mg  75 mg Oral Daily    ferrous sulfate tablet 325 mg  325 mg Oral BID    fluticasone-salmeterol (ADVAIR) 500-50 mcg/dose inhaler 1 puff  1 puff Inhalation BID    furosemide (LASIX) tablet 40 mg  40 mg Oral Daily    gabapentin (NEURONTIN) capsule 100 mg  100 mg Oral TID    heparin (porcine) 25,000 units in 250 mL infusion (premix)  3-30 Units/kg/hr (Order-Specific) Intravenous Titrated    heparin (porcine) injection 4,000 Units  4,000 Units Intravenous PRN    heparin (porcine) injection 8,000 Units  8,000 Units Intravenous PRN    insulin glargine (LANTUS) subcutaneous injection 40 Units  40 Units Subcutaneous HS    insulin lispro (HumaLOG) 100 units/mL subcutaneous injection 1-6 Units  1-6 Units Subcutaneous HS    insulin lispro (HumaLOG) 100 units/mL subcutaneous injection 12 Units  12 Units Subcutaneous Daily Before Lunch    insulin lispro (HumaLOG) 100 units/mL subcutaneous injection 12 Units  12 Units Subcutaneous Before Dinner    insulin lispro (HumaLOG) 100 units/mL subcutaneous injection 15 Units  15 Units Subcutaneous Daily Before Breakfast    insulin lispro (HumaLOG) 100 units/mL subcutaneous injection 2-12 Units  2-12 Units Subcutaneous TID AC    magnesium hydroxide (MILK OF MAGNESIA) 400 mg/5 mL oral suspension 30 mL  30 mL Oral PRN    magnesium oxide (MAG-OX) tablet 400 mg  400 mg Oral Daily    ondansetron (ZOFRAN) injection 4 mg  4 mg Intravenous Q6H PRN    oxybutynin (DITROPAN) tablet 5 mg  5 mg Oral BID    pravastatin (PRAVACHOL) tablet 40 mg  40 mg Oral Daily With Dinner    sodium hypochlorite (DAKIN'S HALF-STRENGTH) 0 25 % topical solution 1 application  1 application Irrigation Daily    tiotropium (SPIRIVA) capsule for inhaler 18 mcg  18 mcg Inhalation Daily         Physical Exam:    General:  Alert oriented to place person and time, pleasant, no acute distress  CV:  RRR positive S1 and S2  Respiratory:  Clear to auscultation bilaterally, no rhonchi or rales  Abdominal:  Soft, obese, nontender, positive bowel sounds  Extremities:  Left foot dressing in place with no gross drainage, dry eschar to right heel    Neurologic:  Grossly intact as tested      Pulse exam:  DP: Right: 2+ Left: non-palpable  PT: Right: +1Left: non-palpable    Rosetta Favre, PA-C  9/23/2017  [facility]: [phone]

## 2017-09-24 LAB
APTT PPP: 74 SECONDS (ref 23–35)
GLUCOSE SERPL-MCNC: 196 MG/DL (ref 65–140)
GLUCOSE SERPL-MCNC: 260 MG/DL (ref 65–140)
GLUCOSE SERPL-MCNC: 276 MG/DL (ref 65–140)
GLUCOSE SERPL-MCNC: 284 MG/DL (ref 65–140)

## 2017-09-24 PROCEDURE — 85730 THROMBOPLASTIN TIME PARTIAL: CPT | Performed by: HOSPITALIST

## 2017-09-24 PROCEDURE — 97530 THERAPEUTIC ACTIVITIES: CPT

## 2017-09-24 PROCEDURE — 97110 THERAPEUTIC EXERCISES: CPT

## 2017-09-24 PROCEDURE — 97535 SELF CARE MNGMENT TRAINING: CPT

## 2017-09-24 PROCEDURE — 82948 REAGENT STRIP/BLOOD GLUCOSE: CPT

## 2017-09-24 RX ADMIN — TIOTROPIUM BROMIDE 18 MCG: 18 CAPSULE ORAL; RESPIRATORY (INHALATION) at 09:58

## 2017-09-24 RX ADMIN — HEPARIN SODIUM AND DEXTROSE 15 UNITS/KG/HR: 10000; 5 INJECTION INTRAVENOUS at 15:45

## 2017-09-24 RX ADMIN — GABAPENTIN 100 MG: 100 CAPSULE ORAL at 22:21

## 2017-09-24 RX ADMIN — FERROUS SULFATE TAB 325 MG (65 MG ELEMENTAL FE) 325 MG: 325 (65 FE) TAB at 16:57

## 2017-09-24 RX ADMIN — OXYBUTYNIN CHLORIDE 5 MG: 5 TABLET ORAL at 16:58

## 2017-09-24 RX ADMIN — GABAPENTIN 100 MG: 100 CAPSULE ORAL at 16:57

## 2017-09-24 RX ADMIN — FUROSEMIDE 40 MG: 40 TABLET ORAL at 09:59

## 2017-09-24 RX ADMIN — Medication: at 17:00

## 2017-09-24 RX ADMIN — INSULIN LISPRO 4 UNITS: 100 INJECTION, SOLUTION INTRAVENOUS; SUBCUTANEOUS at 22:21

## 2017-09-24 RX ADMIN — INSULIN LISPRO 6 UNITS: 100 INJECTION, SOLUTION INTRAVENOUS; SUBCUTANEOUS at 16:56

## 2017-09-24 RX ADMIN — INSULIN LISPRO 2 UNITS: 100 INJECTION, SOLUTION INTRAVENOUS; SUBCUTANEOUS at 07:49

## 2017-09-24 RX ADMIN — HYOSCYAMINE SULFATE 1 APPLICATION: 16 SOLUTION at 11:16

## 2017-09-24 RX ADMIN — FLUTICASONE PROPIONATE AND SALMETEROL 1 PUFF: 50; 500 POWDER RESPIRATORY (INHALATION) at 09:58

## 2017-09-24 RX ADMIN — FLUTICASONE PROPIONATE AND SALMETEROL 1 PUFF: 50; 500 POWDER RESPIRATORY (INHALATION) at 22:21

## 2017-09-24 RX ADMIN — CLOPIDOGREL BISULFATE 75 MG: 75 TABLET ORAL at 09:58

## 2017-09-24 RX ADMIN — GABAPENTIN 100 MG: 100 CAPSULE ORAL at 09:59

## 2017-09-24 RX ADMIN — Medication 400 MG: at 09:58

## 2017-09-24 RX ADMIN — FERROUS SULFATE TAB 325 MG (65 MG ELEMENTAL FE) 325 MG: 325 (65 FE) TAB at 09:58

## 2017-09-24 RX ADMIN — OXYBUTYNIN CHLORIDE 5 MG: 5 TABLET ORAL at 09:59

## 2017-09-24 RX ADMIN — INSULIN LISPRO 6 UNITS: 100 INJECTION, SOLUTION INTRAVENOUS; SUBCUTANEOUS at 11:15

## 2017-09-24 RX ADMIN — INSULIN LISPRO 15 UNITS: 100 INJECTION, SOLUTION INTRAVENOUS; SUBCUTANEOUS at 07:49

## 2017-09-24 RX ADMIN — INSULIN GLARGINE 40 UNITS: 100 INJECTION, SOLUTION SUBCUTANEOUS at 22:21

## 2017-09-24 RX ADMIN — Medication: at 11:15

## 2017-09-24 RX ADMIN — INSULIN LISPRO 12 UNITS: 100 INJECTION, SOLUTION INTRAVENOUS; SUBCUTANEOUS at 16:56

## 2017-09-24 RX ADMIN — PRAVASTATIN SODIUM 40 MG: 40 TABLET ORAL at 16:57

## 2017-09-24 NOTE — PROGRESS NOTES
Progress Note - [facility] 78 y o  male MRN: [id number]    Unit/Bed#: E4 -01 Encounter: [id number]      Assessment/Plan:  1-Left heel decubitus ulcer s/p I&D and partial calcanectomy (DOS: 9/8/17)-deemed by vascular and Podiatry to be nonsalvageable   both Podiatry and vascular recommending BKA  Patient agreeable -BKA planned for 09/26/2017  Cleared by Cardiology for surgery       2-H/o mechanical heart valve AVR with h/o A fib: Continue to hold warfarin in anticipation for surgery  c/w hep gtt,-this will need to be discontinued 6 hours prior to surgery Plan to bridge with Lovenox and restart Coumadin after surgery-INR goal 2-3     3-PVD: s/p SFA angioplasty 9/14     4-Chronic diastolic heart failure, stable-continue furosemide 40 mg daily     5-Chronic indwelling beckett catheter: c/w oxybutynin     6-Diabetes Mellitus, type 2: c/w humalog 15 units for breakfast coverage, 12 units for lunch/dinner coverage, and lantus 40 units     This will need to be adjusted with giving Lantus 20 units at night holding the morning and afternoon coverage of Humalog and covering with a sliding scale for surgery     7-Diabetic neuropathy: c/w gabapentin     8-CKD stage 3: stable         Subjective:  Patient without any complaints  Scheduled to have left BKA on Tuesday 09/26/2017-continues on heparin drip  Coumadin on hold  Physical Exam:   Vitals: Blood pressure 140/61, pulse 68, temperature 98 3 °F (36 8 °C), temperature source Tympanic, resp  rate 18, height 5' 9 5" (1 765 m), weight 108 kg (238 lb 1 6 oz), SpO2 96 %  ,Body mass index is 34 66 kg/m²  Gen:  Pleasant, non-tachypnic, non-dyspnic  Conversant  Heart: regular rate and rhythm, S1S2 present, no murmur, rub or gallop  Lungs: clear to ausculatation bilaterally  No wheezing, crackless, or rhonchi  No accessory muscle use or respiratory distress  Abd: soft, non-tender, non-distended  NABS, no guarding, rebound or peritoneal signs    Extremities:  Left foot wrapped  Neuro: awake, alert and oriented  Cranial nerves 2-12 intact  Strength and sensation grossly intact  Skin: warm and dry: no petechiae, purpura and rash      LABS:     Results from last 7 days  Lab Units 09/20/17  0419 09/18/17  0904   WBC Thousand/uL 10 24* 10 35*   HEMOGLOBIN g/dL 9 4* 9 5*   HEMATOCRIT % 28 3* 28 2*   PLATELETS Thousands/uL 293 313       Results from last 7 days  Lab Units 09/22/17  0558 09/20/17  0419 09/18/17  0904   SODIUM mmol/L 136 138 136   POTASSIUM mmol/L 3 9 3 8 3 5   CHLORIDE mmol/L 97* 99* 99*   CO2 mmol/L 32 32 32   BUN mg/dL 21 17 18   CREATININE mg/dL 1 16 1 11 1 05   GLUCOSE RANDOM mg/dL 154* 130 175*   CALCIUM mg/dL 9 8 9 5 9 1       Intake/Output Summary (Last 24 hours) at 09/24/17 1144  Last data filed at 09/24/17 0900   Gross per 24 hour   Intake              300 ml   Output             2775 ml   Net            -2475 ml           ammonium lactate  Topical BID   clopidogrel 75 mg Oral Daily   ferrous sulfate 325 mg Oral BID   fluticasone-salmeterol 1 puff Inhalation BID   furosemide 40 mg Oral Daily   gabapentin 100 mg Oral TID   insulin glargine 40 Units Subcutaneous HS   insulin lispro 1-6 Units Subcutaneous HS   insulin lispro 12 Units Subcutaneous Daily Before Lunch   insulin lispro 12 Units Subcutaneous Before Dinner   insulin lispro 15 Units Subcutaneous Daily Before Breakfast   insulin lispro 2-12 Units Subcutaneous TID AC   magnesium oxide 400 mg Oral Daily   oxybutynin 5 mg Oral BID   pravastatin 40 mg Oral Daily With Dinner   sodium hypochlorite 1 application Irrigation Daily   tiotropium 18 mcg Inhalation Daily       Family update- wife updated yesterday

## 2017-09-24 NOTE — OCCUPATIONAL THERAPY NOTE
Occupational Therapy Progress Note(pdaj=[id number])      Patient Name: Anam Siddiqui    SQXFX'T Date: 9/24/2017    Problem List:   Patient Active Problem List   Diagnosis    Asthenia    Decubitus ulcer of heel, stage 3    Leukocytosis    Stage 3 chronic kidney disease    Chronic indwelling Rush catheter    Abnormal urinalysis    Chronic atrial fibrillation    Diabetes mellitus    Chronic diastolic heart failure    PAD (peripheral artery disease)    S/P AVR        09/24/17 1545   Restrictions/Precautions   Weight Bearing Precautions Per Order Yes   RLE Weight Bearing Per Order WBAT   LLE Weight Bearing Per Order NWB   Pain Assessment   Pain Assessment No/denies pain   ADL   Where Assessed Edge of bed   LB Dressing Assistance 1  Total Assistance   LB Dressing Deficit Don/doff R shoe;Don/doff L shoe;Don/doff R sock; Don/doff L sock   Toileting Assistance  1  Total Assistance   Functional Standing Tolerance   Time 10-20sec   Activity transfers   Bed Mobility   Rolling R 2  Maximal assistance   Additional items Assist x 1   Rolling L 2  Maximal assistance   Additional items Assist x 1   Supine to Sit 2  Maximal assistance   Additional items Assist x 1   Sit to Supine 2  Maximal assistance   Additional items Assist x 1   Transfers   Sit to Stand 2  Maximal assistance   Additional items Assist x 2   Stand to Sit 2  Maximal assistance   Additional items Assist x 2   Functional Mobility   Functional Mobility 2  Maximal assistance   Additional Comments x2  (unable to maintain L LE NWB status)   Additional items Rolling walker   Cognition   Overall Cognitive Status WFL   Arousal/Participation Alert   Attention Within functional limits   Orientation Level Oriented X4   Following Commands Follows one step commands without difficulty   Activity Tolerance   Activity Tolerance Patient limited by fatigue;Patient limited by pain   Medical Staff Made Aware nsg   Assessment   Assessment Pt seen for 55min tx session with focus on functional balance, functional mobility, LE ADLs, and education  Pt able to tolerate EOB sitting for 10-12mins(L sided lean)  Attempted static standing, but pt with limited ability to demonstrate L LE NWB status  Pt incontinent of BM and required total assistance with toileting  Encouraging pressure relief techniques(i e turning in bed)  Remains to demonstrate appropriateness for inpt rehab to improve his overall level of independence  Goals stated on initial eval remain appropriate; will extend  Will continue  Plan   Treatment Interventions ADL retraining;Functional transfer training; Endurance training;Patient/family training;Equipment evaluation/education; Compensatory technique education   Goal Expiration Date 10/06/17   Treatment Day 6   OT Frequency 3-5x/wk   Recommendation   Discharge Recommendation Short Term Rehab   Geovani Arora, OT

## 2017-09-24 NOTE — PLAN OF CARE
Problem: OCCUPATIONAL THERAPY ADULT  Goal: Performs self-care activities at highest level of function for planned discharge setting  See evaluation for individualized goals  Treatment Interventions: ADL retraining, Functional transfer training, UE strengthening/ROM, Endurance training, Patient/family training, Equipment evaluation/education, Compensatory technique education, Energy conservation, Activityengagement          See flowsheet documentation for full assessment, interventions and recommendations  Limitation: Decreased ADL status, Decreased UE strength, Decreased Safe judgement during ADL, Decreased endurance, Decreased self-care trans, Decreased high-level ADLs (NWB L LE w/ wound vac)  Prognosis: Fair  Assessment: Pt seen for 55min tx session with focus on functional balance, functional mobility, LE ADLs, and education  Pt able to tolerate EOB sitting for 10-12mins(L sided lean)  Attempted static standing, but pt with limited ability to demonstrate L LE NWB status  Pt incontinent of BM and required total assistance with toileting  Encouraging pressure relief techniques(i e turning in bed)  Remains to demonstrate appropriateness for inpt rehab to improve his overall level of independence  Goals stated on initial eval remain appropriate; will extend  Will continue        Discharge Recommendation: Short Term Rehab

## 2017-09-24 NOTE — PHYSICAL THERAPY NOTE
Physical Therapy Treatment Note     09/24/17 1538   Pain Assessment   Pain Assessment No/denies pain   Pain Score No Pain   Restrictions/Precautions   RUE Weight Bearing Per Order FWB   LUE Weight Bearing Per Order FWB   RLE Weight Bearing Per Order WBAT   LLE Weight Bearing Per Order NWB   Braces or Orthoses (Diabetic/ orthopedic shoe R le  )   Other Precautions Cognitive;WBS;Multiple lines; Fall Risk;Telemetry   General   Chart Reviewed Yes   Response to Previous Treatment Patient with no complaints from previous session  Family/Caregiver Present Yes  (wife)   Cognition   Overall Cognitive Status WFL   Arousal/Participation Alert; Cooperative   Orientation Level Oriented X4   Following Commands Follows one step commands without difficulty   Subjective   Subjective Pt  offers no complaints  "What is the difference If i putweight on my foot or not because I will be losing my leg anyway "     Bed Mobility   Supine to Sit 2  Maximal assistance   Additional items Assist x 1;Bedrails;HOB elevated; Increased time required;Verbal cues;LE management   Sit to Supine 2  Maximal assistance   Additional items Assist x 2;Leg ;Verbal cues;LE management   Transfers   Sit to Stand 2  Maximal assistance   Additional items Assist x 2; Increased time required;Verbal cues  (max assist to maintain NWB L le)   Stand to Sit 2  Maximal assistance   Additional items Assist x 2; Increased time required;Verbal cues  (max assist to maintain NWB L le)   Additional Comments attempted to static standing with use of Rw and amx assist x2 for steadying assist and max assist to maintain NWB L le       Ambulation/Elevation   Gait pattern Not appropriate   Balance   Static Sitting Fair -   Dynamic Sitting (L lean noted)   Static Standing Zero   Dynamic Standing (zero)   Ambulatory Zero   Endurance Deficit   Endurance Deficit Yes   Endurance Deficit Description fatigue, generlized weakness and deconditioning   Activity Tolerance   Activity Tolerance Patient limited by fatigue;Patient limited by pain   Nurse Made Aware Toña Flower   Exercises   [employer] Supine;15 reps;AROM; Bilateral   Heelslides Supine;15 reps;AAROM;AROM; Bilateral   Glute Sets Supine;15 reps;AROM; Bilateral   Hip Flexion Supine;15 reps;AAROM;AROM; Right;Left  (aarom R le, arom l le)   Hip Abduction Supine;15 reps;AAROM;AROM; Right;Left   Hip Adduction Supine;15 reps;AAROM;AROM; Right;Left   Knee AROM Short Arc Quad Supine;15 reps;AROM; Right;Left   Knee AROM Long Arc Quad Sitting;10 reps;AROM; Right;Left   Equipment Use   Comments EOB static sitting balance, tolerance x 15 minutes, working on righting techniques, posture   Assessment   Prognosis Fair   Problem List Decreased strength;Decreased endurance; Impaired balance;Decreased mobility; Impaired sensation;Obesity; Decreased skin integrity;Orthopedic restrictions; Impaired hearing   Assessment Pt requiring more assistance for all aspects of mobility  PT is requiring max assist x2 for bed mobility and transfers and max assist x1 to maintain NWB to L le  Pt   Unable to achieve full upright standing position  Pt is easily fatigued with decreased strength ue's, R le and generlaized weakness and deconditioning coupled weightbearing restrictions to L le  Encourage use of sliding board for transfer out of bed, however pt declined  Pt performs aa-arom b/l le in supine and arom in sitting  X 12- 15 reps to tolerance  NOted mild l sided lean in sitting requiring verbal cues and min assist to correct  Pt was incontinent of bowel during PT session requiring return to bed for rn'ing to clean pt   Inpt rehab is recommended at this time  Goals   Patient Goals none stated   STG Expiration Date 09/28/17   Treatment Day 7   Plan   Treatment/Interventions Functional transfer training;LE strengthening/ROM; Therapeutic exercise; Endurance training;Patient/family training;Bed mobility; Equipment eval/education;Spoke to nursing   Progress Slow progress, decreased activity tolerance   PT Frequency 5x/wk   Recommendation   Recommendation Short-term skilled PT   PT - OK to Discharge Yes  (to rehab)        09/24/17 9718   Pain Assessment   Pain Assessment No/denies pain   Pain Score No Pain   Restrictions/Precautions   RUE Weight Bearing Per Order FWB   LUE Weight Bearing Per Order FWB   RLE Weight Bearing Per Order WBAT   LLE Weight Bearing Per Order NWB   Braces or Orthoses (Diabetic/ orthopedic shoe R le  )   Other Precautions Cognitive;WBS;Multiple lines; Fall Risk;Telemetry   General   Chart Reviewed Yes   Response to Previous Treatment Patient with no complaints from previous session  Family/Caregiver Present Yes  (wife)   Cognition   Overall Cognitive Status WFL   Arousal/Participation Alert; Cooperative   Orientation Level Oriented X4   Following Commands Follows one step commands without difficulty   Subjective   Subjective Pt  offers no complaints  "What is the difference If i putweight on my foot or not because I will be losing my leg anyway "     Bed Mobility   Supine to Sit 2  Maximal assistance   Additional items Assist x 1;Bedrails;HOB elevated; Increased time required;Verbal cues;LE management   Sit to Supine 2  Maximal assistance   Additional items Assist x 2;Leg ;Verbal cues;LE management   Transfers   Sit to Stand 2  Maximal assistance   Additional items Assist x 2; Increased time required;Verbal cues  (max assist to maintain NWB L le)   Stand to Sit 2  Maximal assistance   Additional items Assist x 2; Increased time required;Verbal cues  (max assist to maintain NWB L le)   Additional Comments attempted to static standing with use of Rw and amx assist x2 for steadying assist and max assist to maintain NWB L le       Ambulation/Elevation   Gait pattern Not appropriate   Balance   Static Sitting Fair -   Dynamic Sitting (L lean noted)   Static Standing Zero   Dynamic Standing (zero)   Ambulatory Zero   Endurance Deficit   Endurance Deficit Yes Endurance Deficit Description fatigue, generlized weakness and deconditioning   Activity Tolerance   Activity Tolerance Patient limited by fatigue;Patient limited by pain   Nurse Made Aware Jelena Flower   Exercises   [employer] Supine;15 reps;AROM; Bilateral   Heelslides Supine;15 reps;AAROM;AROM; Bilateral   Glute Sets Supine;15 reps;AROM; Bilateral   Hip Flexion Supine;15 reps;AAROM;AROM; Right;Left  (aarom R le, arom l le)   Hip Abduction Supine;15 reps;AAROM;AROM; Right;Left   Hip Adduction Supine;15 reps;AAROM;AROM; Right;Left   Knee AROM Short Arc Quad Supine;15 reps;AROM; Right;Left   Knee AROM Long Arc Quad Sitting;10 reps;AROM; Right;Left   Equipment Use   Comments EOB static sitting balance, tolerance x 15 minutes, working on righting techniques, posture   Assessment   Prognosis Fair   Problem List Decreased strength;Decreased endurance; Impaired balance;Decreased mobility; Impaired sensation;Obesity; Decreased skin integrity;Orthopedic restrictions; Impaired hearing   Assessment Pt requiring more assistance for all aspects of mobility  PT is requiring max assist x2 for bed mobility and transfers and max assist x1 to maintain NWB to L le  Pt   Unable to achieve full upright standing position  Pt is easily fatigued with decreased strength ue's, R le and generlaized weakness and deconditioning coupled weightbearing restrictions to L le  Encourage use of sliding board for transfer out of bed, however pt declined  Pt performs aa-arom b/l le in supine and arom in sitting  X 12- 15 reps to tolerance  NOted mild l sided lean in sitting requiring verbal cues and min assist to correct  Pt was incontinent of bowel during PT session requiring return to bed for rn'ing to clean pt   Inpt rehab is recommended at this time  Goals   Patient Goals none stated   STG Expiration Date 09/28/17   Treatment Day 7   Plan   Treatment/Interventions Functional transfer training;LE strengthening/ROM; Therapeutic exercise; Endurance training;Patient/family training;Bed mobility; Equipment eval/education;Spoke to nursing   Progress Slow progress, decreased activity tolerance   PT Frequency 5x/wk   Recommendation   Recommendation Short-term skilled PT   PT - OK to Discharge Yes  (to rehab)         Jennifer King, PTA

## 2017-09-24 NOTE — PROGRESS NOTES
Progress Note - Podiatry  Moriah Farrell 78 y o  male MRN: [id number]  Unit/Bed#: E4 -01 Encounter: [id number]    Assessment:  1  78 y  o  y/o male with necrosis of left posterior heel ulcer which is deemed nonsalvageable   2  Craft Stage 1 Ulceration to left lateral forefoot - POA  3  Right unstageable posterior heel ulceration - POA  4  DM, type 2 with neuropathy: A1c - 6 6%  5  PAD - per vascular   6  CKD, stage 3 - stable  7  Xerosis      Plan:  - podiatry to c/w [facility] to left foot until LLE BKA with: dakin's wet to dry dressing changes to be performed 2x/day with nursing; podiatry to monitor on a daily basis  - surgery for L BKA scheduled for Tuesday with Dr Rodger Gimenez at 4pm; family, Dr Caron Swanson and nursing, and SL[facility] all aware of this  - right heel wound: c/w [facility] with betadine paint; NC to be done daily with application of amlactin lotion for xerosis    - c/w offloading of heel in prevalon boots while in bed to prevent worsening of ulcerations   - WBS: NWB to LLE, no restrictions to RLE; PT/OT onboard - recommending short-term skilled rehab facility  - continue to monitor off antibiotics      > medical management per primary     Dispo: continue with inpatient stay for surgical intervention for LLE BKA with vascular on Tuesday 9/26       Subjective/Objective   Chief Complaint:   Chief Complaint   Patient presents with    Weakness - Generalized     pt woke up this morning just feeling very weak and unable to get up   pt denies fevers, n/v   denies any pain or sick contacts  Subjective: 78 y o  y/o male was seen and evaluated at bedside  Blood pressure 140/61, pulse 68, temperature 98 3 °F (36 8 °C), temperature source Tympanic, resp  rate 18, height 5' 9 5" (1 765 m), weight 108 kg (238 lb 1 6 oz), SpO2 96 %  ,Body mass index is 34 66 kg/m²      Invasive Devices     Peripheral Intravenous Line            Peripheral IV 09/23/17 Right Arm less than 1 day          Drain            Urethral Catheter 18 Fr  21 days Physical Exam:   General: Alert, cooperative and no distress  Lungs: Non labored breathing  Heart: Positive S1, S2  Abdomen: Soft, non-tender  Extremity:       NVS and gross motor function at baseline  Wounds stable and unchanged  No tuan pus, no ascending cellulitis        Lab, Imaging and other studies:   CBC: No results found for: WBC, HGB, HCT, MCV, PLT, ADJUSTEDWBC, MCH, MCHC, RDW, MPV, NRBC    Imaging: I have personally reviewed pertinent films in PACS  EKG, Pathology, and Other Studies: I have personally reviewed pertinent reports    VTE Pharmacologic Prophylaxis: Heparin

## 2017-09-24 NOTE — PLAN OF CARE
Problem: PHYSICAL THERAPY ADULT  Goal: Performs mobility at highest level of function for planned discharge setting  See evaluation for individualized goals  Treatment/Interventions: Functional transfer training, LE strengthening/ROM, Elevations, Therapeutic exercise, Endurance training, Patient/family training, Equipment eval/education, Bed mobility, Gait training, OT  Equipment Recommended: Walker (RW)       See flowsheet documentation for full assessment, interventions and recommendations  Outcome: Progressing  Prognosis: Fair  Problem List: Decreased strength, Decreased endurance, Impaired balance, Decreased mobility, Impaired sensation, Obesity, Decreased skin integrity, Orthopedic restrictions, Impaired hearing  Assessment: Pt requiring more assistance for all aspects of mobility  PT is requiring max assist x2 for bed mobility and transfers and max assist x1 to maintain NWB to L le  Pt   Unable to achieve full upright standing position  Pt is easily fatigued with decreased strength ue's, R le and generlaized weakness and deconditioning coupled weightbearing restrictions to L le  Encourage use of sliding board for transfer out of bed, however pt declined  Pt performs aa-arom b/l le in supine and arom in sitting  X 12- 15 reps to tolerance  Pt was incontinent of bowel during PT session requiring return to bed for rn'ing to clean pt   Inpt rehab is recommended at this time  Barriers to Discharge: Inaccessible home environment, Decreased caregiver support     Recommendation: Short-term skilled PT     PT - OK to Discharge: Yes (to rehab)    See flowsheet documentation for full assessment

## 2017-09-24 NOTE — PROGRESS NOTES
Progress Note - Vascular Surgery       Assessment:  Left heel decubitus ulcer status post I and D and partial calcanectomy  PVD  Chronic diastolic heart failure, AFib  Chronic indwelling catheter  Diabetes   CKD stage III  CAD  Mechanical AVR      Plan:  Scheduled for left BKA on Tuesday 9/26/2017 with Dr Elena Rossi to hold Coumadin preop, on heparin GTT  Local wound care per Podiatry  Medical management per Medicine  Cleared by Cardiology for procedure   ______________________________________________________________________    Subjective:  Patient is doing well  Denies any issues overnight  Patient continues on heparin drip Coumadin held  Vitals:  /61   Pulse 68   Temp 98 3 °F (36 8 °C) (Tympanic)   Resp 18   Ht 5' 9 5" (1 765 m)   Wt 108 kg (238 lb 1 6 oz)   SpO2 96%   BMI 34 66 kg/m²     I/Os:  I/O last 3 completed shifts: In: 300 [P O :300]  Out: 4625 [Urine:4625]  No intake/output data recorded      Lab Results and Cultures:   CBC with diff:   Lab Results   Component Value Date    WBC 10 24 (H) 09/20/2017    HGB 9 4 (L) 09/20/2017    HCT 28 3 (L) 09/20/2017    MCV 85 09/20/2017     09/20/2017    MCH 28 3 09/20/2017    MCHC 33 2 09/20/2017    RDW 14 9 09/20/2017    MPV 8 5 (L) 09/20/2017    NRBC 0 09/20/2017   ,   BMP/CMP:  Lab Results   Component Value Date     09/22/2017    K 3 9 09/22/2017    CL 97 (L) 09/22/2017    CO2 32 09/22/2017    ANIONGAP 7 09/22/2017    BUN 21 09/22/2017    CREATININE 1 16 09/22/2017    GLUCOSE 154 (H) 09/22/2017    CALCIUM 9 8 09/22/2017    AST 15 09/02/2017    ALT 25 09/02/2017    ALKPHOS 99 09/02/2017    PROT 7 3 09/02/2017    ALBUMIN 3 4 (L) 09/02/2017    BILITOT 0 34 09/02/2017    EGFR 62 09/22/2017   ,   Lipid Panel: No results found for: CHOL,   Coags:   Lab Results   Component Value Date    PTT 74 (H) 09/24/2017    INR 1 11 09/22/2017    INR 1 11 09/22/2017   ,     Blood Culture: No results found for: BLOODCX,   Urinalysis:   Lab Results Component Value Date    COLORU Yellow 09/05/2017    CLARITYU Clear 09/05/2017    SPECGRAV <=1 005 09/05/2017    PHUR 6 5 09/05/2017    LEUKOCYTESUR Small (A) 09/05/2017    NITRITE Positive (A) 09/05/2017    PROTEINUA Negative 09/05/2017    GLUCOSEU Negative 09/05/2017    KETONESU Negative 09/05/2017    BILIRUBINUR Negative 09/05/2017    BLOODU Trace-lysed (A) 09/05/2017   ,   Urine Culture:   Lab Results   Component Value Date    URINECX >100,000 cfu/ml Mixed Contaminants X3 09/02/2017   ,   Wound Culure:   Lab Results   Component Value Date    WOUNDCULT 3+ Growth of Staphylococcus aureus 09/02/2017    WOUNDCULT 3+ Growth of Enterococcus faecalis 09/02/2017    WOUNDCULT 3+ Growth of Mixed Skin Sabiha 09/02/2017       Medications:  Current Facility-Administered Medications   Medication Dose Route Frequency    acetaminophen (TYLENOL) tablet 650 mg  650 mg Oral Q6H PRN    ammonium lactate (LAC-HYDRIN) 12 % lotion   Topical BID    clopidogrel (PLAVIX) tablet 75 mg  75 mg Oral Daily    ferrous sulfate tablet 325 mg  325 mg Oral BID    fluticasone-salmeterol (ADVAIR) 500-50 mcg/dose inhaler 1 puff  1 puff Inhalation BID    furosemide (LASIX) tablet 40 mg  40 mg Oral Daily    gabapentin (NEURONTIN) capsule 100 mg  100 mg Oral TID    heparin (porcine) 25,000 units in 250 mL infusion (premix)  3-30 Units/kg/hr (Order-Specific) Intravenous Titrated    heparin (porcine) injection 4,000 Units  4,000 Units Intravenous PRN    heparin (porcine) injection 8,000 Units  8,000 Units Intravenous PRN    insulin glargine (LANTUS) subcutaneous injection 40 Units  40 Units Subcutaneous HS    insulin lispro (HumaLOG) 100 units/mL subcutaneous injection 1-6 Units  1-6 Units Subcutaneous HS    insulin lispro (HumaLOG) 100 units/mL subcutaneous injection 12 Units  12 Units Subcutaneous Daily Before Lunch    insulin lispro (HumaLOG) 100 units/mL subcutaneous injection 12 Units  12 Units Subcutaneous Before Dinner    insulin lispro (HumaLOG) 100 units/mL subcutaneous injection 15 Units  15 Units Subcutaneous Daily Before Breakfast    insulin lispro (HumaLOG) 100 units/mL subcutaneous injection 2-12 Units  2-12 Units Subcutaneous TID AC    magnesium hydroxide (MILK OF MAGNESIA) 400 mg/5 mL oral suspension 30 mL  30 mL Oral PRN    magnesium oxide (MAG-OX) tablet 400 mg  400 mg Oral Daily    ondansetron (ZOFRAN) injection 4 mg  4 mg Intravenous Q6H PRN    oxybutynin (DITROPAN) tablet 5 mg  5 mg Oral BID    pravastatin (PRAVACHOL) tablet 40 mg  40 mg Oral Daily With Dinner    sodium hypochlorite (DAKIN'S HALF-STRENGTH) 0 25 % topical solution 1 application  1 application Irrigation Daily    tiotropium (SPIRIVA) capsule for inhaler 18 mcg  18 mcg Inhalation Daily         Physical Exam:    General:  Alert to place person and time, pleasant  CV:  Regular rate and rhythm, S1-S2 present  Respiratory:  Clear to auscultation bilaterally   No wheezing, no crackles or rhonchi  Abdominal:  Positive bowel sounds, nontender, nondistended, soft  Extremities:  Left foot dressing in place with no gross drainage, dry eschar to right heel  Neurologic:  Grossly intact as tested, no focal neurological deficit noted      Pulse exam:  DP: Right: 2+ Left: non-palpable  PT: Right: 1+ Left: non-palpable    Emile Flores PA-C  9/24/2017  [facility]: [phone]

## 2017-09-25 ENCOUNTER — ANESTHESIA EVENT (INPATIENT)
Dept: PERIOP | Facility: HOSPITAL | Age: 77
DRG: 616 | End: 2017-09-25
Payer: MEDICARE

## 2017-09-25 PROBLEM — L97.424: Status: ACTIVE | Noted: 2017-09-02

## 2017-09-25 LAB
ABO GROUP BLD: NORMAL
APTT PPP: 67 SECONDS (ref 23–35)
BLD GP AB SCN SERPL QL: NEGATIVE
ERYTHROCYTE [DISTWIDTH] IN BLOOD BY AUTOMATED COUNT: 15 % (ref 11.6–15.1)
GLUCOSE SERPL-MCNC: 189 MG/DL (ref 65–140)
GLUCOSE SERPL-MCNC: 191 MG/DL (ref 65–140)
GLUCOSE SERPL-MCNC: 255 MG/DL (ref 65–140)
GLUCOSE SERPL-MCNC: 265 MG/DL (ref 65–140)
HCT VFR BLD AUTO: 29.3 % (ref 36.5–49.3)
HGB BLD-MCNC: 9.7 G/DL (ref 12–17)
MCH RBC QN AUTO: 28.2 PG (ref 26.8–34.3)
MCHC RBC AUTO-ENTMCNC: 33.1 G/DL (ref 31.4–37.4)
MCV RBC AUTO: 85 FL (ref 82–98)
PLATELET # BLD AUTO: 351 THOUSANDS/UL (ref 149–390)
PMV BLD AUTO: 8.5 FL (ref 8.9–12.7)
RBC # BLD AUTO: 3.44 MILLION/UL (ref 3.88–5.62)
RH BLD: POSITIVE
SPECIMEN EXPIRATION DATE: NORMAL
WBC # BLD AUTO: 9.66 THOUSAND/UL (ref 4.31–10.16)

## 2017-09-25 PROCEDURE — 86923 COMPATIBILITY TEST ELECTRIC: CPT

## 2017-09-25 PROCEDURE — 86850 RBC ANTIBODY SCREEN: CPT | Performed by: PHYSICIAN ASSISTANT

## 2017-09-25 PROCEDURE — 85027 COMPLETE CBC AUTOMATED: CPT | Performed by: PHYSICIAN ASSISTANT

## 2017-09-25 PROCEDURE — 97530 THERAPEUTIC ACTIVITIES: CPT

## 2017-09-25 PROCEDURE — 86900 BLOOD TYPING SEROLOGIC ABO: CPT | Performed by: PHYSICIAN ASSISTANT

## 2017-09-25 PROCEDURE — 82948 REAGENT STRIP/BLOOD GLUCOSE: CPT

## 2017-09-25 PROCEDURE — 97110 THERAPEUTIC EXERCISES: CPT

## 2017-09-25 PROCEDURE — 85730 THROMBOPLASTIN TIME PARTIAL: CPT | Performed by: HOSPITALIST

## 2017-09-25 PROCEDURE — 86901 BLOOD TYPING SEROLOGIC RH(D): CPT | Performed by: PHYSICIAN ASSISTANT

## 2017-09-25 RX ORDER — INSULIN GLARGINE 100 [IU]/ML
40 INJECTION, SOLUTION SUBCUTANEOUS
Status: DISCONTINUED | OUTPATIENT
Start: 2017-09-26 | End: 2017-09-28

## 2017-09-25 RX ORDER — SODIUM CHLORIDE 9 MG/ML
75 INJECTION, SOLUTION INTRAVENOUS CONTINUOUS
Status: DISCONTINUED | OUTPATIENT
Start: 2017-09-26 | End: 2017-09-27

## 2017-09-25 RX ORDER — ALBUTEROL SULFATE 2.5 MG/3ML
2.5 SOLUTION RESPIRATORY (INHALATION) ONCE AS NEEDED
Status: DISCONTINUED | OUTPATIENT
Start: 2017-09-25 | End: 2017-09-26 | Stop reason: HOSPADM

## 2017-09-25 RX ORDER — FENTANYL CITRATE/PF 50 MCG/ML
50 SYRINGE (ML) INJECTION
Status: DISCONTINUED | OUTPATIENT
Start: 2017-09-25 | End: 2017-09-26 | Stop reason: HOSPADM

## 2017-09-25 RX ORDER — INSULIN GLARGINE 100 [IU]/ML
20 INJECTION, SOLUTION SUBCUTANEOUS
Status: COMPLETED | OUTPATIENT
Start: 2017-09-25 | End: 2017-09-25

## 2017-09-25 RX ORDER — MEPERIDINE HYDROCHLORIDE 50 MG/ML
12.5 INJECTION INTRAMUSCULAR; INTRAVENOUS; SUBCUTANEOUS AS NEEDED
Status: DISCONTINUED | OUTPATIENT
Start: 2017-09-25 | End: 2017-09-26 | Stop reason: HOSPADM

## 2017-09-25 RX ADMIN — INSULIN LISPRO 6 UNITS: 100 INJECTION, SOLUTION INTRAVENOUS; SUBCUTANEOUS at 09:03

## 2017-09-25 RX ADMIN — INSULIN LISPRO 6 UNITS: 100 INJECTION, SOLUTION INTRAVENOUS; SUBCUTANEOUS at 13:23

## 2017-09-25 RX ADMIN — INSULIN GLARGINE 20 UNITS: 100 INJECTION, SOLUTION SUBCUTANEOUS at 21:42

## 2017-09-25 RX ADMIN — HEPARIN SODIUM AND DEXTROSE 15 UNITS/KG/HR: 10000; 5 INJECTION INTRAVENOUS at 09:05

## 2017-09-25 RX ADMIN — OXYBUTYNIN CHLORIDE 5 MG: 5 TABLET ORAL at 09:03

## 2017-09-25 RX ADMIN — Medication: at 09:11

## 2017-09-25 RX ADMIN — HYOSCYAMINE SULFATE 1 APPLICATION: 16 SOLUTION at 09:01

## 2017-09-25 RX ADMIN — CLOPIDOGREL BISULFATE 75 MG: 75 TABLET ORAL at 09:02

## 2017-09-25 RX ADMIN — INSULIN LISPRO 12 UNITS: 100 INJECTION, SOLUTION INTRAVENOUS; SUBCUTANEOUS at 17:24

## 2017-09-25 RX ADMIN — FLUTICASONE PROPIONATE AND SALMETEROL 1 PUFF: 50; 500 POWDER RESPIRATORY (INHALATION) at 09:01

## 2017-09-25 RX ADMIN — TIOTROPIUM BROMIDE 18 MCG: 18 CAPSULE ORAL; RESPIRATORY (INHALATION) at 09:01

## 2017-09-25 RX ADMIN — GABAPENTIN 100 MG: 100 CAPSULE ORAL at 17:22

## 2017-09-25 RX ADMIN — FERROUS SULFATE TAB 325 MG (65 MG ELEMENTAL FE) 325 MG: 325 (65 FE) TAB at 17:22

## 2017-09-25 RX ADMIN — Medication 400 MG: at 09:02

## 2017-09-25 RX ADMIN — PRAVASTATIN SODIUM 40 MG: 40 TABLET ORAL at 17:22

## 2017-09-25 RX ADMIN — GABAPENTIN 100 MG: 100 CAPSULE ORAL at 09:02

## 2017-09-25 RX ADMIN — INSULIN LISPRO 15 UNITS: 100 INJECTION, SOLUTION INTRAVENOUS; SUBCUTANEOUS at 09:01

## 2017-09-25 RX ADMIN — OXYBUTYNIN CHLORIDE 5 MG: 5 TABLET ORAL at 17:23

## 2017-09-25 RX ADMIN — INSULIN LISPRO 2 UNITS: 100 INJECTION, SOLUTION INTRAVENOUS; SUBCUTANEOUS at 21:42

## 2017-09-25 RX ADMIN — Medication: at 17:24

## 2017-09-25 RX ADMIN — FLUTICASONE PROPIONATE AND SALMETEROL 1 PUFF: 50; 500 POWDER RESPIRATORY (INHALATION) at 21:42

## 2017-09-25 RX ADMIN — INSULIN LISPRO 2 UNITS: 100 INJECTION, SOLUTION INTRAVENOUS; SUBCUTANEOUS at 17:23

## 2017-09-25 RX ADMIN — FUROSEMIDE 40 MG: 40 TABLET ORAL at 09:02

## 2017-09-25 RX ADMIN — FERROUS SULFATE TAB 325 MG (65 MG ELEMENTAL FE) 325 MG: 325 (65 FE) TAB at 09:02

## 2017-09-25 RX ADMIN — GABAPENTIN 100 MG: 100 CAPSULE ORAL at 21:42

## 2017-09-25 NOTE — PROGRESS NOTES
Progress Note - Vascular Surgery     Assessment:  Postoperative left heel wound necrosis  Left heel osteomyelitis, s/p debridement, partial calcanectomy and wound VAC placement 9/8/2017  PAD with LLE tissue loss, s/p L SFA/pop PTA 9/12/2017  Right heel ulceration, stable  Type II DM  CKD III  Chronic diastolic heart failure, compensated  Chronic atrial fibrillation  CAD, s/p CABG w/LLE GSV harvest 2007  AS, s/p mechanical AVR 2007  Chronic indwelling urinary catheter    Plan:  --Heparin bridge and perioperative period secondary to mechanical AVR  --L BKA by Dr Mariely Temple tomorrow, 9/26/2017  --NPO and IVF after midnight  --Type and screen and CBC  Preop orders placed  Operative consent to be obtained by Dr Mariely Temple tomorrow  --Discussed with patient who agrees to proceed  All questions answered  --Will discuss Plavix with Dr Antonio Yi  Subjective:    Patient without complaints  Denies left lower extremity rest pain  Dressing intact left foot  Plan for left BKA tomorrow  VSS  No temp spikes    Vitals:  /62   Pulse 78   Temp 98 2 °F (36 8 °C) (Tympanic)   Resp 18   Ht 5' 9 5" (1 765 m)   Wt 108 kg (238 lb 1 6 oz)   SpO2 97%   BMI 34 66 kg/m²     I/Os:  I/O last 3 completed shifts: In: 1950 [P O :1700; I V :250]  Out: 4075 [Urine:4075]  No intake/output data recorded      Lab Results and Cultures:   Lab Results   Component Value Date    WBC 10 24 (H) 09/20/2017    HGB 9 4 (L) 09/20/2017    HCT 28 3 (L) 09/20/2017    MCV 85 09/20/2017     09/20/2017     Lab Results   Component Value Date    GLUCOSE 154 (H) 09/22/2017    CALCIUM 9 8 09/22/2017     09/22/2017    K 3 9 09/22/2017    CO2 32 09/22/2017    CL 97 (L) 09/22/2017    BUN 21 09/22/2017    CREATININE 1 16 09/22/2017     Lab Results   Component Value Date    INR 1 11 09/22/2017    INR 1 11 09/22/2017    INR 1 12 09/21/2017    PROTIME 14 3 09/22/2017    PROTIME 14 3 09/22/2017    PROTIME 14 4 09/21/2017        Blood Culture: No results found for: BLOODCX,   Urinalysis:   Lab Results   Component Value Date    COLORU Yellow 09/05/2017    CLARITYU Clear 09/05/2017    SPECGRAV <=1 005 09/05/2017    PHUR 6 5 09/05/2017    LEUKOCYTESUR Small (A) 09/05/2017    NITRITE Positive (A) 09/05/2017    PROTEINUA Negative 09/05/2017    GLUCOSEU Negative 09/05/2017    KETONESU Negative 09/05/2017    BILIRUBINUR Negative 09/05/2017    BLOODU Trace-lysed (A) 09/05/2017   ,   Urine Culture:   Lab Results   Component Value Date    URINECX >100,000 cfu/ml Mixed Contaminants X3 09/02/2017   ,   Wound Culure:   Lab Results   Component Value Date    WOUNDCULT 3+ Growth of Staphylococcus aureus 09/02/2017    WOUNDCULT 3+ Growth of Enterococcus faecalis 09/02/2017    WOUNDCULT 3+ Growth of Mixed Skin Sabiha 09/02/2017       Medications:  Current Facility-Administered Medications   Medication Dose Route Frequency    acetaminophen (TYLENOL) tablet 650 mg  650 mg Oral Q6H PRN    ammonium lactate (LAC-HYDRIN) 12 % lotion   Topical BID    [START ON 9/26/2017] ceFAZolin (ANCEF) IVPB (premix) 2,000 mg  2,000 mg Intravenous Once    clopidogrel (PLAVIX) tablet 75 mg  75 mg Oral Daily    ferrous sulfate tablet 325 mg  325 mg Oral BID    fluticasone-salmeterol (ADVAIR) 500-50 mcg/dose inhaler 1 puff  1 puff Inhalation BID    furosemide (LASIX) tablet 40 mg  40 mg Oral Daily    gabapentin (NEURONTIN) capsule 100 mg  100 mg Oral TID    heparin (porcine) 25,000 units in 250 mL infusion (premix)  3-30 Units/kg/hr (Order-Specific) Intravenous Titrated    heparin (porcine) injection 4,000 Units  4,000 Units Intravenous PRN    heparin (porcine) injection 8,000 Units  8,000 Units Intravenous PRN    insulin glargine (LANTUS) subcutaneous injection 20 Units  20 Units Subcutaneous HS    insulin lispro (HumaLOG) 100 units/mL subcutaneous injection 1-6 Units  1-6 Units Subcutaneous HS    insulin lispro (HumaLOG) 100 units/mL subcutaneous injection 12 Units  12 Units Subcutaneous Daily Before Lunch    insulin lispro (HumaLOG) 100 units/mL subcutaneous injection 12 Units  12 Units Subcutaneous Before Dinner    insulin lispro (HumaLOG) 100 units/mL subcutaneous injection 15 Units  15 Units Subcutaneous Daily Before Breakfast    insulin lispro (HumaLOG) 100 units/mL subcutaneous injection 2-12 Units  2-12 Units Subcutaneous TID AC    magnesium hydroxide (MILK OF MAGNESIA) 400 mg/5 mL oral suspension 30 mL  30 mL Oral PRN    magnesium oxide (MAG-OX) tablet 400 mg  400 mg Oral Daily    ondansetron (ZOFRAN) injection 4 mg  4 mg Intravenous Q6H PRN    oxybutynin (DITROPAN) tablet 5 mg  5 mg Oral BID    pravastatin (PRAVACHOL) tablet 40 mg  40 mg Oral Daily With Dinner    [START ON 9/26/2017] sodium chloride 0 9 % infusion  75 mL/hr Intravenous Continuous    sodium hypochlorite (DAKIN'S HALF-STRENGTH) 0 25 % topical solution 1 application  1 application Irrigation Daily    tiotropium (SPIRIVA) capsule for inhaler 18 mcg  18 mcg Inhalation Daily       Imaging:    No new imaging studies for review    Physical Exam:    General appearance: alert, appears stated age, cooperative and no distress  Neurologic: Grossly normal  Neck: no adenopathy, no carotid bruit, no JVD and supple, symmetrical, trachea midline  Lungs: clear to auscultation bilaterally  Chest wall: no tenderness, Midline sternotomy scar well healed  Sternum stable  Heart: regular rate and rhythm, S1: normal, S2: normal prosthetic S2, no S3 or S4, no rub and No murmur  Abdomen: soft, non-tender; bowel sounds normal; no masses,  no organomegaly and Nondistended  No abdominal bruits  Extremities: no edema, redness or tenderness in the calves or thighs and Dressing intact left foot  Bilateral lower extremities motor and sensory intact  No gangrene of left toes      Wound/Incision:   as above    Pulse exam:  Radial: Right: 2+ Left[de-identified] 2+  Femoral: Right: 2+ Left: 2+  Popliteal: Right: non-palpable Left: 1+  DP: Right: doppler signal Left: Obscured by dressing      Quynh Morales PA-C  9/25/2017   [facility], [phone]

## 2017-09-25 NOTE — PROGRESS NOTES
[facility] Internal Medicine Progress Note  Patient: Chayo Lane 78 y o  male   MRN: [id number]  PCP: Deepak Sarabia MD  Unit/Bed#: E4 -82 Encounter: [id number]  Date Of Visit: 09/25/17    Assessment:    Principal Problem:    Asthenia  Active Problems:    Decubitus ulcer of heel, stage 3    Leukocytosis    Stage 3 chronic kidney disease    Chronic indwelling Beckett catheter    Abnormal urinalysis    Chronic atrial fibrillation    Diabetes mellitus    Chronic diastolic heart failure    PAD (peripheral artery disease)    S/P AVR      Plan: 1  Left heel decubitus ulcer s/p I&D and partial calcanectomy (DOS: 9/8/17)- deemed as nonsalvageable per podiatry/vascular   - pt is agreeable to go to OR tomorrow for left BKA with Dr Gatito Rosenberg   - pt cleared for surgery tomorrow by cardiology   - hep gtt will need to be discontinued 6hrs prior to surgery  2  H/o mechanical heart valve AVR with h/o A fib:    - Continue to hold warfarin in anticipation for surgery  c/w hep gtt, statin and plavix   - Plan to bridge with Lovenox and restart Coumadin with INR goal 2-3, after surgery  3  PVD: s/p SFA angioplasty 9/14  4  Chronic diastolic heart failure, stable  5  Chronic indwelling beckett catheter: c/w oxybutynin  6  Diabetes Mellitus, type 2: currently humalog 15 units for breakfast coverage, 12 units for lunch/dinner coverage, and lantus 40 units QHS   - in anticipation of surgery/NPO status tomorrow, pt to receive 20 units of lantus tonight and will hold morning/afternoon meal coverage  7  Diabetic neuropathy: c/w gabapentin  8  CKD stage 3: stable  9  Bright red blood noted in stool: resolved, unremarkable colonoscopy and denies h/o hemorrhoids  Hgb stable  10 Right knee pain, improved: likely OA and 2/2 immobility per ortho  C/w rehab/PT  11  Xerosis: c/w amlactin      VTE Pharmacologic Prophylaxis:   Pharmacologic: Heparin Drip  Mechanical VTE Prophylaxis in Place: Yes  Time Spent for Care: 20 Minutes    More than 50% of total time spent on counseling and coordination of care as described above  Current Length of Stay: 23 day(s)    Current Patient Status: Inpatient   Certification Statement: The patient will continue to require additional inpatient hospital stay due to anticipated BKA tomorrow    Code Status: Level 1 - Full Code      Subjective:   Pt seen at bedside  Patient is lying comfortably in is in no acute distress but states he wishes time to go faster so that he can be done with his left BKA  Patient denies any nausea, fever, vomiting, shortness breath, or chest pain  Objective:     Vitals:   Temp (24hrs), Av 6 °F (37 °C), Min:97 7 °F (36 5 °C), Max:99 8 °F (37 7 °C)    HR:  [70-93] 78  Resp:  [18] 18  BP: (134-183)/(54-65) 153/62  SpO2:  [95 %-97 %] 97 %  Body mass index is 34 66 kg/m²  Input and Output Summary (last 24 hours): Intake/Output Summary (Last 24 hours) at 17 0838  Last data filed at 17 0700   Gross per 24 hour   Intake             1950 ml   Output             2100 ml   Net             -150 ml       Physical Exam:     Physical Exam   Constitutional: He is oriented to person, place, and time  He appears well-developed and well-nourished  No distress  HENT:   Head: Normocephalic and atraumatic  Eyes: EOM are normal  Right eye exhibits no discharge  Left eye exhibits no discharge  No scleral icterus  Neck: Normal range of motion  Neck supple  Cardiovascular: Normal rate and regular rhythm  Pulmonary/Chest: Effort normal and breath sounds normal  No respiratory distress  Abdominal: Soft  He exhibits no distension  There is no tenderness  Musculoskeletal:   LLE dressing intact   Neurological: He is alert and oriented to person, place, and time  Skin: Skin is warm and dry  He is not diaphoretic  Psychiatric: He has a normal mood and affect   His behavior is normal  Thought content normal        Additional Data:     Labs:      Results from last 7 days  Lab Units 09/20/17  0419   WBC Thousand/uL 10 24*   HEMOGLOBIN g/dL 9 4*   HEMATOCRIT % 28 3*   PLATELETS Thousands/uL 293   NEUTROS PCT % 77*   LYMPHS PCT % 11*   MONOS PCT % 7   EOS PCT % 4       Results from last 7 days  Lab Units 09/22/17  0558   SODIUM mmol/L 136   POTASSIUM mmol/L 3 9   CHLORIDE mmol/L 97*   CO2 mmol/L 32   BUN mg/dL 21   CREATININE mg/dL 1 16   CALCIUM mg/dL 9 8   GLUCOSE RANDOM mg/dL 154*       Results from last 7 days  Lab Units 09/22/17  0559   INR  1 11  1 11       * I Have Reviewed All Lab Data Listed Above  * Additional Pertinent Lab Tests Reviewed: All Labs Within Last 24 Hours Reviewed    Imaging:    Recent Cultures (last 7 days):           Last 24 Hours Medication List:     ammonium lactate  Topical BID   clopidogrel 75 mg Oral Daily   ferrous sulfate 325 mg Oral BID   fluticasone-salmeterol 1 puff Inhalation BID   furosemide 40 mg Oral Daily   gabapentin 100 mg Oral TID   insulin glargine 40 Units Subcutaneous HS   insulin lispro 1-6 Units Subcutaneous HS   insulin lispro 12 Units Subcutaneous Daily Before Lunch   insulin lispro 12 Units Subcutaneous Before Dinner   insulin lispro 15 Units Subcutaneous Daily Before Breakfast   insulin lispro 2-12 Units Subcutaneous TID AC   magnesium oxide 400 mg Oral Daily   oxybutynin 5 mg Oral BID   pravastatin 40 mg Oral Daily With Dinner   sodium hypochlorite 1 application Irrigation Daily   tiotropium 18 mcg Inhalation Daily        Today, Patient Was Seen By: Crista Michelle DPM    ** Please Note: This note has been constructed using a voice recognition system   **

## 2017-09-25 NOTE — PHYSICAL THERAPY NOTE
Physical Therapy Treatment Note     09/25/17 1500   Pain Assessment   Pain Assessment No/denies pain   Pain Score No Pain   Restrictions/Precautions   RLE Weight Bearing Per Order PWB   LLE Weight Bearing Per Order PWB   Other Precautions Multiple lines; Fall Risk;Hard of hearing;WBS   General   Chart Reviewed Yes   Response to Previous Treatment Patient with no complaints from previous session  Family/Caregiver Present Yes  (WIFE)   Cognition   Overall Cognitive Status WFL   Arousal/Participation Alert; Cooperative   Attention Within functional limits   Orientation Level Oriented X4   Memory Decreased recall of precautions;Decreased short term memory   Following Commands Follows one step commands without difficulty   Subjective   Subjective PT  AGREEABLE TO THER EX  Exercises   Quad Sets Supine;15 reps;Bilateral;AROM   Heelslides Supine;15 reps;AAROM; Bilateral   Glute Sets Supine;15 reps;AROM; Bilateral   Hip Abduction Supine;15 reps;AAROM; Bilateral   Hip Adduction Supine;15 reps;AAROM; Bilateral   Knee AROM Short Arc Quad Supine;15 reps;AROM; Bilateral   Ankle Pumps Supine;10 reps;AROM; Bilateral   UE Exercise AROM; Bilateral;Supine;10 reps  (B/L SHOULDER FLEX, HORIZONTAL ABD /ADD ,MELBOW FLEX /EXT  )   Assessment   Prognosis Fair   Problem List Decreased strength;Decreased range of motion;Decreased endurance; Impaired balance;Decreased mobility; Impaired hearing; Impaired sensation;Obesity; Decreased skin integrity;Orthopedic restrictions   Assessment PT PERFORMS B/LLE A-AAROM X 15 REPS I SUPINE  ASSISTANCE REQUIRED TO LIFT LE'S TO REDUCE FRICTION OF HEELS WHEN PERFORMING ROM EXERCISES TO PREVENT RUBBING OF HEELS THAT ARE ALREADY COMPROMISED  PT PERFORMS B/L LUE AROM SHOULDERS AND ELBOW  PT AND WIFE EDUCATED ON WHAT TO EXPECT POST L BKA IN REGARDS TO   POSITIONING, THERE EX EX , PREVENTION OF KNEE AND HIP FLEXION CONTRACTURES,REDUCTION OF SWELLING ETC  PT  SPIRITS DOWN  DISCUSSED SUPPORT GROUP FOR AMPUTEES  TRANS TIBIAL AMPUTATION  SCHEDULED FOR 9/26, WILL AWAIT NEW ORDERS POST SURGERY  Goals   Patient Goals TO GET THROUGH THE SURGERY  STG Expiration Date 09/28/17   Treatment Day 8   Plan   Treatment/Interventions Functional transfer training;LE strengthening/ROM; Therapeutic exercise; Endurance training;Patient/family training;Equipment eval/education; Bed mobility   Progress Slow progress, medical status limitations   PT Frequency 5x/wk   Recommendation   Recommendation Short-term skilled PT   PT - OK to Discharge Yes  (TO [facility] [place])         Luis Silverman PTA    Time of PT treatment session: 4747-3615

## 2017-09-25 NOTE — WOUND OSTOMY CARE
Progress Note - Wound   Itzel Lee 78 y o  male MRN: [id number]  Unit/Bed#: E4 -01 Encounter: [id number]      Assessment:   Patient seen for "new wound "  Patient is lying in bed, denies pain  Wife at bedside  Patient is quiet, not interacting during assessment  Chronic beckett catheter present with healed urethral erosion  Patient is incontinent of stool and is frequently "sliding down in bed "  He is non-weight bearing to the LLE  Patient's appetite is good, eating % of meals  Bilateral lower extremities elevated in prevalon boots--dressings dry and intact bilaterally to feet  No edema present  Patient has notable b/l lower extremity neuropathy  Findings:  1  Hospital acquired stage II pressure injury to right buttocks  2   Bilateral foot/heel wounds--not assessed by wound care team   Podiatry and vascular following  Wound care per podiatry  A left BKA is scheduled for 9/26/2017 by the vascular team     Plan:   1   Turn and reposition patient every 2 hours  2   Elevate heels off of bed in prevalon boots per podiatry  3   Apply calazime cream to OPEN area on right buttocks and Hydraguard lotion to INTACT areas of b/l buttocks and sacrum TID & PRN  4   Sofcare cushion with OOB to chair  5   Moisturize skin daily with nourishing lotion  6   Wound care team will follow weekly  Plan of care reviewed with patient, wife, and primary RNJonathan      Subjective:  78year old male presented to ED with generalized weakness and inability to ambulate or transfer to a chair  Patient has history of right calcaneal ulcer and recent development of left calcaneal ulcer despite being wheelchair bound and offloading the area with prevalon boots at home  Patient receiving treatment for b/l foot & heel ulcers from podiatry team   Patient also evaluated by vascular service  Objective:    Vitals: Blood pressure 153/62, pulse 78, temperature 98 2 °F (36 8 °C), temperature source Tympanic, resp  rate 18, height 5' 9 5" (1 765 m), weight 108 kg (238 lb 1 6 oz), SpO2 97 %  ,Body mass index is 34 66 kg/m²  Pressure Ulcer 09/02/17 Heel Left (Active)   Staging Stage III 9/21/2017  8:12 PM   Wound Description THIERRY 9/25/2017  1:30 PM   Saira-wound Assessment THIERRY 9/25/2017  1:30 PM   Shape circular 9/2/2017 12:02 PM   Wound Length (cm) 2 5 cm 9/2/2017  2:29 PM   Wound Width (cm) 2 cm 9/2/2017  2:29 PM   Wound Depth (cm) 1 9/2/2017  2:29 PM   Calculated Wound Area (cm^2) 5 cm^2 9/2/2017  2:29 PM   Calculated Wound Volume (cm^3) 5 cm^3 9/2/2017  2:29 PM   Drainage Amount None 9/24/2017  9:00 PM   Drainage Description Serosanguineous 9/18/2017  9:00 PM   Treatment Heel boot(s) 9/24/2017 11:00 AM   Dressing Dry dressing 9/24/2017  9:00 PM   Dressing Changed Changed 9/24/2017 11:00 AM   Wound packed? No 9/24/2017 11:00 AM   Patient Tolerance Tolerated well 9/24/2017 11:00 AM   Dressing Status Clean;Dry; Intact 9/25/2017  1:30 PM       Pressure Ulcer 09/02/17 Heel Right (Active)   Staging Stage II 9/21/2017  8:12 PM   Wound Description THIERRY 9/25/2017  1:30 PM   Saira-wound Assessment THIERRY 9/25/2017  1:30 PM   Shape circular 9/2/2017 11:35 AM   Drainage Amount None 9/24/2017 11:00 AM   Drainage Description Purulent 9/2/2017 11:35 AM   Treatment Heel boot(s) 9/24/2017 11:00 AM   Dressing Open to air 9/24/2017  9:00 PM   Dressing Changed Changed 9/24/2017 11:00 AM   Patient Tolerance Tolerated well 9/24/2017 11:00 AM   Dressing Status Clean;Dry; Intact 9/25/2017  1:30 PM       Pressure Ulcer 09/24/17 Buttocks Right shallow crater; pink; no drainage (Active)   Staging Stage II 9/25/2017  1:30 PM   Wound Description Clean;Dry;Beefy red 9/25/2017  1:30 PM   Saira-wound Assessment Erythema;Fragile 9/25/2017  1:30 PM   Shape Round 9/25/2017  1:30 PM   Wound Length (cm) 0 5 cm 9/25/2017  1:30 PM   Wound Width (cm) 0 5 cm 9/25/2017  1:30 PM   Wound Depth (cm) 0 1 9/25/2017  1:30 PM   Calculated Wound Area (cm^2) 0 25 cm^2 9/25/2017 1:30 PM   Calculated Wound Volume (cm^3) 0 02 cm^3 9/25/2017  1:30 PM   Drainage Amount Scant 9/25/2017  1:30 PM   Drainage Description Bloody 9/25/2017  1:30 PM   Treatment Offload; Turn & reposition 9/25/2017  1:30 PM   Dressing Protective barrier 9/25/2017  1:30 PM   Patient Tolerance Tolerated well 9/25/2017  1:30 PM       Incision 09/08/17 Foot Left (Active)   Incision Description THIERRY 9/25/2017  1:30 PM   Closure THIERRY 9/21/2017  8:12 PM   Drainage Amount None 9/20/2017  8:21 AM   Treatments Other (Comment) 9/8/2017  6:02 PM   Dressing Dry dressing 9/22/2017  8:30 PM   Wound packed? No 9/8/2017  4:28 PM   Dressing Changed New dressing applied 9/17/2017  1:55 AM   Dressing Status Clean;Dry; Intact 9/25/2017  1:30 PM     Please contact the wound care team at extension [phone] with any questions    Candice LRN, RN, CCRN

## 2017-09-25 NOTE — PLAN OF CARE
Problem: PHYSICAL THERAPY ADULT  Goal: Performs mobility at highest level of function for planned discharge setting  See evaluation for individualized goals  Treatment/Interventions: Functional transfer training, LE strengthening/ROM, Elevations, Therapeutic exercise, Endurance training, Patient/family training, Equipment eval/education, Bed mobility, Gait training, OT  Equipment Recommended: Walker (RW)       See flowsheet documentation for full assessment, interventions and recommendations  Outcome: Progressing  Prognosis: Fair  Problem List: Decreased strength, Decreased range of motion, Decreased endurance, Impaired balance, Decreased mobility, Impaired hearing, Impaired sensation, Obesity, Decreased skin integrity, Orthopedic restrictions  Assessment: PT PERFORMS B/LLE A-AAROM X 15 REPS I SUPINE  ASSISTANCE REQUIRED TO LIFT LE'S TO REDUCE FRICTION OF HEELS WHEN PERFORMING ROM EXERCISES TO PREVENT RUBBING OF HEELS THAT ARE ALREADY COMPROMISED  PT PERFORMS B/L LUE AROM SHOULDERS AND ELBOW  PT AND WIFE EDUCATED ON WHAT TO EXPECT POST L BKA IN REGARDS TO   POSITIONING, THERE EX EX , PREVENTION OF KNEE AND HIP FLEXION CONTRACTURES,REDUCTION OF SWELLING ETC  PT  SPIRITS DOWN  DISCUSSED SUPPORT GROUP FOR AMPUTEES  TRANS TIBIAL AMPUTATION  SCHEDULED FOR 9/26, WILL AWAIT NEW ORDERS POST SURGERY  Barriers to Discharge: Inaccessible home environment, Decreased caregiver support     Recommendation: Short-term skilled PT     PT - OK to Discharge: Yes ( [address])    See flowsheet documentation for full assessment

## 2017-09-25 NOTE — TREATMENT PLAN
Treatment Plan - General Surgery   Jonas Harrington 78 y o  male MRN: [id number]    Plan:   Vascular surgery planning this amputation tomorrow, Tuesday September 26, 2017  General surgery will sign off and be available if needed      Signature:   Mishel Smith PA-C  Date: 9/25/2017 Time: 7:28 AM

## 2017-09-25 NOTE — PROGRESS NOTES
Progress Note - Podiatry  Raymond Taylor 78 y o  male MRN: [id number]  Unit/Bed#: E4 -01 Encounter: [id number]    Assessment:  1  78 y  o  y/o male with necrosis of left posterior heel ulcer which is deemed nonsalvageable   2  Craft Stage 1 Ulceration to left lateral forefoot - POA  3  Right unstageable posterior heel ulceration - POA; stable  4  DM, type 2 with neuropathy: A1c - 6 6%  5  PAD - per vascular   6  CKD, stage 3 - stable  7  Xerosis     Plan:  - podiatry to c/w [facility] to left foot until LLE BKA tomorrow with: dakin's wet to dry dressing changes to be performed 2x/day with nursing; podiatry to monitor on a daily basis  - surgery for L BKA scheduled for Tuesday (9/26) with Dr Chris Prader at [place] [id number]  - right heel wound: c/w [facility] with betadine paint; NC to be done daily with application of amlactin lotion for xerosis; podiatry to monitor every 3 days next change to be 9/27  - c/w offloading of heel in prevalon boots while in bed to prevent worsening of ulcerations   - WBS: NWB to LLE, no restrictions to RLE; PT/OT onboard - recommending short-term skilled rehab facility  - continue to monitor off antibiotics      > medical management per primary     Dispo: continue with inpatient stay for surgical intervention for LLE BKA with vascular on Tuesday 9/26    Subjective/Objective   Chief Complaint: "I'm fine"    Subjective: 78 y o  y/o male was seen and evaluated at bedside  No acute events overnight with all vital signs stable  No pedal complaints or constitutional symptoms  He is just anxious to have his surgery over with  Objective:     Blood pressure 153/62, pulse 78, temperature 98 2 °F (36 8 °C), temperature source Tympanic, resp  rate 18, height 5' 9 5" (1 765 m), weight 108 kg (238 lb 1 6 oz), SpO2 97 %  ,Body mass index is 34 66 kg/m²      Invasive Devices     Peripheral Intravenous Line            Peripheral IV 09/23/17 Right Arm 1 day          Drain            Urethral Catheter 18 Fr  22 days Physical Exam:   General: Alert, cooperative and no distress  Lungs: Non labored breathing  Lower Extremity:   1  Vascular: at baseline  2  Ortho: at baseline; L knee tenderness   3  Neuro: at baseline  4  Derm: no new ulcerations or areas of concern noted at this time to the left foot  L posterior heel: stable with 80% necrotic wound base with 20% fibrotic wound base; + malodor, no active drainage, no purulence, no edema, no erythema, no change in appearance/size from last week; no acute signs of infection noted   L plantar lateral midfoot ulcerations: dry and stable, no active drainage, no malodor, no acute signs of infection noted  R foot dressing intact      Lab, Imaging and other studies: I have personally reviewed pertinent lab results  Imaging: I have personally reviewed pertinent films in PACS  EKG, Pathology, and Other Studies: I have personally reviewed pertinent reports    VTE Pharmacologic Prophylaxis: Heparin  VTE Mechanical Prophylaxis: sequential compression device

## 2017-09-25 NOTE — CASE MANAGEMENT
Continued Stay Review    Date:  9/23/2017    Vital Signs: /62   Pulse 78   Temp 98 2 °F (36 8 °C) (Tympanic)   Resp 18   Ht 5' 9 5" (1 765 m)   Wt 108 kg (238 lb 1 6 oz)   SpO2 97%   BMI 34 66 kg/m²     Scheduled Meds:  ammonium lactate   Topical BID   clopidogrel 75 mg Oral Daily   ferrous sulfate 325 mg Oral BID   fluticasone-salmeterol 1 puff Inhalation BID   furosemide 40 mg Oral Daily   gabapentin 100 mg Oral TID   insulin glargine 40 Units Subcutaneous HS   insulin lispro 1-6 Units Subcutaneous HS   insulin lispro 12 Units Subcutaneous Daily Before Lunch   insulin lispro 12 Units Subcutaneous Before Dinner   insulin lispro 15 Units Subcutaneous Daily Before Breakfast   insulin lispro 2-12 Units Subcutaneous TID AC   magnesium oxide 400 mg Oral Daily   oxybutynin 5 mg Oral BID   pravastatin 40 mg Oral Daily With Dinner   sodium hypochlorite 1 application Irrigation Daily   tiotropium 18 mcg Inhalation Daily       Continuous Infusions:   heparin (porcine) 3-30 Units/kg/hr (Order-Specific) Last Rate: 15 Units/kg/hr (09/25/17 0905)   [START ON 9/26/2017] sodium chloride 75 mL/hr      PRN Meds:   Acetaminophen x 1    heparin (porcine)    heparin (porcine)    magnesium hydroxide    ondansetron    Abnormal Labs/Diagnostic Results:  BS's > 200 x 4    Age/Sex: 78 y o  male     Assessment/Plan: 1-Left heel decubitus ulcer s/p I&D and partial calcanectomy (DOS: 9/8/17)-deemed by vascular and Podiatry to be nonsalvageable   both Podiatry and vascular recommending BKA  Patient agreeable -BKA planned for 09/26/2017  Cleared by Cardiology for surgery       2-H/o mechanical heart valve AVR with h/o A fib: Continue to hold warfarin in anticipation for surgery   c/w hep gtt,-this will need to be discontinued 6 hours prior to surgery Plan to bridge with Lovenox and restart Coumadin after surgery-INR goal 2-3     3-PVD: s/p SFA angioplasty 9/14     4-Chronic diastolic heart failure, stable-continue furosemide 40 mg daily     5-Chronic indwelling beckett catheter: c/w oxybutynin     6-Diabetes Mellitus, type 2: c/w humalog 15 units for breakfast coverage, 12 units for lunch/dinner coverage, and lantus 40 units      This will need to be adjusted with giving Lantus 20 units at night holding the morning and afternoon coverage of Humalog and covering with a sliding scale for surgery     7-Diabetic neuropathy: c/w gabapentin     8-CKD stage 3: stable       Discharge Plan: Anticipate OR 9/26

## 2017-09-26 ENCOUNTER — ANESTHESIA (INPATIENT)
Dept: PERIOP | Facility: HOSPITAL | Age: 77
DRG: 616 | End: 2017-09-26
Payer: MEDICARE

## 2017-09-26 LAB
ALBUMIN SERPL BCP-MCNC: 2.7 G/DL (ref 3.5–5)
ALP SERPL-CCNC: 92 U/L (ref 46–116)
ALT SERPL W P-5'-P-CCNC: 16 U/L (ref 12–78)
ANION GAP SERPL CALCULATED.3IONS-SCNC: 7 MMOL/L (ref 4–13)
APTT PPP: 35 SECONDS (ref 23–35)
APTT PPP: 52 SECONDS (ref 23–35)
AST SERPL W P-5'-P-CCNC: 10 U/L (ref 5–45)
BILIRUB SERPL-MCNC: 0.31 MG/DL (ref 0.2–1)
BUN SERPL-MCNC: 28 MG/DL (ref 5–25)
CALCIUM SERPL-MCNC: 9.8 MG/DL (ref 8.3–10.1)
CHLORIDE SERPL-SCNC: 98 MMOL/L (ref 100–108)
CO2 SERPL-SCNC: 32 MMOL/L (ref 21–32)
CREAT SERPL-MCNC: 1.24 MG/DL (ref 0.6–1.3)
ERYTHROCYTE [DISTWIDTH] IN BLOOD BY AUTOMATED COUNT: 15.1 % (ref 11.6–15.1)
GFR SERPL CREATININE-BSD FRML MDRD: 57 ML/MIN/1.73SQ M
GLUCOSE SERPL-MCNC: 134 MG/DL (ref 65–140)
GLUCOSE SERPL-MCNC: 212 MG/DL (ref 65–140)
GLUCOSE SERPL-MCNC: 220 MG/DL (ref 65–140)
GLUCOSE SERPL-MCNC: 228 MG/DL (ref 65–140)
HCT VFR BLD AUTO: 29.3 % (ref 36.5–49.3)
HGB BLD-MCNC: 9.6 G/DL (ref 12–17)
MCH RBC QN AUTO: 28.1 PG (ref 26.8–34.3)
MCHC RBC AUTO-ENTMCNC: 32.8 G/DL (ref 31.4–37.4)
MCV RBC AUTO: 86 FL (ref 82–98)
PLATELET # BLD AUTO: 310 THOUSANDS/UL (ref 149–390)
PMV BLD AUTO: 8.4 FL (ref 8.9–12.7)
POTASSIUM SERPL-SCNC: 4.6 MMOL/L (ref 3.5–5.3)
PROT SERPL-MCNC: 6.8 G/DL (ref 6.4–8.2)
RBC # BLD AUTO: 3.42 MILLION/UL (ref 3.88–5.62)
SODIUM SERPL-SCNC: 137 MMOL/L (ref 136–145)
WBC # BLD AUTO: 9.4 THOUSAND/UL (ref 4.31–10.16)

## 2017-09-26 PROCEDURE — 88300 SURGICAL PATH GROSS: CPT | Performed by: NURSE PRACTITIONER

## 2017-09-26 PROCEDURE — 87176 TISSUE HOMOGENIZATION CULTR: CPT | Performed by: SURGERY

## 2017-09-26 PROCEDURE — 85730 THROMBOPLASTIN TIME PARTIAL: CPT | Performed by: PHYSICIAN ASSISTANT

## 2017-09-26 PROCEDURE — 87075 CULTR BACTERIA EXCEPT BLOOD: CPT | Performed by: SURGERY

## 2017-09-26 PROCEDURE — 87205 SMEAR GRAM STAIN: CPT | Performed by: SURGERY

## 2017-09-26 PROCEDURE — 82948 REAGENT STRIP/BLOOD GLUCOSE: CPT

## 2017-09-26 PROCEDURE — 80053 COMPREHEN METABOLIC PANEL: CPT | Performed by: INTERNAL MEDICINE

## 2017-09-26 PROCEDURE — 85027 COMPLETE CBC AUTOMATED: CPT | Performed by: INTERNAL MEDICINE

## 2017-09-26 PROCEDURE — 87070 CULTURE OTHR SPECIMN AEROBIC: CPT | Performed by: SURGERY

## 2017-09-26 PROCEDURE — 85730 THROMBOPLASTIN TIME PARTIAL: CPT | Performed by: INTERNAL MEDICINE

## 2017-09-26 PROCEDURE — 0Y6J0Z1 DETACHMENT AT LEFT LOWER LEG, HIGH, OPEN APPROACH: ICD-10-PCS | Performed by: INTERNAL MEDICINE

## 2017-09-26 RX ORDER — PROPOFOL 10 MG/ML
INJECTION, EMULSION INTRAVENOUS AS NEEDED
Status: DISCONTINUED | OUTPATIENT
Start: 2017-09-26 | End: 2017-09-26 | Stop reason: SURG

## 2017-09-26 RX ORDER — BUPIVACAINE HYDROCHLORIDE 5 MG/ML
INJECTION, SOLUTION PERINEURAL AS NEEDED
Status: DISCONTINUED | OUTPATIENT
Start: 2017-09-26 | End: 2017-09-26 | Stop reason: HOSPADM

## 2017-09-26 RX ORDER — SODIUM CHLORIDE 9 MG/ML
INJECTION, SOLUTION INTRAVENOUS CONTINUOUS PRN
Status: DISCONTINUED | OUTPATIENT
Start: 2017-09-26 | End: 2017-09-26 | Stop reason: SURG

## 2017-09-26 RX ORDER — HYDROMORPHONE HYDROCHLORIDE 2 MG/ML
INJECTION, SOLUTION INTRAMUSCULAR; INTRAVENOUS; SUBCUTANEOUS AS NEEDED
Status: DISCONTINUED | OUTPATIENT
Start: 2017-09-26 | End: 2017-09-26 | Stop reason: SURG

## 2017-09-26 RX ORDER — FENTANYL CITRATE 50 UG/ML
INJECTION, SOLUTION INTRAMUSCULAR; INTRAVENOUS AS NEEDED
Status: DISCONTINUED | OUTPATIENT
Start: 2017-09-26 | End: 2017-09-26 | Stop reason: SURG

## 2017-09-26 RX ORDER — OXYCODONE HYDROCHLORIDE AND ACETAMINOPHEN 5; 325 MG/1; MG/1
1 TABLET ORAL EVERY 4 HOURS PRN
Status: DISCONTINUED | OUTPATIENT
Start: 2017-09-26 | End: 2017-10-03 | Stop reason: HOSPADM

## 2017-09-26 RX ORDER — HEPARIN SODIUM 10000 [USP'U]/100ML
3-30 INJECTION, SOLUTION INTRAVENOUS
Status: DISCONTINUED | OUTPATIENT
Start: 2017-09-26 | End: 2017-10-03 | Stop reason: HOSPADM

## 2017-09-26 RX ORDER — GLYCOPYRROLATE 0.2 MG/ML
INJECTION INTRAMUSCULAR; INTRAVENOUS AS NEEDED
Status: DISCONTINUED | OUTPATIENT
Start: 2017-09-26 | End: 2017-09-26 | Stop reason: SURG

## 2017-09-26 RX ORDER — ROCURONIUM BROMIDE 10 MG/ML
INJECTION, SOLUTION INTRAVENOUS AS NEEDED
Status: DISCONTINUED | OUTPATIENT
Start: 2017-09-26 | End: 2017-09-26 | Stop reason: SURG

## 2017-09-26 RX ORDER — OXYCODONE HYDROCHLORIDE AND ACETAMINOPHEN 5; 325 MG/1; MG/1
2 TABLET ORAL EVERY 4 HOURS PRN
Status: DISCONTINUED | OUTPATIENT
Start: 2017-09-26 | End: 2017-10-03 | Stop reason: HOSPADM

## 2017-09-26 RX ORDER — LIDOCAINE HYDROCHLORIDE 10 MG/ML
INJECTION, SOLUTION INFILTRATION; PERINEURAL AS NEEDED
Status: DISCONTINUED | OUTPATIENT
Start: 2017-09-26 | End: 2017-09-26 | Stop reason: SURG

## 2017-09-26 RX ADMIN — Medication 400 MG: at 08:13

## 2017-09-26 RX ADMIN — NEOSTIGMINE METHYLSULFATE 3 MG: 1 INJECTION, SOLUTION INTRAMUSCULAR; INTRAVENOUS; SUBCUTANEOUS at 18:37

## 2017-09-26 RX ADMIN — GLYCOPYRROLATE 0.4 MG: 0.2 INJECTION, SOLUTION INTRAMUSCULAR; INTRAVENOUS at 18:37

## 2017-09-26 RX ADMIN — HEPARIN SODIUM AND DEXTROSE 15 UNITS/KG/HR: 10000; 5 INJECTION INTRAVENOUS at 00:17

## 2017-09-26 RX ADMIN — FUROSEMIDE 40 MG: 40 TABLET ORAL at 08:13

## 2017-09-26 RX ADMIN — GABAPENTIN 100 MG: 100 CAPSULE ORAL at 22:15

## 2017-09-26 RX ADMIN — ROCURONIUM BROMIDE 50 MG: 10 INJECTION, SOLUTION INTRAVENOUS at 16:32

## 2017-09-26 RX ADMIN — FENTANYL CITRATE 50 MCG: 50 INJECTION, SOLUTION INTRAMUSCULAR; INTRAVENOUS at 17:20

## 2017-09-26 RX ADMIN — OXYBUTYNIN CHLORIDE 5 MG: 5 TABLET ORAL at 08:13

## 2017-09-26 RX ADMIN — GABAPENTIN 100 MG: 100 CAPSULE ORAL at 08:13

## 2017-09-26 RX ADMIN — FLUTICASONE PROPIONATE AND SALMETEROL 1 PUFF: 50; 500 POWDER RESPIRATORY (INHALATION) at 08:12

## 2017-09-26 RX ADMIN — FENTANYL CITRATE 50 MCG: 50 INJECTION, SOLUTION INTRAMUSCULAR; INTRAVENOUS at 17:22

## 2017-09-26 RX ADMIN — SODIUM CHLORIDE 75 ML/HR: 0.9 INJECTION, SOLUTION INTRAVENOUS at 13:39

## 2017-09-26 RX ADMIN — TIOTROPIUM BROMIDE 18 MCG: 18 CAPSULE ORAL; RESPIRATORY (INHALATION) at 08:13

## 2017-09-26 RX ADMIN — HYDROMORPHONE HYDROCHLORIDE 0.6 MG: 2 INJECTION, SOLUTION INTRAMUSCULAR; INTRAVENOUS; SUBCUTANEOUS at 17:33

## 2017-09-26 RX ADMIN — SODIUM CHLORIDE: 0.9 INJECTION, SOLUTION INTRAVENOUS at 16:48

## 2017-09-26 RX ADMIN — ONDANSETRON HYDROCHLORIDE 4 MG: 2 INJECTION, SOLUTION INTRAVENOUS at 16:44

## 2017-09-26 RX ADMIN — HEPARIN SODIUM 4320 UNITS: 1000 INJECTION, SOLUTION INTRAVENOUS; SUBCUTANEOUS at 10:26

## 2017-09-26 RX ADMIN — CEFAZOLIN SODIUM 2000 MG: 2 SOLUTION INTRAVENOUS at 14:17

## 2017-09-26 RX ADMIN — HYDROMORPHONE HYDROCHLORIDE 0.4 MG: 2 INJECTION, SOLUTION INTRAMUSCULAR; INTRAVENOUS; SUBCUTANEOUS at 18:05

## 2017-09-26 RX ADMIN — LIDOCAINE HYDROCHLORIDE 60 MG: 10 INJECTION, SOLUTION INFILTRATION; PERINEURAL at 16:32

## 2017-09-26 RX ADMIN — SODIUM CHLORIDE 75 ML/HR: 0.9 INJECTION, SOLUTION INTRAVENOUS at 00:17

## 2017-09-26 RX ADMIN — Medication: at 10:31

## 2017-09-26 RX ADMIN — FENTANYL CITRATE 100 MCG: 50 INJECTION, SOLUTION INTRAMUSCULAR; INTRAVENOUS at 16:32

## 2017-09-26 RX ADMIN — FERROUS SULFATE TAB 325 MG (65 MG ELEMENTAL FE) 325 MG: 325 (65 FE) TAB at 08:13

## 2017-09-26 RX ADMIN — PROPOFOL 150 MG: 10 INJECTION, EMULSION INTRAVENOUS at 16:32

## 2017-09-26 NOTE — PROGRESS NOTES
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  Active Problems:    Decubitus ulcer of heel, stage 3    Leukocytosis    Stage 3 chronic kidney disease    Chronic indwelling Rush catheter    Abnormal urinalysis    Chronic atrial fibrillation    Diabetes mellitus    Chronic diastolic heart failure    PAD (peripheral artery disease)    S/P AVR  Resolved Problems:    * No resolved hospital problems  *        Plan  1  Left heel ulcer with necrosis  Unsalvageable, going for BKA later this afternoon  2  Peripheral vascular disease status post angioplasty  3  Mechanical aortic valve replacement  Holding warfarin with bridging with heparin  A plantar restarting infusion postoperatively and restart Coumadin  4  Chronic atrial fibrillation  He  5  Chronic diastolic CHF  Continue furosemide  6  Type diabetes mellitus type 2 with hyperlipidemia  On reduced dose of glargine last night while NPO  Can resume prior dosing tonight  7  Chronic kidney disease stage 3  Remains stable  VTE Prophylaxis: Heparin Drip  Education and Discussions:  Discussed with patient's wife  Discharge Plan:    Time Spent for Care:  30 Mins  More than 50% of total time spent on counseling and coordination of care as described above    ______________________________________________________________________________    Subjective:   Patient seen and examined  No new complaints, no chest pain or shortness of breath  Objective:   Vitals: Blood pressure 127/57, pulse 65, temperature 98 7 °F (37 1 °C), temperature source Tympanic, resp  rate 18, height 5' 9 5" (1 765 m), weight 108 kg (238 lb 1 6 oz), SpO2 96 %      Physical Exam:   General appearance: alert, appears stated age and cooperative  Head: Normocephalic, without obvious abnormality, atraumatic  Lungs: clear to auscultation bilaterally  Heart: irregularly irregular rhythm  Abdomen: soft, obese nontender  Back: negative, range of motion normal  Extremities: prevelon boots bilaterally  Neurologic: Grossly normal    Additional Data:   Labs:    Results from last 7 days  Lab Units 09/26/17  0557 09/25/17  1323 09/22/17  0559 09/21/17  0544 09/20/17  0423 09/20/17  0419   WBC Thousand/uL 9 40 9 66  --   --   --  10 24*   HEMOGLOBIN g/dL 9 6* 9 7*  --   --   --  9 4*   HEMATOCRIT % 29 3* 29 3*  --   --   --  28 3*   MCV fL 86 85  --   --   --  85   PLATELETS Thousands/uL 310 351  --   --   --  293   INR   --   --  1 11  1 11 1 12 1 25*  --        Results from last 7 days  Lab Units 09/26/17  0557 09/22/17  0558 09/20/17  0419   SODIUM mmol/L 137 136 138   POTASSIUM mmol/L 4 6 3 9 3 8   CHLORIDE mmol/L 98* 97* 99*   CO2 mmol/L 32 32 32   ANION GAP mmol/L 7 7 7   BUN mg/dL 28* 21 17   CREATININE mg/dL 1 24 1 16 1 11   CALCIUM mg/dL 9 8 9 8 9 5   ALBUMIN g/dL 2 7*  --   --    BILIRUBIN TOTAL mg/dL 0 31  --   --    ALK PHOS U/L 92  --   --    ALT U/L 16  --   --    AST U/L 10  --   --    EGFR ml/min/1 73sq m 57 62 65   GLUCOSE RANDOM mg/dL 220* 154* 130                        Results from last 7 days  Lab Units 09/26/17  1104 09/26/17  0718 09/25/17  2044 09/25/17  1606 09/25/17  1132 09/25/17  0723 09/24/17  2102 09/24/17  1541 09/24/17  1053 09/24/17  0659 09/23/17  2053 09/23/17  1626   POC GLUCOSE mg/dl 228* 212* 191* 189* 265* 255* 276* 284* 260* 196* 252* 210*             * I Have Reviewed All Lab Data Listed Above      Cultures:           Imaging:  Imaging Reports Reviewed Today Include:       Scheduled Meds:  ammonium lactate  Topical BID   bacitracin 43817 units in sodium chloride 0 9% 1000 mL  Irrigation Once   cefazolin 2,000 mg Intravenous Once   ferrous sulfate 325 mg Oral BID   fluticasone-salmeterol 1 puff Inhalation BID   furosemide 40 mg Oral Daily   gabapentin 100 mg Oral TID   insulin glargine 40 Units Subcutaneous HS   insulin lispro 1-6 Units Subcutaneous HS   insulin lispro 12 Units Subcutaneous Before Dinner   [START ON 9/27/2017] insulin lispro 12 Units Subcutaneous Daily Before Lunch   [START ON 9/27/2017] insulin lispro 15 Units Subcutaneous Daily Before Breakfast   insulin lispro 2-12 Units Subcutaneous TID AC   magnesium oxide 400 mg Oral Daily   oxybutynin 5 mg Oral BID   pravastatin 40 mg Oral Daily With Dinner   sodium hypochlorite 1 application Irrigation Daily   tiotropium 18 mcg Inhalation Daily     Continuous Infusions:  sodium chloride 75 mL/hr Last Rate: 75 mL/hr (09/26/17 1339)     PRN Meds:    acetaminophen    albuterol    fentaNYL    heparin (porcine)    heparin (porcine)    HYDROmorphone    magnesium hydroxide    meperidine    ondansetron     Nimisha Barbone, DO

## 2017-09-26 NOTE — PROGRESS NOTES
Progress Note - Vascular Surgery     Assessment:  Postoperative left heel wound necrosis  Left heel osteomyelitis, s/p debridement, partial calcanectomy and wound VAC placement 9/8/2017  PAD with LLE tissue loss, s/p L SFA/pop PTA 9/12/2017  Right heel ulceration, stable  Type II DM  CKD III  Chronic diastolic heart failure, compensated  Chronic atrial fibrillation  CAD, s/p CABG w/LLE GSV harvest 2007  AS, s/p mechanical AVR 2007  Chronic indwelling urinary catheter    Plan:  --heparin bridge during perioperative period secondary to mechanical AVR  --L BKA today by Dr Danielito Metz  --NPO and IVF  --discussed with patient and wife at bedside  All questions answered    Subjective:  Patient without complaints  Denies left lower extremity rest pain  VSS    Vitals:  /57   Pulse 65   Temp 98 7 °F (37 1 °C) (Tympanic)   Resp 18   Ht 5' 9 5" (1 765 m)   Wt 108 kg (238 lb 1 6 oz)   SpO2 96%   BMI 34 66 kg/m²     I/Os:  I/O last 3 completed shifts: In: 1760 [P O :1760]  Out: 4200 [Urine:4200]  No intake/output data recorded      Lab Results and Cultures:   Lab Results   Component Value Date    WBC 9 40 09/26/2017    HGB 9 6 (L) 09/26/2017    HCT 29 3 (L) 09/26/2017    MCV 86 09/26/2017     09/26/2017     Lab Results   Component Value Date    GLUCOSE 220 (H) 09/26/2017    CALCIUM 9 8 09/26/2017     09/26/2017    K 4 6 09/26/2017    CO2 32 09/26/2017    CL 98 (L) 09/26/2017    BUN 28 (H) 09/26/2017    CREATININE 1 24 09/26/2017     Lab Results   Component Value Date    INR 1 11 09/22/2017    INR 1 11 09/22/2017    INR 1 12 09/21/2017    PROTIME 14 3 09/22/2017    PROTIME 14 3 09/22/2017    PROTIME 14 4 09/21/2017        Blood Culture: No results found for: BLOODCX,   Urinalysis:   Lab Results   Component Value Date    COLORU Yellow 09/05/2017    CLARITYU Clear 09/05/2017    SPECGRAV <=1 005 09/05/2017    PHUR 6 5 09/05/2017    LEUKOCYTESUR Small (A) 09/05/2017    NITRITE Positive (A) 09/05/2017 PROTEINUA Negative 09/05/2017    GLUCOSEU Negative 09/05/2017    KETONESU Negative 09/05/2017    BILIRUBINUR Negative 09/05/2017    BLOODU Trace-lysed (A) 09/05/2017   ,   Urine Culture:   Lab Results   Component Value Date    URINECX >100,000 cfu/ml Mixed Contaminants X3 09/02/2017   ,   Wound Culure:   Lab Results   Component Value Date    WOUNDCULT 3+ Growth of Staphylococcus aureus 09/02/2017    WOUNDCULT 3+ Growth of Enterococcus faecalis 09/02/2017    WOUNDCULT 3+ Growth of Mixed Skin Sabiha 09/02/2017       Medications:  Current Facility-Administered Medications   Medication Dose Route Frequency    acetaminophen (TYLENOL) tablet 650 mg  650 mg Oral Q6H PRN    albuterol inhalation solution 2 5 mg  2 5 mg Nebulization Once PRN    ammonium lactate (LAC-HYDRIN) 12 % lotion   Topical BID    ceFAZolin (ANCEF) IVPB (premix) 2,000 mg  2,000 mg Intravenous Once    fentaNYL (SUBLIMAZE) injection 50 mcg  50 mcg Intravenous Q5 Min PRN    ferrous sulfate tablet 325 mg  325 mg Oral BID    fluticasone-salmeterol (ADVAIR) 500-50 mcg/dose inhaler 1 puff  1 puff Inhalation BID    furosemide (LASIX) tablet 40 mg  40 mg Oral Daily    gabapentin (NEURONTIN) capsule 100 mg  100 mg Oral TID    heparin (porcine) 25,000 units in 250 mL infusion (premix)  3-30 Units/kg/hr (Order-Specific) Intravenous Titrated    heparin (porcine) injection 4,000 Units  4,000 Units Intravenous PRN    heparin (porcine) injection 8,000 Units  8,000 Units Intravenous PRN    HYDROmorphone (DILAUDID) 1 mg/mL injection 0 2 mg  0 2 mg Intravenous Q5 Min PRN    insulin glargine (LANTUS) subcutaneous injection 40 Units  40 Units Subcutaneous HS    insulin lispro (HumaLOG) 100 units/mL subcutaneous injection 1-6 Units  1-6 Units Subcutaneous HS    insulin lispro (HumaLOG) 100 units/mL subcutaneous injection 12 Units  12 Units Subcutaneous Before Dinner    [START ON 9/27/2017] insulin lispro (HumaLOG) 100 units/mL subcutaneous injection 12 Units 12 Units Subcutaneous Daily Before Lunch    [START ON 9/27/2017] insulin lispro (HumaLOG) 100 units/mL subcutaneous injection 15 Units  15 Units Subcutaneous Daily Before Breakfast    insulin lispro (HumaLOG) 100 units/mL subcutaneous injection 2-12 Units  2-12 Units Subcutaneous TID AC    magnesium hydroxide (MILK OF MAGNESIA) 400 mg/5 mL oral suspension 30 mL  30 mL Oral PRN    magnesium oxide (MAG-OX) tablet 400 mg  400 mg Oral Daily    meperidine (DEMEROL) 50 mg/mL injection 12 5 mg  12 5 mg Intravenous PRN    ondansetron (ZOFRAN) injection 4 mg  4 mg Intravenous Q6H PRN    oxybutynin (DITROPAN) tablet 5 mg  5 mg Oral BID    pravastatin (PRAVACHOL) tablet 40 mg  40 mg Oral Daily With Dinner    sodium chloride 0 9 % infusion  75 mL/hr Intravenous Continuous    sodium hypochlorite (DAKIN'S HALF-STRENGTH) 0 25 % topical solution 1 application  1 application Irrigation Daily    tiotropium (SPIRIVA) capsule for inhaler 18 mcg  18 mcg Inhalation Daily       Imaging:  No new vascular studies for review    Physical Exam:    General appearance: alert, appears stated age, cooperative and no distress  Neurologic: Grossly normal  Neck: no adenopathy, no carotid bruit, no JVD and supple, symmetrical, trachea midline  Lungs: clear to auscultation bilaterally  Chest wall: no tenderness, Midline sternotomy scar noted well healed  Sternum stable  Heart: regular rate and rhythm, S1: normal, S2: normal prosthetic S2, no S3 or S4, no rub and No murmur  Abdomen: soft, non-tender; bowel sounds normal; no masses,  no organomegaly and Nondistended  No abdominal bruits  Extremities: no edema, redness or tenderness in the calves or thighs and Dressing intact left foot  Malodorous  Bilateral lower extremities motor sensory intact  No gangrene of left toes  Wound/Incision:  Left foot dressing intact   As above    Pulse exam:  Radial: Right: 2+ Left[de-identified] 2+  Femoral: Right: 2+ Left: 2+  Popliteal: Right: non-palpable Left: 1+  DP: Right: doppler signal Left: Obscured by dressing    Sherida Canavan, PA-C  9/26/2017   [facility], [phone]

## 2017-09-26 NOTE — OCCUPATIONAL THERAPY NOTE
Occupational Therapy  Ot tx session cancelled  Pt having BKA today  Will continue to follow as available   KALLIE Love

## 2017-09-26 NOTE — PHYSICAL THERAPY NOTE
Physical Therapy Cancellation Note    Pt for scheduled L BKA today  Will follow post-op as appropriate  Nsg notified   Gala Townsend, PT

## 2017-09-27 PROBLEM — Z89.512 STATUS POST BELOW KNEE AMPUTATION OF LEFT LOWER EXTREMITY: Status: ACTIVE | Noted: 2017-09-27

## 2017-09-27 LAB
ANION GAP SERPL CALCULATED.3IONS-SCNC: 9 MMOL/L (ref 4–13)
APTT PPP: 37 SECONDS (ref 23–35)
APTT PPP: 62 SECONDS (ref 23–35)
APTT PPP: 71 SECONDS (ref 23–35)
BUN SERPL-MCNC: 27 MG/DL (ref 5–25)
CALCIUM SERPL-MCNC: 9 MG/DL (ref 8.3–10.1)
CHLORIDE SERPL-SCNC: 96 MMOL/L (ref 100–108)
CO2 SERPL-SCNC: 28 MMOL/L (ref 21–32)
CREAT SERPL-MCNC: 1.49 MG/DL (ref 0.6–1.3)
ERYTHROCYTE [DISTWIDTH] IN BLOOD BY AUTOMATED COUNT: 15.1 % (ref 11.6–15.1)
GFR SERPL CREATININE-BSD FRML MDRD: 46 ML/MIN/1.73SQ M
GLUCOSE SERPL-MCNC: 208 MG/DL (ref 65–140)
GLUCOSE SERPL-MCNC: 217 MG/DL (ref 65–140)
GLUCOSE SERPL-MCNC: 257 MG/DL (ref 65–140)
GLUCOSE SERPL-MCNC: 286 MG/DL (ref 65–140)
GLUCOSE SERPL-MCNC: 290 MG/DL (ref 65–140)
HCT VFR BLD AUTO: 26.7 % (ref 36.5–49.3)
HGB BLD-MCNC: 8.8 G/DL (ref 12–17)
INR PPP: 1.06 (ref 0.86–1.16)
MCH RBC QN AUTO: 28.2 PG (ref 26.8–34.3)
MCHC RBC AUTO-ENTMCNC: 33 G/DL (ref 31.4–37.4)
MCV RBC AUTO: 86 FL (ref 82–98)
PLATELET # BLD AUTO: 334 THOUSANDS/UL (ref 149–390)
PMV BLD AUTO: 8.4 FL (ref 8.9–12.7)
POTASSIUM SERPL-SCNC: 4.8 MMOL/L (ref 3.5–5.3)
PROTHROMBIN TIME: 13.8 SECONDS (ref 12.1–14.4)
RBC # BLD AUTO: 3.12 MILLION/UL (ref 3.88–5.62)
SODIUM SERPL-SCNC: 133 MMOL/L (ref 136–145)
WBC # BLD AUTO: 13.93 THOUSAND/UL (ref 4.31–10.16)

## 2017-09-27 PROCEDURE — 97530 THERAPEUTIC ACTIVITIES: CPT

## 2017-09-27 PROCEDURE — 85027 COMPLETE CBC AUTOMATED: CPT | Performed by: INTERNAL MEDICINE

## 2017-09-27 PROCEDURE — 97164 PT RE-EVAL EST PLAN CARE: CPT | Performed by: PHYSICAL THERAPIST

## 2017-09-27 PROCEDURE — 85610 PROTHROMBIN TIME: CPT | Performed by: INTERNAL MEDICINE

## 2017-09-27 PROCEDURE — 82948 REAGENT STRIP/BLOOD GLUCOSE: CPT

## 2017-09-27 PROCEDURE — G8979 MOBILITY GOAL STATUS: HCPCS | Performed by: PHYSICAL THERAPIST

## 2017-09-27 PROCEDURE — 80048 BASIC METABOLIC PNL TOTAL CA: CPT | Performed by: INTERNAL MEDICINE

## 2017-09-27 PROCEDURE — 85730 THROMBOPLASTIN TIME PARTIAL: CPT | Performed by: INTERNAL MEDICINE

## 2017-09-27 PROCEDURE — G8978 MOBILITY CURRENT STATUS: HCPCS | Performed by: PHYSICAL THERAPIST

## 2017-09-27 PROCEDURE — 94760 N-INVAS EAR/PLS OXIMETRY 1: CPT

## 2017-09-27 PROCEDURE — 97168 OT RE-EVAL EST PLAN CARE: CPT

## 2017-09-27 RX ORDER — WARFARIN SODIUM 7.5 MG/1
7.5 TABLET ORAL
Status: DISCONTINUED | OUTPATIENT
Start: 2017-09-27 | End: 2017-09-28

## 2017-09-27 RX ORDER — WARFARIN SODIUM 5 MG/1
5 TABLET ORAL
Status: DISCONTINUED | OUTPATIENT
Start: 2017-09-28 | End: 2017-09-27

## 2017-09-27 RX ORDER — SENNOSIDES 8.6 MG
1 TABLET ORAL
Status: DISCONTINUED | OUTPATIENT
Start: 2017-09-27 | End: 2017-10-03 | Stop reason: HOSPADM

## 2017-09-27 RX ORDER — SODIUM CHLORIDE 9 MG/ML
50 INJECTION, SOLUTION INTRAVENOUS CONTINUOUS
Status: DISCONTINUED | OUTPATIENT
Start: 2017-09-27 | End: 2017-09-27

## 2017-09-27 RX ORDER — PANTOPRAZOLE SODIUM 40 MG/1
40 TABLET, DELAYED RELEASE ORAL
Status: DISCONTINUED | OUTPATIENT
Start: 2017-09-27 | End: 2017-10-03 | Stop reason: HOSPADM

## 2017-09-27 RX ORDER — WARFARIN SODIUM 7.5 MG/1
7.5 TABLET ORAL
Status: DISCONTINUED | OUTPATIENT
Start: 2017-09-27 | End: 2017-09-27

## 2017-09-27 RX ORDER — DOCUSATE SODIUM 100 MG/1
100 CAPSULE, LIQUID FILLED ORAL 2 TIMES DAILY
Status: DISCONTINUED | OUTPATIENT
Start: 2017-09-27 | End: 2017-10-03 | Stop reason: HOSPADM

## 2017-09-27 RX ADMIN — OXYBUTYNIN CHLORIDE 5 MG: 5 TABLET ORAL at 17:43

## 2017-09-27 RX ADMIN — PANTOPRAZOLE SODIUM 40 MG: 40 TABLET, DELAYED RELEASE ORAL at 09:10

## 2017-09-27 RX ADMIN — INSULIN LISPRO 15 UNITS: 100 INJECTION, SOLUTION INTRAVENOUS; SUBCUTANEOUS at 09:10

## 2017-09-27 RX ADMIN — SODIUM CHLORIDE 75 ML/HR: 0.9 INJECTION, SOLUTION INTRAVENOUS at 12:11

## 2017-09-27 RX ADMIN — INSULIN LISPRO 2 UNITS: 100 INJECTION, SOLUTION INTRAVENOUS; SUBCUTANEOUS at 22:09

## 2017-09-27 RX ADMIN — ACETAMINOPHEN 650 MG: 325 TABLET, FILM COATED ORAL at 22:13

## 2017-09-27 RX ADMIN — FERROUS SULFATE TAB 325 MG (65 MG ELEMENTAL FE) 325 MG: 325 (65 FE) TAB at 17:42

## 2017-09-27 RX ADMIN — OXYCODONE HYDROCHLORIDE AND ACETAMINOPHEN 2 TABLET: 5; 325 TABLET ORAL at 12:52

## 2017-09-27 RX ADMIN — Medication 1 APPLICATION: at 08:15

## 2017-09-27 RX ADMIN — Medication 400 MG: at 09:05

## 2017-09-27 RX ADMIN — INSULIN LISPRO 6 UNITS: 100 INJECTION, SOLUTION INTRAVENOUS; SUBCUTANEOUS at 12:49

## 2017-09-27 RX ADMIN — INSULIN LISPRO 4 UNITS: 100 INJECTION, SOLUTION INTRAVENOUS; SUBCUTANEOUS at 17:46

## 2017-09-27 RX ADMIN — Medication 1 APPLICATION: at 17:53

## 2017-09-27 RX ADMIN — TIOTROPIUM BROMIDE 18 MCG: 18 CAPSULE ORAL; RESPIRATORY (INHALATION) at 09:06

## 2017-09-27 RX ADMIN — WARFARIN SODIUM 7.5 MG: 7.5 TABLET ORAL at 17:44

## 2017-09-27 RX ADMIN — PRAVASTATIN SODIUM 40 MG: 40 TABLET ORAL at 17:43

## 2017-09-27 RX ADMIN — HEPARIN SODIUM AND DEXTROSE 15 UNITS/KG/HR: 10000; 5 INJECTION INTRAVENOUS at 17:40

## 2017-09-27 RX ADMIN — GABAPENTIN 100 MG: 100 CAPSULE ORAL at 09:05

## 2017-09-27 RX ADMIN — OXYBUTYNIN CHLORIDE 5 MG: 5 TABLET ORAL at 09:07

## 2017-09-27 RX ADMIN — INSULIN LISPRO 6 UNITS: 100 INJECTION, SOLUTION INTRAVENOUS; SUBCUTANEOUS at 09:11

## 2017-09-27 RX ADMIN — INSULIN LISPRO 12 UNITS: 100 INJECTION, SOLUTION INTRAVENOUS; SUBCUTANEOUS at 12:49

## 2017-09-27 RX ADMIN — FERROUS SULFATE TAB 325 MG (65 MG ELEMENTAL FE) 325 MG: 325 (65 FE) TAB at 09:05

## 2017-09-27 RX ADMIN — HEPARIN SODIUM AND DEXTROSE 15 UNITS/KG/HR: 10000; 5 INJECTION INTRAVENOUS at 01:37

## 2017-09-27 RX ADMIN — SODIUM CHLORIDE 50 ML/HR: 0.9 INJECTION, SOLUTION INTRAVENOUS at 15:16

## 2017-09-27 RX ADMIN — GABAPENTIN 100 MG: 100 CAPSULE ORAL at 17:42

## 2017-09-27 RX ADMIN — INSULIN GLARGINE 40 UNITS: 100 INJECTION, SOLUTION SUBCUTANEOUS at 22:09

## 2017-09-27 RX ADMIN — FLUTICASONE PROPIONATE AND SALMETEROL 1 PUFF: 50; 500 POWDER RESPIRATORY (INHALATION) at 22:09

## 2017-09-27 RX ADMIN — INSULIN LISPRO 12 UNITS: 100 INJECTION, SOLUTION INTRAVENOUS; SUBCUTANEOUS at 17:45

## 2017-09-27 RX ADMIN — SENNOSIDES 8.6 MG: 8.6 TABLET, FILM COATED ORAL at 22:09

## 2017-09-27 RX ADMIN — GABAPENTIN 100 MG: 100 CAPSULE ORAL at 22:09

## 2017-09-27 RX ADMIN — FLUTICASONE PROPIONATE AND SALMETEROL 1 PUFF: 50; 500 POWDER RESPIRATORY (INHALATION) at 09:07

## 2017-09-27 RX ADMIN — DOCUSATE SODIUM 100 MG: 100 CAPSULE, LIQUID FILLED ORAL at 09:04

## 2017-09-27 RX ADMIN — DOCUSATE SODIUM 100 MG: 100 CAPSULE, LIQUID FILLED ORAL at 17:41

## 2017-09-27 RX ADMIN — OXYCODONE HYDROCHLORIDE AND ACETAMINOPHEN 2 TABLET: 5; 325 TABLET ORAL at 07:54

## 2017-09-27 RX ADMIN — FUROSEMIDE 40 MG: 40 TABLET ORAL at 09:05

## 2017-09-27 NOTE — CASE MANAGEMENT
Continued Stay Review    Date: 9/27/2017     POD #1 ESTRELLITA CHINCHILLA (EDI)  Vital Signs: /70   Pulse 90   Temp 97 6 °F (36 4 °C) (Tympanic)   Resp 18   Ht 5' 9 5" (1 765 m)   Wt 108 kg (238 lb 1 6 oz)   SpO2 98%   BMI 34 66 kg/m²     Medications:   Scheduled Meds:   ammonium lactate  Topical BID   docusate sodium 100 mg Oral BID   ferrous sulfate 325 mg Oral BID   fluticasone-salmeterol 1 puff Inhalation BID   furosemide 40 mg Oral Daily   gabapentin 100 mg Oral TID   insulin glargine 40 Units Subcutaneous HS   insulin lispro 1-6 Units Subcutaneous HS   insulin lispro 12 Units Subcutaneous Before Dinner   insulin lispro 12 Units Subcutaneous Daily Before Lunch   insulin lispro 15 Units Subcutaneous Daily Before Breakfast   insulin lispro 2-12 Units Subcutaneous TID AC   magnesium oxide 400 mg Oral Daily   oxybutynin 5 mg Oral BID   pantoprazole 40 mg Oral Early Morning   pravastatin 40 mg Oral Daily With Dinner   senna 1 tablet Oral HS   sodium hypochlorite 1 application Irrigation Daily   tiotropium 18 mcg Inhalation Daily     Continuous Infusions:   heparin (porcine) 3-30 Units/kg/hr (Order-Specific) Last Rate: 15 Units/kg/hr (09/27/17 0137)   sodium chloride 75 mL/hr Last Rate: 75 mL/hr (09/27/17 1211)     PRN Meds:   acetaminophen    heparin (porcine)    heparin (porcine)    HYDROmorphone    magnesium hydroxide    ondansetron    oxyCODONE-acetaminophen    oxyCODONE-acetaminophen    Abnormal Labs/Diagnostic Results:  Wbc's 13 93,  H/h 8 8 / 26 7,  Na 133,  Cl 96,  Bun 27,  Cr 1 49,  Glu 257,  ptt 71    Age/Sex: 78 y o  male     Assessment/Plan:   Per Vascular: Plan:  --will leave OR dressing and drain intact, remove in 2 days  --continue heparin drip for bridge to Coumadin secondary to mechanical AVR     --OK to restart coumadin from vascular surgery standpoint  --bowel regimen  --continue IVF presently and DC later today for hydration  --PT/OT  --consult case management to start process for acute rehab placement      [address] [place] [address] in the [employer] by Baudilio Lima for 2017  Network Utilization Review Department  Phone: [phone]; Fax [phone]  ATTENTION: The Network Utilization Review Department is now centralized for our 7 Facilities  Make a note that we have a new phone and fax numbers for our Department  Please call with any questions or concerns to [phone] and carefully follow the prompts so that you are directed to the right person  All voicemails are confidential  Fax any determinations, approvals, denials, and requests for initial or continue stay review clinical to [phone]  Due to HIGH CALL volume, it would be easier if you could please send faxed requests to expedite your requests and in part, help us provide discharge notifications faster

## 2017-09-27 NOTE — PROGRESS NOTES
Progress Note - Cardiology   Eriberto Shanks 78 y o  male MRN: [id number]  Unit/Bed#: E4 -01 Encounter: [id number]    Assessment:  78year old man with PAF, CAD with CABG and mechanical AVR  He had infection with PAD, now s/p L BKA yesterday  Recommendations:  Continue current cardiac medications  Heparin drip until INR is therapeutic given mechanical AVR  No evidence of rapid afib  Will sign off, please call with questions or changes  Follows with Dr Panda Raymundo at [facility]  Subjective/Objective     Subjective: Tolerated operating room yesterday without any significant problems  Denies chest pain or shortess of breath  Objective:    Vitals: /70   Pulse 90   Temp 97 6 °F (36 4 °C) (Tympanic)   Resp 18   Ht 5' 9 5" (1 765 m)   Wt 108 kg (238 lb 1 6 oz)   SpO2 98%   BMI 34 66 kg/m²   Vitals:    09/02/17 1347 09/04/17 1034   Weight: 108 kg (238 lb 8 6 oz) 108 kg (238 lb 1 6 oz)     Orthostatic Blood Pressures    Flowsheet Row Most Recent Value   Blood Pressure  135/70 filed at 09/27/2017 1970   Patient Position - Orthostatic VS  Lying filed at 09/27/2017 4234          Intake/Output Summary (Last 24 hours) at 09/27/17 1050  Last data filed at 09/27/17 7218   Gross per 24 hour   Intake           2972 5 ml   Output             2260 ml   Net            712 5 ml     Physical Exam:   General appearance: alert and in no acute distress  Head: Normocephalic, without obvious abnormality, atraumatic  Neck: no carotid bruit, no JVD and supple, symmetrical, trachea midline  Lungs: clear to auscultation bilaterally  Normal air entry  Normal effort  Heart: S1, S2 mechanical heart sound  Abdomen: soft, nontender  Extremities: wrapped  Skin: Skin color, texture, turgor normal  No rashes or lesions  Neurologic: answers questions  Poor recall      Medications:    Current Facility-Administered Medications:     acetaminophen (TYLENOL) tablet 650 mg, 650 mg, Oral, Q6H PRN, Ora Chapa PA-C, 650 mg at 09/23/17 1259    ammonium lactate (LAC-HYDRIN) 12 % lotion, , Topical, BID, Robyn Raman DPM    docusate sodium (COLACE) capsule 100 mg, 100 mg, Oral, BID, Alexa Gomez PA-C, 100 mg at 09/27/17 0904    ferrous sulfate tablet 325 mg, 325 mg, Oral, BID, Ora Chapa PA-C, 325 mg at 09/27/17 0905    fluticasone-salmeterol (ADVAIR) 500-50 mcg/dose inhaler 1 puff, 1 puff, Inhalation, BID, Ora Chapa PA-C, 1 puff at 09/27/17 0907    furosemide (LASIX) tablet 40 mg, 40 mg, Oral, Daily, Irvin Brewster DO, 40 mg at 09/27/17 0905    gabapentin (NEURONTIN) capsule 100 mg, 100 mg, Oral, TID, Ora Chapa PA-C, 100 mg at 09/27/17 0905    heparin (porcine) 25,000 units in 250 mL infusion (premix), 3-30 Units/kg/hr (Order-Specific), Intravenous, Titrated, Dillon Logan DO, Last Rate: 15 mL/hr at 09/27/17 0137, 15 Units/kg/hr at 09/27/17 0137    heparin (porcine) injection 4,000 Units, 4,000 Units, Intravenous, PRN, Cy Dynes, DO, 4,000 Units at 09/21/17 6113    heparin (porcine) injection 8,000 Units, 8,000 Units, Intravenous, PRN, Cy Dynes, DO, 4,320 Units at 09/26/17 1026    HYDROmorphone (DILAUDID) 1 mg/mL injection 1 mg, 1 mg, Intravenous, Q3H PRN, Scott Mcknight MD    insulin glargine (LANTUS) subcutaneous injection 40 Units, 40 Units, Subcutaneous, HS, Martell Crandall DPEARNESTINE    insulin lispro (HumaLOG) 100 units/mL subcutaneous injection 1-6 Units, 1-6 Units, Subcutaneous, HS, Kendall De La Torre MD, 2 Units at 09/25/17 8582    insulin lispro (HumaLOG) 100 units/mL subcutaneous injection 12 Units, 12 Units, Subcutaneous, Before Ismay Fudge, MD, 12 Units at 09/25/17 1724    insulin lispro (HumaLOG) 100 units/mL subcutaneous injection 12 Units, 12 Units, Subcutaneous, Daily Before Lunch, Crystal Fox DPM    insulin lispro (HumaLOG) 100 units/mL subcutaneous injection 15 Units, 15 Units, Subcutaneous, Daily Before BreakfastJulee Nelson DPM, 15 Units at 09/27/17 0910    insulin lispro (HumaLOG) 100 units/mL subcutaneous injection 2-12 Units, 2-12 Units, Subcutaneous, TID AC, 6 Units at 09/27/17 0911 **AND** Fingerstick Glucose (POCT), , , TID AC, Elvira Sadler MD    magnesium hydroxide (MILK OF MAGNESIA) 400 mg/5 mL oral suspension 30 mL, 30 mL, Oral, PRN, Ora Chapa PA-C    magnesium oxide (MAG-OX) tablet 400 mg, 400 mg, Oral, Daily, Ora Chapa PA-C, 400 mg at 09/27/17 0905    ondansetron (ZOFRAN) injection 4 mg, 4 mg, Intravenous, Q6H PRN, Ora Chapa PA-C, 4 mg at 09/26/17 1644    oxybutynin (DITROPAN) tablet 5 mg, 5 mg, Oral, BID, Ora Chapa PA-C, 5 mg at 09/27/17 0907    oxyCODONE-acetaminophen (PERCOCET) 5-325 mg per tablet 1 tablet, 1 tablet, Oral, Q4H PRN, Scott Mcknight MD    oxyCODONE-acetaminophen (PERCOCET) 5-325 mg per tablet 2 tablet, 2 tablet, Oral, Q4H PRN, Scott Mcknight MD, 2 tablet at 09/27/17 0754    pantoprazole (PROTONIX) EC tablet 40 mg, 40 mg, Oral, Early Morning, Alexa Gomez PA-C, 40 mg at 09/27/17 0910    pravastatin (PRAVACHOL) tablet 40 mg, 40 mg, Oral, Daily With Daniel Chapa PA-C, 40 mg at 09/25/17 1722    senna (SENOKOT) tablet 8 6 mg, 1 tablet, Oral, HS, Alexa Gomez PA-C    sodium chloride 0 9 % infusion, 75 mL/hr, Intravenous, Continuous, Alexa Gomez PA-C, Last Rate: 75 mL/hr at 09/26/17 1339, 75 mL/hr at 09/26/17 1339    sodium hypochlorite (DAKIN'S HALF-STRENGTH) 0 25 % topical solution 1 application, 1 application, Irrigation, Daily, Jean Carlos Rviera DPM, 1 application at 02/01/96 0901    tiotropium (SPIRIVA) capsule for inhaler 18 mcg, 18 mcg, Inhalation, Daily, Ora Chapa PA-C, 18 mcg at 09/27/17 1044    Lab Results:        Results from last 7 days  Lab Units 09/27/17  0537 09/26/17  0557 09/25/17  1323   WBC Thousand/uL 13 93* 9 40 9 66   HEMOGLOBIN g/dL 8 8* 9 6* 9 7*   HEMATOCRIT % 26 7* 29 3* 29 3*   PLATELETS Thousands/uL 334 310 351           Results from last 7 days  Lab Units 09/27/17  0537 09/26/17  0557 09/22/17  0558   SODIUM mmol/L 133* 137 136   POTASSIUM mmol/L 4 8 4 6 3 9   CHLORIDE mmol/L 96* 98* 97*   CO2 mmol/L 28 32 32   BUN mg/dL 27* 28* 21   CREATININE mg/dL 1 49* 1 24 1 16   CALCIUM mg/dL 9 0 9 8 9 8   TOTAL PROTEIN g/dL  --  6 8  --    BILIRUBIN TOTAL mg/dL  --  0 31  --    ALK PHOS U/L  --  92  --    ALT U/L  --  16  --    AST U/L  --  10  --    GLUCOSE RANDOM mg/dL 257* 220* 154*       Results from last 7 days  Lab Units 09/27/17  0749 09/27/17  0537 09/27/17  0028 09/26/17  0835  09/22/17  0559  09/21/17  0544   INR   --  1 06  --   --   --  1 11  1 11  --  1 12   PTT seconds 71*  --  37* 52*  < > 70*  < >  --    < > = values in this interval not displayed  Telemetry: Not on telemetry

## 2017-09-27 NOTE — PROGRESS NOTES
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  Left heel osteomyelitis, s/p debridement, partial calcanectomy and wound VAC placement 9/8/2017  PAD with LLE tissue loss, s/p L SFA/pop PTA 9/12/2017  Right heel ulceration, stable  Type II DM  CKD III  Chronic diastolic heart failure, compensated  Chronic atrial fibrillation  CAD, s/p CABG w/LLE GSV harvest 2007  AS, s/p mechanical AVR 2007  Chronic indwelling urinary catheter    Plan:  --will leave OR dressing and drain intact, remove in 2 days  --continue heparin drip for bridge to Coumadin secondary to mechanical AVR  --OK to restart coumadin from vascular surgery standpoint  --bowel regimen  --continue IVF presently and DC later today for hydration  --PT/OT  --consult case management to start process for acute rehab placement    Subjective:  POD #1 L BKA (EDI)  Patient awake, alert, without complaints  Complains of phantom pain left foot  No chest pain or shortness of breath  MGEAN output 65 ml since OR  Creat bump to 1 49  Drips:  IVF, heparin    Vitals:  /70   Pulse 90   Temp 97 6 °F (36 4 °C) (Tympanic)   Resp 18   Ht 5' 9 5" (1 765 m)   Wt 108 kg (238 lb 1 6 oz)   SpO2 98%   BMI 34 66 kg/m²     I/Os:  I/O last 3 completed shifts:   In: 3632 5 [P O :660; I V :2972 5]  Out: 2795 [Urine:3525; Drains:65; Blood:300]  I/O this shift:  In: -   Out: 70 [Urine:70]    Lab Results and Cultures:   Lab Results   Component Value Date    WBC 13 93 (H) 09/27/2017    HGB 8 8 (L) 09/27/2017    HCT 26 7 (L) 09/27/2017    MCV 86 09/27/2017     09/27/2017     Lab Results   Component Value Date    GLUCOSE 257 (H) 09/27/2017    CALCIUM 9 0 09/27/2017     (L) 09/27/2017    K 4 8 09/27/2017    CO2 28 09/27/2017    CL 96 (L) 09/27/2017    BUN 27 (H) 09/27/2017    CREATININE 1 49 (H) 09/27/2017     Lab Results   Component Value Date    INR 1 06 09/27/2017    INR 1 11 09/22/2017    INR 1 11 09/22/2017    PROTIME 13 8 09/27/2017    PROTIME 14 3 09/22/2017    PROTIME 14 3 09/22/2017        Blood Culture: No results found for: BLOODCX,   Urinalysis:   Lab Results   Component Value Date    COLORU Yellow 09/05/2017    CLARITYU Clear 09/05/2017    SPECGRAV <=1 005 09/05/2017    PHUR 6 5 09/05/2017    LEUKOCYTESUR Small (A) 09/05/2017    NITRITE Positive (A) 09/05/2017    PROTEINUA Negative 09/05/2017    GLUCOSEU Negative 09/05/2017    KETONESU Negative 09/05/2017    BILIRUBINUR Negative 09/05/2017    BLOODU Trace-lysed (A) 09/05/2017   ,   Urine Culture:   Lab Results   Component Value Date    URINECX >100,000 cfu/ml Mixed Contaminants X3 09/02/2017   ,   Wound Culure:   Lab Results   Component Value Date    WOUNDCULT 3+ Growth of Staphylococcus aureus 09/02/2017    WOUNDCULT 3+ Growth of Enterococcus faecalis 09/02/2017    WOUNDCULT 3+ Growth of Mixed Skin Sabiha 09/02/2017       Medications:  Current Facility-Administered Medications   Medication Dose Route Frequency    acetaminophen (TYLENOL) tablet 650 mg  650 mg Oral Q6H PRN    ammonium lactate (LAC-HYDRIN) 12 % lotion   Topical BID    ferrous sulfate tablet 325 mg  325 mg Oral BID    fluticasone-salmeterol (ADVAIR) 500-50 mcg/dose inhaler 1 puff  1 puff Inhalation BID    furosemide (LASIX) tablet 40 mg  40 mg Oral Daily    gabapentin (NEURONTIN) capsule 100 mg  100 mg Oral TID    heparin (porcine) 25,000 units in 250 mL infusion (premix)  3-30 Units/kg/hr (Order-Specific) Intravenous Titrated    heparin (porcine) injection 4,000 Units  4,000 Units Intravenous PRN    heparin (porcine) injection 8,000 Units  8,000 Units Intravenous PRN    HYDROmorphone (DILAUDID) 1 mg/mL injection 1 mg  1 mg Intravenous Q3H PRN    insulin glargine (LANTUS) subcutaneous injection 40 Units  40 Units Subcutaneous HS    insulin lispro (HumaLOG) 100 units/mL subcutaneous injection 1-6 Units  1-6 Units Subcutaneous HS    insulin lispro (HumaLOG) 100 units/mL subcutaneous injection 12 Units  12 Units Subcutaneous Before Dinner    insulin lispro (HumaLOG) 100 units/mL subcutaneous injection 12 Units  12 Units Subcutaneous Daily Before Lunch    insulin lispro (HumaLOG) 100 units/mL subcutaneous injection 15 Units  15 Units Subcutaneous Daily Before Breakfast    insulin lispro (HumaLOG) 100 units/mL subcutaneous injection 2-12 Units  2-12 Units Subcutaneous TID AC    magnesium hydroxide (MILK OF MAGNESIA) 400 mg/5 mL oral suspension 30 mL  30 mL Oral PRN    magnesium oxide (MAG-OX) tablet 400 mg  400 mg Oral Daily    ondansetron (ZOFRAN) injection 4 mg  4 mg Intravenous Q6H PRN    oxybutynin (DITROPAN) tablet 5 mg  5 mg Oral BID    oxyCODONE-acetaminophen (PERCOCET) 5-325 mg per tablet 1 tablet  1 tablet Oral Q4H PRN    oxyCODONE-acetaminophen (PERCOCET) 5-325 mg per tablet 2 tablet  2 tablet Oral Q4H PRN    pravastatin (PRAVACHOL) tablet 40 mg  40 mg Oral Daily With Dinner    sodium chloride 0 9 % infusion  75 mL/hr Intravenous Continuous    sodium hypochlorite (DAKIN'S HALF-STRENGTH) 0 25 % topical solution 1 application  1 application Irrigation Daily    tiotropium (SPIRIVA) capsule for inhaler 18 mcg  18 mcg Inhalation Daily       Imaging:  No new vascular studies for review    Physical Exam:    General appearance: alert, appears stated age, cooperative and no distress  Neurologic: Grossly normal  Neck: no adenopathy, no carotid bruit, no JVD and supple, symmetrical, trachea midline  Lungs: clear to auscultation bilaterally  Chest wall: no tenderness, Midline sternotomy scar well healed  Sternum stable  Heart: regular rate and rhythm, S1, S2 normal, no murmur, click, rub or gallop  Abdomen: Soft, nontender, nondistended without organomegaly or masses  Hypoactive bowel sounds in 4 quadrants  No abdominal bruits  Extremities: The L BKA stump dressing in place without strike through  MEGAN drain intact  Dressing intact right heel      Wound/Incision:  as above  L BKA stump dressing intact      Pulse exam:  Radial: Right: 2+ Left[de-identified] 2+  Femoral: Right: 2+ Left: 2+  Popliteal: Right: non-palpable Left: Obscured by dressing  DP: Right: non-palpable   PT: Right: non-palpable     Sherida Canavan, PA-C  9/27/2017   [facility], [phone]

## 2017-09-27 NOTE — PLAN OF CARE
Problem: PHYSICAL THERAPY ADULT  Goal: Performs mobility at highest level of function for planned discharge setting  See evaluation for individualized goals  Treatment/Interventions: Functional transfer training, LE strengthening/ROM,   Therapeutic exercise, Endurance training, Patient/family training, Equipment eval/education, Bed mobility, OT  Equipment Recommended:      See flowsheet documentation for full assessment, interventions and recommendations  Prognosis: Fair  Problem List: Decreased strength, Decreased range of motion, Decreased endurance, Impaired balance, Decreased mobility, Decreased cognition, Impaired judgement, Decreased safety awareness, Impaired hearing, Obesity, Decreased skin integrity, Orthopedic restrictions, Pain, Impaired sensation  Assessment: pt underwent L BKA, now re-evaluated post op  pt was max assist for bed level mobility PTA, now nwb lle , dependent for all mobility  pt was minimally interactive and appears depressed,  pt yelled out in pain with movement lle but would not repost pain level  pt needed total assist for supine to and from sitting, did sit with max assist of 1, using hads for support as well  balance is retropulsive  pt did not demonstrate sufficient righting reactions to prevent falling  pt reported being light headed and only tolerated 5 minutes of sitting  pt will need skilled PT and will need rehab  pt not ready to try standing untill sitting balance is adequate  Barriers to Discharge: Inaccessible home environment     Recommendation: Post acute IP rehab     PT - OK to Discharge: Yes (to rehab)    See flowsheet documentation for full assessment

## 2017-09-27 NOTE — OCCUPATIONAL THERAPY NOTE
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    Decubitus ulcer of heel, stage 3    Leukocytosis    Stage 3 chronic kidney disease    Chronic indwelling Rush catheter    Abnormal urinalysis    Chronic atrial fibrillation    Diabetes mellitus    Chronic diastolic heart failure    PAD (peripheral artery disease)    S/P AVR                09/27/17 1510   Restrictions/Precautions   LLE Weight Bearing Per Order NWB   Pain Assessment   Pain Rating: FLACC (Rest) - Face 0   Pain Rating: FLACC (Rest) - Legs 0   Pain Rating: FLACC (Rest) - Activity 0   Pain Rating: FLACC (Rest) - Cry 1   Pain Rating: FLACC (Rest) - Consolability 0   Score: FLACC (Rest) 1   ADL   Where Assessed Edge of bed   LB Dressing Assistance 1  Total Assistance   LB Dressing Deficit Don/doff R sock; Don/doff L sock   Bed Mobility   Rolling R 1  Dependent   Additional items Assist x 2   Rolling L 1  Dependent   Additional items Assist x 2   Supine to Sit 1  Dependent   Additional items Assist x 2   Sit to Supine 1  Dependent   Additional items Assist x 2   Transfers   Sit to Stand Unable to assess   Functional Mobility   [facility]   Cognition   Overall Cognitive Status Impaired   Arousal/Participation Arousable   Attention Difficulty attending to directions   Orientation Level Oriented to person   Memory Decreased recall of precautions   Following Commands Follows one step commands inconsistently   Activity Tolerance   Activity Tolerance Patient limited by fatigue;Patient limited by pain   Medical Staff Made Aware [email or web address]   Assessment   Assessment Pt seen for 35min tx session with focus on functional mobility, functional balance, LE ADLs, cognition, and education  Pt with recent(9/26) L BKA   Pt with limited verbalization with noted cognitive deficits(i e orientation, memory)  Pt requiring heavy assistance with all functional mobility tasks  Dependent with LE ADLs  Appears to demonstrate symptoms of depression(i e withdrawn, flat affect)  Pt able to tolerate EOB sitting, but limted static sitting balance(i e p+/p)  Would benefit from inpt rehab to improve his overall level of independence  Will continue  Plan   Treatment Interventions ADL retraining;Functional transfer training;UE strengthening/ROM; Endurance training;Cognitive reorientation;Patient/family training;Equipment evaluation/education; Compensatory technique education   Goal Expiration Date 10/06/17   Treatment Day 7   OT Frequency 3-5x/wk   Recommendation   Discharge Recommendation Short Term Rehab   Samara Dorsey, OT

## 2017-09-27 NOTE — PLAN OF CARE
Problem: OCCUPATIONAL THERAPY ADULT  Goal: Performs self-care activities at highest level of function for planned discharge setting  See evaluation for individualized goals  Treatment Interventions: ADL retraining, Functional transfer training, UE strengthening/ROM, Endurance training, Patient/family training, Equipment evaluation/education, Compensatory technique education, Energy conservation, Activityengagement          See flowsheet documentation for full assessment, interventions and recommendations  Limitation: Decreased ADL status, Decreased UE strength, Decreased Safe judgement during ADL, Decreased endurance, Decreased self-care trans, Decreased high-level ADLs (NWB L LE w/ wound vac)  Prognosis: Fair  Assessment: Pt seen for 35min tx session with focus on functional mobility, functional balance, LE ADLs, cognition, and education  Pt with recent(9/26) L BKA  Pt with limited verbalization with noted cognitive deficits(i e orientation, memory)  Pt requiring heavy assistance with all functional mobility tasks  Dependent with LE ADLs  Appears to demonstrate symptoms of depression(i e withdrawn, flat affect)  Pt able to tolerate EOB sitting, but limted static sitting balance(i e p+/p)  Would benefit from inpt rehab to improve his overall level of independence  Will continue        Discharge Recommendation: Short Term Rehab

## 2017-09-27 NOTE — PROGRESS NOTES
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  Active Problems:    Decubitus ulcer of heel, stage 3    Leukocytosis    Stage 3 chronic kidney disease    Chronic indwelling Rush catheter    Abnormal urinalysis    Chronic atrial fibrillation    Diabetes mellitus    Chronic diastolic heart failure    PAD (peripheral artery disease)    S/P AVR      Plan:    · Chronic ulcer of left heel w/necrosis  · Pt s/p BKA POD #1 after previous partial calcanectomy, wound vac placement and debridement due to underlying heel osteomyelitis that was deemed not salvagable  · Pt doing well w/pain, continue mgmt per vascular  · F/u PT/OT eval as pt will need rehab    PVD   Pt s/p angioplasty this admission    Mechaniacal AVR   Pt on heparin bridge and is off coumadin due to #1, appreciate cards note, will restart coumadin, will need heparin until pt therapeutic    Chronic A fib   Pt rate controlled off chronotropics   Restart coumadin   F/u PT INR    Chronic diastolic CHF   Pt appears somewhat dehydrated, no evidence of fluid overload at this time   Hold lasix tomorrow given creatinine rising    DM II w/HLD   Poorly controlled DM   Pt did not receive 40 IU lantus last night   Continue to monitor on current SSI regimen     CKD and hyponatremia   Suspect 2* dehydration   BL appears to be 1 1-1 25 now 1 49 w/mild hyponatremia of 133   Will hold lasix tomorrow, will give 12 hours of  NS IVF at 50cc/hr   Repeat BMP in Am    Acute blood loss anemia   hgb 8 8, pt asymptomatic   Continue to monitor          VTE Pharmacologic Prophylaxis:   Pharmacologic: Heparin Drip  Mechanical VTE Prophylaxis in Place: Yes    Patient Centered Rounds: I have performed bedside rounds with nursing staff today      Discussions with Specialists or Other Care Team Provider:     Education and Discussions with Family / Patient: pt's wife at bedside    Time Spent for Care: 20 minutes  More than 50% of total time spent on counseling and coordination of care as described above  Current Length of Stay: 25 day(s)    Current Patient Status: Inpatient   Certification Statement: The patient will continue to require additional inpatient hospital stay due to left heel ulcer    Discharge Plan / Estimated Discharge Date:     Code Status: Level 1 - Full Code      Subjective:    patient reports he is doing well with his pain in his left leg  He reports no nausea although his appetite is somewhat blunted  He has not been passing gas  He denies any chest pain or shortness of breath  His wife is at bedside  All questions answered    Objective:     Vitals:   Temp (24hrs), Av 4 °F (36 9 °C), Min:97 6 °F (36 4 °C), Max:98 9 °F (37 2 °C)    HR:  [] 90  Resp:  [16-22] 18  BP: (105-161)/(46-70) 135/70  SpO2:  [90 %-100 %] 98 %  Body mass index is 34 66 kg/m²  Input and Output Summary (last 24 hours): Intake/Output Summary (Last 24 hours) at 17 1432  Last data filed at 17 1300   Gross per 24 hour   Intake             2390 ml   Output              860 ml   Net             1530 ml       Physical Exam:     Physical Exam   Constitutional: He appears well-developed  No distress  HENT:   Head: Normocephalic and atraumatic  Right Ear: External ear normal    Left Ear: External ear normal    Mouth/Throat: No oropharyngeal exudate  Eyes: Conjunctivae are normal  Right eye exhibits no discharge  Left eye exhibits no discharge  No scleral icterus  Cardiovascular: Normal rate, regular rhythm, normal heart sounds and intact distal pulses  Exam reveals no gallop and no friction rub  No murmur heard  Pulmonary/Chest: Effort normal and breath sounds normal  No respiratory distress  He has no wheezes  He has no rales     Abdominal: Soft  He exhibits no distension  There is no tenderness  There is no rebound and no guarding  Musculoskeletal: He exhibits no edema  Lymphadenopathy:     He has no cervical adenopathy  Neurological: He is alert  Skin: Skin is warm and dry  He is not diaphoretic  No erythema  Left BKA noted,  Dressing intact   right lower extremity with dressing on dorsum of the midfoot   Psychiatric: He has a normal mood and affect  Vitals reviewed  (  Be Sure to Include Physical Exam: Delete this entire line when you have entered your exam)    Additional Data:     Labs:      Results from last 7 days  Lab Units 09/27/17  0537   WBC Thousand/uL 13 93*   HEMOGLOBIN g/dL 8 8*   HEMATOCRIT % 26 7*   PLATELETS Thousands/uL 334       Results from last 7 days  Lab Units 09/27/17  0537 09/26/17  0557   SODIUM mmol/L 133* 137   POTASSIUM mmol/L 4 8 4 6   CHLORIDE mmol/L 96* 98*   CO2 mmol/L 28 32   BUN mg/dL 27* 28*   CREATININE mg/dL 1 49* 1 24   CALCIUM mg/dL 9 0 9 8   TOTAL PROTEIN g/dL  --  6 8   BILIRUBIN TOTAL mg/dL  --  0 31   ALK PHOS U/L  --  92   ALT U/L  --  16   AST U/L  --  10   GLUCOSE RANDOM mg/dL 257* 220*       Results from last 7 days  Lab Units 09/27/17  0537   INR  1 06       * I Have Reviewed All Lab Data Listed Above  * Additional Pertinent Lab Tests Reviewed:  [facility] Admission Reviewed    Imaging:    Imaging Reports Reviewed Today Include:   Imaging Personally Reviewed by Myself Includes:      Recent Cultures (last 7 days):       Results from last 7 days  Lab Units 09/26/17  1758   GRAM STAIN RESULT  No Polys or Bacteria seen       Last 24 Hours Medication List:     ammonium lactate  Topical BID   docusate sodium 100 mg Oral BID   ferrous sulfate 325 mg Oral BID   fluticasone-salmeterol 1 puff Inhalation BID   furosemide 40 mg Oral Daily   gabapentin 100 mg Oral TID   insulin glargine 40 Units Subcutaneous HS   insulin lispro 1-6 Units Subcutaneous HS   insulin lispro 12 Units Subcutaneous Before Dinner   insulin lispro 12 Units Subcutaneous Daily Before Lunch   insulin lispro 15 Units Subcutaneous Daily Before Breakfast   insulin lispro 2-12 Units Subcutaneous TID AC   magnesium oxide 400 mg Oral Daily   oxybutynin 5 mg Oral BID   pantoprazole 40 mg Oral Early Morning   pravastatin 40 mg Oral Daily With Dinner   senna 1 tablet Oral HS   sodium hypochlorite 1 application Irrigation Daily   tiotropium 18 mcg Inhalation Daily        Today, Patient Was Seen By: Angeles Edmonds PA-C    ** Please Note: This note has been constructed using a voice recognition system   **

## 2017-09-27 NOTE — PHYSICAL THERAPY NOTE
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    Decubitus ulcer of heel, stage 3    Leukocytosis    Stage 3 chronic kidney disease    Chronic indwelling Rush catheter    Abnormal urinalysis    Chronic atrial fibrillation    Diabetes mellitus    Chronic diastolic heart failure    PAD (peripheral artery disease)    S/P AVR       Past Medical History:   Diagnosis Date    Anemia     Aortic stenosis     Atrial fibrillation     Chronic kidney disease     stage 3    COPD (chronic obstructive pulmonary disease)     Diabetes mellitus     Hyperlipidemia     Hypertension     Sleep apnea        Past Surgical History:   Procedure Laterality Date    LEG AMPUTATION THROUGH LOWER TIBIA AND FIBULA Left 9/26/2017    Procedure: AMPUTATION BELOW KNEE (BKA); Surgeon: Lexis Ott MD;  Location: [facility];  Service: Vascular    WOUND DEBRIDEMENT Left 9/8/2017    Procedure: DEBRIDEMENT WOUND [facility] OUT), I&D, PARTIAL CALCANECTOMY, APPLICATION OF WOUND VAC;  Surgeon: Zoe Amador DPM;  Location: [facility];  Service: Podiatry      09/27/17 7032   Note Type   Note type Re-eval   Pain Assessment   Pain Rating: FLACC (Rest) - Face 1   Pain Rating: FLACC (Rest) - Legs 0   Pain Rating: FLACC (Rest) - Activity 0   Pain Rating: FLACC (Rest) - Cry 1   Pain Rating: FLACC (Rest) - Consolability 0   Score: FLACC (Rest) 2   Home Living   Type of Home House   Home Layout Two level   Home Equipment Walker;Cane   Prior Function   Level of [place] Needs assistance with ADLs and functional mobility; Needs assistance with IADLs   Lives With Spouse   Receives Help From Family   ADL Assistance Needs assistance   IADLs Needs assistance   Falls in the last 6 months (3)   Comments see initial eval  in PT pt has been max assist of 2 for bed mobility, standing wtih rw  and unable to transfer OOB via standing   was nwb rlle prior, now continues to be nwb lle   Restrictions/Precautions   Weight Bearing Precautions Per Order Yes   RLE Weight Bearing Per Order PWB   LLE Weight Bearing Per Order NWB   Braces or Orthoses LE Immobilizer  (L knee)   Other Precautions Cognitive;Multiple lines; Fall Risk;Pain;Hard of hearing   General   Additional Pertinent History 9/27/17 - pt underwent L BKA on 9/26/17   Family/Caregiver Present No   Cognition   Overall Cognitive Status Impaired   Orientation Level Unable to assess  (refused)   RLE Assessment   RLE Assessment X  (strength 3+/5, foot and ankle deformities)   LLE Assessment   LLE Assessment X  (severe pain with movement  knee immob in place, str 1/5)   Coordination   Movements are Fluid and Coordinated 1   Light Touch   RLE Light Touch Impaired   Bed Mobility   Rolling R 1  Dependent   Additional items Assist x 2; Increased time required;Verbal cues;LE management   Rolling L 1  Dependent   Additional items Assist x 2; Increased time required;Verbal cues;LE management   Supine to Sit 1  Dependent   Additional items Assist x 2; Increased time required;Verbal cues;LE management   Sit to Supine 1  Dependent   Additional items Assist x 2; Increased time required;Verbal cues;LE management   Transfers   Sit to Stand Unable to assess   Balance   Static Sitting Poor   Dynamic Sitting Poor -   Endurance Deficit   Endurance Deficit Yes   Endurance Deficit Description fatigue, ? depression, pt marginally interactive   Activity Tolerance   Activity Tolerance Treatment limited secondary to medical complications (Comment)   Medical Staff Made Aware OT   Nurse Made Aware yes   Assessment   Prognosis Fair   Problem List Decreased strength;Decreased range of motion;Decreased endurance; Impaired balance;Decreased mobility; Decreased cognition; Impaired judgement;Decreased safety awareness; Impaired hearing;Obesity; Decreased skin integrity;Orthopedic restrictions;Pain; Impaired sensation   Assessment pt underwent L BKA, now re-evaluated post op  pt was max assist for bed level mobility PTA, now nwb lle , dependent for all mobility  pt was minimally interactive and appears depressed,  pt yelled out in pain with movement lle but would not repost pain level  pt needed total assist for supine to and from sitting, did sit with max assist of 1, using hads for support as well  balance is retropulsive  pt did not demonstrate sufficient righting reactions to prevent falling  pt reported being light headed and only tolerated 5 minutes of sitting  pt will need skilled PT and will need rehab  pt not ready to try standing untill sitting balance is adequate  Barriers to Discharge Inaccessible home environment   Goals   Patient Goals none offered   STG Expiration Date 10/11/17   Short Term Goal #1 bed mobility with min assist  sitting edge of bed for 15 minutes with bimanual support  improve activity tolerance to 45 minutes  improve strength and balance by 1 grade, achieve 0/90* l knee rom, demonstrate good safety awareness  prepare for standing wtih pregait and bed mobility activities, lateral scoot on sliding board along edge of bed with mod assist     Treatment Day 1   Plan   Treatment/Interventions Functional transfer training;LE strengthening/ROM; Therapeutic exercise; Bed mobility; Patient/family training;Equipment eval/education;Spoke to nursing;OT   PT Frequency Weekend;5x/wk  (6x/week)   Recommendation   Recommendation Post acute IP rehab   PT - OK to Discharge Yes  (to rehab)   Modified [place] Scale   Modified Danay Scale 5   Barthel Index   Feeding 5   Bathing 0   Grooming Score 0   Dressing Score 0   Bladder Score 0   Bowels Score 5   Toilet Use Score 0   Transfers (Bed/Chair) Score 0   Mobility (Level Surface) Score 0   Stairs Score 0   Barthel Index Score 10           Loan Kowalski, PT

## 2017-09-28 LAB
ANION GAP SERPL CALCULATED.3IONS-SCNC: 7 MMOL/L (ref 4–13)
APTT PPP: 51 SECONDS (ref 23–35)
APTT PPP: 59 SECONDS (ref 23–35)
APTT PPP: >210 SECONDS (ref 23–35)
BASOPHILS # BLD MANUAL: 0 THOUSAND/UL (ref 0–0.1)
BASOPHILS NFR MAR MANUAL: 0 % (ref 0–1)
BUN SERPL-MCNC: 26 MG/DL (ref 5–25)
CALCIUM SERPL-MCNC: 8.6 MG/DL (ref 8.3–10.1)
CHLORIDE SERPL-SCNC: 98 MMOL/L (ref 100–108)
CO2 SERPL-SCNC: 28 MMOL/L (ref 21–32)
CREAT SERPL-MCNC: 1.29 MG/DL (ref 0.6–1.3)
EOSINOPHIL # BLD MANUAL: 0.4 THOUSAND/UL (ref 0–0.4)
EOSINOPHIL NFR BLD MANUAL: 4 % (ref 0–6)
ERYTHROCYTE [DISTWIDTH] IN BLOOD BY AUTOMATED COUNT: 15.2 % (ref 11.6–15.1)
GFR SERPL CREATININE-BSD FRML MDRD: 54 ML/MIN/1.73SQ M
GLUCOSE SERPL-MCNC: 142 MG/DL (ref 65–140)
GLUCOSE SERPL-MCNC: 191 MG/DL (ref 65–140)
GLUCOSE SERPL-MCNC: 195 MG/DL (ref 65–140)
GLUCOSE SERPL-MCNC: 197 MG/DL (ref 65–140)
GLUCOSE SERPL-MCNC: 275 MG/DL (ref 65–140)
HCT VFR BLD AUTO: 22.8 % (ref 36.5–49.3)
HGB BLD-MCNC: 7.6 G/DL (ref 12–17)
INR PPP: 1.12 (ref 0.86–1.16)
LYMPHOCYTES # BLD AUTO: 2.2 THOUSAND/UL (ref 0.6–4.47)
LYMPHOCYTES # BLD AUTO: 22 % (ref 14–44)
MCH RBC QN AUTO: 28.4 PG (ref 26.8–34.3)
MCHC RBC AUTO-ENTMCNC: 33.3 G/DL (ref 31.4–37.4)
MCV RBC AUTO: 85 FL (ref 82–98)
MONOCYTES # BLD AUTO: 1 THOUSAND/UL (ref 0–1.22)
MONOCYTES NFR BLD: 10 % (ref 4–12)
MYELOCYTES NFR BLD MANUAL: 1 % (ref 0–1)
NEUTROPHILS # BLD MANUAL: 6.31 THOUSAND/UL (ref 1.85–7.62)
NEUTS BAND NFR BLD MANUAL: 6 % (ref 0–8)
NEUTS SEG NFR BLD AUTO: 57 % (ref 43–75)
NRBC BLD AUTO-RTO: 0 /100 WBCS
PLATELET BLD QL SMEAR: ABNORMAL
PMV BLD AUTO: 9.1 FL (ref 8.9–12.7)
POTASSIUM SERPL-SCNC: 4.2 MMOL/L (ref 3.5–5.3)
PROTHROMBIN TIME: 14.4 SECONDS (ref 12.1–14.4)
RBC # BLD AUTO: 2.68 MILLION/UL (ref 3.88–5.62)
RBC MORPH BLD: NORMAL
SODIUM SERPL-SCNC: 133 MMOL/L (ref 136–145)
TOTAL CELLS COUNTED SPEC: 100
WBC # BLD AUTO: 10.01 THOUSAND/UL (ref 4.31–10.16)

## 2017-09-28 PROCEDURE — 85027 COMPLETE CBC AUTOMATED: CPT | Performed by: PHYSICIAN ASSISTANT

## 2017-09-28 PROCEDURE — 80048 BASIC METABOLIC PNL TOTAL CA: CPT | Performed by: PHYSICIAN ASSISTANT

## 2017-09-28 PROCEDURE — 30233N1 TRANSFUSION OF NONAUTOLOGOUS RED BLOOD CELLS INTO PERIPHERAL VEIN, PERCUTANEOUS APPROACH: ICD-10-PCS | Performed by: INTERNAL MEDICINE

## 2017-09-28 PROCEDURE — 85610 PROTHROMBIN TIME: CPT | Performed by: INTERNAL MEDICINE

## 2017-09-28 PROCEDURE — 94760 N-INVAS EAR/PLS OXIMETRY 1: CPT

## 2017-09-28 PROCEDURE — 85007 BL SMEAR W/DIFF WBC COUNT: CPT | Performed by: PHYSICIAN ASSISTANT

## 2017-09-28 PROCEDURE — 85730 THROMBOPLASTIN TIME PARTIAL: CPT | Performed by: INTERNAL MEDICINE

## 2017-09-28 PROCEDURE — 82948 REAGENT STRIP/BLOOD GLUCOSE: CPT

## 2017-09-28 RX ORDER — WARFARIN SODIUM 5 MG/1
10 TABLET ORAL
Status: DISCONTINUED | OUTPATIENT
Start: 2017-09-28 | End: 2017-10-01

## 2017-09-28 RX ORDER — INSULIN GLARGINE 100 [IU]/ML
45 INJECTION, SOLUTION SUBCUTANEOUS
Status: DISCONTINUED | OUTPATIENT
Start: 2017-09-28 | End: 2017-10-03 | Stop reason: HOSPADM

## 2017-09-28 RX ORDER — FUROSEMIDE 40 MG/1
40 TABLET ORAL DAILY
Status: DISCONTINUED | OUTPATIENT
Start: 2017-09-28 | End: 2017-10-03 | Stop reason: HOSPADM

## 2017-09-28 RX ADMIN — INSULIN LISPRO 12 UNITS: 100 INJECTION, SOLUTION INTRAVENOUS; SUBCUTANEOUS at 12:36

## 2017-09-28 RX ADMIN — Medication: at 08:32

## 2017-09-28 RX ADMIN — OXYCODONE HYDROCHLORIDE AND ACETAMINOPHEN 2 TABLET: 5; 325 TABLET ORAL at 14:14

## 2017-09-28 RX ADMIN — TIOTROPIUM BROMIDE 18 MCG: 18 CAPSULE ORAL; RESPIRATORY (INHALATION) at 08:35

## 2017-09-28 RX ADMIN — INSULIN LISPRO 2 UNITS: 100 INJECTION, SOLUTION INTRAVENOUS; SUBCUTANEOUS at 17:51

## 2017-09-28 RX ADMIN — FLUTICASONE PROPIONATE AND SALMETEROL 1 PUFF: 50; 500 POWDER RESPIRATORY (INHALATION) at 19:40

## 2017-09-28 RX ADMIN — GABAPENTIN 100 MG: 100 CAPSULE ORAL at 08:31

## 2017-09-28 RX ADMIN — FLUTICASONE PROPIONATE AND SALMETEROL 1 PUFF: 50; 500 POWDER RESPIRATORY (INHALATION) at 08:31

## 2017-09-28 RX ADMIN — PRAVASTATIN SODIUM 40 MG: 40 TABLET ORAL at 17:50

## 2017-09-28 RX ADMIN — DOCUSATE SODIUM 100 MG: 100 CAPSULE, LIQUID FILLED ORAL at 17:50

## 2017-09-28 RX ADMIN — INSULIN LISPRO 12 UNITS: 100 INJECTION, SOLUTION INTRAVENOUS; SUBCUTANEOUS at 17:51

## 2017-09-28 RX ADMIN — GABAPENTIN 100 MG: 100 CAPSULE ORAL at 21:51

## 2017-09-28 RX ADMIN — OXYCODONE HYDROCHLORIDE AND ACETAMINOPHEN 2 TABLET: 5; 325 TABLET ORAL at 01:53

## 2017-09-28 RX ADMIN — SENNOSIDES 8.6 MG: 8.6 TABLET, FILM COATED ORAL at 21:51

## 2017-09-28 RX ADMIN — HEPARIN SODIUM AND DEXTROSE 19 UNITS/KG/HR: 10000; 5 INJECTION INTRAVENOUS at 14:11

## 2017-09-28 RX ADMIN — INSULIN LISPRO 15 UNITS: 100 INJECTION, SOLUTION INTRAVENOUS; SUBCUTANEOUS at 08:32

## 2017-09-28 RX ADMIN — INSULIN LISPRO 2 UNITS: 100 INJECTION, SOLUTION INTRAVENOUS; SUBCUTANEOUS at 08:33

## 2017-09-28 RX ADMIN — Medication: at 17:54

## 2017-09-28 RX ADMIN — HEPARIN SODIUM AND DEXTROSE 17 UNITS/KG/HR: 10000; 5 INJECTION INTRAVENOUS at 09:05

## 2017-09-28 RX ADMIN — INSULIN GLARGINE 45 UNITS: 100 INJECTION, SOLUTION SUBCUTANEOUS at 21:51

## 2017-09-28 RX ADMIN — INSULIN LISPRO 6 UNITS: 100 INJECTION, SOLUTION INTRAVENOUS; SUBCUTANEOUS at 12:37

## 2017-09-28 RX ADMIN — FUROSEMIDE 40 MG: 40 TABLET ORAL at 12:40

## 2017-09-28 RX ADMIN — HEPARIN SODIUM 4000 UNITS: 1000 INJECTION, SOLUTION INTRAVENOUS; SUBCUTANEOUS at 14:09

## 2017-09-28 RX ADMIN — FERROUS SULFATE TAB 325 MG (65 MG ELEMENTAL FE) 325 MG: 325 (65 FE) TAB at 08:31

## 2017-09-28 RX ADMIN — FERROUS SULFATE TAB 325 MG (65 MG ELEMENTAL FE) 325 MG: 325 (65 FE) TAB at 17:50

## 2017-09-28 RX ADMIN — WARFARIN SODIUM 10 MG: 5 TABLET ORAL at 17:50

## 2017-09-28 RX ADMIN — OXYBUTYNIN CHLORIDE 5 MG: 5 TABLET ORAL at 17:51

## 2017-09-28 RX ADMIN — Medication 400 MG: at 08:31

## 2017-09-28 RX ADMIN — DOCUSATE SODIUM 100 MG: 100 CAPSULE, LIQUID FILLED ORAL at 08:31

## 2017-09-28 RX ADMIN — HEPARIN SODIUM 4000 UNITS: 1000 INJECTION, SOLUTION INTRAVENOUS; SUBCUTANEOUS at 06:41

## 2017-09-28 RX ADMIN — OXYBUTYNIN CHLORIDE 5 MG: 5 TABLET ORAL at 08:34

## 2017-09-28 RX ADMIN — HYOSCYAMINE SULFATE 1 APPLICATION: 16 SOLUTION at 13:14

## 2017-09-28 RX ADMIN — GABAPENTIN 100 MG: 100 CAPSULE ORAL at 17:50

## 2017-09-28 RX ADMIN — OXYCODONE HYDROCHLORIDE AND ACETAMINOPHEN 2 TABLET: 5; 325 TABLET ORAL at 19:41

## 2017-09-28 RX ADMIN — PANTOPRAZOLE SODIUM 40 MG: 40 TABLET, DELAYED RELEASE ORAL at 05:48

## 2017-09-28 NOTE — PROGRESS NOTES
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    Decubitus ulcer of heel, stage 3    Stage 3 chronic kidney disease    Chronic indwelling Beckett catheter    Abnormal urinalysis    Chronic atrial fibrillation    Diabetes mellitus    Chronic diastolic heart failure    PAD (peripheral artery disease)    S/P AVR      Plan:    1 s/p Left BKA 2/2 left heel decubitus ulcer (DOS: 9/26) after previous partial calcanectomy, wound vac placement and I&D that was deemed as nonsalvageable per podiatry/vascular   -pain is under control, management per vascular   -PT/OT on board   -CM on board for acute rehab placement    2  H/o mechanical heart valve AVR with h/o A fib:    - pt started on coumadin yesterday, will c/w heparin bridging with goal INR 2-3   - INR at 1 12    3  PVD: s/p SFA angioplasty 9/14    4  Chronic diastolic heart failure   - continue to hold fluids and monitor labs   - creatinine trending down     5  Chronic indwelling beckett catheter: c/w oxybutynin    6  Diabetes Mellitus, type 2: currently humalog 15 units for breakfast coverage, 12 units for lunch/dinner coverage, and lantus 40 units QHS                         7  Diabetic neuropathy: c/w gabapentin    8  CKD stage 3: baseline is 1 1-1 25   - current creatinine is 1 29    9  Bright red blood noted in stool: resolved, unremarkable colonoscopy and denies h/o hemorrhoids  10 Right knee pain, improved: likely OA and 2/2 immobility per ortho  C/w rehab/PT    11  Xerosis: c/w amlactin    12   Acute blood loss anemia, 2/2 L BKA surgery, asymptomatic   - hgb 7 6, will transfuse if Hgb <7   - continue to monitor    13  unstageable posterior heel ulcer, right foot   -LWC per podiatry    VTE Pharmacologic Prophylaxis:   Pharmacologic: Heparin Drip  Mechanical VTE Prophylaxis in Place: Yes    Time Spent for Care: 30 minutes  More than 50% of total time spent on counseling and coordination of care as described above  Current Length of Stay: 26 day(s)    Current Patient Status: Inpatient   Code Status: Level 1 - Full Code      Subjective:   Pt seen at bedside in no acute distress  Pt states pain is under control  Pt denies n/v/f/c/sob  Objective:     Vitals:   Temp (24hrs), Av 9 °F (36 1 °C), Min:96 6 °F (35 9 °C), Max:97 4 °F (36 3 °C)    HR:  [74-86] 74  Resp:  [18] 18  BP: (125-132)/(50-63) 125/63  SpO2:  [95 %-98 %] 97 %  Body mass index is 34 66 kg/m²  Input and Output Summary (last 24 hours): Intake/Output Summary (Last 24 hours) at 17 0856  Last data filed at 17 0537   Gross per 24 hour   Intake             1200 ml   Output             2725 ml   Net            -1525 ml       Physical Exam:     Physical Exam   Constitutional: He is oriented to person, place, and time  He appears well-developed and well-nourished  HENT:   Head: Normocephalic and atraumatic  Eyes: EOM are normal  Right eye exhibits no discharge  Left eye exhibits no discharge  No scleral icterus  Neck: Normal range of motion  Neck supple  Cardiovascular: Normal rate and regular rhythm  Mechanical heart sounds noted   Pulmonary/Chest: Effort normal and breath sounds normal  No respiratory distress  He has no wheezes  Abdominal: Soft  There is no tenderness  Musculoskeletal: He exhibits deformity  S/p L BKA   Neurological: He is alert and oriented to person, place, and time  Skin: Skin is warm and dry  R foot ulcer   Psychiatric: He has a normal mood and affect   His behavior is normal  Thought content normal            Additional Data:     Labs:      Results from last 7 days  Lab Units 17  0601 17  0537   WBC Thousand/uL 10 01 13 93* HEMOGLOBIN g/dL 7 6* 8 8*   HEMATOCRIT % 22 8* 26 7*   PLATELETS Thousands/uL  --  334   LYMPHO PCT % 22  --    MONO PCT MAN % 10  --    EOSINO PCT MANUAL % 4  --        Results from last 7 days  Lab Units 09/28/17  0601  09/26/17  0557   SODIUM mmol/L 133*  < > 137   POTASSIUM mmol/L 4 2  < > 4 6   CHLORIDE mmol/L 98*  < > 98*   CO2 mmol/L 28  < > 32   BUN mg/dL 26*  < > 28*   CREATININE mg/dL 1 29  < > 1 24   CALCIUM mg/dL 8 6  < > 9 8   TOTAL PROTEIN g/dL  --   --  6 8   BILIRUBIN TOTAL mg/dL  --   --  0 31   ALK PHOS U/L  --   --  92   ALT U/L  --   --  16   AST U/L  --   --  10   GLUCOSE RANDOM mg/dL 195*  < > 220*   < > = values in this interval not displayed  Results from last 7 days  Lab Units 09/28/17  0601   INR  1 12       * I Have Reviewed All Lab Data Listed Above  * Additional Pertinent Lab Tests Reviewed:  All Labs Within Last 24 Hours Reviewed    Imaging:      Recent Cultures (last 7 days):       Results from last 7 days  Lab Units 09/26/17  1758   GRAM STAIN RESULT  No Polys or Bacteria seen       Last 24 Hours Medication List:     ammonium lactate  Topical BID   docusate sodium 100 mg Oral BID   ferrous sulfate 325 mg Oral BID   fluticasone-salmeterol 1 puff Inhalation BID   gabapentin 100 mg Oral TID   insulin glargine 40 Units Subcutaneous HS   insulin lispro 1-6 Units Subcutaneous HS   insulin lispro 12 Units Subcutaneous Before Dinner   insulin lispro 12 Units Subcutaneous Daily Before Lunch   insulin lispro 15 Units Subcutaneous Daily Before Breakfast   insulin lispro 2-12 Units Subcutaneous TID AC   magnesium oxide 400 mg Oral Daily   oxybutynin 5 mg Oral BID   pantoprazole 40 mg Oral Early Morning   pravastatin 40 mg Oral Daily With Dinner   senna 1 tablet Oral HS   sodium hypochlorite 1 application Irrigation Daily   tiotropium 18 mcg Inhalation Daily   warfarin 7 5 mg Oral Daily (warfarin)        Today, Patient Was Seen By: Lexa Richard DPM    ** Please Note: This note has been constructed using a voice recognition system   **

## 2017-09-28 NOTE — SOCIAL WORK
Pt had BKA on September 26th  Pt continues on Heparin drip and coumadin bridge  INR not therapeutic  [facility] denied the patient  Guillaume is continuing to follow

## 2017-09-28 NOTE — PROGRESS NOTES
Progress Note - Yessica Kwon 78 y o  male MRN: [id number]    Unit/Bed#: E4 -01 Encounter: [id number]      CC: diabetes f/u    Subjective:   Yessica Kwon is a 78y o  year old male with type 2  diabetes  Feels ok, good appetite , no nausea ,vomiting   No hypoglycemia  Underwent L BKA  On 9/26/17     Objective:     Vitals: Blood pressure 113/59, pulse 82, temperature 99 1 °F (37 3 °C), temperature source Tympanic, resp  rate 18, height 5' 9 5" (1 765 m), weight 108 kg (238 lb 1 6 oz), SpO2 97 %  ,Body mass index is 34 66 kg/m²  Intake/Output Summary (Last 24 hours) at 09/28/17 1627  Last data filed at 09/28/17 1301   Gross per 24 hour   Intake             2400 ml   Output             2110 ml   Net              290 ml       Physical Exam:  General Appearance: awake, appears stated age and cooperative  Head: Normocephalic, without obvious abnormality, atraumatic  Extremities: moves all extremities , left BKA   Skin: Skin color and temperature normal    Pulm: no labored breathing    Lab, Imaging and other studies: I have personally reviewed pertinent reports  Results from last 7 days  Lab Units 09/28/17  0601   SODIUM mmol/L 133*   POTASSIUM mmol/L 4 2   CHLORIDE mmol/L 98*   CO2 mmol/L 28   BUN mg/dL 26*   CREATININE mg/dL 1 29   GLUCOSE RANDOM mg/dL 195*   CALCIUM mg/dL 8 6       POC Glucose (mg/dl)   Date Value   09/28/2017 191 (H)   09/28/2017 275 (H)   09/28/2017 197 (H)   09/27/2017 208 (H)   09/27/2017 217 (H)   09/27/2017 290 (H)   09/27/2017 286 (H)   09/26/2017 134   09/26/2017 228 (H)   09/26/2017 212 (H)       Assessment:  TYPE 2 DIABETES WITH HYPERGLYCEMIA  LONG-TERM INSULIN USE  PERIPHERAL ARTERIAL DISEASE  STATUS POST LEFT BKA  CHRONIC KIDNEY DISEASE    Plan:  Sugars consistently above goal- increase pre breakfast Humalog to 18 units, Lantus has been increased to 45 units subcu at bedtime  continue to monitor blood sugars before meals and bedtime and adjust accordingly       Chronic kidney disease-stable    Peripheral  arterial disease-status post left BKA-management as per primary team          Portions of the record may have been created with voice recognition software  Occasional wrong word or "sound a like" substitutions may have occurred due to the inherent limitations of voice recognition software  Read the chart carefully and recognize, using context, where substitutions have occurred

## 2017-09-28 NOTE — PROGRESS NOTES
Progress Note - Podiatry  Robe Quiles 78 y o  male MRN: [id number]  Unit/Bed#: E4 -01 Encounter: [id number]    Assessment/Plan:  1  Right unstageable posterior heel ulceration - POA; stable  2  L BKA secondary to necrosis of left posterior heel ulcer which is deemed nonsalvageable   3  DM, type 2 with neuropathy: A1c - 6 6%  4  PAD - per vascular   5  CKD, stage 3 - stable  6  Xerosis      Plan:  - right heel wound: c/w LWC with betadine paint; NC to be done daily with application of amlactin lotion for xerosis; podiatry to monitor every 3 days next change to be 9/30  - c/w offloading of heel in prevalon boots or pillows behind ankles while in bed to prevent worsening of ulcerations   - WBS: will try WBAT with SS for couple of days on the right foot and monitor how he progresses; will place a consult to PT   - LLE BKA management per vascular service      > medical management per primary     Subjective/Objective   Chief Complaint:   Chief Complaint   Patient presents with    Weakness - Generalized     pt woke up this morning just feeling very weak and unable to get up   pt denies fevers, n/v   denies any pain or sick contacts  Subjective: 78 y o  y/o male was seen and evaluated at bedside  Denies any complains  Patient is asking if he can be WBAT to the right foot  Blood pressure 125/63, pulse 74, temperature (!) 96 8 °F (36 °C), temperature source Tympanic, resp  rate 18, height 5' 9 5" (1 765 m), weight 108 kg (238 lb 1 6 oz), SpO2 97 %  ,Body mass index is 34 66 kg/m²  Invasive Devices     Peripheral Intravenous Line            Peripheral IV 09/26/17 Left Forearm 1 day          Drain            Urethral Catheter 18 Fr  25 days    Closed/Suction Drain Left;Lateral Other (Comment) Bulb 19 Fr  1 day                Physical Exam:   General: Alert, cooperative and no distress  Lungs: Non labored breathing  Heart: Positive S1, S2  Abdomen: Soft, non-tender    Extremity: NVS and motor function at baseline  RLE: posterior heel eschar is dry and stable  No acute signs of infection  No drainage  LLE: BKA per vascular  Right posterior heel       Lab, Imaging and other studies:   I have personally reviewed pertinent lab results  , CBC:   Lab Results   Component Value Date    WBC 10 01 09/28/2017    HGB 7 6 (L) 09/28/2017    HCT 22 8 (L) 09/28/2017    MCV 85 09/28/2017    PLT  09/28/2017      Comment:      Unable to enumerate platelet count due to platelet clumps  This is a corrected result  Previous result was 245 Thousands/uL on 9/28/2017 at 0701 EDT    [address] 28 4 09/28/2017    MCHC 33 3 09/28/2017    RDW 15 2 (H) 09/28/2017    MPV 9 1 09/28/2017    NRBC 0 09/28/2017   , CMP:   Lab Results   Component Value Date     (L) 09/28/2017    K 4 2 09/28/2017    CL 98 (L) 09/28/2017    CO2 28 09/28/2017    ANIONGAP 7 09/28/2017    BUN 26 (H) 09/28/2017    CREATININE 1 29 09/28/2017    GLUCOSE 195 (H) 09/28/2017    CALCIUM 8 6 09/28/2017    EGFR 54 09/28/2017       Imaging: I have personally reviewed pertinent films in PACS  EKG, Pathology, and Other Studies: I have personally reviewed pertinent reports

## 2017-09-28 NOTE — PROGRESS NOTES
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  Acute blood loss anemia  Left heel osteomyelitis, s/p debridement, partial calcanectomy and wound VAC placement 9/8/2017  PAD with LLE tissue loss, s/p L SFA/pop PTA 9/12/2017  Right heel ulceration, stable  Type II DM  CKD III, creat baseline  Chronic diastolic heart failure, compensated  Chronic atrial fibrillation  CAD, s/p CABG w/LLE GSV harvest 2007  AS, s/p mechanical AVR 2007  Chronic indwelling urinary catheter    Plan:  --will remove L BKA stump dressing and MEGAN drain tomorrow  --heparin-->coumadin secondary to mechanical AVR  --PT/OT  --will require acute rehab placement as per PT/OT recommendations  --observe CBC  --no pillows to be placed under left BKA stump/knee  Encouraged patient to straighten left knee to prevent contracture    Subjective:  POD #2 L BKA  Patient without complaints  Pain well controlled  Minimal MEGAN output (25 ml/24 hrs)  Coumadin restarted last p m  [place] Mort Creatinine back to baseline   H/H down to 7 6/22 8 without clinical signs of symptomatic anemia  VSS Drips:  Heparin    Vitals:  /63   Pulse 74   Temp (!) 96 8 °F (36 °C) (Tympanic)   Resp 18   Ht 5' 9 5" (1 765 m)   Wt 108 kg (238 lb 1 6 oz)   SpO2 97%   BMI 34 66 kg/m²     I/Os:  I/O last 3 completed shifts: In: 2380 [P O :1380; I V :1070]  Out: 9052 [Urine:2795; Drains:90]  No intake/output data recorded  Lab Results and Cultures:   Lab Results   Component Value Date    WBC 10 01 09/28/2017    HGB 7 6 (L) 09/28/2017    HCT 22 8 (L) 09/28/2017    MCV 85 09/28/2017    PLT  09/28/2017      Comment:      Unable to enumerate platelet count due to platelet clumps  This is a corrected result   Previous result was 245 Thousands/uL on 9/28/2017 at [address] EDT     Lab Results   Component Value Date    GLUCOSE 195 (H) 09/28/2017    CALCIUM 8 6 09/28/2017     (L) 09/28/2017    K 4 2 09/28/2017    CO2 28 09/28/2017 CL 98 (L) 09/28/2017    BUN 26 (H) 09/28/2017    CREATININE 1 29 09/28/2017     Lab Results   Component Value Date    INR 1 12 09/28/2017    INR 1 06 09/27/2017    INR 1 11 09/22/2017    INR 1 11 09/22/2017    PROTIME 14 4 09/28/2017    PROTIME 13 8 09/27/2017    PROTIME 14 3 09/22/2017    PROTIME 14 3 09/22/2017        Blood Culture: No results found for: BLOODCX,   Urinalysis:   Lab Results   Component Value Date    COLORU Yellow 09/05/2017    CLARITYU Clear 09/05/2017    SPECGRAV <=1 005 09/05/2017    PHUR 6 5 09/05/2017    LEUKOCYTESUR Small (A) 09/05/2017    NITRITE Positive (A) 09/05/2017    PROTEINUA Negative 09/05/2017    GLUCOSEU Negative 09/05/2017    KETONESU Negative 09/05/2017    BILIRUBINUR Negative 09/05/2017    BLOODU Trace-lysed (A) 09/05/2017   ,   Urine Culture:   Lab Results   Component Value Date    URINECX >100,000 cfu/ml Mixed Contaminants X3 09/02/2017   ,   Wound Culure:   Lab Results   Component Value Date    WOUNDCULT 3+ Growth of Staphylococcus aureus 09/02/2017    WOUNDCULT 3+ Growth of Enterococcus faecalis 09/02/2017    WOUNDCULT 3+ Growth of Mixed Skin Sabiha 09/02/2017       Medications:  Current Facility-Administered Medications   Medication Dose Route Frequency    acetaminophen (TYLENOL) tablet 650 mg  650 mg Oral Q6H PRN    ammonium lactate (LAC-HYDRIN) 12 % lotion   Topical BID    docusate sodium (COLACE) capsule 100 mg  100 mg Oral BID    ferrous sulfate tablet 325 mg  325 mg Oral BID    fluticasone-salmeterol (ADVAIR) 500-50 mcg/dose inhaler 1 puff  1 puff Inhalation BID    gabapentin (NEURONTIN) capsule 100 mg  100 mg Oral TID    heparin (porcine) 25,000 units in 250 mL infusion (premix)  3-30 Units/kg/hr (Order-Specific) Intravenous Titrated    heparin (porcine) injection 4,000 Units  4,000 Units Intravenous PRN    heparin (porcine) injection 8,000 Units  8,000 Units Intravenous PRN    HYDROmorphone (DILAUDID) 1 mg/mL injection 1 mg  1 mg Intravenous Q3H PRN    insulin glargine (LANTUS) subcutaneous injection 40 Units  40 Units Subcutaneous HS    insulin lispro (HumaLOG) 100 units/mL subcutaneous injection 1-6 Units  1-6 Units Subcutaneous HS    insulin lispro (HumaLOG) 100 units/mL subcutaneous injection 12 Units  12 Units Subcutaneous Before Dinner    insulin lispro (HumaLOG) 100 units/mL subcutaneous injection 12 Units  12 Units Subcutaneous Daily Before Lunch    insulin lispro (HumaLOG) 100 units/mL subcutaneous injection 15 Units  15 Units Subcutaneous Daily Before Breakfast    insulin lispro (HumaLOG) 100 units/mL subcutaneous injection 2-12 Units  2-12 Units Subcutaneous TID AC    magnesium hydroxide (MILK OF MAGNESIA) 400 mg/5 mL oral suspension 30 mL  30 mL Oral PRN    magnesium oxide (MAG-OX) tablet 400 mg  400 mg Oral Daily    ondansetron (ZOFRAN) injection 4 mg  4 mg Intravenous Q6H PRN    oxybutynin (DITROPAN) tablet 5 mg  5 mg Oral BID    oxyCODONE-acetaminophen (PERCOCET) 5-325 mg per tablet 1 tablet  1 tablet Oral Q4H PRN    oxyCODONE-acetaminophen (PERCOCET) 5-325 mg per tablet 2 tablet  2 tablet Oral Q4H PRN    pantoprazole (PROTONIX) EC tablet 40 mg  40 mg Oral Early Morning    pravastatin (PRAVACHOL) tablet 40 mg  40 mg Oral Daily With Dinner    senna (SENOKOT) tablet 8 6 mg  1 tablet Oral HS    sodium hypochlorite (DAKIN'S HALF-STRENGTH) 0 25 % topical solution 1 application  1 application Irrigation Daily    tiotropium (SPIRIVA) capsule for inhaler 18 mcg  18 mcg Inhalation Daily    warfarin (COUMADIN) tablet 7 5 mg  7 5 mg Oral Daily (warfarin)       Imaging:  No new vascular or imaging studies for review    Physical Exam:    General appearance: alert, appears stated age, cooperative and no distress  Neurologic: Grossly normal  Neck: no adenopathy, no carotid bruit, no JVD and supple, symmetrical, trachea midline   Lungs: clear to auscultation bilaterally  Chest wall: no tenderness, Midline sternotomy scar well healed    Sternum stable  Heart: irregularly irregular rhythm, S1: normal, S2: normal prosthetic S2, no S3 or S4, no rub and No murmur  Abdomen: soft, non-tender; bowel sounds normal; no masses,  no organomegaly and Nondistended  No abdominal bruits  Extremities: Left BKA stump dressing in place without strike through  MEGAN drain intact with minimal drainage  Dressing intact right heel  Bilateral lower extremities motor and sensory intact  Wound/Incision:  Left BKA stump dressing intact    Right heel dressing intact    Pulse exam:  Radial: Right: 2+ Left[de-identified] 2+  Femoral: Right: 2+ Left: 2+  Popliteal: Right: non-palpable Left: Obscured by dressing  DP: Right: non-palpable   PT: Right: non-palpable    Reuben De Anda PA-C  9/28/2017   [facility], [phone]

## 2017-09-29 PROBLEM — D62 ACUTE BLOOD LOSS ANEMIA: Status: ACTIVE | Noted: 2017-09-29

## 2017-09-29 LAB
ABO GROUP BLD BPU: NORMAL
ABO GROUP BLD BPU: NORMAL
ABO GROUP BLD: NORMAL
ANION GAP SERPL CALCULATED.3IONS-SCNC: 5 MMOL/L (ref 4–13)
APTT PPP: 61 SECONDS (ref 23–35)
APTT PPP: 92 SECONDS (ref 23–35)
APTT PPP: >210 SECONDS (ref 23–35)
BACTERIA SPEC ANAEROBE CULT: NO GROWTH
BACTERIA TISS AEROBE CULT: NO GROWTH
BLD GP AB SCN SERPL QL: NEGATIVE
BPU ID: NORMAL
BPU ID: NORMAL
BUN SERPL-MCNC: 26 MG/DL (ref 5–25)
CALCIUM SERPL-MCNC: 9 MG/DL (ref 8.3–10.1)
CHLORIDE SERPL-SCNC: 97 MMOL/L (ref 100–108)
CO2 SERPL-SCNC: 32 MMOL/L (ref 21–32)
CREAT SERPL-MCNC: 1.44 MG/DL (ref 0.6–1.3)
ERYTHROCYTE [DISTWIDTH] IN BLOOD BY AUTOMATED COUNT: 15.1 % (ref 11.6–15.1)
GFR SERPL CREATININE-BSD FRML MDRD: 47 ML/MIN/1.73SQ M
GLUCOSE SERPL-MCNC: 166 MG/DL (ref 65–140)
GLUCOSE SERPL-MCNC: 195 MG/DL (ref 65–140)
GLUCOSE SERPL-MCNC: 213 MG/DL (ref 65–140)
GLUCOSE SERPL-MCNC: 243 MG/DL (ref 65–140)
GLUCOSE SERPL-MCNC: 275 MG/DL (ref 65–140)
GRAM STN SPEC: NORMAL
HCT VFR BLD AUTO: 22.8 % (ref 36.5–49.3)
HGB BLD-MCNC: 7.5 G/DL (ref 12–17)
INR PPP: 1.22 (ref 0.86–1.16)
MCH RBC QN AUTO: 28.2 PG (ref 26.8–34.3)
MCHC RBC AUTO-ENTMCNC: 32.9 G/DL (ref 31.4–37.4)
MCV RBC AUTO: 86 FL (ref 82–98)
PLATELET # BLD AUTO: 255 THOUSANDS/UL (ref 149–390)
PMV BLD AUTO: 8 FL (ref 8.9–12.7)
POTASSIUM SERPL-SCNC: 4.3 MMOL/L (ref 3.5–5.3)
PROTHROMBIN TIME: 15.5 SECONDS (ref 12.1–14.4)
RBC # BLD AUTO: 2.66 MILLION/UL (ref 3.88–5.62)
RH BLD: POSITIVE
SODIUM SERPL-SCNC: 134 MMOL/L (ref 136–145)
SPECIMEN EXPIRATION DATE: NORMAL
UNIT DISPENSE STATUS: NORMAL
UNIT DISPENSE STATUS: NORMAL
UNIT PRODUCT CODE: NORMAL
UNIT PRODUCT CODE: NORMAL
UNIT RH: NORMAL
UNIT RH: NORMAL
WBC # BLD AUTO: 8.64 THOUSAND/UL (ref 4.31–10.16)

## 2017-09-29 PROCEDURE — P9021 RED BLOOD CELLS UNIT: HCPCS

## 2017-09-29 PROCEDURE — 80048 BASIC METABOLIC PNL TOTAL CA: CPT | Performed by: INTERNAL MEDICINE

## 2017-09-29 PROCEDURE — 86901 BLOOD TYPING SEROLOGIC RH(D): CPT | Performed by: INTERNAL MEDICINE

## 2017-09-29 PROCEDURE — 97532 HB COGNITIVE SKILLS DEVELOPMENT: CPT

## 2017-09-29 PROCEDURE — 97530 THERAPEUTIC ACTIVITIES: CPT

## 2017-09-29 PROCEDURE — 97535 SELF CARE MNGMENT TRAINING: CPT

## 2017-09-29 PROCEDURE — 86850 RBC ANTIBODY SCREEN: CPT | Performed by: INTERNAL MEDICINE

## 2017-09-29 PROCEDURE — 82948 REAGENT STRIP/BLOOD GLUCOSE: CPT

## 2017-09-29 PROCEDURE — 86900 BLOOD TYPING SEROLOGIC ABO: CPT | Performed by: INTERNAL MEDICINE

## 2017-09-29 PROCEDURE — 86923 COMPATIBILITY TEST ELECTRIC: CPT

## 2017-09-29 PROCEDURE — 85730 THROMBOPLASTIN TIME PARTIAL: CPT | Performed by: INTERNAL MEDICINE

## 2017-09-29 PROCEDURE — 85027 COMPLETE CBC AUTOMATED: CPT | Performed by: INTERNAL MEDICINE

## 2017-09-29 PROCEDURE — 97110 THERAPEUTIC EXERCISES: CPT

## 2017-09-29 PROCEDURE — 85610 PROTHROMBIN TIME: CPT | Performed by: INTERNAL MEDICINE

## 2017-09-29 PROCEDURE — 85730 THROMBOPLASTIN TIME PARTIAL: CPT | Performed by: NURSE PRACTITIONER

## 2017-09-29 RX ADMIN — INSULIN GLARGINE 45 UNITS: 100 INJECTION, SOLUTION SUBCUTANEOUS at 22:22

## 2017-09-29 RX ADMIN — FLUTICASONE PROPIONATE AND SALMETEROL 1 PUFF: 50; 500 POWDER RESPIRATORY (INHALATION) at 20:38

## 2017-09-29 RX ADMIN — OXYBUTYNIN CHLORIDE 5 MG: 5 TABLET ORAL at 08:25

## 2017-09-29 RX ADMIN — WARFARIN SODIUM 10 MG: 5 TABLET ORAL at 17:55

## 2017-09-29 RX ADMIN — Medication: at 17:56

## 2017-09-29 RX ADMIN — GABAPENTIN 100 MG: 100 CAPSULE ORAL at 08:26

## 2017-09-29 RX ADMIN — INSULIN LISPRO 4 UNITS: 100 INJECTION, SOLUTION INTRAVENOUS; SUBCUTANEOUS at 17:55

## 2017-09-29 RX ADMIN — Medication: at 08:25

## 2017-09-29 RX ADMIN — INSULIN LISPRO 12 UNITS: 100 INJECTION, SOLUTION INTRAVENOUS; SUBCUTANEOUS at 12:31

## 2017-09-29 RX ADMIN — INSULIN LISPRO 4 UNITS: 100 INJECTION, SOLUTION INTRAVENOUS; SUBCUTANEOUS at 22:23

## 2017-09-29 RX ADMIN — OXYBUTYNIN CHLORIDE 5 MG: 5 TABLET ORAL at 17:55

## 2017-09-29 RX ADMIN — HEPARIN SODIUM AND DEXTROSE 14 UNITS/KG/HR: 10000; 5 INJECTION INTRAVENOUS at 22:27

## 2017-09-29 RX ADMIN — TIOTROPIUM BROMIDE 18 MCG: 18 CAPSULE ORAL; RESPIRATORY (INHALATION) at 08:25

## 2017-09-29 RX ADMIN — OXYCODONE HYDROCHLORIDE AND ACETAMINOPHEN 1 TABLET: 5; 325 TABLET ORAL at 08:25

## 2017-09-29 RX ADMIN — GABAPENTIN 100 MG: 100 CAPSULE ORAL at 17:55

## 2017-09-29 RX ADMIN — INSULIN LISPRO 4 UNITS: 100 INJECTION, SOLUTION INTRAVENOUS; SUBCUTANEOUS at 08:26

## 2017-09-29 RX ADMIN — GABAPENTIN 100 MG: 100 CAPSULE ORAL at 20:39

## 2017-09-29 RX ADMIN — PRAVASTATIN SODIUM 40 MG: 40 TABLET ORAL at 17:55

## 2017-09-29 RX ADMIN — HEPARIN SODIUM AND DEXTROSE 16 UNITS/KG/HR: 10000; 5 INJECTION INTRAVENOUS at 00:33

## 2017-09-29 RX ADMIN — DOCUSATE SODIUM 100 MG: 100 CAPSULE, LIQUID FILLED ORAL at 08:25

## 2017-09-29 RX ADMIN — FERROUS SULFATE TAB 325 MG (65 MG ELEMENTAL FE) 325 MG: 325 (65 FE) TAB at 08:25

## 2017-09-29 RX ADMIN — Medication 400 MG: at 08:25

## 2017-09-29 RX ADMIN — INSULIN LISPRO 12 UNITS: 100 INJECTION, SOLUTION INTRAVENOUS; SUBCUTANEOUS at 17:55

## 2017-09-29 RX ADMIN — INSULIN LISPRO 2 UNITS: 100 INJECTION, SOLUTION INTRAVENOUS; SUBCUTANEOUS at 12:31

## 2017-09-29 RX ADMIN — FUROSEMIDE 40 MG: 40 TABLET ORAL at 08:25

## 2017-09-29 RX ADMIN — DOCUSATE SODIUM 100 MG: 100 CAPSULE, LIQUID FILLED ORAL at 17:55

## 2017-09-29 RX ADMIN — PANTOPRAZOLE SODIUM 40 MG: 40 TABLET, DELAYED RELEASE ORAL at 05:31

## 2017-09-29 RX ADMIN — FERROUS SULFATE TAB 325 MG (65 MG ELEMENTAL FE) 325 MG: 325 (65 FE) TAB at 17:55

## 2017-09-29 RX ADMIN — FLUTICASONE PROPIONATE AND SALMETEROL 1 PUFF: 50; 500 POWDER RESPIRATORY (INHALATION) at 08:25

## 2017-09-29 NOTE — PLAN OF CARE
Problem: OCCUPATIONAL THERAPY ADULT  Goal: Performs self-care activities at highest level of function for planned discharge setting  See evaluation for individualized goals  Treatment Interventions: ADL retraining, Functional transfer training, UE strengthening/ROM, Endurance training, Patient/family training, Equipment evaluation/education, Compensatory technique education, Energy conservation, Activityengagement          See flowsheet documentation for full assessment, interventions and recommendations  Outcome: Progressing  Limitation: Decreased ADL status, Decreased UE strength, Decreased Safe judgement during ADL, Decreased endurance, Decreased self-care trans, Decreased high-level ADLs (NWB L LE w/ wound vac)  Prognosis: Fair  Assessment: Pt seen for skilled OT with a focus on functional transfers, BUE ROM, and bed mobility  Upon arrival, Pt had a flat affect  Bed postion as chair to encourage alertness in tx session  Staff member put on music throughout tx session to motivate Pt  Completed BUE ROM with slight SOB noted after activity  Pt able to follow commands with increased processing time  Pt reports feeling pain in LLE during functional activity and that the pain comes and goes  Pt able to complete trunk flexion and extension exercises with use of bed rails 2 sets of 5, with SOB noted  Pt able to sit EOB with need for increased A due to retropulsive movements  Pt's BP after activity 122/58  attempted use of quick move, Pt unable to fully extend RLE and weakness in BUE noted  Pt's wife present throughout tx session and active in care plan  See above levels of assistance required for all functional tasks  Pt will benefit from further rehab to achieve highest level of independence with all functional tasks        Discharge Recommendation: Short Term Rehab         Comments: KALLIE Madsen

## 2017-09-29 NOTE — PHYSICAL THERAPY NOTE
PT PROGRESS NOTE     09/29/17 1153   Pain Assessment   Pain Score 8   Pain Type Surgical pain   Pain Location Leg  (stump)   Pain Orientation Left   Hospital Pain Intervention(s) Repositioned; Emotional support  (RN notified)   Restrictions/Precautions   RLE Weight Bearing Per Order WBAT  (w/ surgical shoe as per Podiatry as of 9/28)   LLE Weight Bearing Per Order NWB   Other Precautions Cognitive;Multiple lines; Fall Risk;Pain;WBS;Hard of hearing   General   Chart Reviewed Yes   Additional Pertinent History 09/26: L BKA   Response to Previous Treatment Patient with no complaints from previous session  Family/Caregiver Present Yes   Cognition   Overall Cognitive Status Impaired   Arousal/Participation Responsive; Cooperative   Attention Attends with cues to redirect   Following Commands Follows one step commands with increased time or repetition   Subjective   Subjective Pt agreeable to therapy  Bed Mobility   Supine to Sit 3  Moderate assistance   Additional items Assist x 2;HOB elevated; Bedrails; Increased time required;Verbal cues;LE management   Sit to Supine 3  Moderate assistance   Additional items Assist x 2; Increased time required;Verbal cues;LE management; Bedrails   Transfers   Sit to Stand 2  Maximal assistance   Additional items Assist x 2; Increased time required;Verbal cues; Other  (bed elevated; quick move; pt unable to fully stand)   Stand to Sit 2  Maximal assistance   Additional items Assist x 2; Increased time required;Verbal cues; Bed elevated   Additional Comments attempted quick move but pt unable to fully stand (attempted x3)   Ambulation/Elevation   Gait pattern Not appropriate   Balance   Static Sitting Poor +   Dynamic Sitting Poor   Static Standing Zero   Endurance Deficit   Endurance Deficit Yes   Endurance Deficit Description pain & fatigue   Activity Tolerance   Activity Tolerance Patient limited by pain; Patient limited by fatigue   Nurse BELLA Ayon 16   Exercises   Quad Sets Supine;10 reps;AROM; Bilateral   Heelslides Supine;10 reps;AAROM; Bilateral   Hip Flexion Supine;10 reps;AAROM; Bilateral  (SLR)   Hip Abduction Supine;10 reps;AAROM; Bilateral   Hip Adduction Supine;10 reps;AROM; Bilateral   Ankle Pumps Supine;10 reps;AAROM; Right   Balance training  sitting balance & tolerance at EOB, righting & postural techniques; require mod/minAx1 to maintain sitting position as pt tend to fall backwards   Assessment   Prognosis Guarded   Problem List Decreased strength;Decreased endurance; Impaired balance;Decreased mobility; Decreased cognition; Impaired judgement; Impaired hearing;Obesity;Pain;Decreased skin integrity   Assessment Pt continue to demonstrate dec mobility, balance & endurance 2* to post-pain & deconditioning  Pt require modAx2 for bed mobility & maxAx2 for sit<>stand transition w/ quick move  Attempted sit<>stand transitions 3x but pt unable to tolerate full standing in all attempts  Improved sitting tolerance at EOB however still dec sitting balance requiring mod/minAx1 to maintain balance  Pt tend to fall backwards but able to right self w/ verbal & tactile cues  Pt has stage II R gluteal ulceration, will trial beasy transfer board to reduce friction during bed<>chair transfers  Pt tolerated above mentioned thera  ex well, AAROM  Pt assisted back in bed at end of session w/o issues  Call bell in reach  Will continue PT per POC  Pt will continue to benefit from inpt rehab at D/C  Barriers to Discharge Inaccessible home environment   Goals   Patient Goals none stated   STG Expiration Date 10/11/17   Treatment Day 2   Plan   Treatment/Interventions Functional transfer training;LE strengthening/ROM; Therapeutic exercise; Endurance training;Patient/family training;Bed mobility;Gait training;Spoke to nursing;OT;Family   Progress Slow progress, multiple tests/procedures   PT Frequency 5x/wk; Weekend  (1x weekend)   Recommendation   Recommendation Post acute IP rehab   Equipment Recommended Wheelchair PT - OK to Discharge Yes  (to inpt rehab)   Gloria Cartagena, PT

## 2017-09-29 NOTE — PLAN OF CARE
Problem: PHYSICAL THERAPY ADULT  Goal: Performs mobility at highest level of function for planned discharge setting  See evaluation for individualized goals  Treatment/Interventions: Functional transfer training, LE strengthening/ROM,   Therapeutic exercise, Endurance training, Patient/family training, Equipment eval/education, Bed mobility, OT  Equipment Recommended:      See flowsheet documentation for full assessment, interventions and recommendations  Outcome: Progressing  Prognosis: Guarded  Problem List: Decreased strength, Decreased endurance, Impaired balance, Decreased mobility, Decreased cognition, Impaired judgement, Impaired hearing, Obesity, Pain, Decreased skin integrity  Assessment: Pt continue to demonstrate dec mobility, balance & endurance 2* to post-pain & deconditioning  Pt require modAx2 for bed mobility & maxAx2 for sit<>stand transition w/ quick move  Attempted sit<>stand transitions 3x but pt unable to tolerate full standing in all attempts  Improved sitting tolerance at EOB however still dec sitting balance requiring mod/minAx1 to maintain balance  Pt tend to fall backwards but able to right self w/ verbal & tactile cues  Pt has stage II R gluteal ulceration, will trial beasy transfer board to reduce friction during bed<>chair transfers  Pt tolerated above mentioned thera  ex well, AAROM  Pt assisted back in bed at end of session w/o issues  Call bell in reach  Will continue PT per POC  Pt will continue to benefit from inpt rehab at D/C  Barriers to Discharge: Inaccessible home environment     Recommendation: Post acute IP rehab     PT - OK to Discharge: Yes (to inpt rehab)    See flowsheet documentation for full assessment

## 2017-09-29 NOTE — PROGRESS NOTES
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    Decubitus ulcer of heel, stage 3    Stage 3 chronic kidney disease    Chronic indwelling Beckett catheter    Abnormal urinalysis    Chronic atrial fibrillation    Diabetes mellitus    Chronic diastolic heart failure    PAD (peripheral artery disease)    S/P AVR      Plan:    1 s/p Left BKA 2/2 left heel decubitus ulcer (DOS: 9/26) after previous partial calcanectomy, wound vac placement and I&D that was deemed as nonsalvageable per podiatry/vascular  - pain is under control, management per vascular  - PT/OT on board  - CM on board for acute rehab placement     2  H/o mechanical heart valve AVR with h/o A fib:   - coumadin dose increased to 10mg,   - pt's PTT was critically high at >210  Heparin drip is currently on hold while we await repeat PTT results  - goal INR 2-3, current INR at 1 22     3  PVD: s/p SFA angioplasty 9/14     4  Chronic diastolic heart failure  - creatinine has increased to 1 44 from 1 29  - continue to hold fluids and monitor labs    5  Chronic indwelling beckett catheter: c/w oxybutynin     6  Diabetes Mellitus, type 2:   - pt on humalog 18 units for breakfast coverage, 12 units for lunch/dinner coverage, and lantus 45 units QHS  Appreciate management per endocrinology     7  Diabetic neuropathy: c/w gabapentin     8  CKD stage 3: baseline is 1 1-1 25                        - current creatinine is 1 44, will continue to monitor     9  Bright red blood noted in stool: resolved, unremarkable colonoscopy and denies h/o hemorrhoids       10  Right knee pain, improved: likely OA and 2/2 immobility per ortho  C/w rehab/PT     11   Xerosis: c/w amlactin     12  Acute blood loss anemia, 2/2 L BKA surgery, asymptomatic                        - hgb 7 5, will transfuse if Hgb <7                        - continue to monitor     13  unstageable posterior heel ulcer, right foot                        -LWC per podiatry      VTE Pharmacologic Prophylaxis:   Pharmacologic: Heparin Drip  Mechanical VTE Prophylaxis in Place: Yes    Time Spent for Care: 30 minutes  More than 50% of total time spent on counseling and coordination of care as described above  Current Length of Stay: 27 day(s)    Current Patient Status: Inpatient   Code Status: Level 1 - Full Code      Subjective:   Pt seen with wife present at bedside, eating breakfast comfortably  Pt states that he experienced some pain in his left leg when the dressing was changed earlier  Pt denies n/v/f/c/sob/cp/dizziness/headaches  Objective:     Vitals:   Temp (24hrs), Av 6 °F (36 4 °C), Min:96 3 °F (35 7 °C), Max:99 1 °F (37 3 °C)    HR:  [69-82] 69  Resp:  [18] 18  BP: (113-132)/(57-67) 119/67  SpO2:  [96 %-98 %] 98 %  Body mass index is 34 66 kg/m²  Input and Output Summary (last 24 hours): Intake/Output Summary (Last 24 hours) at 17 0809  Last data filed at 17 0532   Gross per 24 hour   Intake          2729 73 ml   Output             3045 ml   Net          -315 27 ml       Physical Exam:     Physical Exam   Constitutional: He is oriented to person, place, and time  He appears well-developed and well-nourished  No distress  HENT:   Head: Normocephalic and atraumatic  Eyes: EOM are normal  No scleral icterus  Neck: Neck supple  Cardiovascular: Normal rate and regular rhythm  Mechanical heart sounds noted   Pulmonary/Chest: Effort normal and breath sounds normal  No respiratory distress  Abdominal: Soft  There is no tenderness  Musculoskeletal: He exhibits tenderness and deformity  S/p L BKA   Neurological: He is alert and oriented to person, place, and time  Skin: Skin is warm and dry  He is not diaphoretic  Psychiatric: He has a normal mood and affect  His behavior is normal  Thought content normal          Additional Data:     Labs:      Results from last 7 days  Lab Units 09/29/17  0446 09/28/17  0601   WBC Thousand/uL 8 64 10 01   HEMOGLOBIN g/dL 7 5* 7 6*   HEMATOCRIT % 22 8* 22 8*   PLATELETS Thousands/uL 255  --    LYMPHO PCT %  --  22   MONO PCT MAN %  --  10   EOSINO PCT MANUAL %  --  4       Results from last 7 days  Lab Units 09/29/17  0446  09/26/17  0557   SODIUM mmol/L 134*  < > 137   POTASSIUM mmol/L 4 3  < > 4 6   CHLORIDE mmol/L 97*  < > 98*   CO2 mmol/L 32  < > 32   BUN mg/dL 26*  < > 28*   CREATININE mg/dL 1 44*  < > 1 24   CALCIUM mg/dL 9 0  < > 9 8   TOTAL PROTEIN g/dL  --   --  6 8   BILIRUBIN TOTAL mg/dL  --   --  0 31   ALK PHOS U/L  --   --  92   ALT U/L  --   --  16   AST U/L  --   --  10   GLUCOSE RANDOM mg/dL 166*  < > 220*   < > = values in this interval not displayed  Results from last 7 days  Lab Units 09/29/17  0446   INR  1 22*       * I Have Reviewed All Lab Data Listed Above  * Additional Pertinent Lab Tests Reviewed:  All Labs Within Last 24 Hours Reviewed    Imaging:      Recent Cultures (last 7 days):       Results from last 7 days  Lab Units 09/26/17  1758   GRAM STAIN RESULT  No Polys or Bacteria seen       Last 24 Hours Medication List:     ammonium lactate  Topical BID   docusate sodium 100 mg Oral BID   ferrous sulfate 325 mg Oral BID   fluticasone-salmeterol 1 puff Inhalation BID   furosemide 40 mg Oral Daily   gabapentin 100 mg Oral TID   insulin glargine 45 Units Subcutaneous HS   insulin lispro 1-6 Units Subcutaneous HS   insulin lispro 12 Units Subcutaneous Before Dinner   insulin lispro 12 Units Subcutaneous Daily Before Lunch   insulin lispro 18 Units Subcutaneous Daily Before Breakfast   insulin lispro 2-12 Units Subcutaneous TID AC   magnesium oxide 400 mg Oral Daily   oxybutynin 5 mg Oral BID   pantoprazole 40 mg Oral Early Morning   pravastatin 40 mg Oral Daily With Dinner   senna 1 tablet Oral HS   sodium hypochlorite 1 application Irrigation Daily   tiotropium 18 mcg Inhalation Daily   warfarin 10 mg Oral Daily (warfarin)        Today, Patient Was Seen By: Jessica Roberts DPM    ** Please Note: This note has been constructed using a voice recognition system   **

## 2017-09-29 NOTE — PROGRESS NOTES
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  Acute blood loss anemia  Left heel osteomyelitis, s/p debridement, partial calcanectomy and wound VAC placement 9/8/2017  PAD with LLE tissue loss, s/p L SFA/pop PTA 9/12/2017  Right heel ulceration, stable  Type II DM  CKD III, creat baseline  Chronic diastolic heart failure, compensated  Chronic atrial fibrillation  CAD, s/p CABG w/LLE GSV harvest 2007  AS, s/p mechanical AVR 2007  Chronic indwelling urinary catheter    Plan:  Doing well from vascular surgery standpoint  Dressing and MEGAN drain removed  L BKA stump healing well  Continue light compression with Kerlix and ACE wrap with dressing change daily  Continue PT/OT  Acute rehab placement  Heparin--> Coumadin secondary to mechanical AVR  Okay for discharge to rehab from vascular surgery standpoint  Discharge when medically cleared and bed available  Outpatient follow-up as noted on chart  Transfusion at discretion of Internal Medicine  Stressed the importance of keeping left leg straight to prevent contractures  No pillows under L knee  Discussed with patient's wife at bedside  Will discuss with Dr Edilma Tapia  Subjective:  POD #3 L BKA  Patient without complaints  Patient with lack of motivation  PT/OT did not work with patient yesterday  Minimal MEGAN drainage (20 ml/24 hr)  Intra op cultures negative so far  VSS  Drips:  Heparin  Vitals:  /67   Pulse 69   Temp 97 5 °F (36 4 °C) (Tympanic)   Resp 18   Ht 5' 9 5" (1 765 m)   Wt 108 kg (238 lb 1 6 oz)   SpO2 98%   BMI 34 66 kg/m²     I/Os:  I/O last 3 completed shifts: In: 3209 7 [P O :2880; I V :329 7]  Out: 4570 [Urine:4525; Drains:45]  No intake/output data recorded      Lab Results and Cultures:   Lab Results   Component Value Date    WBC 8 64 09/29/2017    HGB 7 5 (L) 09/29/2017    HCT 22 8 (L) 09/29/2017    MCV 86 09/29/2017     09/29/2017     Lab Results Component Value Date    GLUCOSE 166 (H) 09/29/2017    CALCIUM 9 0 09/29/2017     (L) 09/29/2017    K 4 3 09/29/2017    CO2 32 09/29/2017    CL 97 (L) 09/29/2017    BUN 26 (H) 09/29/2017    CREATININE 1 44 (H) 09/29/2017     Lab Results   Component Value Date    INR 1 22 (H) 09/29/2017    INR 1 12 09/28/2017    INR 1 06 09/27/2017    PROTIME 15 5 (H) 09/29/2017    PROTIME 14 4 09/28/2017    PROTIME 13 8 09/27/2017        Blood Culture: No results found for: BLOODCX,   Urinalysis:   Lab Results   Component Value Date    COLORU Yellow 09/05/2017    CLARITYU Clear 09/05/2017    SPECGRAV <=1 005 09/05/2017    PHUR 6 5 09/05/2017    LEUKOCYTESUR Small (A) 09/05/2017    NITRITE Positive (A) 09/05/2017    PROTEINUA Negative 09/05/2017    GLUCOSEU Negative 09/05/2017    KETONESU Negative 09/05/2017    BILIRUBINUR Negative 09/05/2017    BLOODU Trace-lysed (A) 09/05/2017   ,   Urine Culture:   Lab Results   Component Value Date    URINECX >100,000 cfu/ml Mixed Contaminants X3 09/02/2017   ,   Wound Culure:   Lab Results   Component Value Date    WOUNDCULT 3+ Growth of Staphylococcus aureus 09/02/2017    WOUNDCULT 3+ Growth of Enterococcus faecalis 09/02/2017    WOUNDCULT 3+ Growth of Mixed Skin Sabiha 09/02/2017       Medications:  Current Facility-Administered Medications   Medication Dose Route Frequency    acetaminophen (TYLENOL) tablet 650 mg  650 mg Oral Q6H PRN    ammonium lactate (LAC-HYDRIN) 12 % lotion   Topical BID    docusate sodium (COLACE) capsule 100 mg  100 mg Oral BID    ferrous sulfate tablet 325 mg  325 mg Oral BID    fluticasone-salmeterol (ADVAIR) 500-50 mcg/dose inhaler 1 puff  1 puff Inhalation BID    furosemide (LASIX) tablet 40 mg  40 mg Oral Daily    gabapentin (NEURONTIN) capsule 100 mg  100 mg Oral TID    heparin (porcine) 25,000 units in 250 mL infusion (premix)  3-30 Units/kg/hr (Order-Specific) Intravenous Titrated    heparin (porcine) injection 4,000 Units  4,000 Units Intravenous PRN    heparin (porcine) injection 8,000 Units  8,000 Units Intravenous PRN    HYDROmorphone (DILAUDID) 1 mg/mL injection 1 mg  1 mg Intravenous Q3H PRN    insulin glargine (LANTUS) subcutaneous injection 45 Units  45 Units Subcutaneous HS    insulin lispro (HumaLOG) 100 units/mL subcutaneous injection 1-6 Units  1-6 Units Subcutaneous HS    insulin lispro (HumaLOG) 100 units/mL subcutaneous injection 12 Units  12 Units Subcutaneous Before Dinner    insulin lispro (HumaLOG) 100 units/mL subcutaneous injection 12 Units  12 Units Subcutaneous Daily Before Lunch    insulin lispro (HumaLOG) 100 units/mL subcutaneous injection 18 Units  18 Units Subcutaneous Daily Before Breakfast    insulin lispro (HumaLOG) 100 units/mL subcutaneous injection 2-12 Units  2-12 Units Subcutaneous TID AC    magnesium hydroxide (MILK OF MAGNESIA) 400 mg/5 mL oral suspension 30 mL  30 mL Oral PRN    magnesium oxide (MAG-OX) tablet 400 mg  400 mg Oral Daily    ondansetron (ZOFRAN) injection 4 mg  4 mg Intravenous Q6H PRN    oxybutynin (DITROPAN) tablet 5 mg  5 mg Oral BID    oxyCODONE-acetaminophen (PERCOCET) 5-325 mg per tablet 1 tablet  1 tablet Oral Q4H PRN    oxyCODONE-acetaminophen (PERCOCET) 5-325 mg per tablet 2 tablet  2 tablet Oral Q4H PRN    pantoprazole (PROTONIX) EC tablet 40 mg  40 mg Oral Early Morning    pravastatin (PRAVACHOL) tablet 40 mg  40 mg Oral Daily With Dinner    senna (SENOKOT) tablet 8 6 mg  1 tablet Oral HS    sodium hypochlorite (DAKIN'S HALF-STRENGTH) 0 25 % topical solution 1 application  1 application Irrigation Daily    tiotropium (SPIRIVA) capsule for inhaler 18 mcg  18 mcg Inhalation Daily    warfarin (COUMADIN) tablet 10 mg  10 mg Oral Daily (warfarin)       Imaging:  No new imaging studies for review    Physical Exam:    General appearance: alert, appears stated age, cooperative and no distress  Neurologic: Grossly normal  Neck: no adenopathy, no carotid bruit, no JVD and supple, symmetrical, trachea midline  Lungs: clear to auscultation bilaterally  Heart: irregularly irregular rhythm, S1: normal, S2: normal prosthetic S2, no S3 or S4 and No murmur  Abdomen: soft, non-tender; bowel sounds normal; no masses,  no organomegaly and Nondistended  No abdominal bruits  Extremities: Left BKA stump with moderate edema but no erythema, induration or drainage  No hematoma  Minimal ecchymosis mid incision  Staples intact with edges well approximated  No hemorrhage  No wound edge necrosis  Bilateral lower extremities motor and sensory intact  Right foot dressing intact    Wound/Incision:  As above  Left BKA stump healing well with staples intact  No wound edge necrosis, hemorrhage, hematoma, erythema or dehiscence      Pulse exam:  Radial: Right: 2+ Left[de-identified] 2+  Femoral: Right: 2+ Left: 2+  Popliteal: Right: non-palpable Left: 1+  DP: Right: non-palpable  PT: Right: non-palpable     Donnalee JR Hernandez  9/29/2017   [facility], [phone]

## 2017-09-29 NOTE — PROGRESS NOTES
Critical high PTT >210 on this morning's labwork  Heparin drip immediately placed on hold  Spoke with rapid response CRNP on call  New order to continue to hold heparin and repeat PTT in 3 hours (@08:38) and notify attending physician with results for further instruction with drip

## 2017-09-29 NOTE — OCCUPATIONAL THERAPY NOTE
Occupational Therapy Treatment Note     09/29/17 1143   Restrictions/Precautions   Weight Bearing Precautions Per Order No   Other Precautions Cognitive; Bed Alarm; Chair Alarm;Multiple lines; Fall Risk;Pain;Hard of hearing   Pain Assessment   Pain Assessment 0-10   Pain Score 8   Pain Type Surgical pain   Pain Location Leg   Pain Orientation Left   ADL   Where Assessed Edge of bed   LB Dressing Assistance 1  Total Assistance   LB Dressing Deficit Don/doff R shoe   LB Dressing Comments A required due to limited functional reach and increased pain levels    Functional Standing Tolerance   Time 10 seconds   Activity Functional transfers   Comments Limited stand tolerance due to increased pain levels    Bed Mobility   Supine to Sit 3  Moderate assistance   Additional items Assist x 2; Increased time required;Verbal cues;LE management; Bedrails;HOB elevated   Sit to Supine 3  Moderate assistance   Additional items Assist x 2; Increased time required;Verbal cues;LE management   Transfers   Sit to Stand 2  Maximal assistance   Additional items Assist x 2; Increased time required;Verbal cues   Stand to Sit 2  Maximal assistance   Additional items Assist x 2; Increased time required;Verbal cues   Mechanical lift 2  Maximal assistance   Additional items Assist x 2; Increased time required;Verbal cues   Additional Comments attempted use of quick move, Pt unable to fully extend RLE and weakness in BUE noted    Therapeutic Exercise - ROM   UE-ROM Yes   ROM- Right Upper Extremities   R Shoulder AROM; Flexion; Extension;ABduction   R Elbow AROM;Elbow flexion;Elbow extension   R Wrist AROM; Wrist extension;Wrist flexion   R Hand AROM  (Stress ball squeezes)   R Position (Bed in chair position )   Equipment Other (Comment)  (Stress ball )   R Weight/Reps/Sets 2 sets of 10    RUE ROM Comment Pt became fatigued after activity, slight SOB noted    ROM - Left Upper Extremities    L Shoulder AROM; Flexion; Extension;ABduction   L Elbow AROM;Elbow flexion;Elbow extension   L Wrist AROM; Wrist extension;Wrist flexion   L Hand AROM  (Stress ball squeezes )   L Position (bed in chair position )   Equipment Other (Comment)  (Stress Ball )   L Weight/Reps/Sets 2 sets of 10    LUE ROM Comment Pt became fatigued after activity, slight SOB noted    Cognition   Overall Cognitive Status Impaired   Arousal/Participation Responsive; Cooperative   Attention Difficulty attending to directions   Orientation Level Oriented X4   Memory Decreased recall of precautions   Following Commands Follows one step commands with increased time or repetition   Activity Tolerance   Activity Tolerance Patient limited by fatigue;Patient limited by pain   Medical Staff Made Aware Reported all findings to nursing staff    Assessment   Assessment Pt seen for skilled OT with a focus on functional transfers, BUE ROM, and bed mobility  Upon arrival, Pt had a flat affect  Bed postion as chair to encourage alertness in tx session  Staff member put on music throughout tx session to motivate Pt  Completed BUE ROM with slight SOB noted after activity  Pt able to follow commands with increased processing time  Pt reports feeling pain in LLE during functional activity and that the pain comes and goes  Pt able to complete trunk flexion and extension exercises with use of bed rails 2 sets of 5, with SOB noted  Pt able to sit EOB with need for increased A due to retropulsive movements  Pt's BP after activity 122/58  attempted use of quick move, Pt unable to fully extend RLE and weakness in BUE noted  Pt's wife present throughout tx session and active in care plan  See above levels of assistance required for all functional tasks  Pt will benefit from further rehab to achieve highest level of independence with all functional tasks  Plan   Treatment Interventions ADL retraining;Functional transfer training;UE strengthening/ROM; Cognitive reorientation;Patient/family training; Compensatory technique education;Equipment evaluation/education; Energy conservation; Activityengagement   Goal Expiration Date 10/06/17   Treatment Day 8   OT Frequency 3-5x/wk   Recommendation   Discharge Recommendation Short Term Rehab   [facility]

## 2017-09-30 LAB
ABO GROUP BLD BPU: NORMAL
ANION GAP SERPL CALCULATED.3IONS-SCNC: 7 MMOL/L (ref 4–13)
APTT PPP: 79 SECONDS (ref 23–35)
APTT PPP: 84 SECONDS (ref 23–35)
BPU ID: NORMAL
BUN SERPL-MCNC: 30 MG/DL (ref 5–25)
CALCIUM SERPL-MCNC: 9.2 MG/DL (ref 8.3–10.1)
CHLORIDE SERPL-SCNC: 97 MMOL/L (ref 100–108)
CO2 SERPL-SCNC: 30 MMOL/L (ref 21–32)
CREAT SERPL-MCNC: 1.18 MG/DL (ref 0.6–1.3)
ERYTHROCYTE [DISTWIDTH] IN BLOOD BY AUTOMATED COUNT: 14.8 % (ref 11.6–15.1)
GFR SERPL CREATININE-BSD FRML MDRD: 60 ML/MIN/1.73SQ M
GLUCOSE SERPL-MCNC: 169 MG/DL (ref 65–140)
GLUCOSE SERPL-MCNC: 171 MG/DL (ref 65–140)
GLUCOSE SERPL-MCNC: 213 MG/DL (ref 65–140)
GLUCOSE SERPL-MCNC: 216 MG/DL (ref 65–140)
GLUCOSE SERPL-MCNC: 230 MG/DL (ref 65–140)
HCT VFR BLD AUTO: 26.4 % (ref 36.5–49.3)
HGB BLD-MCNC: 8.5 G/DL (ref 12–17)
INR PPP: 1.3 (ref 0.86–1.16)
MCH RBC QN AUTO: 28.1 PG (ref 26.8–34.3)
MCHC RBC AUTO-ENTMCNC: 32.2 G/DL (ref 31.4–37.4)
MCV RBC AUTO: 87 FL (ref 82–98)
PLATELET # BLD AUTO: 445 THOUSANDS/UL (ref 149–390)
PMV BLD AUTO: 8.6 FL (ref 8.9–12.7)
POTASSIUM SERPL-SCNC: 4.3 MMOL/L (ref 3.5–5.3)
PROTHROMBIN TIME: 16.3 SECONDS (ref 12.1–14.4)
RBC # BLD AUTO: 3.02 MILLION/UL (ref 3.88–5.62)
SODIUM SERPL-SCNC: 134 MMOL/L (ref 136–145)
UNIT DISPENSE STATUS: NORMAL
UNIT PRODUCT CODE: NORMAL
UNIT RH: NORMAL
WBC # BLD AUTO: 9.89 THOUSAND/UL (ref 4.31–10.16)

## 2017-09-30 PROCEDURE — 85027 COMPLETE CBC AUTOMATED: CPT | Performed by: INTERNAL MEDICINE

## 2017-09-30 PROCEDURE — 85730 THROMBOPLASTIN TIME PARTIAL: CPT | Performed by: INTERNAL MEDICINE

## 2017-09-30 PROCEDURE — 80048 BASIC METABOLIC PNL TOTAL CA: CPT | Performed by: INTERNAL MEDICINE

## 2017-09-30 PROCEDURE — 85610 PROTHROMBIN TIME: CPT | Performed by: INTERNAL MEDICINE

## 2017-09-30 PROCEDURE — 82948 REAGENT STRIP/BLOOD GLUCOSE: CPT

## 2017-09-30 RX ADMIN — INSULIN LISPRO 2 UNITS: 100 INJECTION, SOLUTION INTRAVENOUS; SUBCUTANEOUS at 16:57

## 2017-09-30 RX ADMIN — OXYBUTYNIN CHLORIDE 5 MG: 5 TABLET ORAL at 08:37

## 2017-09-30 RX ADMIN — OXYCODONE HYDROCHLORIDE AND ACETAMINOPHEN 1 TABLET: 5; 325 TABLET ORAL at 08:39

## 2017-09-30 RX ADMIN — OXYCODONE HYDROCHLORIDE AND ACETAMINOPHEN 1 TABLET: 5; 325 TABLET ORAL at 19:58

## 2017-09-30 RX ADMIN — GABAPENTIN 100 MG: 100 CAPSULE ORAL at 15:30

## 2017-09-30 RX ADMIN — FLUTICASONE PROPIONATE AND SALMETEROL 1 PUFF: 50; 500 POWDER RESPIRATORY (INHALATION) at 08:41

## 2017-09-30 RX ADMIN — Medication: at 08:42

## 2017-09-30 RX ADMIN — DOCUSATE SODIUM 100 MG: 100 CAPSULE, LIQUID FILLED ORAL at 17:03

## 2017-09-30 RX ADMIN — FLUTICASONE PROPIONATE AND SALMETEROL 1 PUFF: 50; 500 POWDER RESPIRATORY (INHALATION) at 19:54

## 2017-09-30 RX ADMIN — TIOTROPIUM BROMIDE 18 MCG: 18 CAPSULE ORAL; RESPIRATORY (INHALATION) at 08:42

## 2017-09-30 RX ADMIN — HYOSCYAMINE SULFATE 1 APPLICATION: 16 SOLUTION at 08:43

## 2017-09-30 RX ADMIN — DOCUSATE SODIUM 100 MG: 100 CAPSULE, LIQUID FILLED ORAL at 08:36

## 2017-09-30 RX ADMIN — HEPARIN SODIUM AND DEXTROSE 14 UNITS/KG/HR: 10000; 5 INJECTION INTRAVENOUS at 17:00

## 2017-09-30 RX ADMIN — FUROSEMIDE 40 MG: 40 TABLET ORAL at 08:36

## 2017-09-30 RX ADMIN — OXYCODONE HYDROCHLORIDE AND ACETAMINOPHEN 1 TABLET: 5; 325 TABLET ORAL at 15:31

## 2017-09-30 RX ADMIN — INSULIN LISPRO 1 UNITS: 100 INJECTION, SOLUTION INTRAVENOUS; SUBCUTANEOUS at 21:28

## 2017-09-30 RX ADMIN — INSULIN LISPRO 12 UNITS: 100 INJECTION, SOLUTION INTRAVENOUS; SUBCUTANEOUS at 12:19

## 2017-09-30 RX ADMIN — OXYBUTYNIN CHLORIDE 5 MG: 5 TABLET ORAL at 17:04

## 2017-09-30 RX ADMIN — FERROUS SULFATE TAB 325 MG (65 MG ELEMENTAL FE) 325 MG: 325 (65 FE) TAB at 08:36

## 2017-09-30 RX ADMIN — FERROUS SULFATE TAB 325 MG (65 MG ELEMENTAL FE) 325 MG: 325 (65 FE) TAB at 17:03

## 2017-09-30 RX ADMIN — INSULIN GLARGINE 45 UNITS: 100 INJECTION, SOLUTION SUBCUTANEOUS at 21:28

## 2017-09-30 RX ADMIN — Medication 400 MG: at 08:47

## 2017-09-30 RX ADMIN — PANTOPRAZOLE SODIUM 40 MG: 40 TABLET, DELAYED RELEASE ORAL at 06:46

## 2017-09-30 RX ADMIN — SENNOSIDES 8.6 MG: 8.6 TABLET, FILM COATED ORAL at 21:28

## 2017-09-30 RX ADMIN — PRAVASTATIN SODIUM 40 MG: 40 TABLET ORAL at 16:56

## 2017-09-30 RX ADMIN — INSULIN LISPRO 4 UNITS: 100 INJECTION, SOLUTION INTRAVENOUS; SUBCUTANEOUS at 12:19

## 2017-09-30 RX ADMIN — INSULIN LISPRO 4 UNITS: 100 INJECTION, SOLUTION INTRAVENOUS; SUBCUTANEOUS at 08:41

## 2017-09-30 RX ADMIN — GABAPENTIN 100 MG: 100 CAPSULE ORAL at 21:28

## 2017-09-30 RX ADMIN — GABAPENTIN 100 MG: 100 CAPSULE ORAL at 08:36

## 2017-09-30 RX ADMIN — INSULIN LISPRO 12 UNITS: 100 INJECTION, SOLUTION INTRAVENOUS; SUBCUTANEOUS at 16:56

## 2017-09-30 RX ADMIN — WARFARIN SODIUM 10 MG: 5 TABLET ORAL at 17:04

## 2017-09-30 NOTE — PROGRESS NOTES
Progress Note - Podiatry  Anam Chen 78 y o  male MRN: [id number]  Unit/Bed#: E4 -01 Encounter: [id number]    Assessment:  1  Right unstageable posterior heel eschar - POA; stable  2  Left BKA secondary nonsalvageable foot  3  DM, type 2 with neuropathy: A1c - 6 6%  4  PAD - per vascular   5  CKD, stage 3 - stable  6  Xerosis      Plan:  - right heel eschar dry and stable; betadine paint applied with dry sterile dressing  - c/w LWC; NC to be done daily with application of amlactin lotion for xerosis; podiatry to monitor every 3 days next change to be 10/4   - c/w offloading of heel in prevalon boots or pillows behind ankles while in bed to prevent worsening  - patient is WBAT with SS however unable to ambulate well thus needs wheelchair   - LLE BKA management per vascular service      > medical management per primary    Subjective/Objective   Chief Complaint:   Chief Complaint   Patient presents with    Weakness - Generalized     pt woke up this morning just feeling very weak and unable to get up   pt denies fevers, n/v   denies any pain or sick contacts  Subjective: 78 y o  y/o male was seen and evaluated at bedside  Denies any complains  Denies any constitutional symptoms  Blood pressure 135/61, pulse 75, temperature 97 6 °F (36 4 °C), temperature source Tympanic, resp  rate 18, height 5' 9 5" (1 765 m), weight 108 kg (238 lb 1 6 oz), SpO2 97 %  ,Body mass index is 34 66 kg/m²  Invasive Devices     Peripheral Intravenous Line            Peripheral IV 09/26/17 Left Forearm 3 days    Peripheral IV 09/30/17 Right Antecubital less than 1 day          Drain            Urethral Catheter 18 Fr  27 days                Physical Exam:   General: Alert, cooperative and no distress  Lungs: Non labored breathing  Heart: Positive S1, S2  Abdomen: Soft, non-tender  Extremity:   NVS and motor function at baseline  RLE: posterior heel unstageable eschar is dry and stable measures ~ 2cm x 1cm   No acute signs of infection  No drainage  No purulence  LLE: BKA per vascular  Lab, Imaging and other studies:   I have personally reviewed pertinent lab results  , CBC:   Lab Results   Component Value Date    WBC 9 89 09/30/2017    HGB 8 5 (L) 09/30/2017    HCT 26 4 (L) 09/30/2017    MCV 87 09/30/2017     (H) 09/30/2017    MCH 28 1 09/30/2017    MCHC 32 2 09/30/2017    RDW 14 8 09/30/2017    MPV 8 6 (L) 09/30/2017   , CMP: No results found for: NA, K, CL, CO2, ANIONGAP, BUN, CREATININE, GLUCOSE, CALCIUM, AST, ALT, ALKPHOS, PROT, ALBUMIN, BILITOT, EGFR    Imaging: I have personally reviewed pertinent films in PACS  EKG, Pathology, and Other Studies: I have personally reviewed pertinent reports

## 2017-09-30 NOTE — PROGRESS NOTES
Progress Note - Vascular Surgery       Assessment:  POD #4 Left BKA  Left heel osteomyelitis, s/p debridement/partial calcanectomy/VAC 9/8/17, nonhealing w/ necrosis s/p L BKA 9/26/17  PAD with LLE tissue loss, s/p L SGA/pop PTA 9/12/17  Right heel dry eschar  DM II  CKD III  Chronic diastolic HF  Chronic Afib  CAD, s/p CABG w/ L GSV '07  AS, s/p mechanical AVR '07    Plan:  -L BKA dressing changed  Site healing well with no signs necrosis/ischemia or infection  -Daily dry dressing to L BKA with ACE wrap compression  Please do not place pillow under L stump, to avoid contracture formation  -PAD with stable right heel wound  Continue off loading boot to R heel while in bed  Local wound care with Betadine paint  Continue statin therapy   -Heparin-->Coumadin for mechanical valve  -Bedside PT/OT  -Discharge planning  Possible rehab placement at [address] [place] [address]    ______________________________________________________________________    Subjective:  Patient seen in bed  Denies    Vitals:  /61   Pulse 75   Temp 97 6 °F (36 4 °C) (Tympanic)   Resp 18   Ht 5' 9 5" (1 765 m)   Wt 108 kg (238 lb 1 6 oz)   SpO2 97%   BMI 34 66 kg/m²     I/Os:  I/O last 3 completed shifts: In: 2073 2 [P O :1080; I V :643 2; Blood:350]  Out: 0801 [Urine:5400; Drains:10]  No intake/output data recorded      Lab Results and Cultures:   CBC with diff:   Lab Results   Component Value Date    WBC 9 89 09/30/2017    HGB 8 5 (L) 09/30/2017    HCT 26 4 (L) 09/30/2017    MCV 87 09/30/2017     (H) 09/30/2017    MCH 28 1 09/30/2017    MCHC 32 2 09/30/2017    RDW 14 8 09/30/2017    MPV 8 6 (L) 09/30/2017    NRBC 0 09/28/2017   ,   BMP/CMP:  Lab Results   Component Value Date     (L) 09/30/2017    K 4 3 09/30/2017    CL 97 (L) 09/30/2017    CO2 30 09/30/2017    ANIONGAP 7 09/30/2017    BUN 30 (H) 09/30/2017    CREATININE 1 18 09/30/2017    GLUCOSE 216 (H) 09/30/2017    CALCIUM 9 2 09/30/2017    AST 10 09/26/2017    ALT 16 09/26/2017    ALKPHOS 92 09/26/2017    PROT 6 8 09/26/2017    ALBUMIN 2 7 (L) 09/26/2017    BILITOT 0 31 09/26/2017    EGFR 60 09/30/2017   ,   Lipid Panel: No results found for: CHOL,   Coags:   Lab Results   Component Value Date    PTT 79 (H) 09/30/2017    INR 1 30 (H) 09/30/2017   ,     Blood Culture: No results found for: BLOODCX,   Urinalysis:   Lab Results   Component Value Date    COLORU Yellow 09/05/2017    CLARITYU Clear 09/05/2017    SPECGRAV <=1 005 09/05/2017    PHUR 6 5 09/05/2017    LEUKOCYTESUR Small (A) 09/05/2017    NITRITE Positive (A) 09/05/2017    PROTEINUA Negative 09/05/2017    GLUCOSEU Negative 09/05/2017    KETONESU Negative 09/05/2017    BILIRUBINUR Negative 09/05/2017    BLOODU Trace-lysed (A) 09/05/2017   ,   Urine Culture:   Lab Results   Component Value Date    URINECX >100,000 cfu/ml Mixed Contaminants X3 09/02/2017   ,   Wound Culure:   Lab Results   Component Value Date    WOUNDCULT 3+ Growth of Staphylococcus aureus 09/02/2017    WOUNDCULT 3+ Growth of Enterococcus faecalis 09/02/2017    WOUNDCULT 3+ Growth of Mixed Skin [place] 09/02/2017       Medications:  Current Facility-Administered Medications   Medication Dose Route Frequency    acetaminophen (TYLENOL) tablet 650 mg  650 mg Oral Q6H PRN    ammonium lactate (LAC-HYDRIN) 12 % lotion   Topical BID    docusate sodium (COLACE) capsule 100 mg  100 mg Oral BID    ferrous sulfate tablet 325 mg  325 mg Oral BID    fluticasone-salmeterol (ADVAIR) 500-50 mcg/dose inhaler 1 puff  1 puff Inhalation BID    furosemide (LASIX) tablet 40 mg  40 mg Oral Daily    gabapentin (NEURONTIN) capsule 100 mg  100 mg Oral TID    heparin (porcine) 25,000 units in 250 mL infusion (premix)  3-30 Units/kg/hr (Order-Specific) Intravenous Titrated    heparin (porcine) injection 4,000 Units  4,000 Units Intravenous PRN    heparin (porcine) injection 8,000 Units  8,000 Units Intravenous PRN    HYDROmorphone (DILAUDID) 1 mg/mL injection 1 mg  1 mg Intravenous Q3H PRN    insulin glargine (LANTUS) subcutaneous injection 45 Units  45 Units Subcutaneous HS    insulin lispro (HumaLOG) 100 units/mL subcutaneous injection 1-6 Units  1-6 Units Subcutaneous HS    insulin lispro (HumaLOG) 100 units/mL subcutaneous injection 12 Units  12 Units Subcutaneous Before Dinner    insulin lispro (HumaLOG) 100 units/mL subcutaneous injection 12 Units  12 Units Subcutaneous Daily Before Lunch    insulin lispro (HumaLOG) 100 units/mL subcutaneous injection 18 Units  18 Units Subcutaneous Daily Before Breakfast    insulin lispro (HumaLOG) 100 units/mL subcutaneous injection 2-12 Units  2-12 Units Subcutaneous TID AC    magnesium hydroxide (MILK OF MAGNESIA) 400 mg/5 mL oral suspension 30 mL  30 mL Oral PRN    magnesium oxide (MAG-OX) tablet 400 mg  400 mg Oral Daily    ondansetron (ZOFRAN) injection 4 mg  4 mg Intravenous Q6H PRN    oxybutynin (DITROPAN) tablet 5 mg  5 mg Oral BID    oxyCODONE-acetaminophen (PERCOCET) 5-325 mg per tablet 1 tablet  1 tablet Oral Q4H PRN    oxyCODONE-acetaminophen (PERCOCET) 5-325 mg per tablet 2 tablet  2 tablet Oral Q4H PRN    pantoprazole (PROTONIX) EC tablet 40 mg  40 mg Oral Early Morning    pravastatin (PRAVACHOL) tablet 40 mg  40 mg Oral Daily With Dinner    senna (SENOKOT) tablet 8 6 mg  1 tablet Oral HS    sodium hypochlorite (DAKIN'S HALF-STRENGTH) 0 25 % topical solution 1 application  1 application Irrigation Daily    tiotropium (SPIRIVA) capsule for inhaler 18 mcg  18 mcg Inhalation Daily    warfarin (COUMADIN) tablet 10 mg  10 mg Oral Daily (warfarin)       Physical Exam:    General: A&Ox3, cooperative with exam, no acute distress  CV: RRR, prosthetic valve  Respiratory: CTA bilaterally, no rhonchi, rales or wheeze  Respiration even and unlabored  Abdominal: Obese, soft, non-tender  Extremities: Left BKA dsg changed, site healing well with no necrosis, no drainage or signs infection    Stable dry ulcer right heel, off loading boot in use  Right 4-5th toes absent      Neurologic: Grossly normal    Wound/Incision:  As above      NEIL Mims  9/30/2017  [facility]: [phone]

## 2017-09-30 NOTE — PLAN OF CARE
Problem: Nutrition/Hydration-ADULT  Goal: Nutrient/Hydration intake appropriate for improving, restoring or maintaining nutritional needs  Monitor and assess patient's nutrition/hydration status for malnutrition (ex- brittle hair, bruises, dry skin, pale skin and conjunctiva, muscle wasting, smooth red tongue, and disorientation)  Collaborate with interdisciplinary team and initiate plan and interventions as ordered  Monitor patient's weight and dietary intake as ordered or per policy  Utilize nutrition screening tool and intervene per policy  Determine patient's food preferences and provide high-protein, high-caloric foods as appropriate  INTERVENTIONS:  - Monitor oral intake, urinary output, labs, and treatment plans  - Assess nutrition and hydration status and recommend course of action  - Evaluate amount of meals eaten  - Assist patient with eating if necessary   - Allow adequate time for meals  - Recommend/ encourage appropriate diets, oral nutritional supplements, and vitamin/mineral supplements  - Order, calculate, and assess calorie counts as needed  - Recommend, monitor, and adjust tube feedings and TPN/PPN based on assessed needs  - Assess need for intravenous fluids  - Provide specific nutrition/hydration education as appropriate  - Include patient/family/caregiver in decisions related to nutrition   Outcome: Progressing      Problem: Potential for Falls  Goal: Patient will remain free of falls  INTERVENTIONS:  - Assess patient frequently for physical needs  -  Identify cognitive and physical deficits and behaviors that affect risk of falls    -  [place] fall precautions as indicated by assessment   - Educate patient/family on patient safety including physical limitations  - Instruct patient to call for assistance with activity based on assessment  - Modify environment to reduce risk of injury  - Consider OT/PT consult to assist with strengthening/mobility   Outcome: Progressing      Problem: Prexisting or High Potential for Compromised Skin Integrity  Goal: Skin integrity is maintained or improved  INTERVENTIONS:  - Identify patients at risk for skin breakdown  - Assess and monitor skin integrity  - Assess and monitor nutrition and hydration status  - Monitor labs (i e  albumin)  - Assess for incontinence   - Turn and reposition patient  - Assist with mobility/ambulation  - Relieve pressure over bony prominences  - Avoid friction and shearing  - Provide appropriate hygiene as needed including keeping skin clean and dry  - Evaluate need for skin moisturizer/barrier cream  - Collaborate with interdisciplinary team (i e  Nutrition, Rehabilitation, etc )   - Patient/family teaching   Outcome: Progressing      Problem: PAIN - ADULT  Goal: Verbalizes/displays adequate comfort level or baseline comfort level  Interventions:  - Encourage patient to monitor pain and request assistance  - Assess pain using appropriate pain scale  - Administer analgesics based on type and severity of pain and evaluate response  - Implement non-pharmacological measures as appropriate and evaluate response  - Consider cultural and social influences on pain and pain management  - Notify physician/advanced practitioner if interventions unsuccessful or patient reports new pain   Outcome: Progressing      Problem: INFECTION - ADULT  Goal: Absence or prevention of progression during hospitalization  INTERVENTIONS:  - Assess and monitor for signs and symptoms of infection  - Monitor lab/diagnostic results  - Monitor all insertion sites, i e  indwelling lines, tubes, and drains  - Monitor endotracheal (as able) and nasal secretions for changes in amount and color  - [place] appropriate cooling/warming therapies per order  - Administer medications as ordered  - Instruct and encourage patient and family to use good hand hygiene technique  - Identify and instruct in appropriate isolation precautions for identified infection/condition Outcome: Progressing      Problem: SAFETY ADULT  Goal: Maintain or return to baseline ADL function  INTERVENTIONS:  -  Assess patient's ability to carry out ADLs; assess patient's baseline for ADL function and identify physical deficits which impact ability to perform ADLs (bathing, care of mouth/teeth, toileting, grooming, dressing, etc )  - Assess/evaluate cause of self-care deficits   - Assess range of motion  - Assess patient's mobility; develop plan if impaired  - Assess patient's need for assistive devices and provide as appropriate  - Encourage maximum independence but intervene and supervise when necessary  ¯ Involve family in performance of ADLs  ¯ Assess for home care needs following discharge   ¯ Request OT consult to assist with ADL evaluation and planning for discharge  ¯ Provide patient education as appropriate   Outcome: Progressing    Goal: Maintain or return mobility status to optimal level  INTERVENTIONS:  - Assess patient's baseline mobility status (ambulation, transfers, stairs, etc )    - Identify cognitive and physical deficits and behaviors that affect mobility  - Identify mobility aids required to assist with transfers and/or ambulation (gait belt, sit-to-stand, lift, walker, cane, etc )  - [place] fall precautions as indicated by assessment  - Record patient progress and toleration of activity level on Mobility SBAR; progress patient to next Phase/Stage  - Instruct patient to call for assistance with activity based on assessment  - Request Rehabilitation consult to assist with strengthening/weightbearing, etc    Outcome: Progressing      Problem: DISCHARGE PLANNING  Goal: Discharge to home or other facility with appropriate resources  INTERVENTIONS:  - Identify barriers to discharge w/patient and caregiver  - Arrange for needed discharge resources and transportation as appropriate  - Identify discharge learning needs (meds, wound care, etc )  - Arrange for interpretive services to assist at discharge as needed  - Refer to Case Management Department for coordinating discharge planning if the patient needs post-hospital services based on physician/advanced practitioner order or complex needs related to functional status, cognitive ability, or social support system   Outcome: Progressing      Problem: Knowledge Deficit  Goal: Patient/family/caregiver demonstrates understanding of disease process, treatment plan, medications, and discharge instructions  Complete learning assessment and assess knowledge base    Interventions:  - Provide teaching at level of understanding  - Provide teaching via preferred learning methods   Outcome: Progressing      Problem: SKIN/TISSUE INTEGRITY - ADULT  Goal: Skin integrity remains intact  INTERVENTIONS  - Identify patients at risk for skin breakdown  - Assess and monitor skin integrity  - Assess and monitor nutrition and hydration status  - Monitor labs (i e  albumin)  - Assess for incontinence   - Turn and reposition patient  - Assist with mobility/ambulation  - Relieve pressure over bony prominences  - Avoid friction and shearing  - Provide appropriate hygiene as needed including keeping skin clean and dry  - Evaluate need for skin moisturizer/barrier cream  - Collaborate with interdisciplinary team (i e  Nutrition, Rehabilitation, etc )   - Patient/family teaching   Outcome: Progressing    Goal: Incision(s), wounds(s) or drain site(s) healing without S/S of infection  INTERVENTIONS  - Assess and document risk factors for skin impairment   - Assess and document dressing, incision, wound bed, drain sites and surrounding tissue  - Initiate Nutrition services consult and/or wound management as needed   Outcome: Progressing      Problem: DISCHARGE PLANNING - CARE MANAGEMENT  Goal: Discharge to post-acute care or home with appropriate resources  INTERVENTIONS:  - Conduct assessment to determine patient/family and health care team treatment goals, and need for post-acute services based on payer coverage, community resources, and patient preferences, and barriers to discharge  - Address psychosocial, clinical, and financial barriers to discharge as identified in assessment in conjunction with the patient/family and health care team  - Arrange appropriate level of post-acute services according to patients   needs and preference and payer coverage in collaboration with the physician and health care team  - Communicate with and update the patient/family, physician, and health care team regarding progress on the discharge plan  - Arrange appropriate transportation to post-acute venues   Outcome: Progressing

## 2017-09-30 NOTE — PROGRESS NOTES
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    Decubitus ulcer of heel, stage 3    Stage 3 chronic kidney disease    Chronic indwelling Rush catheter    Abnormal urinalysis    Chronic atrial fibrillation    Diabetes mellitus    Chronic diastolic heart failure    PAD (peripheral artery disease)    S/P AVR    Acute blood loss anemia  Resolved Problems:    * No resolved hospital problems  *        Plan  1  Status post left BKA  Surgically stable per vascular surgery  2  Acute blood loss anemia  Appropriate rise in hemoglobin status post 1 unit PRBC  3  Status post AVR mechanical   Continue warfarin with heparin bridging  4  Chronic diastolic CHF  Stable on current dose of furosemide  5  Diabetes mellitus type 2  Stable on glargine and current lispro coverage  6  Chronic kidney disease stage 3  Remains stable  7  Chronic atrial fibrillation  Rate control without any need of agents  8  History of prostate cancer with chronic Rush catheter for urinary retention      VTE Prophylaxis: Heparin Drip  Education and Discussions:  Discussed with wife  Discharge Plan:  It appears feed be his choice for rehab, awaiting therapeutic INR 2 5-3 5  Time Spent for Care:  30 Mins  More than 50% of total time spent on counseling and coordination of care as described above    ______________________________________________________________________________    Subjective:   Patient seen and examined  No new complaints  Filled much more awake with energy since blood transfusion  Objective:   Vitals: Blood pressure 135/61, pulse 75, temperature 97 6 °F (36 4 °C), temperature source Tympanic, resp   rate 18, height 5' 9 5" (1 765 m), weight 108 kg (238 lb 1 6 oz), SpO2 97 %      Physical Exam:   General appearance: alert, appears older than stated age, cooperative and no distress  Head: Normocephalic, without obvious abnormality, atraumatic  Lungs: diminished breath sounds bibasilar  Heart: irregularly irregular rhythm  Abdomen: Soft obese nontender positive bowel sounds  Back: negative, range of motion normal  Extremities: Status post left BKA  Neurologic: Grossly normal    Additional Data:   Labs:    Results from last 7 days  Lab Units 09/30/17  0549 09/29/17  0446 09/28/17  0601 09/27/17  0537 09/26/17  0557 09/25/17  1323   WBC Thousand/uL 9 89 8 64 10 01 13 93* 9 40 9 66   HEMOGLOBIN g/dL 8 5* 7 5* 7 6* 8 8* 9 6* 9 7*   HEMATOCRIT % 26 4* 22 8* 22 8* 26 7* 29 3* 29 3*   MCV fL 87 86 85 86 86 85   PLATELETS Thousands/uL 445* 255  --  334 310 351   INR  1 30* 1 22* 1 12 1 06  --   --        Results from last 7 days  Lab Units 09/30/17  0851 09/29/17  0446 09/28/17  0601 09/27/17  0537 09/26/17  0557   SODIUM mmol/L 134* 134* 133* 133* 137   POTASSIUM mmol/L 4 3 4 3 4 2 4 8 4 6   CHLORIDE mmol/L 97* 97* 98* 96* 98*   CO2 mmol/L 30 32 28 28 32   ANION GAP mmol/L 7 5 7 9 7   BUN mg/dL 30* 26* 26* 27* 28*   CREATININE mg/dL 1 18 1 44* 1 29 1 49* 1 24   CALCIUM mg/dL 9 2 9 0 8 6 9 0 9 8   ALBUMIN g/dL  --   --   --   --  2 7*   BILIRUBIN TOTAL mg/dL  --   --   --   --  0 31   ALK PHOS U/L  --   --   --   --  92   ALT U/L  --   --   --   --  16   AST U/L  --   --   --   --  10   EGFR ml/min/1 73sq m 60 47 54 46 57   GLUCOSE RANDOM mg/dL 216* 166* 195* 257* 220*                        Results from last 7 days  Lab Units 09/30/17  1126 09/30/17  0710 09/29/17  2100 09/29/17  1617 09/29/17  1119 09/29/17  0724 09/28/17  2059 09/28/17  1603 09/28/17  1102 09/28/17  0715 09/27/17  2050 09/27/17  1538   POC GLUCOSE mg/dl 230* 213* 275* 213* 195* 243* 142* 191* 275* 197* 208* 217*             * I Have Reviewed All Lab Data Listed Above     Cultures:     Results from last 7 days  Lab Units 09/26/17  6108   GRAM STAIN RESULT  No Polys or Bacteria seen         Imaging:  Imaging Reports Reviewed Today Include:       Scheduled Meds:  ammonium lactate  Topical BID   docusate sodium 100 mg Oral BID   ferrous sulfate 325 mg Oral BID   fluticasone-salmeterol 1 puff Inhalation BID   furosemide 40 mg Oral Daily   gabapentin 100 mg Oral TID   insulin glargine 45 Units Subcutaneous HS   insulin lispro 1-6 Units Subcutaneous HS   insulin lispro 12 Units Subcutaneous Before Dinner   insulin lispro 12 Units Subcutaneous Daily Before Lunch   insulin lispro 18 Units Subcutaneous Daily Before Breakfast   insulin lispro 2-12 Units Subcutaneous TID AC   magnesium oxide 400 mg Oral Daily   oxybutynin 5 mg Oral BID   pantoprazole 40 mg Oral Early Morning   pravastatin 40 mg Oral Daily With Dinner   senna 1 tablet Oral HS   sodium hypochlorite 1 application Irrigation Daily   tiotropium 18 mcg Inhalation Daily   warfarin 10 mg Oral Daily (warfarin)     Continuous Infusions:  heparin (porcine) 3-30 Units/kg/hr (Order-Specific) Last Rate: 14 Units/kg/hr (09/30/17 0052)     PRN Meds:    acetaminophen    heparin (porcine)    heparin (porcine)    HYDROmorphone    magnesium hydroxide    ondansetron    oxyCODONE-acetaminophen    oxyCODONE-acetaminophen     Peri Benavides DO

## 2017-10-01 LAB
ANION GAP SERPL CALCULATED.3IONS-SCNC: 7 MMOL/L (ref 4–13)
APTT PPP: 41 SECONDS (ref 23–35)
APTT PPP: 59 SECONDS (ref 23–35)
APTT PPP: 86 SECONDS (ref 23–35)
BUN SERPL-MCNC: 32 MG/DL (ref 5–25)
CALCIUM SERPL-MCNC: 9.1 MG/DL (ref 8.3–10.1)
CHLORIDE SERPL-SCNC: 97 MMOL/L (ref 100–108)
CO2 SERPL-SCNC: 31 MMOL/L (ref 21–32)
CREAT SERPL-MCNC: 1.2 MG/DL (ref 0.6–1.3)
ERYTHROCYTE [DISTWIDTH] IN BLOOD BY AUTOMATED COUNT: 15 % (ref 11.6–15.1)
GFR SERPL CREATININE-BSD FRML MDRD: 59 ML/MIN/1.73SQ M
GLUCOSE SERPL-MCNC: 179 MG/DL (ref 65–140)
GLUCOSE SERPL-MCNC: 187 MG/DL (ref 65–140)
GLUCOSE SERPL-MCNC: 190 MG/DL (ref 65–140)
GLUCOSE SERPL-MCNC: 201 MG/DL (ref 65–140)
GLUCOSE SERPL-MCNC: 218 MG/DL (ref 65–140)
HCT VFR BLD AUTO: 26.1 % (ref 36.5–49.3)
HGB BLD-MCNC: 8.4 G/DL (ref 12–17)
INR PPP: 1.52 (ref 0.86–1.16)
MCH RBC QN AUTO: 28.3 PG (ref 26.8–34.3)
MCHC RBC AUTO-ENTMCNC: 32.2 G/DL (ref 31.4–37.4)
MCV RBC AUTO: 88 FL (ref 82–98)
PLATELET # BLD AUTO: 422 THOUSANDS/UL (ref 149–390)
PMV BLD AUTO: 8.3 FL (ref 8.9–12.7)
POTASSIUM SERPL-SCNC: 4.3 MMOL/L (ref 3.5–5.3)
PROTHROMBIN TIME: 18.4 SECONDS (ref 12.1–14.4)
RBC # BLD AUTO: 2.97 MILLION/UL (ref 3.88–5.62)
SODIUM SERPL-SCNC: 135 MMOL/L (ref 136–145)
WBC # BLD AUTO: 10.19 THOUSAND/UL (ref 4.31–10.16)

## 2017-10-01 PROCEDURE — 80048 BASIC METABOLIC PNL TOTAL CA: CPT | Performed by: INTERNAL MEDICINE

## 2017-10-01 PROCEDURE — 85730 THROMBOPLASTIN TIME PARTIAL: CPT | Performed by: INTERNAL MEDICINE

## 2017-10-01 PROCEDURE — 82948 REAGENT STRIP/BLOOD GLUCOSE: CPT

## 2017-10-01 PROCEDURE — 85027 COMPLETE CBC AUTOMATED: CPT | Performed by: INTERNAL MEDICINE

## 2017-10-01 PROCEDURE — 85610 PROTHROMBIN TIME: CPT | Performed by: INTERNAL MEDICINE

## 2017-10-01 RX ADMIN — OXYBUTYNIN CHLORIDE 5 MG: 5 TABLET ORAL at 17:10

## 2017-10-01 RX ADMIN — FLUTICASONE PROPIONATE AND SALMETEROL 1 PUFF: 50; 500 POWDER RESPIRATORY (INHALATION) at 08:05

## 2017-10-01 RX ADMIN — HEPARIN SODIUM AND DEXTROSE 18 UNITS/KG/HR: 10000; 5 INJECTION INTRAVENOUS at 08:58

## 2017-10-01 RX ADMIN — HEPARIN SODIUM AND DEXTROSE 20 UNITS/KG/HR: 10000; 5 INJECTION INTRAVENOUS at 21:34

## 2017-10-01 RX ADMIN — GABAPENTIN 100 MG: 100 CAPSULE ORAL at 08:05

## 2017-10-01 RX ADMIN — GABAPENTIN 100 MG: 100 CAPSULE ORAL at 21:05

## 2017-10-01 RX ADMIN — INSULIN LISPRO 12 UNITS: 100 INJECTION, SOLUTION INTRAVENOUS; SUBCUTANEOUS at 11:59

## 2017-10-01 RX ADMIN — INSULIN LISPRO 2 UNITS: 100 INJECTION, SOLUTION INTRAVENOUS; SUBCUTANEOUS at 21:04

## 2017-10-01 RX ADMIN — FERROUS SULFATE TAB 325 MG (65 MG ELEMENTAL FE) 325 MG: 325 (65 FE) TAB at 08:05

## 2017-10-01 RX ADMIN — HEPARIN SODIUM 4000 UNITS: 1000 INJECTION, SOLUTION INTRAVENOUS; SUBCUTANEOUS at 15:49

## 2017-10-01 RX ADMIN — INSULIN LISPRO 2 UNITS: 100 INJECTION, SOLUTION INTRAVENOUS; SUBCUTANEOUS at 17:11

## 2017-10-01 RX ADMIN — INSULIN GLARGINE 45 UNITS: 100 INJECTION, SOLUTION SUBCUTANEOUS at 21:13

## 2017-10-01 RX ADMIN — GABAPENTIN 100 MG: 100 CAPSULE ORAL at 17:10

## 2017-10-01 RX ADMIN — FLUTICASONE PROPIONATE AND SALMETEROL 1 PUFF: 50; 500 POWDER RESPIRATORY (INHALATION) at 20:59

## 2017-10-01 RX ADMIN — DOCUSATE SODIUM 100 MG: 100 CAPSULE, LIQUID FILLED ORAL at 08:05

## 2017-10-01 RX ADMIN — FERROUS SULFATE TAB 325 MG (65 MG ELEMENTAL FE) 325 MG: 325 (65 FE) TAB at 17:10

## 2017-10-01 RX ADMIN — INSULIN LISPRO 2 UNITS: 100 INJECTION, SOLUTION INTRAVENOUS; SUBCUTANEOUS at 08:07

## 2017-10-01 RX ADMIN — OXYCODONE HYDROCHLORIDE AND ACETAMINOPHEN 1 TABLET: 5; 325 TABLET ORAL at 17:10

## 2017-10-01 RX ADMIN — DOCUSATE SODIUM 100 MG: 100 CAPSULE, LIQUID FILLED ORAL at 17:11

## 2017-10-01 RX ADMIN — HEPARIN SODIUM 8000 UNITS: 1000 INJECTION, SOLUTION INTRAVENOUS; SUBCUTANEOUS at 08:06

## 2017-10-01 RX ADMIN — INSULIN LISPRO 4 UNITS: 100 INJECTION, SOLUTION INTRAVENOUS; SUBCUTANEOUS at 11:59

## 2017-10-01 RX ADMIN — Medication 400 MG: at 08:05

## 2017-10-01 RX ADMIN — WARFARIN SODIUM 12.5 MG: 7.5 TABLET ORAL at 17:10

## 2017-10-01 RX ADMIN — INSULIN LISPRO 12 UNITS: 100 INJECTION, SOLUTION INTRAVENOUS; SUBCUTANEOUS at 17:11

## 2017-10-01 RX ADMIN — PANTOPRAZOLE SODIUM 40 MG: 40 TABLET, DELAYED RELEASE ORAL at 06:00

## 2017-10-01 RX ADMIN — Medication: at 17:11

## 2017-10-01 RX ADMIN — OXYBUTYNIN CHLORIDE 5 MG: 5 TABLET ORAL at 08:05

## 2017-10-01 RX ADMIN — SENNOSIDES 8.6 MG: 8.6 TABLET, FILM COATED ORAL at 21:05

## 2017-10-01 RX ADMIN — Medication: at 08:05

## 2017-10-01 RX ADMIN — TIOTROPIUM BROMIDE 18 MCG: 18 CAPSULE ORAL; RESPIRATORY (INHALATION) at 08:05

## 2017-10-01 RX ADMIN — FUROSEMIDE 40 MG: 40 TABLET ORAL at 08:05

## 2017-10-01 RX ADMIN — PRAVASTATIN SODIUM 40 MG: 40 TABLET ORAL at 17:10

## 2017-10-01 NOTE — PLAN OF CARE
DISCHARGE PLANNING     Discharge to home or other facility with appropriate resources Progressing        DISCHARGE PLANNING - CARE MANAGEMENT     Discharge to post-acute care or home with appropriate resources Progressing        GENITOURINARY - ADULT     Urinary catheter remains patent Progressing        INFECTION - ADULT     Absence or prevention of progression during hospitalization Progressing        Knowledge Deficit     Patient/family/caregiver demonstrates understanding of disease process, treatment plan, medications, and discharge instructions Progressing        METABOLIC, FLUID AND ELECTROLYTES - ADULT     Glucose maintained within target range Progressing        Nutrition/Hydration-ADULT     Nutrient/Hydration intake appropriate for improving, restoring or maintaining nutritional needs Progressing        PAIN - ADULT     Verbalizes/displays adequate comfort level or baseline comfort level Progressing        Potential for Falls     Patient will remain free of falls Progressing        Prexisting or High Potential for Compromised Skin Integrity     Skin integrity is maintained or improved Progressing        SAFETY ADULT     Maintain or return to baseline ADL function Progressing     Maintain or return mobility status to optimal level Progressing        SKIN/TISSUE INTEGRITY - ADULT     Skin integrity remains intact Progressing     Incision(s), wounds(s) or drain site(s) healing without S/S of infection Progressing

## 2017-10-01 NOTE — PROGRESS NOTES
Patients IV site on R antecubital has infiltrated with Heparin infusing  Heparin gtt placed on hold, Ice applied, IV removed   Attending MD made aware, will CTM

## 2017-10-01 NOTE — PROGRESS NOTES
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    Decubitus ulcer of heel, stage 3    Stage 3 chronic kidney disease    Chronic indwelling Rush catheter    Abnormal urinalysis    Chronic atrial fibrillation    Diabetes mellitus    Chronic diastolic heart failure    PAD (peripheral artery disease)    S/P AVR    Acute blood loss anemia  Resolved Problems:    * No resolved hospital problems  *        Plan  1  Status post left knee amputation of left lower extremity  Stable from vascular surgery standpoint for discharge  2  Acute blood loss anemia  Given 1 unit PRBC 9/29  3  Diabetes mellitus type 2  Relatively stable on current dose of glargine and lispro  4  History of prostate cancer with chronic Rush catheter  Discussed with patient's wife, due for change tomorrow  Historically has been changed by nursing, will order exchange tomorrow 10/2  5  Chronic kidney disease stage 3  Stable  6  Status post AVR mechanical valve  Continue warfarin with heparin bridging  Increase warfarin to 12 5 mg   Historically on alternating 5/7 5 mg with goal INR 2 5-3 5  7  Chronic diastolic CHF  Stable on furosemide      VTE Prophylaxis: Heparin Drip  Education and Discussions:  Discussed with patient's wife  Discharge Plan:  When INR greater than 2 5  Time Spent for Care:  30 Mins  More than 50% of total time spent on counseling and coordination of care as described above    ______________________________________________________________________________    Subjective:   Patient seen and examined  No chest pain shortness of breath    Wife at bedside states that patient looks well since blood transfusion    Objective: Vitals: Blood pressure 118/79, pulse 65, temperature (!) 97 °F (36 1 °C), temperature source Tympanic, resp  rate 18, height 5' 9 5" (1 765 m), weight 108 kg (238 lb 1 6 oz), SpO2 94 %      Physical Exam:   General appearance: alert, appears older than stated age and cooperative  Head: Normocephalic, without obvious abnormality, atraumatic  Lungs: diminished breath sounds bilaterally  Heart: irregularly irregular rhythm  Abdomen: soft, non-tender; bowel sounds normal; no masses,  no organomegaly  Back: negative, range of motion normal  Extremities: Left below-knee amputation  Neurologic: Grossly normal    Additional Data:   Labs:    Results from last 7 days  Lab Units 10/01/17  0614 09/30/17  0549 09/29/17  0446 09/28/17  0601 09/27/17  0537 09/26/17  0557 09/25/17  1323   WBC Thousand/uL 10 19* 9 89 8 64 10 01 13 93* 9 40 9 66   HEMOGLOBIN g/dL 8 4* 8 5* 7 5* 7 6* 8 8* 9 6* 9 7*   HEMATOCRIT % 26 1* 26 4* 22 8* 22 8* 26 7* 29 3* 29 3*   MCV fL 88 87 86 85 86 86 85   PLATELETS Thousands/uL 422* 445* 255  --  334 310 351   INR  1 52* 1 30* 1 22* 1 12 1 06  --   --        Results from last 7 days  Lab Units 10/01/17  0614 09/30/17  0851 09/29/17  0446 09/28/17  0601 09/27/17  0537 09/26/17  0557   SODIUM mmol/L 135* 134* 134* 133* 133* 137   POTASSIUM mmol/L 4 3 4 3 4 3 4 2 4 8 4 6   CHLORIDE mmol/L 97* 97* 97* 98* 96* 98*   CO2 mmol/L 31 30 32 28 28 32   ANION GAP mmol/L 7 7 5 7 9 7   BUN mg/dL 32* 30* 26* 26* 27* 28*   CREATININE mg/dL 1 20 1 18 1 44* 1 29 1 49* 1 24   CALCIUM mg/dL 9 1 9 2 9 0 8 6 9 0 9 8   ALBUMIN g/dL  --   --   --   --   --  2 7*   BILIRUBIN TOTAL mg/dL  --   --   --   --   --  0 31   ALK PHOS U/L  --   --   --   --   --  92   ALT U/L  --   --   --   --   --  16   AST U/L  --   --   --   --   --  10   EGFR ml/min/1 73sq m 59 60 47 54 46 57   GLUCOSE RANDOM mg/dL 179* 216* 166* 195* 257* 220*                        Results from last 7 days  Lab Units 10/01/17  1108 10/01/17  0712 09/30/17 2051 09/30/17  1610 09/30/17  1126 09/30/17  0710 09/29/17  2100 09/29/17  1617 09/29/17  1119 09/29/17  0724 09/28/17  2059 09/28/17  1603   POC GLUCOSE mg/dl 218* 187* 169* 171* 230* 213* 275* 213* 195* 243* 142* 191*             * I Have Reviewed All Lab Data Listed Above      Cultures:     Results from last 7 days  Lab Units 09/26/17  1758   GRAM STAIN RESULT  No Polys or Bacteria seen         Imaging:  Imaging Reports Reviewed Today Include:       Scheduled Meds:  ammonium lactate  Topical BID   docusate sodium 100 mg Oral BID   ferrous sulfate 325 mg Oral BID   fluticasone-salmeterol 1 puff Inhalation BID   furosemide 40 mg Oral Daily   gabapentin 100 mg Oral TID   insulin glargine 45 Units Subcutaneous HS   insulin lispro 1-6 Units Subcutaneous HS   insulin lispro 12 Units Subcutaneous Before Dinner   insulin lispro 12 Units Subcutaneous Daily Before Lunch   insulin lispro 18 Units Subcutaneous Daily Before Breakfast   insulin lispro 2-12 Units Subcutaneous TID AC   magnesium oxide 400 mg Oral Daily   oxybutynin 5 mg Oral BID   pantoprazole 40 mg Oral Early Morning   pravastatin 40 mg Oral Daily With Dinner   senna 1 tablet Oral HS   sodium hypochlorite 1 application Irrigation Daily   tiotropium 18 mcg Inhalation Daily   warfarin 10 mg Oral Daily (warfarin)     Continuous Infusions:  heparin (porcine) 3-30 Units/kg/hr (Order-Specific) Last Rate: 18 Units/kg/hr (10/01/17 0858)     PRN Meds:    acetaminophen    heparin (porcine)    heparin (porcine)    HYDROmorphone    magnesium hydroxide    ondansetron    oxyCODONE-acetaminophen    oxyCODONE-acetaminophen     Lacretia DO Zoila

## 2017-10-02 PROBLEM — L89.603 DECUBITUS ULCER OF HEEL, STAGE 3 (HCC): Status: RESOLVED | Noted: 2017-09-02 | Resolved: 2017-10-02

## 2017-10-02 PROBLEM — L97.424: Status: RESOLVED | Noted: 2017-09-02 | Resolved: 2017-10-02

## 2017-10-02 LAB
ANION GAP SERPL CALCULATED.3IONS-SCNC: 9 MMOL/L (ref 4–13)
APTT PPP: 84 SECONDS (ref 23–35)
BUN SERPL-MCNC: 32 MG/DL (ref 5–25)
CALCIUM SERPL-MCNC: 9.2 MG/DL (ref 8.3–10.1)
CHLORIDE SERPL-SCNC: 96 MMOL/L (ref 100–108)
CO2 SERPL-SCNC: 29 MMOL/L (ref 21–32)
CREAT SERPL-MCNC: 1.3 MG/DL (ref 0.6–1.3)
ERYTHROCYTE [DISTWIDTH] IN BLOOD BY AUTOMATED COUNT: 15.1 % (ref 11.6–15.1)
GFR SERPL CREATININE-BSD FRML MDRD: 54 ML/MIN/1.73SQ M
GLUCOSE SERPL-MCNC: 169 MG/DL (ref 65–140)
GLUCOSE SERPL-MCNC: 210 MG/DL (ref 65–140)
GLUCOSE SERPL-MCNC: 226 MG/DL (ref 65–140)
GLUCOSE SERPL-MCNC: 233 MG/DL (ref 65–140)
GLUCOSE SERPL-MCNC: 260 MG/DL (ref 65–140)
HCT VFR BLD AUTO: 28.3 % (ref 36.5–49.3)
HGB BLD-MCNC: 9.2 G/DL (ref 12–17)
INR PPP: 2.01 (ref 0.86–1.16)
MCH RBC QN AUTO: 28.4 PG (ref 26.8–34.3)
MCHC RBC AUTO-ENTMCNC: 32.5 G/DL (ref 31.4–37.4)
MCV RBC AUTO: 87 FL (ref 82–98)
PLATELET # BLD AUTO: 402 THOUSANDS/UL (ref 149–390)
PMV BLD AUTO: 8.1 FL (ref 8.9–12.7)
POTASSIUM SERPL-SCNC: 4.3 MMOL/L (ref 3.5–5.3)
PROTHROMBIN TIME: 23 SECONDS (ref 12.1–14.4)
RBC # BLD AUTO: 3.24 MILLION/UL (ref 3.88–5.62)
SODIUM SERPL-SCNC: 134 MMOL/L (ref 136–145)
WBC # BLD AUTO: 9 THOUSAND/UL (ref 4.31–10.16)

## 2017-10-02 PROCEDURE — 97530 THERAPEUTIC ACTIVITIES: CPT

## 2017-10-02 PROCEDURE — 80048 BASIC METABOLIC PNL TOTAL CA: CPT | Performed by: INTERNAL MEDICINE

## 2017-10-02 PROCEDURE — 85730 THROMBOPLASTIN TIME PARTIAL: CPT | Performed by: INTERNAL MEDICINE

## 2017-10-02 PROCEDURE — 82948 REAGENT STRIP/BLOOD GLUCOSE: CPT

## 2017-10-02 PROCEDURE — 85610 PROTHROMBIN TIME: CPT | Performed by: INTERNAL MEDICINE

## 2017-10-02 PROCEDURE — 0T2BX0Z CHANGE DRAINAGE DEVICE IN BLADDER, EXTERNAL APPROACH: ICD-10-PCS | Performed by: INTERNAL MEDICINE

## 2017-10-02 PROCEDURE — 85027 COMPLETE CBC AUTOMATED: CPT | Performed by: INTERNAL MEDICINE

## 2017-10-02 PROCEDURE — 97535 SELF CARE MNGMENT TRAINING: CPT

## 2017-10-02 RX ADMIN — GABAPENTIN 100 MG: 100 CAPSULE ORAL at 15:33

## 2017-10-02 RX ADMIN — INSULIN GLARGINE 45 UNITS: 100 INJECTION, SOLUTION SUBCUTANEOUS at 21:21

## 2017-10-02 RX ADMIN — FERROUS SULFATE TAB 325 MG (65 MG ELEMENTAL FE) 325 MG: 325 (65 FE) TAB at 08:10

## 2017-10-02 RX ADMIN — TIOTROPIUM BROMIDE 18 MCG: 18 CAPSULE ORAL; RESPIRATORY (INHALATION) at 08:10

## 2017-10-02 RX ADMIN — INSULIN LISPRO 3 UNITS: 100 INJECTION, SOLUTION INTRAVENOUS; SUBCUTANEOUS at 21:21

## 2017-10-02 RX ADMIN — FLUTICASONE PROPIONATE AND SALMETEROL 1 PUFF: 50; 500 POWDER RESPIRATORY (INHALATION) at 08:10

## 2017-10-02 RX ADMIN — Medication 400 MG: at 08:10

## 2017-10-02 RX ADMIN — GABAPENTIN 100 MG: 100 CAPSULE ORAL at 21:20

## 2017-10-02 RX ADMIN — INSULIN LISPRO 12 UNITS: 100 INJECTION, SOLUTION INTRAVENOUS; SUBCUTANEOUS at 16:57

## 2017-10-02 RX ADMIN — Medication: at 17:01

## 2017-10-02 RX ADMIN — HYOSCYAMINE SULFATE 1 APPLICATION: 16 SOLUTION at 09:00

## 2017-10-02 RX ADMIN — FUROSEMIDE 40 MG: 40 TABLET ORAL at 08:10

## 2017-10-02 RX ADMIN — DOCUSATE SODIUM 100 MG: 100 CAPSULE, LIQUID FILLED ORAL at 08:10

## 2017-10-02 RX ADMIN — WARFARIN SODIUM 12.5 MG: 7.5 TABLET ORAL at 17:00

## 2017-10-02 RX ADMIN — OXYBUTYNIN CHLORIDE 5 MG: 5 TABLET ORAL at 17:00

## 2017-10-02 RX ADMIN — FLUTICASONE PROPIONATE AND SALMETEROL 1 PUFF: 50; 500 POWDER RESPIRATORY (INHALATION) at 20:46

## 2017-10-02 RX ADMIN — SENNOSIDES 8.6 MG: 8.6 TABLET, FILM COATED ORAL at 21:20

## 2017-10-02 RX ADMIN — DOCUSATE SODIUM 100 MG: 100 CAPSULE, LIQUID FILLED ORAL at 17:00

## 2017-10-02 RX ADMIN — INSULIN LISPRO 4 UNITS: 100 INJECTION, SOLUTION INTRAVENOUS; SUBCUTANEOUS at 16:56

## 2017-10-02 RX ADMIN — FERROUS SULFATE TAB 325 MG (65 MG ELEMENTAL FE) 325 MG: 325 (65 FE) TAB at 17:00

## 2017-10-02 RX ADMIN — OXYCODONE HYDROCHLORIDE AND ACETAMINOPHEN 2 TABLET: 5; 325 TABLET ORAL at 13:14

## 2017-10-02 RX ADMIN — Medication: at 08:17

## 2017-10-02 RX ADMIN — INSULIN LISPRO 4 UNITS: 100 INJECTION, SOLUTION INTRAVENOUS; SUBCUTANEOUS at 08:10

## 2017-10-02 RX ADMIN — PANTOPRAZOLE SODIUM 40 MG: 40 TABLET, DELAYED RELEASE ORAL at 04:42

## 2017-10-02 RX ADMIN — OXYBUTYNIN CHLORIDE 5 MG: 5 TABLET ORAL at 08:10

## 2017-10-02 RX ADMIN — GABAPENTIN 100 MG: 100 CAPSULE ORAL at 08:10

## 2017-10-02 RX ADMIN — OXYCODONE HYDROCHLORIDE AND ACETAMINOPHEN 2 TABLET: 5; 325 TABLET ORAL at 06:11

## 2017-10-02 RX ADMIN — INSULIN LISPRO 12 UNITS: 100 INJECTION, SOLUTION INTRAVENOUS; SUBCUTANEOUS at 11:42

## 2017-10-02 RX ADMIN — INSULIN LISPRO 2 UNITS: 100 INJECTION, SOLUTION INTRAVENOUS; SUBCUTANEOUS at 11:42

## 2017-10-02 RX ADMIN — PRAVASTATIN SODIUM 40 MG: 40 TABLET ORAL at 15:33

## 2017-10-02 NOTE — PLAN OF CARE
Problem: PHYSICAL THERAPY ADULT  Goal: Performs mobility at highest level of function for planned discharge setting  See evaluation for individualized goals  Treatment/Interventions: Functional transfer training, LE strengthening/ROM,   Therapeutic exercise, Endurance training, Patient/family training, Equipment eval/education, Bed mobility, OT  Equipment Recommended:      See flowsheet documentation for full assessment, interventions and recommendations  Outcome: Progressing  Prognosis: Guarded  Problem List: Decreased strength, Decreased endurance, Impaired balance, Decreased mobility, Impaired judgement, Obesity, Decreased skin integrity, Pain, Orthopedic restrictions  Assessment: Improved bed mobility & sitting balance & tolerance at EOB this tx session  Pt progressed to modAx1 for supine to sit w/ fair- sitting balance  Attempted sit<>stand transitions w/ RW 3x but pt unable to fully stand  Pt able to slightly clear buttocks off bed but unable to tolerate a complete a stand w/ RW  Hence completed bed to chair transfer via Venturepax transfer board w/ maxAx2 + cues for techniques  Will continue PT per POC  Pt will continue to benefit from inpt rehab at D/C  Pt tolerated OOB in chair at end of session w/o issues  Call bell in reach  Wife updated w/ pt's progress  RN Mary Tinajero made aware of pt's current mode of transfer w/ good understanding  Nsg staff to continue to mobilized pt as tolerated to prevent further decline in function  Nsg notified  Barriers to Discharge: Inaccessible home environment     Recommendation: Post acute IP rehab     PT - OK to Discharge: Yes (to inpt rehab)    See flowsheet documentation for full assessment

## 2017-10-02 NOTE — WOUND OSTOMY CARE
Progress Note - Wound   Inis Heather 78 y o  male MRN: [id number]  Unit/Bed#: E4 -01 Encounter: [id number]      Assessment:   Patient seen for wound care follow-up  Resting in bed  Denies pain at rest   States he has some pain "if I move too fast "  Patient reports a good appetite--limited meal intake documentation available  Chronic beckett catheter in place  Patient continues to have occasional bowel incontinence--no recent diarrhea  Findings:  1  Hospital acquired stage II pressure injury to right buttocks--healing  2   Bilateral foot/heel wounds--not assessed by wound care team   Podiatry and vascular following  Plan:   1   Turn and reposition patient every 2 hours  2   Elevate right heel off of bed in prevalon boots per podiatry  3   Apply calazime cream to OPEN area on right buttocks and Hydraguard lotion to INTACT areas of b/l buttocks and sacrum TID & PRN  4   Sofcare cushion with OOB to chair  5   Moisturize skin daily with nourishing lotion  6   Wound care team will follow weekly      Plan of care reviewed with patient and primary RN, Shelly      Subjective:  Patient continues to receive treatment of RLE ulcer with podiatry  Vascular team performed left BKA on 9/26/2017  Patient received 1 unit PRBCs post operatively  Getting OOB to chair with specialty slide board or sit-to-stand with physical therapy  Objective:    Vitals: Blood pressure 138/65, pulse 66, temperature 97 8 °F (36 6 °C), temperature source Tympanic, resp  rate 18, height 5' 9 5" (1 765 m), weight 108 kg (238 lb 1 6 oz), SpO2 99 %  ,Body mass index is 34 66 kg/m²          Pressure Ulcer 09/24/17 Buttocks Right shallow crater; pink; no drainage (Active)   Staging Stage II 10/2/2017 12:00 PM   Wound Description Clean;Pink 10/2/2017 12:00 PM   Saira-wound Assessment Clean;Fragile;Pink 10/2/2017 12:00 PM   Shape Round 10/2/2017 12:00 PM   Wound Length (cm) 0 2 cm 10/2/2017 12:00 PM   Wound Width (cm) 0 2 cm 10/2/2017 12:00 PM   Wound Depth (cm) 0 1 10/2/2017 12:00 PM   Calculated Wound Area (cm^2) 0 04 cm^2 10/2/2017 12:00 PM   Calculated Wound Volume (cm^3) 0 cm^3 10/2/2017 12:00 PM   Drainage Amount None 10/2/2017 12:00 PM   Drainage Description Bloody 9/25/2017  1:30 PM   Treatment Cleansed; Offload; Turn & reposition 10/2/2017 12:00 PM   Dressing Protective barrier 10/2/2017 12:00 PM   Patient Tolerance Tolerated well 10/2/2017 12:00 PM   Dressing Status Open to air 10/2/2017  8:00 AM           Please contact the wound care team at extension [phone] with any questions    Lucio Peñaloza BSN ,RN, CCRN

## 2017-10-02 NOTE — PROGRESS NOTES
[facility] Progress Note  Patient: Raymond Taylor 78 y o  male   MRN: [id number]  PCP: Juan Manzo MD  Unit/Bed#: E4 -38 Encounter: [id number]  Date Of Visit: 10/02/17    Assessment and plan:    Principal Problem:    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- stable POD#6  Active Problems:    S/P AVR- clinically euvolemic    Chronic atrial fibrillation- with valvular heart disease  Continue warfarin 12 5 mg tonight  Check INR in a m       Diabetes mellitus-sugars fairly controlled  Continue Lantus 45 units q h s  and Humalog 1212    Chronic diastolic heart failure-clinically euvolemic  Continue Lasix 40 mg p o  daily    PAD (peripheral artery disease)-status post left BKA  Chronic indwelling Rush catheter-status post Rush change today    Acute blood loss anemia-stable and asymptomatic    Stage 3 chronic kidney disease-at baseline             VTE Pharmacologic Prophylaxis:   Pharmacologic: Heparin Drip  Mechanical VTE Prophylaxis in Place: No    Discussions with Specialists or Other Care Team Provider:  Hospital course reviewed    Education and Discussions with Family / Patient:  Spoke with wife at bedside    Time Spent for Care: More than 50% of total time spent on counseling and coordination of care as described above  Current Length of Stay: 30 day(s)    Current Patient Status: Inpatient   Certification Statement: The patient will continue to require additional inpatient hospital stay due to Placement    Discharge Plan:  Anticipate discharge to rehab in 24-48 hours when available    Code Status: Level 1 - Full Code      Subjective:   Denies any left leg pain  Denies any shortness of breath  Patient and wife eager to go to rehab    He has been in the hospital for 30 days now    Objective:     Vitals:   Temp (24hrs), Av 2 °F (36 2 °C), Min:96 7 °F (35 9 °C), Max:97 8 °F (36 6 °C)    HR:  [61-66] 63  Resp:  [18] 18  BP: (125-147)/(51-65) 147/51  SpO2: [98 %-99 %] 99 %  Body mass index is 34 66 kg/m²  Input and Output Summary (last 24 hours): Intake/Output Summary (Last 24 hours) at 10/02/17 1728  Last data filed at 10/02/17 1414   Gross per 24 hour   Intake           1246 1 ml   Output             3050 ml   Net          -1803 9 ml       Physical Exam:     Physical Exam   Constitutional: He appears well-developed  No distress  HENT:   Head: Normocephalic and atraumatic  Mouth/Throat: No oropharyngeal exudate  Eyes: No scleral icterus  Cardiovascular:   Murmur heard  Irregular   Pulmonary/Chest: Effort normal and breath sounds normal  No stridor  No respiratory distress  He has no wheezes  Abdominal: Soft  Bowel sounds are normal  He exhibits no distension  There is no tenderness  Genitourinary:   Genitourinary Comments: Rush with clear yellow urine output   Musculoskeletal:   Status post left BKA   Neurological: He is alert  Skin: Skin is warm and dry  He is not diaphoretic  Psychiatric: He has a normal mood and affect  Additional Data:     Labs:      Results from last 7 days  Lab Units 10/02/17  0438  09/28/17  0601   WBC Thousand/uL 9 00  < > 10 01   HEMOGLOBIN g/dL 9 2*  < > 7 6*   HEMATOCRIT % 28 3*  < > 22 8*   PLATELETS Thousands/uL 402*  < >  --    LYMPHO PCT %  --   --  22   MONO PCT MAN %  --   --  10   EOSINO PCT MANUAL %  --   --  4   < > = values in this interval not displayed  Results from last 7 days  Lab Units 10/02/17  0438  09/26/17  0557   SODIUM mmol/L 134*  < > 137   POTASSIUM mmol/L 4 3  < > 4 6   CHLORIDE mmol/L 96*  < > 98*   CO2 mmol/L 29  < > 32   BUN mg/dL 32*  < > 28*   CREATININE mg/dL 1 30  < > 1 24   CALCIUM mg/dL 9 2  < > 9 8   TOTAL PROTEIN g/dL  --   --  6 8   BILIRUBIN TOTAL mg/dL  --   --  0 31   ALK PHOS U/L  --   --  92   ALT U/L  --   --  16   AST U/L  --   --  10   GLUCOSE RANDOM mg/dL 226*  < > 220*   < > = values in this interval not displayed      Results from last 7 days  Lab Units 10/02/17  0438   INR  2 01*       * I Have Reviewed All Lab Data Listed Above  * Additional Pertinent Lab Tests Reviewed: All Labs Within Last 24 Hours Reviewed    Imaging:    Imaging Reports Reviewed Today Include:  No new imaging      Recent Cultures (last 7 days):       Results from last 7 days  Lab Units 09/26/17  7298   GRAM STAIN RESULT  No Polys or Bacteria seen       Last 24 Hours Medication List:     ammonium lactate  Topical BID   docusate sodium 100 mg Oral BID   ferrous sulfate 325 mg Oral BID   fluticasone-salmeterol 1 puff Inhalation BID   furosemide 40 mg Oral Daily   gabapentin 100 mg Oral TID   insulin glargine 45 Units Subcutaneous HS   insulin lispro 1-6 Units Subcutaneous HS   insulin lispro 12 Units Subcutaneous Before Dinner   insulin lispro 12 Units Subcutaneous Daily Before Lunch   insulin lispro 18 Units Subcutaneous Daily Before Breakfast   insulin lispro 2-12 Units Subcutaneous TID AC   magnesium oxide 400 mg Oral Daily   oxybutynin 5 mg Oral BID   pantoprazole 40 mg Oral Early Morning   pravastatin 40 mg Oral Daily With Dinner   senna 1 tablet Oral HS   sodium hypochlorite 1 application Irrigation Daily   tiotropium 18 mcg Inhalation Daily   warfarin 12 5 mg Oral Daily (warfarin)        Today, Patient Was Seen By: Isha Santizo MD    ** Please Note: Dragon 360 Dictation voice to text software may have been used in the creation of this document   **

## 2017-10-02 NOTE — PHYSICAL THERAPY NOTE
PT PROGRESS NOTE     10/02/17 1234   Pain Assessment   Pain Rating: FLACC (Rest) - Face 0   Pain Rating: FLACC (Rest) - Legs 0   Pain Rating: FLACC (Rest) - Activity 0   Pain Rating: FLACC (Rest) - Cry 0   Pain Rating: FLACC (Rest) - Consolability 0   Score: FLACC (Rest) 0   Restrictions/Precautions   RLE Weight Bearing Per Order WBAT   LLE Weight Bearing Per Order NWB  (BKA)   Other Precautions Cognitive; Bed Alarm;Multiple lines; Fall Risk;Pain;Hard of hearing   General   Chart Reviewed Yes   Response to Previous Treatment Patient with no complaints from previous session  Family/Caregiver Present Yes  (wife arrived towards end of session)   Cognition   Overall Cognitive Status Impaired   Arousal/Participation Alert; Cooperative   Attention Attends with cues to redirect   Following Commands Follows one step commands with increased time or repetition   Subjective   Subjective Pt agreeable to therapy  Bed Mobility   Supine to Sit 3  Moderate assistance   Additional items Assist x 1; Increased time required;Verbal cues;LE management;HOB elevated; Bedrails   Transfers   Sit to Stand 2  Maximal assistance   Additional items Assist x 2; Increased time required;Verbal cues; Other  (bed elevated; but unable to fully stand; attempts x3)   Stand to Sit 2  Maximal assistance   Additional items Assist x 2; Increased time required;Verbal cues; Bed elevated   Sliding Board transfer 2  Maximal assistance   Additional items Assist x 2;Armrests; Increased time required;Verbal cues; Other  (beasy transfer board)   Additional Comments attempted sit<>stand transitions w/ RW 3x but pt unable to tolerate, able to slightly clear buttocks off from bed but unable to fully stand    Ambulation/Elevation   Gait pattern Not appropriate   Balance   Static Sitting Fair -   Dynamic Sitting Poor +   Static Standing Zero   Ambulatory Zero   Endurance Deficit   Endurance Deficit Yes   Endurance Deficit Description fatigue & weakness   Activity Tolerance Activity Tolerance Patient limited by fatigue   Nurse Made Aware Namrata Fuller   Assessment   Prognosis Guarded   Problem List Decreased strength;Decreased endurance; Impaired balance;Decreased mobility; Impaired judgement;Obesity; Decreased skin integrity;Pain;Orthopedic restrictions   Assessment Improved bed mobility & sitting balance & tolerance at EOB this tx session  Pt progressed to modAx1 for supine to sit w/ fair- sitting balance  Attempted sit<>stand transitions w/ RW 3x but pt unable to fully stand  Pt able to slightly clear buttocks off bed but unable to tolerate a complete a stand w/ RW  Hence completed bed to chair transfer via Intense[employer] transfer board w/ maxAx2 + cues for techniques  Will continue PT per POC  Pt will continue to benefit from inpt rehab at D/C  Pt tolerated OOB in chair at end of session w/o issues  Call bell in reach  Wife updated w/ pt's progress  RN Namrata Fuller made aware of pt's current mode of transfer w/ good understanding  Nsg staff to continue to mobilized pt as tolerated to prevent further decline in function  Nsg notified  Barriers to Discharge Inaccessible home environment   Goals   Patient Goals none stated   STG Expiration Date 10/11/17   Treatment Day 3   Plan   Treatment/Interventions Functional transfer training;LE strengthening/ROM; Therapeutic exercise; Endurance training;Patient/family training;Bed mobility;Spoke to nursing;OT;Family   Progress Slow progress, decreased activity tolerance   PT Frequency 5x/wk; Weekend  (1x weekend)   Recommendation   Recommendation Post acute IP rehab   Equipment Recommended Wheelchair   PT - OK to Discharge Yes  (to inpt rehab)   Barbara Villareal, PT

## 2017-10-02 NOTE — PROGRESS NOTES
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  Left heel osteomyelitis, s/p debridement, partial calcanectomy and wound VAC placement 9/8/2017  PAD with LLE tissue loss, s/p L SFA/pop PTA 9/12/2017  Right dry eschar, stable  Type II DM  CKD III, creat baseline  Chronic diastolic heart failure, compensated  Chronic atrial fibrillation  CAD, s/p CABG w/LLE GSV harvest 2007  AS, s/p mechanical AVR 2007  Chronic indwelling urinary catheter    Plan:  --Doing well from vascular surgery standpoint  --Left BKA stump dressing changed today and wound healing well without evidence of necrosis or dehiscence  Daily dressing change with light ACE wrap compression for edema control  --heparin-->coumadin due to mechanical AVR (INR goal 2 5-3 0)  --PT/OT  --rehab placement  --will see on p r n  basis  Please call if we can be of any further assistance  Outpatient follow-up as noted on chart    Subjective:  POD #6 L BKA  Patient without complaints  Intraop cultures negative  No new events overnight  INR 2 0 this a m     VSS  Drips:  Heparin    Vitals:  /65   Pulse 66   Temp 97 8 °F (36 6 °C) (Tympanic)   Resp 18   Ht 5' 9 5" (1 765 m)   Wt 108 kg (238 lb 1 6 oz)   SpO2 99%   BMI 34 66 kg/m²     I/Os:  I/O last 3 completed shifts: In: 1808 3 [P O :1080; I V :728 3]  Out: 3650 [Urine:3650]  No intake/output data recorded      Lab Results and Cultures:   Lab Results   Component Value Date    WBC 9 00 10/02/2017    HGB 9 2 (L) 10/02/2017    HCT 28 3 (L) 10/02/2017    MCV 87 10/02/2017     (H) 10/02/2017     Lab Results   Component Value Date    GLUCOSE 226 (H) 10/02/2017    CALCIUM 9 2 10/02/2017     (L) 10/02/2017    K 4 3 10/02/2017    CO2 29 10/02/2017    CL 96 (L) 10/02/2017    BUN 32 (H) 10/02/2017    CREATININE 1 30 10/02/2017     Lab Results   Component Value Date    INR 2 01 (H) 10/02/2017    INR 1 52 (H) 10/01/2017    INR 1 30 (H) 09/30/2017    PROTIME 23 0 (H) 10/02/2017    PROTIME 18 4 (H) 10/01/2017    PROTIME 16 3 (H) 09/30/2017        Blood Culture: No results found for: BLOODCX,   Urinalysis:   Lab Results   Component Value Date    COLORU Yellow 09/05/2017    CLARITYU Clear 09/05/2017    SPECGRAV <=1 005 09/05/2017    PHUR 6 5 09/05/2017    LEUKOCYTESUR Small (A) 09/05/2017    NITRITE Positive (A) 09/05/2017    PROTEINUA Negative 09/05/2017    GLUCOSEU Negative 09/05/2017    KETONESU Negative 09/05/2017    BILIRUBINUR Negative 09/05/2017    BLOODU Trace-lysed (A) 09/05/2017   ,   Urine Culture:   Lab Results   Component Value Date    URINECX >100,000 cfu/ml Mixed Contaminants X3 09/02/2017   ,   Wound Culure:   Lab Results   Component Value Date    WOUNDCULT 3+ Growth of Staphylococcus aureus 09/02/2017    WOUNDCULT 3+ Growth of Enterococcus faecalis 09/02/2017    WOUNDCULT 3+ Growth of Mixed Skin Sabiha 09/02/2017       Medications:  Current Facility-Administered Medications   Medication Dose Route Frequency    acetaminophen (TYLENOL) tablet 650 mg  650 mg Oral Q6H PRN    ammonium lactate (LAC-HYDRIN) 12 % lotion   Topical BID    docusate sodium (COLACE) capsule 100 mg  100 mg Oral BID    ferrous sulfate tablet 325 mg  325 mg Oral BID    fluticasone-salmeterol (ADVAIR) 500-50 mcg/dose inhaler 1 puff  1 puff Inhalation BID    furosemide (LASIX) tablet 40 mg  40 mg Oral Daily    gabapentin (NEURONTIN) capsule 100 mg  100 mg Oral TID    heparin (porcine) 25,000 units in 250 mL infusion (premix)  3-30 Units/kg/hr (Order-Specific) Intravenous Titrated    heparin (porcine) injection 4,000 Units  4,000 Units Intravenous PRN    heparin (porcine) injection 8,000 Units  8,000 Units Intravenous PRN    HYDROmorphone (DILAUDID) 1 mg/mL injection 1 mg  1 mg Intravenous Q3H PRN    insulin glargine (LANTUS) subcutaneous injection 45 Units  45 Units Subcutaneous HS    insulin lispro (HumaLOG) 100 units/mL subcutaneous injection 1-6 Units 1-6 Units Subcutaneous HS    insulin lispro (HumaLOG) 100 units/mL subcutaneous injection 12 Units  12 Units Subcutaneous Before Dinner    insulin lispro (HumaLOG) 100 units/mL subcutaneous injection 12 Units  12 Units Subcutaneous Daily Before Lunch    insulin lispro (HumaLOG) 100 units/mL subcutaneous injection 18 Units  18 Units Subcutaneous Daily Before Breakfast    insulin lispro (HumaLOG) 100 units/mL subcutaneous injection 2-12 Units  2-12 Units Subcutaneous TID AC    magnesium hydroxide (MILK OF MAGNESIA) 400 mg/5 mL oral suspension 30 mL  30 mL Oral PRN    magnesium oxide (MAG-OX) tablet 400 mg  400 mg Oral Daily    ondansetron (ZOFRAN) injection 4 mg  4 mg Intravenous Q6H PRN    oxybutynin (DITROPAN) tablet 5 mg  5 mg Oral BID    oxyCODONE-acetaminophen (PERCOCET) 5-325 mg per tablet 1 tablet  1 tablet Oral Q4H PRN    oxyCODONE-acetaminophen (PERCOCET) 5-325 mg per tablet 2 tablet  2 tablet Oral Q4H PRN    pantoprazole (PROTONIX) EC tablet 40 mg  40 mg Oral Early Morning    pravastatin (PRAVACHOL) tablet 40 mg  40 mg Oral Daily With Dinner    senna (SENOKOT) tablet 8 6 mg  1 tablet Oral HS    sodium hypochlorite (DAKIN'S HALF-STRENGTH) 0 25 % topical solution 1 application  1 application Irrigation Daily    tiotropium (SPIRIVA) capsule for inhaler 18 mcg  18 mcg Inhalation Daily    warfarin (COUMADIN) tablet 12 5 mg  12 5 mg Oral Daily (warfarin)       Imaging:  No new imaging studies for review    Physical Exam:    General appearance: alert, appears stated age, cooperative and no distress  Neurologic: Grossly normal  Neck: no adenopathy, no carotid bruit, no JVD and supple, symmetrical, trachea midline  Lungs: clear to auscultation bilaterally  Chest wall: no tenderness, Midline sternotomy scar noted well healed    Sternum stable  Heart: irregularly irregular rhythm, S1: normal, S2: normal prosthetic S2, no S3 or S4, no rub and No murmur  Abdomen: soft, non-tender; bowel sounds normal; no masses,  no organomegaly and Nondistended  No abdominal bruits  Extremities: Left BKA stump healing well  Staples intact wound edges well approximated  No wound edge necrosis  Ecchymoses noted of medial 1/3  No drainage, hemorrhage or hematoma  No erythema or induration  Right heel dressing intact  Bilateral lower extremities motor and sensory intact      Wound/Incision:  As above    Pulse exam:  Radial: Right: 2+ Left[de-identified] 2+  Femoral: Right: 2+ Left: 2+  Popliteal: Right: non-palpable Left: 1+  DP: Right: non-palpable   PT: Right: non-palpable     Nanette Guillen PA-C  10/2/2017   [facility], [phone]

## 2017-10-02 NOTE — PLAN OF CARE
Problem: OCCUPATIONAL THERAPY ADULT  Goal: Performs self-care activities at highest level of function for planned discharge setting  See evaluation for individualized goals  Treatment Interventions: ADL retraining, Functional transfer training, UE strengthening/ROM, Endurance training, Patient/family training, Equipment evaluation/education, Compensatory technique education, Energy conservation, Activityengagement          See flowsheet documentation for full assessment, interventions and recommendations  Outcome: Progressing  Limitation: Decreased ADL status, Decreased UE strength, Decreased Safe judgement during ADL, Decreased endurance, Decreased self-care trans, Decreased high-level ADLs (NWB L LE w/ wound vac)  Prognosis: Fair  Assessment: Pt was seen for skilled OT with focus on completion of self care tasks, functional transfers and review of current plan of care  See above levels of A required for all functional tasks  Pt with improved activity tolerance noted this tx session  Able to tolerate activity without further increase in pain levels  Pt will benefit from further rehab with focus on achieving optimal performance levels with all functional tasks        Discharge Recommendation: Short Term Rehab         Comments: KALLIE James

## 2017-10-02 NOTE — OCCUPATIONAL THERAPY NOTE
Occupational Therapy Treatment Note:         10/02/17 1233   Restrictions/Precautions   Weight Bearing Precautions Per Order No   RLE Weight Bearing Per Order WBAT   LLE Weight Bearing Per Order NWB   Pain Assessment   Pain Assessment 0-10   Pain Score 5   Pain Type Surgical pain   Pain Location Leg   Pain Orientation Left   ADL   Where Assessed Edge of bed   Grooming Assistance 5  Supervision/Setup   Grooming Deficit Setup   UB Bathing Assistance 4  Minimal Assistance   UB Bathing Deficit Setup;Verbal cueing   LB Bathing Assistance 2  Maximal Assistance   LB Bathing Deficit Setup   UB Dressing Assistance 4  Minimal Assistance   [address] [facility] 2  Maximal Assistance   LB Dressing Deficit Don/doff R sock; Don/doff L shoe   LB Dressing Comments Pt able to push for staff to A with donning of L shoe from home  Shoe with custom fit  Functional Standing Tolerance   Time 5 secs  Activity dynamic stand balance activity  Comments Limited ability to coordinate use of BUE RLE to stand at Pilar Passy  2 trials noted without ability to fully stand  Bed Mobility   Supine to Sit 3  Moderate assistance   Additional items Assist x 2;Bedrails; Increased time required;Verbal cues;LE management   Sit to Supine 3  Moderate assistance   Additional items Assist x 2;LE management; Increased time required;Verbal cues; Bedrails   Additional Comments No LOB noted with EOB positioning  Transfers   Sit to Stand 2  Maximal assistance   Additional items Assist x 2;Bedrails; Increased time required;Verbal cues   Stand to Sit 2  Maximal assistance   Additional items Assist x 2;Bedrails; Increased time required;Verbal cues   Stand pivot 2  Maximal assistance   Sliding Board transfer 3  Moderate assistance   Additional items Assist x 2; Increased time required;Verbal cues;Armrests   Additional Comments Pt with need for cues for safe hand placement and footing with activity      Cognition   Overall Cognitive Status Impaired   Arousal/Participation Responsive   Attention Attends with cues to redirect   Orientation Level Oriented X4   Memory Decreased short term memory;Decreased recall of recent events;Decreased recall of precautions   Following Commands Follows one step commands with increased time or repetition   Comments cues for safety required throught out tx session  Activity Tolerance   Activity Tolerance Patient limited by pain; Patient limited by fatigue   Medical Staff Made Aware Reported all findings to nursing staff  Assessment   Assessment Pt was seen for skilled OT with focus on completion of self care tasks, functional transfers and review of current plan of care  See above levels of A required for all functional tasks  Pt with improved activity tolerance noted this tx session  Able to tolerate activity without further increase in pain levels  Pt will benefit from further rehab with focus on achieving optimal performance levels with all functional tasks  Plan   Treatment Interventions ADL retraining;Functional transfer training; Endurance training;Cognitive reorientation;UE strengthening/ROM   Goal Expiration Date 10/06/17   Treatment Day 9   OT Frequency 3-5x/wk   Recommendation   Discharge Recommendation Short Term Rehab   KALLIE Beach

## 2017-10-03 VITALS
HEART RATE: 63 BPM | OXYGEN SATURATION: 98 % | RESPIRATION RATE: 18 BRPM | DIASTOLIC BLOOD PRESSURE: 54 MMHG | HEIGHT: 70 IN | WEIGHT: 238.1 LBS | SYSTOLIC BLOOD PRESSURE: 118 MMHG | BODY MASS INDEX: 34.09 KG/M2 | TEMPERATURE: 97.4 F

## 2017-10-03 LAB
APTT PPP: >210 SECONDS (ref 23–35)
GLUCOSE SERPL-MCNC: 145 MG/DL (ref 65–140)
GLUCOSE SERPL-MCNC: 159 MG/DL (ref 65–140)
INR PPP: 3.29 (ref 0.86–1.16)
PROTHROMBIN TIME: 34 SECONDS (ref 12.1–14.4)

## 2017-10-03 PROCEDURE — 82948 REAGENT STRIP/BLOOD GLUCOSE: CPT

## 2017-10-03 PROCEDURE — 85610 PROTHROMBIN TIME: CPT | Performed by: INTERNAL MEDICINE

## 2017-10-03 PROCEDURE — 85730 THROMBOPLASTIN TIME PARTIAL: CPT | Performed by: INTERNAL MEDICINE

## 2017-10-03 RX ORDER — OXYCODONE HYDROCHLORIDE AND ACETAMINOPHEN 5; 325 MG/1; MG/1
1 TABLET ORAL EVERY 4 HOURS PRN
Qty: 20 TABLET | Refills: 0 | Status: SHIPPED | OUTPATIENT
Start: 2017-10-03 | End: 2017-10-13

## 2017-10-03 RX ORDER — WARFARIN SODIUM 7.5 MG/1
7.5 TABLET ORAL
Refills: 0
Start: 2017-10-03 | End: 2017-12-05

## 2017-10-03 RX ORDER — INSULIN GLARGINE 100 [IU]/ML
45 INJECTION, SOLUTION SUBCUTANEOUS
Qty: 10 ML | Refills: 0 | Status: SHIPPED | OUTPATIENT
Start: 2017-10-03 | End: 2017-10-26 | Stop reason: ALTCHOICE

## 2017-10-03 RX ADMIN — Medication 400 MG: at 08:00

## 2017-10-03 RX ADMIN — FERROUS SULFATE TAB 325 MG (65 MG ELEMENTAL FE) 325 MG: 325 (65 FE) TAB at 08:00

## 2017-10-03 RX ADMIN — FLUTICASONE PROPIONATE AND SALMETEROL 1 PUFF: 50; 500 POWDER RESPIRATORY (INHALATION) at 08:02

## 2017-10-03 RX ADMIN — PANTOPRAZOLE SODIUM 40 MG: 40 TABLET, DELAYED RELEASE ORAL at 05:37

## 2017-10-03 RX ADMIN — DOCUSATE SODIUM 100 MG: 100 CAPSULE, LIQUID FILLED ORAL at 08:00

## 2017-10-03 RX ADMIN — OXYBUTYNIN CHLORIDE 5 MG: 5 TABLET ORAL at 08:00

## 2017-10-03 RX ADMIN — INSULIN LISPRO 2 UNITS: 100 INJECTION, SOLUTION INTRAVENOUS; SUBCUTANEOUS at 07:57

## 2017-10-03 RX ADMIN — OXYCODONE HYDROCHLORIDE AND ACETAMINOPHEN 2 TABLET: 5; 325 TABLET ORAL at 06:44

## 2017-10-03 RX ADMIN — HEPARIN SODIUM AND DEXTROSE 20 UNITS/KG/HR: 10000; 5 INJECTION INTRAVENOUS at 01:21

## 2017-10-03 RX ADMIN — Medication 1 APPLICATION: at 08:02

## 2017-10-03 RX ADMIN — HYOSCYAMINE SULFATE 1 APPLICATION: 16 SOLUTION at 08:02

## 2017-10-03 RX ADMIN — GABAPENTIN 100 MG: 100 CAPSULE ORAL at 08:00

## 2017-10-03 RX ADMIN — INSULIN LISPRO 12 UNITS: 100 INJECTION, SOLUTION INTRAVENOUS; SUBCUTANEOUS at 12:02

## 2017-10-03 RX ADMIN — FUROSEMIDE 40 MG: 40 TABLET ORAL at 08:00

## 2017-10-03 RX ADMIN — TIOTROPIUM BROMIDE 18 MCG: 18 CAPSULE ORAL; RESPIRATORY (INHALATION) at 08:02

## 2017-10-03 NOTE — SOCIAL WORK
Pt accepted at [facility] for STR  1230 wheelchair Geoffery Lightning pickup via [facility], wife at bedside and aware of bill for transport

## 2017-10-03 NOTE — NURSING NOTE
Patients belongings packed and IC removed  Report called into [employer]  Paper work and script handed off to transport team  Patient discharged to [facility] via stretcher with two attendants

## 2017-10-03 NOTE — DISCHARGE SUMMARY
Discharge Summary - [address] [facility]    Patient Information: Graciela Carolina 78 y o  male MRN: [id number]  Unit/Bed#: E4 -01 Encounter: [id number]    Discharging Physician / Practitioner: Millie Davis MD  PCP: Cris Luis MD  Admission Date: 9/2/2017  Discharge Date: 10/03/17    Reason for Admission:  Generalized weakness    Discharge Diagnoses:     Principal Problem:    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  Active Problems:    S/P AVR    Chronic atrial fibrillation    Diabetes mellitus    Chronic diastolic heart failure    PAD (peripheral artery disease)    Chronic indwelling Rush catheter    Acute blood loss anemia    Stage 3 chronic kidney disease    Abnormal urinalysis  Resolved Problems:    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    Decubitus ulcer of heel, stage 3      Consultations During Hospital Stay:  · Cardiology, Dr Sweetie lOson  · General surgery Dr Alexandra Coleman  · Vascular surgery Dr Edilma Tapia  · Orthopedic surgery Dr Priyank Rosas  · Nephrology Dr William Ruffin  · Endocrine Dr Cee Cabrera  · Podiatry Dr Bridgette Garcia    Procedures Performed:     · Left below-knee amputation on 09/26/2017   · Angiogram 09/12/17  · Status post left partial calcanectomy with wound VAC secondary to osteomyelitis on 09/08/2017    Significant Findings:     · Angiogram-mild aortoiliac atherosclerotic changes  Patent left SFA  Distal SFA/popliteal artery with moderate to high-grade stenosis treated with angioplasty  Incidental Findings:   · None     Test Results Pending at Discharge (will require follow up): · None     Outpatient Tests Requested:  · None    Complications:  None    Hospital Course:     Graciela Carolina is a 78 y o  male patient who originally presented to the hospital on 9/2/2017 due to generalized weakness    He has multiple medical problems including valvular heart disease status post mechanical aortic valve replacement, atrial fibrillation on chronic anticoagulation, diabetes with neuropathy, hypertension, chronic kidney disease, peripheral vascular disease, and chronic ulcers of both heels  He was brought to the hospital due to generalized weakness and difficulty ambulating  He was hospitalized for a total of 31 days  Please see the problem list below for further details    · Left calcaneal osteomyelitis-the patient underwent debridement and washout on 2017  After the procedure wound VAC was placed  Postoperative course was highlighted by nonhealing and progressive necrosis  He was evaluated by vascular surgery who felt that he had severe disease of the small vessels  Ultimately  of extremity was nonsalvageable and he underwent below-knee amputation on 2017  He will undergo further recovery and rehabilitation at [facility]  · Chronic anticoagulation-patient underwent bridging heparin prior to his procedure  Warfarin was restarted and his last INR was 3 2  Goal INR was 2 5-3 5 due to the presence of a mechanical valve and atrial fibrillation with high chads score  · Diabetes type 2 with chronic kidney disease and neuropathy-he was evaluated by endocrine  Prior to admission he was on an insulin pump  Anabel Lightning He was placed on Lantus and pre meal Humalog regimen here  His final dosage is for Lantus 45 units at bedtime and Humalog 12 units with meals  This can be adjusted further at the rehab  · Chronic urinary retention-Rush catheter was replaced on 10/02/2017  · Chronic diastolic CHF-without exacerbation  He was maintained on Lasix which was held temporarily prior to his argram    Condition at Discharge: good     Discharge Day Visit / Exam:     Subjective:  No pain  No shortness of breath  Tahir Blank to transition to rehab  He has been in the hospital for 31 days    Vitals: Blood Pressure: 118/54 (10/03/17 0738)  Pulse: 63 (10/03/17 0738)  Temperature: (!) 97 4 °F (36 3 °C) (10/03/17 0738)  Temp Source: Tympanic (10/03/17 3122)  Respirations: 18 (10/03/17 7539)  Height: 5' 9 5" (176 5 cm) (09/02/17 1347)  Weight - Scale: 108 kg (238 lb 1 6 oz) (09/04/17 1034)  SpO2: 98 % (10/03/17 0738)  Exam:   Physical Exam   Constitutional: He appears well-developed  No distress  HENT:   Head: Normocephalic  Mouth/Throat: No oropharyngeal exudate  Eyes: No scleral icterus  Cardiovascular:   Murmur heard  Irregular   Pulmonary/Chest: Effort normal  No stridor  No respiratory distress  He has no wheezes  Abdominal: Soft  He exhibits no distension  There is no tenderness  Musculoskeletal:   Status post left BKA   Neurological: He is alert  Skin: Skin is warm and dry  He is not diaphoretic  Psychiatric: He has a normal mood and affect  Discharge instructions/Information to patient and family:   See after visit summary for information provided to patient and family  Provisions for Follow-Up Care:  See after visit summary for information related to follow-up care and any pertinent home health orders  Disposition:     Acute Rehab at [facility]    For Discharges to [facility] SNF:   · Not Applicable to this Patient - Not Applicable to this Patient    Planned Readmission: none     Discharge Statement:  I spent >30 minutes discharging the patient  This time was spent on the day of discharge  I had direct contact with the patient on the day of discharge  Greater than 50% of the total time was spent examining patient, answering all patient questions, arranging and discussing plan of care with patient as well as directly providing post-discharge instructions  Additional time then spent on discharge activities  Discharge Medications:  See after visit summary for reconciled discharge medications provided to patient and family  ** Please Note: Dragon 360 Dictation voice to text software may have been used in the creation of this document   **

## 2017-10-03 NOTE — DISCHARGE INSTRUCTIONS
Chronic anticoagulation - titrate warfarin dose to goal INR 2 5-3 5 for valvular afib                                     DM2 with neuropathy- placed on Lantus 45 units QhS and humalog 12 units with meals  Adjust insulin dose as needed                                          DISCHARGE INSTRUCTIONS  LOWER EXTREMITY AMPUTATION    Following discharge from the hospital, you may have some questions about your operation, your activities or your general condition  These instructions may answer some of your questions and help you adjust during the first few weeks following your operation  REHABILITATION:   All patients require some form of rehabilitation after this procedure  This will assist with your return to daily activities by incorporating exercise and balance training  This may be in the form of visiting nurses and physical therapy in your home  However, admission to a rehabilitation facility is also common for a period of time before you return home  ACTIVITY:  Your limitations for activity will be discussed prior to discharge  Physical therapy and rehab staff will direct progression of your activity based on your progression in the physical therapy  Please follow their recommendations closely  DIET:  Resume your normal diet  Try to eat low fat and low cholesterol foods  INCISION:  You may not include the operated area in a shower until we have given you instructions to do so  Please protect the operated area from moisture when bathing/showering  It is normal to have swelling or discoloration around the incision  If increasing redness or pain develops, call our office immediately  You will have stitches or staples present after your surgery and these will be evaluated at your first post-operative visit  If any of your incisions are open and require dressing changes, you will be given instructions for your daily incision care   If you are not able to change the dressings, a visiting nurse will be arranged  PLEASE CALL THE OFFICE IF YOU HAVE ANY QUESTIONS  [phone] Eduardo Caldwell [phone] [facility] FREE [phone]  [address] [place] [address]    Follow-up appointment with Dr Hoa Forte at office address as noted above on 10/18/2017 at 11:15 a m  Sylvia Thrasher Please call office after hospital discharge to confirm appointment  Staple/suture removal will be performed at time of appointment

## 2017-10-13 ENCOUNTER — ALLSCRIPTS OFFICE VISIT (OUTPATIENT)
Dept: OTHER | Facility: OTHER | Age: 77
End: 2017-10-13

## 2017-10-19 ENCOUNTER — GENERIC CONVERSION - ENCOUNTER (OUTPATIENT)
Dept: OTHER | Facility: OTHER | Age: 77
End: 2017-10-19

## 2017-10-24 DIAGNOSIS — Z89.512 ACQUIRED ABSENCE OF LEFT LEG BELOW KNEE (HCC): ICD-10-CM

## 2017-10-24 DIAGNOSIS — Z98.890 OTHER SPECIFIED POSTPROCEDURAL STATES: ICD-10-CM

## 2017-10-24 DIAGNOSIS — L97.519 NON-PRESSURE CHRONIC ULCER OF OTHER PART OF RIGHT FOOT WITH UNSPECIFIED SEVERITY (HCC): ICD-10-CM

## 2017-10-26 ENCOUNTER — GENERIC CONVERSION - ENCOUNTER (OUTPATIENT)
Dept: OTHER | Facility: OTHER | Age: 77
End: 2017-10-26

## 2017-10-26 ENCOUNTER — ANESTHESIA EVENT (OUTPATIENT)
Dept: PERIOP | Facility: HOSPITAL | Age: 77
End: 2017-10-26
Payer: MEDICARE

## 2017-10-26 RX ORDER — BUDESONIDE AND FORMOTEROL FUMARATE DIHYDRATE 160; 4.5 UG/1; UG/1
1 AEROSOL RESPIRATORY (INHALATION) 2 TIMES DAILY
COMMUNITY
End: 2017-12-05

## 2017-10-26 RX ORDER — MULTIVIT WITH MINERALS/LUTEIN
250 TABLET ORAL 2 TIMES DAILY
COMMUNITY
End: 2018-01-31

## 2017-10-26 RX ORDER — MULTIVITAMIN
1 TABLET ORAL DAILY
COMMUNITY

## 2017-10-26 RX ORDER — PANTOPRAZOLE SODIUM 40 MG/1
40 TABLET, DELAYED RELEASE ORAL DAILY
COMMUNITY
End: 2017-12-05

## 2017-10-26 RX ORDER — NICOTINE POLACRILEX 4 MG
15 LOZENGE BUCCAL ONCE
COMMUNITY
End: 2017-12-05

## 2017-10-26 RX ORDER — SENNA PLUS 8.6 MG/1
2 TABLET ORAL
COMMUNITY
End: 2017-12-05

## 2017-10-26 RX ORDER — PRAVASTATIN SODIUM 40 MG
40 TABLET ORAL DAILY
COMMUNITY
End: 2017-12-05

## 2017-10-26 RX ORDER — POLYETHYLENE GLYCOL 3350 17 G/17G
17 POWDER, FOR SOLUTION ORAL DAILY PRN
COMMUNITY
End: 2017-12-05

## 2017-10-26 RX ORDER — BISACODYL 10 MG
10 SUPPOSITORY, RECTAL RECTAL DAILY PRN
COMMUNITY
End: 2017-12-05

## 2017-10-26 RX ORDER — OXYCODONE HYDROCHLORIDE AND ACETAMINOPHEN 5; 325 MG/1; MG/1
1 TABLET ORAL EVERY 4 HOURS PRN
COMMUNITY
End: 2017-12-05

## 2017-10-26 RX ORDER — ACETAMINOPHEN 325 MG/1
650 TABLET ORAL EVERY 4 HOURS PRN
COMMUNITY

## 2017-10-26 RX ORDER — GABAPENTIN 400 MG/1
400 CAPSULE ORAL 3 TIMES DAILY
COMMUNITY
End: 2018-01-31

## 2017-10-26 RX ORDER — DOCUSATE SODIUM 100 MG/1
100 CAPSULE, LIQUID FILLED ORAL 2 TIMES DAILY PRN
COMMUNITY
End: 2017-12-05

## 2017-10-26 NOTE — PRE-PROCEDURE INSTRUCTIONS
Pre-Surgery Instructions:   Medication Instructions    acetaminophen (TYLENOL) 325 mg tablet Patient was instructed by Physician and understands   ascorbic acid (VITAMIN C) 250 mg tablet Patient was instructed by Physician and understands   bisacodyl (BISAC-EVAC) 10 mg suppository Patient was instructed by Physician and understands   bisacodyl (DULCOLAX) 5 mg EC tablet Patient was instructed by Physician and understands   budesonide-formoterol (SYMBICORT) 160-4 5 mcg/act inhaler Patient was instructed by Physician and understands   Diclofenac Epolamine (FLECTOR) 1 3 % PTCH Patient was instructed by Physician and understands   docusate sodium (COLACE) 100 mg capsule Patient was instructed by Physician and understands   ferrous sulfate 325 (65 Fe) mg tablet Patient was instructed by Physician and understands   FUROSEMIDE PO Patient was instructed by Physician and understands   gabapentin (NEURONTIN) 400 mg capsule Patient was instructed by Physician and understands   glucagon (GLUCAGEN) 1 mg injection Patient was instructed by Physician and understands   glucose 40 % Patient was instructed by Physician and understands   insulin aspart (NovoLOG) 100 units/mL injection Patient was instructed by Physician and understands   insulin aspart (NovoLOG) 100 units/mL injection Patient was instructed by Physician and understands   insulin detemir (LEVEMIR) 100 units/mL subcutaneous injection Patient was instructed by Physician and understands   MAGNESIUM OXIDE PO Patient was instructed by Physician and understands   Multiple Vitamin (MULTIVITAMIN) tablet Patient was instructed by Physician and understands   oxybutynin (DITROPAN) 5 mg tablet Patient was instructed by Physician and understands   oxyCODONE-acetaminophen (PERCOCET) 5-325 mg per tablet Patient was instructed by Physician and understands      pantoprazole (PROTONIX) 40 mg tablet Patient was instructed by Physician and understands   polyethylene glycol (MIRALAX) 17 g packet Patient was instructed by Physician and understands   pravastatin (PRAVACHOL) 40 mg tablet Patient was instructed by Physician and understands   senna (SENOKOT) 8 6 MG tablet Patient was instructed by Physician and understands   tiotropium (SPIRIVA) 18 mcg inhalation capsule Patient was instructed by Physician and understands  Rodney Blair instructed to have pt take his protonix with a small sip of water the morning of surgery before he comes to the hospital and use his inhalers  Pt may also take his gabapentin, tylenol, and or percocet if he needs it  [facility] preop instructions faxed to Jaye, the nurse at [address] [place] [address] taking care of the pt

## 2017-10-27 ENCOUNTER — ANESTHESIA (OUTPATIENT)
Dept: PERIOP | Facility: HOSPITAL | Age: 77
End: 2017-10-27
Payer: MEDICARE

## 2017-10-27 ENCOUNTER — HOSPITAL ENCOUNTER (OUTPATIENT)
Facility: HOSPITAL | Age: 77
Setting detail: OUTPATIENT SURGERY
End: 2017-10-27
Attending: SURGERY | Admitting: SURGERY
Payer: MEDICARE

## 2017-10-27 VITALS
RESPIRATION RATE: 18 BRPM | HEART RATE: 78 BPM | WEIGHT: 237 LBS | SYSTOLIC BLOOD PRESSURE: 143 MMHG | OXYGEN SATURATION: 98 % | TEMPERATURE: 97.8 F | DIASTOLIC BLOOD PRESSURE: 65 MMHG | BODY MASS INDEX: 35.1 KG/M2 | HEIGHT: 69 IN

## 2017-10-27 DIAGNOSIS — Z89.512 ACQUIRED ABSENCE OF LEFT LEG BELOW KNEE (HCC): ICD-10-CM

## 2017-10-27 DIAGNOSIS — L97.519 NON-PRESSURE CHRONIC ULCER OF OTHER PART OF RIGHT FOOT WITH UNSPECIFIED SEVERITY (HCC): ICD-10-CM

## 2017-10-27 LAB
GLUCOSE SERPL-MCNC: 115 MG/DL (ref 65–140)
GLUCOSE SERPL-MCNC: 134 MG/DL (ref 65–140)

## 2017-10-27 PROCEDURE — 87205 SMEAR GRAM STAIN: CPT | Performed by: SURGERY

## 2017-10-27 PROCEDURE — 87147 CULTURE TYPE IMMUNOLOGIC: CPT | Performed by: SURGERY

## 2017-10-27 PROCEDURE — 87070 CULTURE OTHR SPECIMN AEROBIC: CPT | Performed by: SURGERY

## 2017-10-27 PROCEDURE — 82948 REAGENT STRIP/BLOOD GLUCOSE: CPT

## 2017-10-27 PROCEDURE — 87186 SC STD MICRODIL/AGAR DIL: CPT | Performed by: SURGERY

## 2017-10-27 PROCEDURE — 87077 CULTURE AEROBIC IDENTIFY: CPT | Performed by: SURGERY

## 2017-10-27 PROCEDURE — 87075 CULTR BACTERIA EXCEPT BLOOD: CPT | Performed by: SURGERY

## 2017-10-27 RX ORDER — FENTANYL CITRATE/PF 50 MCG/ML
25 SYRINGE (ML) INJECTION
Status: DISCONTINUED | OUTPATIENT
Start: 2017-10-27 | End: 2017-10-27 | Stop reason: HOSPADM

## 2017-10-27 RX ORDER — MIDAZOLAM HYDROCHLORIDE 1 MG/ML
INJECTION INTRAMUSCULAR; INTRAVENOUS AS NEEDED
Status: DISCONTINUED | OUTPATIENT
Start: 2017-10-27 | End: 2017-10-27 | Stop reason: SURG

## 2017-10-27 RX ORDER — KETAMINE HYDROCHLORIDE 50 MG/ML
INJECTION, SOLUTION, CONCENTRATE INTRAMUSCULAR; INTRAVENOUS AS NEEDED
Status: DISCONTINUED | OUTPATIENT
Start: 2017-10-27 | End: 2017-10-27 | Stop reason: SURG

## 2017-10-27 RX ORDER — POTASSIUM CHLORIDE 20 MEQ/1
40 TABLET, EXTENDED RELEASE ORAL DAILY
COMMUNITY

## 2017-10-27 RX ORDER — FENTANYL CITRATE 50 UG/ML
INJECTION, SOLUTION INTRAMUSCULAR; INTRAVENOUS AS NEEDED
Status: DISCONTINUED | OUTPATIENT
Start: 2017-10-27 | End: 2017-10-27 | Stop reason: SURG

## 2017-10-27 RX ORDER — MAGNESIUM HYDROXIDE 1200 MG/15ML
LIQUID ORAL AS NEEDED
Status: DISCONTINUED | OUTPATIENT
Start: 2017-10-27 | End: 2017-10-27 | Stop reason: HOSPADM

## 2017-10-27 RX ORDER — ONDANSETRON 2 MG/ML
INJECTION INTRAMUSCULAR; INTRAVENOUS AS NEEDED
Status: DISCONTINUED | OUTPATIENT
Start: 2017-10-27 | End: 2017-10-27 | Stop reason: SURG

## 2017-10-27 RX ORDER — CHLORHEXIDINE GLUCONATE 0.12 MG/ML
15 RINSE ORAL ONCE
Status: COMPLETED | OUTPATIENT
Start: 2017-10-27 | End: 2017-10-27

## 2017-10-27 RX ORDER — SODIUM CHLORIDE 9 MG/ML
125 INJECTION, SOLUTION INTRAVENOUS CONTINUOUS
Status: DISCONTINUED | OUTPATIENT
Start: 2017-10-27 | End: 2017-10-27 | Stop reason: HOSPADM

## 2017-10-27 RX ORDER — ONDANSETRON 2 MG/ML
4 INJECTION INTRAMUSCULAR; INTRAVENOUS ONCE AS NEEDED
Status: DISCONTINUED | OUTPATIENT
Start: 2017-10-27 | End: 2017-10-27 | Stop reason: HOSPADM

## 2017-10-27 RX ORDER — AMMONIUM LACTATE 12 G/100G
LOTION TOPICAL 2 TIMES DAILY PRN
COMMUNITY
End: 2018-01-31

## 2017-10-27 RX ORDER — PROPOFOL 10 MG/ML
INJECTION, EMULSION INTRAVENOUS AS NEEDED
Status: DISCONTINUED | OUTPATIENT
Start: 2017-10-27 | End: 2017-10-27 | Stop reason: SURG

## 2017-10-27 RX ORDER — OXYCODONE HYDROCHLORIDE AND ACETAMINOPHEN 5; 325 MG/1; MG/1
1 TABLET ORAL EVERY 4 HOURS PRN
Status: DISCONTINUED | OUTPATIENT
Start: 2017-10-27 | End: 2017-10-27 | Stop reason: HOSPADM

## 2017-10-27 RX ORDER — LIDOCAINE HYDROCHLORIDE 10 MG/ML
INJECTION, SOLUTION INFILTRATION; PERINEURAL AS NEEDED
Status: DISCONTINUED | OUTPATIENT
Start: 2017-10-27 | End: 2017-10-27 | Stop reason: SURG

## 2017-10-27 RX ADMIN — CHLORHEXIDINE GLUCONATE 15 ML: 1.2 RINSE ORAL at 09:26

## 2017-10-27 RX ADMIN — FENTANYL CITRATE 25 MCG: 50 INJECTION, SOLUTION INTRAMUSCULAR; INTRAVENOUS at 12:15

## 2017-10-27 RX ADMIN — FENTANYL CITRATE 25 MCG: 50 INJECTION INTRAMUSCULAR; INTRAVENOUS at 13:16

## 2017-10-27 RX ADMIN — ONDANSETRON HYDROCHLORIDE 4 MG: 2 INJECTION, SOLUTION INTRAVENOUS at 12:28

## 2017-10-27 RX ADMIN — SODIUM CHLORIDE 125 ML/HR: 0.9 INJECTION, SOLUTION INTRAVENOUS at 09:26

## 2017-10-27 RX ADMIN — SODIUM CHLORIDE: 0.9 INJECTION, SOLUTION INTRAVENOUS at 12:25

## 2017-10-27 RX ADMIN — FENTANYL CITRATE 25 MCG: 50 INJECTION, SOLUTION INTRAMUSCULAR; INTRAVENOUS at 12:10

## 2017-10-27 RX ADMIN — PROPOFOL 100 MG: 10 INJECTION, EMULSION INTRAVENOUS at 11:46

## 2017-10-27 RX ADMIN — FENTANYL CITRATE 25 MCG: 50 INJECTION, SOLUTION INTRAMUSCULAR; INTRAVENOUS at 12:06

## 2017-10-27 RX ADMIN — KETAMINE HYDROCHLORIDE 50 MG: 50 INJECTION INTRAMUSCULAR; INTRAVENOUS at 11:34

## 2017-10-27 RX ADMIN — FENTANYL CITRATE 25 MCG: 50 INJECTION, SOLUTION INTRAMUSCULAR; INTRAVENOUS at 12:20

## 2017-10-27 RX ADMIN — MIDAZOLAM HYDROCHLORIDE 2 MG: 1 INJECTION, SOLUTION INTRAMUSCULAR; INTRAVENOUS at 11:33

## 2017-10-27 RX ADMIN — MIDAZOLAM HYDROCHLORIDE 2 MG: 1 INJECTION, SOLUTION INTRAMUSCULAR; INTRAVENOUS at 11:30

## 2017-10-27 RX ADMIN — OXYCODONE HYDROCHLORIDE AND ACETAMINOPHEN 1 TABLET: 5; 325 TABLET ORAL at 14:38

## 2017-10-27 RX ADMIN — LIDOCAINE HYDROCHLORIDE 80 MG: 10 INJECTION, SOLUTION INFILTRATION; PERINEURAL at 11:46

## 2017-10-27 RX ADMIN — CEFAZOLIN SODIUM 2000 MG: 1 SOLUTION INTRAVENOUS at 11:45

## 2017-10-27 RX ADMIN — DEXAMETHASONE SODIUM PHOSPHATE 4 MG: 10 INJECTION INTRAMUSCULAR; INTRAVENOUS at 12:28

## 2017-10-27 NOTE — OP NOTE
OPERATIVE REPORT  PATIENT NAME: Itzel Lee    :  1940  MRN: [id number]  Pt Location: [facility] OR ROOM 07    SURGERY DATE: 10/27/2017    Surgeon(s) and Role:     Qing Aaron MD - Primary  Asst: PA student, Aida Maldonado    Preop Diagnosis:  Necrosis of skin at L BKA site; concern for infection    Post-Op Diagnosis Codes:  Necrosis and poor healing of L BKA site without gross evidence of infection    Procedure(s) (LRB):  L BKA exploration, debridement of skin and soft tissue, washout, VAC placement    Specimen(s):  ID Type Source Tests Collected by Time Destination   A : left below knee amputation  wound Wound Leg, Left ANAEROBIC CULTURE AND GRAM STAIN, STAT GRAM STAIN, Austin Aaron MD 10/27/2017 1158        Estimated Blood Loss:   20cc    Drains:  Negative Pressure Wound Therapy (V A C ) Leg Left (Active)   Unit Type VAC ULTA 10/27/2017 12:23 PM   Black foam- # applied 1 10/27/2017 12:23 PM   Cycle Continuous 10/27/2017 12:23 PM   Target Pressure (mmHg) 125 10/27/2017 12:23 PM   Canister Changed No 10/27/2017 12:23 PM   Dressing Status Clean;Dry; Intact 10/27/2017 12:23 PM   Number of days: 0       Anesthesia Type:   General - LMA    Operative Indications:  Patient is a 77 yo M w/ hx of nonhealing OM of L TMA site, s/p L BKA last month  Returned to office with partial stump nonhealing concerning for infection  Presents for exploration, washout, debridmenet, VAC placement    Operative Findings:  Necrotic skin at lateral portion of BKA incision; poor healing and some fatty necrosis underlying but without evidence of infection    Complications:   None    Procedure and Technique:  After informed consent was obtained, the patient was brought to the operating room and placed in the supine position  Perioperative IV ancef was given  He was given anesthesia and an LMA was placed  He was prepped and draped in the usual sterile fashion exposing the leg circumferentially  A timeout was performed   Staples were removed from the mid and lateral aspect of the BKA stump incision  The deeper layer of sutures was cut  There was poor healing of these tissues with some fatty necrosis noted but no purulence or foul odor  The wound was examined for any tracking or deeper pockets of infection and none were found  The unhealthy skin and soft tissue were sharply debrided with a scalpel  The wound was then pulsevac irrigated with 3L of saline with gentamycin  Cautery was used to achieve hemostasis  A small VAC sponge was selected and cut to size  1 piece of black foam sponge was placed in the wound and the VAC was applied  The VAC was set to 125mmHg low continuous suction  He was allowed to awaken and was extubated  He was transferred to the PACU for postoperative care       I was present for the entire procedure and A qualified resident physician was not available    Patient Disposition:  PACU     SIGNATURE: Osiel Aaron MD  DATE: October 27, 2017  TIME: 12:42 PM

## 2017-10-27 NOTE — ANESTHESIA PREPROCEDURE EVALUATION
Review of Systems/Medical History  Patient summary reviewed  Chart reviewed  No history of anesthetic complications     Cardiovascular  EKG reviewed, Exercise tolerance: good,  Hyperlipidemia, Hypertension controlled, Valve replacement, CAD, , Dysrhythmias, atrial fibrillation, CHF compensated CHF, CALLAHAN, No aortic disease, PVD,    Pulmonary  COPD moderate- medication dependent , Sleep apnea CPAP, ,        GI/Hepatic  Negative GI/hepatic ROS          Chronic kidney disease stage 3,        Endo/Other  Diabetes poorly controlled type 2 Insulin, Arthritis     GYN  Negative gynecology ROS          Hematology  Anemia anemia of chronic disease,     Musculoskeletal  Negative musculoskeletal ROS Obesity ,   Comment: osteomyelitis      Neurology  Negative neurology ROS   Diabetic neuropathy,    Psychology   Negative psychology ROS            Physical Exam    Airway    Mallampati score: II  TM Distance: >3 FB  Neck ROM: full     Dental       Cardiovascular  Rhythm: irregular, Rate: normal,     Pulmonary  Breath sounds clear to auscultation,     Other Findings  caps      Anesthesia Plan  ASA Score- 4       Anesthesia Type- general with ASA Monitors  Additional Monitors:   Airway Plan:           Induction- intravenous  Informed Consent- Anesthetic plan and risks discussed with patient

## 2017-10-27 NOTE — PERIOPERATIVE NURSING NOTE
Pt incontinent of stool, cleaned of large amount of dried pasty stool, removed sacral wound dressing from [facility] flores  3 Duoderm dressings applied to buttocks, & b/l upper leg/buttocks wounds

## 2017-10-30 LAB
BACTERIA SPEC ANAEROBE CULT: NORMAL
BACTERIA WND AEROBE CULT: ABNORMAL
BACTERIA WND AEROBE CULT: ABNORMAL
GRAM STN SPEC: ABNORMAL
GRAM STN SPEC: ABNORMAL

## 2017-11-20 ENCOUNTER — GENERIC CONVERSION - ENCOUNTER (OUTPATIENT)
Dept: OTHER | Facility: OTHER | Age: 77
End: 2017-11-20

## 2017-11-22 ENCOUNTER — ALLSCRIPTS OFFICE VISIT (OUTPATIENT)
Dept: OTHER | Facility: OTHER | Age: 77
End: 2017-11-22

## 2017-11-23 NOTE — PROGRESS NOTES
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    · Enoxaparin Sodium 100 MG/ML Subcutaneous Solution (Lovenox); Si mg/kgsubcutaneous every 12 hoursDisp: 5 doses   Rx By: Aki Rush; Dispense: 0 Days ; #:5 ML; Refill: 0;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; SAEED = N; Verified Transmission to [address] [place] [address]; Last Updated By: SystemArachno; 2017 9:58:25 AM   · Oxycodone-Acetaminophen 5-325 MG Oral Tablet (Percocet); TAKE 1 TABLETEVERY 6 HOURS AS NEEDED FOR PAIN   Rx By: Aki Rush; Dispense: 8 Days ; #:30 Tablet; Refill: 0;For: 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; SAEED = N; Print Rx   · (1) BASIC METABOLIC PROFILE; Status:Active; Requested for:2017;    Perform:[facility]; [facility]:[id number]; Ordered; For: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; Ordered By:Tyler Del Toro;   · (1) CBC/ PLT (NO DIFF); Status:Active; Requested for:2017;    Perform:[facility]; KQP:[id number]; Ordered;post below knee amputation of left lower extremity; Ordered By:Tyler Del Toro;   · CT FEMUR LEFT W WO CONTRAST; Status:Hold For - Scheduling; Requestedfor:2017;    Perform:[facility]; Order Comments:Evaluate for underlying left below-knee amputation site abscess; Due:2018; Ordered;post below knee amputation of left lower extremity; Ordered By:Tyler Del Toro;   · Schedule Surgery Treatment  Procedure  Status: Hold For - Scheduling  Requested for:22Nov2017   Ordered;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; Ordered By: Tatiana Caceres Performed:  Due: 22CQH0192    Discussion/Summary    Mr Esperanza Hsieh presents to the office for a left below-knee amputation stump wound  He initially underwent left below-knee amputation 9/26 by Dr Jaciel Thapa by debridement of stump eschar by Dr Rhina Long Doctor 10/27  note his prior intraoperative cultures obtained on 10/27 did grow back staph aureus and Enterococcus faecalis  Unclear whether he was on any specific antimicrobial therapy and if so the duration of therapy  left below-knee Amputation stump wound has a malodor to it  There is good granulation tissue at the periphery with devitalized tissue in the center  He denies any fevers or chills  He denies any purulent drainage within the back  He is scheduled to return home from [place] today  I discussed with both he and his wife the potential need to convert to an above-knee amputation if in fact there is evidence of deeper necrotic devitalized tissue  We'll plan on obtaining a CTA of the left BKA site to rule out underlying abscess  I have consented him for a debridement of the left below-knee amputation stump  At his wife's request I provided him with a Percocet prescription to be taken as needed for back changes  He is to continue with Ace wrap to his right lower extremity to assist with edema control  note he is on Coumadin therapy for mechanical heart valve and atrial fibrillation  We will hold Coumadin 3 days prior to procedure and initiate Lovenox 1 mg/kg every 12 hours  Please hold p m  dose day prior to procedure if surgery scheduled early in the morning  Chief Complaint   I am here to get my wound checked  Active Problems    1  Acquired absence of other right toe(s) (V49 72) (Z89 275)   2  Chronic ulcer of right foot (078 15) (L97 953)   3   Decubitus ulcer of right heel, stage 3 (707 07,707 23) (L89 613)   4  DM (diabetes mellitus), type 2, uncontrolled w/neurologic complication (923 72) (N48 59,Z00 96)   5  Late effect of contusion (906 3)   6  Postoperative state (V45 89) (Z98 890)   7  Postoperative wound dehiscence, subsequent encounter (V58 89,998 32) (T81 31XD)   8  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 (V49 75) (V49 323)    Current Meds   1  Advair Diskus 250-50 MCG/DOSE Inhalation Aerosol Powder Breath Activated; Therapy: 09MBA2269 to (Last Rx:26May2011)  Requested for: 72QXN8351 Ordered   2  Advair Diskus 500-50 MCG/DOSE Inhalation Aerosol Powder Breath Activated; Therapy: 40CRN7356 to Recorded   3  BD Pen Needle Awa U/F 32G X 4 MM Miscellaneous; Therapy: 24EGM3176 to (Last Rx:31Jan2011)  Requested for: 82ISJ5035 Ordered   4  Lelia LakeAltraTechk Auto-Code Voice Device; Therapy: 81Pnm9263 to (Last Rx:20Jul2011)  Requested for: 59Hbf2680 Ordered   5  Coumadin 5 MG Oral Tablet; Therapy: 69DQY3658 to Recorded   6  [employer] Vit D 5000 High-Potency 5000 UNIT CAPS; Therapy: (Recorded:73Tjj6793) to Recorded   7  Furosemide 40 MG Oral Tablet; Therapy: 79LXZ3755 to Recorded   8  Gabapentin 100 MG Oral Capsule; Therapy: 55ZUN1756 to (Last Rx:22Nov2010)  Requested for: 22Nov2010 Ordered   9  Glucagon Emergency 1 MG Injection Kit; Therapy: 72TTY1438 to Recorded   10  HumaLOG 100 UNIT/ML Subcutaneous Solution; Therapy: 09VZQ3178 to Recorded   11  Jantoven 4 MG Oral Tablet; Therapy: (Recorded:13Oct2017) to Recorded   12  Lantus SoloStar 100 UNIT/ML Subcutaneous Solution Pen-injector; Therapy: 21WQO0068 to Recorded   13  NovoLOG 100 UNIT/ML Subcutaneous Solution; Therapy: 94PZL5191 to Recorded   14  NovoLOG Mix 70/30 FlexPen (70-30) 100 UNIT/ML SUSP; Therapy: 92ZEP7008 to (Last Rx:18May2011)  Requested for: 31KTY6000 Ordered   15  Oxybutynin Chloride 5 MG Oral Tablet; Therapy: 48CKJ4408 to Recorded   16   OxyCONTIN 10 MG TB12;  Therapy: (Recorded:13Oct2017) to Recorded   17  Percocet  MG Oral Tablet; Therapy: (Recorded:48Dzn4614) to Recorded   18  Pharmacist Choice Lancets Miscellaneous; Therapy: 06Hsv0733 to (Last Rx:56Vpj8702)  Requested for: 99Omh1923 Ordered   19  PredniSONE 20 MG Oral Tablet; Therapy: 01Apr2017 to Recorded   20  Simvastatin 40 MG Oral Tablet; Therapy: 88LAO8492 to (Last Rx:22Nov2010)  Requested for: 22Nov2010 Ordered   21  Spiriva HandiHaler 18 MCG Inhalation Capsule; Therapy: 33RGS6161 to (Last Rx:86Dat5379)  Requested for: 54WRP3168 Ordered   22  Hemant Doc Control Normal In Vitro Solution; Therapy: 70Bsi2342 to (Last Rx:81Pog3906)  Requested for: 42Mkv0913 Ordered   23  Ulti-Masoud Auto-Adjust Device MISC; Therapy: 33Gmj8941 to (Last Rx:08Ypr7988)  Requested for: 77Nzt8553 Ordered   24  V-Go 40 KIT; Therapy: 31OYW7276 to Recorded    Allergies  1  Aspirin EC Extra Strength TBEC   2  Baby Aspirin CHEW    Vitals   Recorded: 22Nov2017 09:16AM   Heart Rate 76   Respiration Quality Normal   Respiration 20   Systolic 356, RUE, Sitting   Diastolic 76, RUE, Sitting   Height 5 ft 9 in   Weight Unobtainable Yes       Review of Systems   Constitutional: No fever or chills, feels well, no tiredness, no recent weight gain or weight loss  Respiratory: No sob, no wheezing, no cough, no sob with exertion, no orthopnea  Genitourinary: Chronic indwelling catheter  Hematologic/Lymphatic: Chronic atrial fibrillation secondary to mechanical heart valve/H of fibrillation  ROS reviewed  Post-Op  Mr Ryder Lovett is here to get his wound vac checked  below-knee amputation stump wound with adequate granulation tissue along the periphery with devitalized/necrotic soft tissue in the center associated with a malodor  Physical Exam  The heart rate was normal  The rhythm was regular  Heart sounds: normal S1-- and-- normal S2   Pulmonary  Respiratory effort: No increased work of breathing or signs of respiratory distress    Auscultation of lungs: Clear to auscultation  No wheezing, no rales, no rhonchi  Surgery Scheduling Form      Location:   Confirmation Number:   Procedure Date:   Requested Time:  Physician Alverto  Co-Surgeon:   [facility] Required:   Bed:  Anesthesia: General        PROCEDURE DETAILS  Procedure: Debridement of left below-knee amputation stump wound  Laterality: Left   Anticipated frozen section:   Procedure Codes:   Pre-op diagnosis:   Diagnosis Code(s):  Case Length: 1 hour  Equipment:   Equipment Needs:   Implants/Representative:     REGISTRATION & FINANCIAL CLEARANCE     Amount Paid/Date:   FA Initials:   Insurance:   Policy Number: Group Number:     PRE-ADMISSION TESTING & CLINICAL INFORMATION   PAT Location:     Consults Needed   Anesthesia Consult:   Medical Consult:   Cardiac Consult:        ALLERGIES AND ALERTS   Latex Allergy:   Penicillin Allergy:   Malignant Hyperthermia:   Diabetic Patient:     COMMENTS   Scheduling Information Provided By:     IN OFFICE USE   Urgency:   Additional Bed Requirements:   CD to Hospital   Is the patient able to walk up a flight of stairs, walk up a hill or do heavy housework WITHOUT having chest pain or shortness of breath? Patient is currently taking Coumadin Coumadin Stop Date:  Hold Coumadin for 3 days                      Signatures   Electronically signed by : Sole Mittal DO; Nov 22 2017  9:59AM EST                       (Author)

## 2017-11-28 ENCOUNTER — GENERIC CONVERSION - ENCOUNTER (OUTPATIENT)
Dept: OTHER | Facility: OTHER | Age: 77
End: 2017-11-28

## 2017-12-04 ENCOUNTER — APPOINTMENT (OUTPATIENT)
Dept: WOUND CARE | Facility: HOSPITAL | Age: 77
End: 2017-12-04
Payer: MEDICARE

## 2017-12-04 ENCOUNTER — HOSPITAL ENCOUNTER (OUTPATIENT)
Dept: CT IMAGING | Facility: HOSPITAL | Age: 77
Discharge: HOME | End: 2017-12-04
Attending: SURGERY
Payer: MEDICARE

## 2017-12-04 DIAGNOSIS — Z89.512 ACQUIRED ABSENCE OF LEFT LEG BELOW KNEE (HCC): ICD-10-CM

## 2017-12-04 PROCEDURE — 99213 OFFICE O/P EST LOW 20 MIN: CPT | Performed by: PODIATRIST

## 2017-12-04 PROCEDURE — 73701 CT LOWER EXTREMITY W/DYE: CPT

## 2017-12-04 RX ADMIN — IODIXANOL 100 ML: 320 INJECTION, SOLUTION INTRAVASCULAR at 16:59

## 2017-12-05 ENCOUNTER — ANESTHESIA EVENT (OUTPATIENT)
Dept: PERIOP | Facility: HOSPITAL | Age: 77
End: 2017-12-05
Payer: MEDICARE

## 2017-12-05 RX ORDER — FUROSEMIDE 40 MG/1
40 TABLET ORAL DAILY
COMMUNITY

## 2017-12-05 RX ORDER — SIMVASTATIN 40 MG
40 TABLET ORAL DAILY
COMMUNITY
End: 2018-02-23 | Stop reason: SDUPTHER

## 2017-12-05 RX ORDER — MAG HYDROX/ALUMINUM HYD/SIMETH 400-400-40
SUSPENSION, ORAL (FINAL DOSE FORM) ORAL
COMMUNITY

## 2017-12-05 RX ORDER — INSULIN GLARGINE 100 [IU]/ML
20 INJECTION, SOLUTION SUBCUTANEOUS 2 TIMES DAILY
COMMUNITY
End: 2018-01-31

## 2017-12-05 NOTE — PRE-PROCEDURE INSTRUCTIONS
Pre-Surgery Instructions:   Medication Instructions    acetaminophen (TYLENOL) 325 mg tablet Instructed patient per Anesthesia Guidelines   ammonium lactate (LAC-HYDRIN) 12 % lotion Instructed patient per Anesthesia Guidelines   ascorbic acid (VITAMIN C) 250 mg tablet Instructed patient per Anesthesia Guidelines   Cholecalciferol (VITAMIN D3) 5000 units CAPS Instructed patient per Anesthesia Guidelines   Enoxaparin Sodium (LOVENOX SC) Patient was instructed by Physician and understands   ferrous sulfate 325 (65 Fe) mg tablet Instructed patient per Anesthesia Guidelines   fluticasone-salmeterol (ADVAIR DISKUS) 250-50 mcg/dose inhaler Patient was instructed per "E-Preop"    furosemide (LASIX) 40 mg tablet Patient was instructed per "E-Preop"    gabapentin (NEURONTIN) 400 mg capsule Instructed patient per Anesthesia Guidelines   insulin aspart (NovoLOG) 100 units/mL injection Patient was instructed by Physician and understands   insulin glargine (LANTUS) 100 units/mL subcutaneous injection Patient was instructed by Physician and understands   MAGNESIUM OXIDE PO Instructed patient per Anesthesia Guidelines   Multiple Vitamin (MULTIVITAMIN) tablet Instructed patient per Anesthesia Guidelines   oxybutynin (DITROPAN) 5 mg tablet Instructed patient per Anesthesia Guidelines   potassium chloride (K-DUR,KLOR-CON) 20 mEq tablet Instructed patient per Anesthesia Guidelines   simvastatin (ZOCOR) 40 mg tablet Patient was instructed per "E-Preop"    tiotropium (SPIRIVA) 18 mcg inhalation capsule Patient was instructed per "E-Preop"    Warfarin Sodium (COUMADIN PO) Patient was instructed by Physician and understands  Spoke to pt wife  Medication list confirmed & instructed  Confirmed coumadin instructions from MD with wife - coumadin on hold 3 days prior to sx, on lovenox  Pt will hold pm dose prior to surgery     Per wife - NP from endo office instructed wife on diabetic medications  Pt has insulin pump, wife to turn off prior to coming to hospital  Pt to take lantus 20 units am DOS, no novolog  Pt to hold all vitamins except potassium on 12 6 17  On am DOS pt to take advair, spiriva, lantus (instructed by pt endo), gabapentin, simvastatin, oxybutynin  Showering instructions provided by office, reviewed at time of call  All instructions verbally understood by pt wife  Callback number given  Pt wife called in [phone] to report RLE ulcer with bandage/boot  Medication Instruction (Bronchodilator)    Please continue the following medications up to and including the day of surgery  Please bring this medication with you on the day of surgery  Advair, tiotropium (Spiriva)       Medication Instruction (Diuretics - Water Pills)    Please continue the following medications up to the evening before surgery, but do not take it on the day of surgery  furosemide (Lasix)      NPO Instructions    Please do not eat or drink anything prior to your surgery as follows: For AM Surgery times = stop at midnight the night before  For PM Surgery times = Midnight to 8AM clear liquids only (Jello, broth, 7up, Sprite, apple juice, black coffee, black tea, Gatorade)  If you are supposed to take any of your medications, do so with a sip of water  Failure to follow these instructions can lead to an increased risk of lung complications and may result in a delay or cancellation of your procedure  If you have any questions, contact your institution for further instructions  Medical Procedure Risk      Sleep Apnea    Please notify surgeon and anesthesiologist if you have sleep apnea, severe snoring, or daytime somnolence  For those sleep apnea patients with continuous positive airway pressure (CPAP or BiPAP) machines, please bring your machine to the hospital on the day of surgery          Sleep Apnea     Medication Instruction (Cholesterol Medication)    Please continue to take this medication on your normal schedule  If this is an oral medication and you take in the morning, you may do so with a sip of water          simvastatin (Zocor)

## 2017-12-07 ENCOUNTER — HOSPITAL ENCOUNTER (OUTPATIENT)
Facility: HOSPITAL | Age: 77
Setting detail: OUTPATIENT SURGERY
Discharge: HOME | End: 2017-12-07
Attending: SURGERY | Admitting: SURGERY
Payer: MEDICARE

## 2017-12-07 ENCOUNTER — ANESTHESIA (OUTPATIENT)
Dept: PERIOP | Facility: HOSPITAL | Age: 77
End: 2017-12-07
Payer: MEDICARE

## 2017-12-07 VITALS
HEIGHT: 69 IN | BODY MASS INDEX: 32.58 KG/M2 | DIASTOLIC BLOOD PRESSURE: 57 MMHG | OXYGEN SATURATION: 96 % | RESPIRATION RATE: 18 BRPM | SYSTOLIC BLOOD PRESSURE: 134 MMHG | TEMPERATURE: 98.4 F | HEART RATE: 68 BPM | WEIGHT: 220 LBS

## 2017-12-07 DIAGNOSIS — Z89.512 STATUS POST BELOW KNEE AMPUTATION OF LEFT LOWER EXTREMITY: Primary | ICD-10-CM

## 2017-12-07 LAB
GLUCOSE SERPL-MCNC: 188 MG/DL (ref 65–140)
GLUCOSE SERPL-MCNC: 197 MG/DL (ref 65–140)

## 2017-12-07 PROCEDURE — 82948 REAGENT STRIP/BLOOD GLUCOSE: CPT

## 2017-12-07 RX ORDER — SODIUM CHLORIDE, SODIUM LACTATE, POTASSIUM CHLORIDE, CALCIUM CHLORIDE 600; 310; 30; 20 MG/100ML; MG/100ML; MG/100ML; MG/100ML
INJECTION, SOLUTION INTRAVENOUS CONTINUOUS PRN
Status: DISCONTINUED | OUTPATIENT
Start: 2017-12-07 | End: 2017-12-07 | Stop reason: SURG

## 2017-12-07 RX ORDER — OXYCODONE HYDROCHLORIDE AND ACETAMINOPHEN 325; 5 MG/5ML; MG/5ML
5 SOLUTION ORAL EVERY 4 HOURS PRN
COMMUNITY

## 2017-12-07 RX ORDER — PROPOFOL 10 MG/ML
INJECTION, EMULSION INTRAVENOUS AS NEEDED
Status: DISCONTINUED | OUTPATIENT
Start: 2017-12-07 | End: 2017-12-07 | Stop reason: SURG

## 2017-12-07 RX ORDER — EPHEDRINE SULFATE 50 MG/ML
INJECTION, SOLUTION INTRAVENOUS AS NEEDED
Status: DISCONTINUED | OUTPATIENT
Start: 2017-12-07 | End: 2017-12-07 | Stop reason: SURG

## 2017-12-07 RX ORDER — OXYCODONE HYDROCHLORIDE AND ACETAMINOPHEN 5; 325 MG/1; MG/1
1 TABLET ORAL EVERY 6 HOURS PRN
Qty: 15 TABLET | Refills: 0 | Status: SHIPPED | OUTPATIENT
Start: 2017-12-07 | End: 2017-12-17

## 2017-12-07 RX ORDER — ONDANSETRON 2 MG/ML
4 INJECTION INTRAMUSCULAR; INTRAVENOUS ONCE AS NEEDED
Status: DISCONTINUED | OUTPATIENT
Start: 2017-12-07 | End: 2017-12-07 | Stop reason: HOSPADM

## 2017-12-07 RX ORDER — MIDAZOLAM HYDROCHLORIDE 1 MG/ML
INJECTION INTRAMUSCULAR; INTRAVENOUS AS NEEDED
Status: DISCONTINUED | OUTPATIENT
Start: 2017-12-07 | End: 2017-12-07 | Stop reason: SURG

## 2017-12-07 RX ORDER — LIDOCAINE HYDROCHLORIDE 10 MG/ML
INJECTION, SOLUTION INFILTRATION; PERINEURAL AS NEEDED
Status: DISCONTINUED | OUTPATIENT
Start: 2017-12-07 | End: 2017-12-07 | Stop reason: SURG

## 2017-12-07 RX ORDER — ONDANSETRON 2 MG/ML
INJECTION INTRAMUSCULAR; INTRAVENOUS AS NEEDED
Status: DISCONTINUED | OUTPATIENT
Start: 2017-12-07 | End: 2017-12-07 | Stop reason: SURG

## 2017-12-07 RX ORDER — CHLORHEXIDINE GLUCONATE 0.12 MG/ML
15 RINSE ORAL ONCE
Status: DISCONTINUED | OUTPATIENT
Start: 2017-12-07 | End: 2017-12-07

## 2017-12-07 RX ORDER — FENTANYL CITRATE/PF 50 MCG/ML
25 SYRINGE (ML) INJECTION
Status: DISCONTINUED | OUTPATIENT
Start: 2017-12-07 | End: 2017-12-07 | Stop reason: HOSPADM

## 2017-12-07 RX ADMIN — LIDOCAINE HYDROCHLORIDE 50 MG: 10 INJECTION, SOLUTION INFILTRATION; PERINEURAL at 11:11

## 2017-12-07 RX ADMIN — SODIUM CHLORIDE, SODIUM LACTATE, POTASSIUM CHLORIDE, AND CALCIUM CHLORIDE: .6; .31; .03; .02 INJECTION, SOLUTION INTRAVENOUS at 11:05

## 2017-12-07 RX ADMIN — MIDAZOLAM HYDROCHLORIDE 1 MG: 1 INJECTION, SOLUTION INTRAMUSCULAR; INTRAVENOUS at 11:05

## 2017-12-07 RX ADMIN — ONDANSETRON 4 MG: 2 INJECTION INTRAMUSCULAR; INTRAVENOUS at 11:31

## 2017-12-07 RX ADMIN — FENTANYL CITRATE 25 MCG: 50 INJECTION INTRAMUSCULAR; INTRAVENOUS at 12:33

## 2017-12-07 RX ADMIN — EPHEDRINE SULFATE 5 MG: 50 INJECTION, SOLUTION INTRAMUSCULAR; INTRAVENOUS; SUBCUTANEOUS at 11:14

## 2017-12-07 RX ADMIN — CEFAZOLIN SODIUM 2000 MG: 2 SOLUTION INTRAVENOUS at 11:23

## 2017-12-07 RX ADMIN — PROPOFOL 200 MG: 10 INJECTION, EMULSION INTRAVENOUS at 11:11

## 2017-12-07 NOTE — INTERIM OP NOTE
DEBRIDEMENTOF BELOW THE KNEE AMPUTATION STUMP WOUND  Postoperative Note  PATIENT NAME: Mikaela Kenney  : 1940  MRN: [id number]  [facility] HYBRID OR ROOM 02    Surgery Date: 2017    Preop Diagnosis:  Disruption of external operation (surgical) wound, not elsewhere classified, subsequent encounter [T81 31XD]  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 ([address] ) [Z89 512]    Post-Op Diagnosis Codes:     * Disruption of external operation (surgical) wound, not elsewhere classified, subsequent encounter [T81 31XD]     * 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 ([address] ) [Z89 512]    Procedure(s) (LRB):  DEBRIDEMENTOF BELOW THE KNEE AMPUTATION STUMP WOUND (Left)    Surgeon(s) and Role:     * Tyler Smith DO - Primary     * Wendy Hoffman PA-C - Assisting    Specimens:  * No specimens in log *  IVF: 300ml  Estimated Blood Loss:   Minimal    Anesthesia Type:   General     Findings:   L BKA stump wound debridement- excisional debridement of devitalized soft tissue down to healthy bleeding wound bed  No gross purulence noted  Wound dimensions: 7cm x 3 2QZ x 2 1MI  Complications:   None    SIGNATURE: Tyler Smith DO   DATE: 2017   TIME: 11:37 AM

## 2017-12-07 NOTE — ANESTHESIA POSTPROCEDURE EVALUATION
Post-Op Assessment Note      CV Status:  Stable    Mental Status:  Alert    Hydration Status:  Stable    PONV Controlled:  Controlled    Airway Patency:  Patent    Post Op Vitals Reviewed: Yes          Staff: Anesthesiologist, CRNA           BP      Temp      Pulse  72   Resp   26   SpO2 97

## 2017-12-07 NOTE — OP NOTE
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 ([address] ) [Z89 512]    Procedure(s) (LRB):  DEBRIDEMENTOF BELOW THE KNEE AMPUTATION STUMP WOUND, VAC CHANGE (Left)    Specimen(s):  * No specimens in log *    Estimated Blood Loss:   Minimal    Drains:  Negative Pressure Wound Therapy (V A C ) Leg Left (Active)   Unit Type wound vac 2017 11:45 AM   Black foam- # applied 1 2017 11:26 AM   Cycle Continuous 2017  2:21 PM   Target Pressure (mmHg) 125 2017  2:21 PM   Canister Changed No 2017  2:21 PM   Dressing Status Clean;Dry; Intact 2017  2:21 PM   Black foam- # removed 1 2017  2:21 PM   Output (mL) 0 mL 2017 11:45 AM   Number of days: 41       Urethral Catheter 18 Fr  (Active)   Site Assessment Clean;Skin intact 2017  1:21 PM   Collection Container Standard drainage bag 2017  2:21 PM   Securement Method Leg strap 2017  2:21 PM   Output (mL) 400 mL 2017 12:33 PM   Number of days: 66       Anesthesia Type:   General    Operative Indications:  Disruption of external operation (surgical) wound, not elsewhere classified, subsequent encounter [T81 31XD]  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 [facility]) [Z89 512]  This is a 71-year-old gentleman who previously underwent a left below-knee amputation  Postop course complicated by stump breakdown requiring debridement and VAC placement    He was recently seen in the office at which time the wound had good granulation tissue along the periphery however the center portion had devitalized tissue/fibrinous exudate  Due to his anticoagulation and inability to tolerate minor debridement in the office he is now brought to the operating room for the above-mentioned procedure  Operative Findings:  L BKA stump wound debridement- excisional debridement of devitalized soft tissue down to healthy bleeding wound bed  No gross purulence noted  Wound dimensions: 7cm x 6 3XP x 2 3VN    Complications:   None    Procedure and Technique:  The patient was properly identified in the preop holding area  Patient's name, laterality, and nature procedure verified  The site was marked  Patient was brought to the operating room where he was positioned supine on the OR table  After adequate induction general anesthesia patient was intubated without difficulty  The left below-knee stump VAC was removed  The left below-knee stump was prepped using Betadine and draped in usual sterile fashion  Sharp excisional debridement was performed with scalpel and scissors of all devitalized subcutaneous down to healthy bleeding wound bed  The wound was irrigated  There was no gross purulence  The wound dimensions were as follows 7 cm by 2 5 cm x 0 4 cm  A single black VAC sponge was cut to the appropriate size place within the wound and connected to low continuous suction with good seal   All needles, instruments, and sponge counts were reported correct  Patient tolerated procedure well the patient was awakened, extubated and transferred to recovery room in stable condition     I was present for the entire procedure and A qualified resident physician was not available    Patient Disposition:  PACU     SIGNATURE: Zaira Grover DO  DATE: December 7, 2017  TIME: 6:29 PM

## 2017-12-07 NOTE — ANESTHESIA PREPROCEDURE EVALUATION
Review of Systems/Medical History  Patient summary reviewed  Chart reviewed      Cardiovascular  Hyperlipidemia, Hypertension , CAD, , CHF ,    Pulmonary  COPD , Sleep apnea , ,        GI/Hepatic  Negative GI/hepatic ROS          Negative  ROS Chronic kidney disease ,        Endo/Other  Diabetes poorly controlled type 2 Insulin, Arthritis     GYN       Hematology  Anemia ,     Musculoskeletal  Obesity ,        Neurology  Negative neurology ROS      Psychology           Physical Exam    Airway    Mallampati score: II  TM Distance: >3 FB  Neck ROM: full     Dental       Cardiovascular  Cardiovascular exam normal    Pulmonary  Pulmonary exam normal     Other Findings        Anesthesia Plan  ASA Score- 4       Anesthesia Type- general with ASA Monitors  Additional Monitors:   Airway Plan: ETT  Comment: Plan discussed  Consent obtained          Induction- intravenous  Informed Consent- Anesthetic plan and risks discussed with patient and spouse  I personally reviewed this patient with the CRNA  Discussed and agreed on the Anesthesia Plan with the CRNA  Luma Quinn

## 2017-12-07 NOTE — DISCHARGE INSTRUCTIONS
DISCHARGE INSTRUCTIONS  LOWER EXTREMITY AMPUTATION    Following discharge from the hospital, you may have some questions about your operation, your activities or your general condition  These instructions may answer some of your questions and help you adjust during the first few weeks following your operation  REHABILITATION:   All patients require some form of rehabilitation after this procedure  This will assist with your return to daily activities by incorporating exercise and balance training  This may be in the form of visiting nurses and physical therapy in your home  However, admission to a rehabilitation facility is also common for a period of time before you return home  ACTIVITY:  Your limitations for activity will be discussed prior to discharge  Physical therapy and rehab staff will direct progression of your activity based on your progression in the physical therapy  Please follow their recommendations closely  DIET:  Resume your normal diet  Try to eat low fat and low cholesterol foods  INCISION:  You may not include the operated area in a shower until we have given you instructions to do so  Please protect the operated area from moisture when bathing/showering  It is normal to have swelling or discoloration around the incision  If increasing redness or pain develops, call our office immediately  You will have stitches or staples present after your surgery and these will be evaluated at your first post-operative visit  If any of your incisions are open and require dressing changes, you will be given instructions for your daily incision care  If you are not able to change the dressings, a visiting nurse will be arranged  PLEASE CALL THE OFFICE IF YOU HAVE ANY QUESTIONS  [phone] [facility] [phone] [facility] FREE [phone]  [address] [place] [address] , [address], [place], [facility]  [facility] 99, [place], [address] [place], [place] [address]  [address] [facility] Maren Aparicio, [address] [facility] [address]    [address], [address], [address]  [facility] 19, [address] [facility], [address] [facility] 97   1307 [facility], [address]14 [facility], [facility] [place], [address] [facility]  1660 Located within [facility], [address] [place] [address], [facility], [address], Ramon Hester [address]

## 2017-12-27 ENCOUNTER — GENERIC CONVERSION - ENCOUNTER (OUTPATIENT)
Dept: OTHER | Facility: OTHER | Age: 77
End: 2017-12-27

## 2017-12-27 ENCOUNTER — APPOINTMENT (OUTPATIENT)
Dept: WOUND CARE | Facility: HOSPITAL | Age: 77
End: 2017-12-27
Payer: MEDICARE

## 2017-12-27 PROCEDURE — 99212 OFFICE O/P EST SF 10 MIN: CPT | Performed by: PODIATRIST

## 2018-01-09 ENCOUNTER — GENERIC CONVERSION - ENCOUNTER (OUTPATIENT)
Dept: OTHER | Facility: OTHER | Age: 78
End: 2018-01-09

## 2018-01-11 NOTE — MISCELLANEOUS
Message  received call from Samson Lau at PAT  [facility] today and value is 3 3 (received paper copy of results)  spoke to dr Shanique Argueta and she wants patient to hold coumadin dose emir  made sayra HINKLE aware of dr leo instructions  called [facility], relayed message to [facility] who will relay message as well to dr Tildon Bosworth  Active Problems    1  Acquired absence of other right toe(s) (V49 72) (Z89 421)   2  Chronic ulcer of right foot (707 15) (L97 519)   3  Decubitus ulcer of right heel, stage 3 (874 11,530 64) (L89 613)   4  DM (diabetes mellitus), type 2, uncontrolled w/neurologic complication (043 92)   (E11 49,E11 65)   5  Late effect of contusion (906 3)   6  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 (V49 75) (S96 520)    Current Meds   1  Advair Diskus 250-50 MCG/DOSE Inhalation Aerosol Powder Breath Activated; Therapy: 24ENX5032 to (Last Rx:26May2011)  Requested for: 28PLE2128 Ordered   2  Advair Diskus 500-50 MCG/DOSE Inhalation Aerosol Powder Breath Activated; Therapy: 71PGA9166 to Recorded   3  BD Pen Needle Awa U/F 32G X 4 MM Miscellaneous; Therapy: 61NGJ6295 to (Last Rx:31Jan2011)  Requested for: 54AIT1927 Ordered   4  [place] Chek Auto-Code Voice Device; Therapy: 99Cjq2551 to (Last Rx:20Jul2011)  Requested for: 03Nsk4782 Ordered   5  Coumadin 5 MG Oral Tablet (Warfarin Sodium); Therapy: 85NSI6395 to Recorded   6  [employer] Vit D 5000 High-Potency 5000 UNIT CAPS; Therapy: (Recorded:93Fka9548) to Recorded   7  Furosemide 40 MG Oral Tablet; Therapy: 13GZB9002 to Recorded   8  Gabapentin 100 MG Oral Capsule; Therapy: 57TFK9481 to (Last Rx:22Nov2010)  Requested for: 22Nov2010 Ordered   9  Glucagon Emergency 1 MG Injection Kit; Therapy: 14HVZ2851 to Recorded   10  HumaLOG 100 UNIT/ML Subcutaneous Solution; Therapy: 12KEK5708 to Recorded   11  Jantoven 4 MG Oral Tablet; Therapy: (Recorded:13Oct2017) to Recorded   12   Lantus SoloStar 100 UNIT/ML Subcutaneous Solution Pen-injector; Therapy: 31OOK0614 to Recorded   13  NovoLOG 100 UNIT/ML Subcutaneous Solution; Therapy: 54GBW5455 to Recorded   14  NovoLOG Mix 70/30 FlexPen (70-30) 100 UNIT/ML SUSP; Therapy: 66NLL9427 to (Last Rx:92Ljr7642)  Requested for: 33ZBA9303 Ordered   15  Oxybutynin Chloride 5 MG Oral Tablet; Therapy: 75KMM9433 to Recorded   16  OxyCONTIN 10 MG TB12 (OxyCODONE HCl); Therapy: (Recorded:06Ioi1098) to Recorded   17  Percocet  MG Oral Tablet (Oxycodone-Acetaminophen); Therapy: (Recorded:22Tuk1171) to Recorded   18  Pharmacist Choice Lancets Miscellaneous; Therapy: 08Osc1232 to (Last Rx:67Oqx5228)  Requested for: 86Qmg4502 Ordered   19  PredniSONE 20 MG Oral Tablet; Therapy: 16Yop8054 to Recorded   20  Simvastatin 40 MG Oral Tablet; Therapy: 17AXD4081 to (Last Rx:22Nov2010)  Requested for: 58Uag4330 Ordered   21  Spiriva HandiHaler 18 MCG Inhalation Capsule; Therapy: 71XZJ2352 to (Last Rx:85Qww5263)  Requested for: 27JQG1372 Ordered   22  Hemant Doc Control Normal In Vitro Solution; Therapy: 58Pkg9484 to (Last Rx:11Qht2594)  Requested for: 89Svb4858 Ordered   23  Ulti-Masoud Auto-Adjust Device MISC; Therapy: 66Ibg1368 to (Last Rx:17Ipv4946)  Requested for: 07Mdd3569 Ordered   24  V-Go 40 KIT; Therapy: 70IOS6074 to Recorded    Allergies    1   Aspirin EC Extra Strength TBEC   2  Baby Aspirin CHEW    Signatures   Electronically signed by : Praveen Concepcion RN; Oct 26 2017  1:16PM EST                       (Author)

## 2018-01-11 NOTE — MISCELLANEOUS
Message  call from [address] [place] [address] at [facility] rehab  pt has ov 10/24 w/ Dr Hans Weiss  on wound care rounds today L bka inc was noted to be more read and skin sloughing  cont w/ serous drainage, not purulent  no fever/chills, labs ok, wbc wnl  she was concerned and wanted to see if pt should come in sooner  s/w WENDY TREJO  pt to keep f/u apt as planned, they should cont to watch and call if any further changes or concerns  [address] [place] [address] notified of same  Active Problems    1  Acquired absence of other right toe(s) (V49 72) (Z89 421)   2  Chronic ulcer of right foot (707 15) (L97 519)   3  Decubitus ulcer of right heel, stage 3 (098 80,647 00) (L89 613)   4  DM (diabetes mellitus), type 2, uncontrolled w/neurologic complication (202 71)   (E11 49,E11 65)   5  Late effect of contusion (906 3)   6  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 (V49 75) (Y75 438)    Current Meds   1  Advair Diskus 250-50 MCG/DOSE Inhalation Aerosol Powder Breath Activated; Therapy: 96KOI4752 to (Last Rx:26May2011)  Requested for: 12EXS4776 Ordered   2  Advair Diskus 500-50 MCG/DOSE Inhalation Aerosol Powder Breath Activated; Therapy: 90QRA0369 to Recorded   3  BD Pen Needle Awa U/F 32G X 4 MM Miscellaneous; Therapy: 15WPE1725 to (Last Rx:31Jan2011)  Requested for: 12HVE6634 Ordered   4  [place] Chek Auto-Code Voice Device; Therapy: 86Goh1618 to (Last Rx:06Wfm6730)  Requested for: 81Kyt1258 Ordered   5  Coumadin 5 MG Oral Tablet (Warfarin Sodium); Therapy: 69HIP3514 to Recorded   6  CVS Vit D 5000 High-Potency 5000 UNIT CAPS; Therapy: (Recorded:14Nxi2993) to Recorded   7  Furosemide 40 MG Oral Tablet; Therapy: 55KZQ3935 to Recorded   8  Gabapentin 100 MG Oral Capsule; Therapy: 72TOQ8313 to (Last Rx:22Nov2010)  Requested for: 22Nov2010 Ordered   9  Glucagon Emergency 1 MG Injection Kit; Therapy: 45UEL4433 to Recorded   10  HumaLOG 100 UNIT/ML Subcutaneous Solution;     Therapy: 39YUB7873 to Recorded   Bridgette Levels 4 MG Oral Tablet; Therapy: (Recorded:13Oct2017) to Recorded   12  Lantus SoloStar 100 UNIT/ML Subcutaneous Solution Pen-injector; Therapy: 62DIJ1045 to Recorded   13  NovoLOG 100 UNIT/ML Subcutaneous Solution; Therapy: 77OFA1309 to Recorded   14  NovoLOG Mix 70/30 FlexPen (70-30) 100 UNIT/ML SUSP; Therapy: 57ZND3425 to (Last Rx:51Oiu5679)  Requested for: 57JQS9610 Ordered   15  Oxybutynin Chloride 5 MG Oral Tablet; Therapy: 36SWD8995 to Recorded   16  OxyCONTIN 10 MG TB12 (OxyCODONE HCl); Therapy: (Recorded:13Oct2017) to Recorded   17  Percocet  MG Oral Tablet (Oxycodone-Acetaminophen); Therapy: (Recorded:13Oct2017) to Recorded   18  Pharmacist Choice Lancets Miscellaneous; Therapy: 80Gio8684 to (Last Rx:53Pcm6368)  Requested for: 17Gkn7651 Ordered   19  PredniSONE 20 MG Oral Tablet; Therapy: 82Xsl1121 to Recorded   20  Simvastatin 40 MG Oral Tablet; Therapy: 95YNH9893 to (Last Rx:22Nov2010)  Requested for: 25Jwz3906 Ordered   21  Spiriva HandiHaler 18 MCG Inhalation Capsule; Therapy: 85VCI6173 to (Last Rx:68Kmp5967)  Requested for: 74FFC9438 Ordered   22  Hemant Doc Control Normal In Vitro Solution; Therapy: 59Dha8426 to (Last Rx:71Fuk8455)  Requested for: 23Doz9914 Ordered   23  Ulti-Masoud Auto-Adjust Device MISC; Therapy: 38Nxk7139 to (Last Rx:81Fdm7108)  Requested for: 69Sri5681 Ordered   24  V-Go 40 KIT; Therapy: 32AFK7229 to Recorded    Allergies    1   Aspirin EC Extra Strength TBEC   2  Baby Aspirin CHEW    Signatures   Electronically signed by : Juju Moreno, ; Oct 19 2017 12:40PM EST                       (Author)

## 2018-01-13 VITALS
DIASTOLIC BLOOD PRESSURE: 72 MMHG | HEART RATE: 80 BPM | BODY MASS INDEX: 35.55 KG/M2 | SYSTOLIC BLOOD PRESSURE: 140 MMHG | RESPIRATION RATE: 18 BRPM | WEIGHT: 240 LBS | TEMPERATURE: 95.7 F | HEIGHT: 69 IN

## 2018-01-13 VITALS
SYSTOLIC BLOOD PRESSURE: 130 MMHG | BODY MASS INDEX: 35.55 KG/M2 | HEIGHT: 69 IN | RESPIRATION RATE: 18 BRPM | WEIGHT: 240 LBS | DIASTOLIC BLOOD PRESSURE: 72 MMHG | HEART RATE: 80 BPM | TEMPERATURE: 96 F

## 2018-01-14 VITALS
DIASTOLIC BLOOD PRESSURE: 76 MMHG | SYSTOLIC BLOOD PRESSURE: 128 MMHG | RESPIRATION RATE: 20 BRPM | HEART RATE: 76 BPM | HEIGHT: 69 IN

## 2018-01-14 VITALS
DIASTOLIC BLOOD PRESSURE: 80 MMHG | WEIGHT: 240 LBS | HEART RATE: 80 BPM | TEMPERATURE: 96 F | SYSTOLIC BLOOD PRESSURE: 140 MMHG | BODY MASS INDEX: 35.55 KG/M2 | RESPIRATION RATE: 20 BRPM | HEIGHT: 69 IN

## 2018-01-14 VITALS
HEART RATE: 84 BPM | SYSTOLIC BLOOD PRESSURE: 136 MMHG | TEMPERATURE: 98.4 F | DIASTOLIC BLOOD PRESSURE: 76 MMHG | RESPIRATION RATE: 18 BRPM

## 2018-01-15 ENCOUNTER — APPOINTMENT (OUTPATIENT)
Dept: WOUND CARE | Facility: HOSPITAL | Age: 78
End: 2018-01-15
Payer: MEDICARE

## 2018-01-15 PROCEDURE — 99212 OFFICE O/P EST SF 10 MIN: CPT | Performed by: PODIATRIST

## 2018-01-15 NOTE — MISCELLANEOUS
Message  received call from [address] at [facility], provider of pt's vac - they need current wound measurements  none recorded at last 2 ov's  called wife - pt is being seen by  home care  called home care and s/w Kesha Mendoza, they did obtain measurements yesterday  req she fax to Marin Heaton at [phone]  Active Problems    1  Acquired absence of other right toe(s) (V49 72) (Z89 421)   2  Chronic ulcer of right foot (707 15) (L97 519)   3  Decubitus ulcer of right heel, stage 3 (129 68,276 27) (L89 613)   4  DM (diabetes mellitus), type 2, uncontrolled w/neurologic complication (358 53)   (E11 49,E11 65)   5  Late effect of contusion (906 3)   6  Postoperative state (V45 89) (Z98 890)   7  Postoperative wound dehiscence, subsequent encounter (V58 89,998 32) (T81 31XD)   8  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 (V49 75) (Y95 141)    Current Meds   1  Advair Diskus 250-50 MCG/DOSE Inhalation Aerosol Powder Breath Activated; Therapy: 96NSW3561 to (Last Rx:12Nos0001)  Requested for: 93CTN0211 Ordered   2  Advair Diskus 500-50 MCG/DOSE Inhalation Aerosol Powder Breath Activated; Therapy: 09IVK1167 to Recorded   3  BD Pen Needle Awa U/F 32G X 4 MM Miscellaneous; Therapy: 85EWZ3362 to (Last Rx:2011)  Requested for: 96GVC3080 Ordered   4  [place] Chek Auto-Code Voice Device; Therapy: 57Bpe7963 to (Last Rx:79Zfb0272)  Requested for: 44Tjo0197 Ordered   5  Coumadin 5 MG Oral Tablet (Warfarin Sodium); Therapy: 90OHL0809 to Recorded   6  CVS Vit D 5000 High-Potency 5000 UNIT CAPS; Therapy: (Recorded:95Dse2115) to Recorded   7  Enoxaparin Sodium 100 MG/ML Subcutaneous Solution (Lovenox); Si mg/kg   subcutaneous every 12 hours   Disp: 5 doses; Therapy: 68FGC3444 to (Last Rx:2017)  Requested for: 2017 Ordered   8  Furosemide 40 MG Oral Tablet; Therapy: 43SVH0375 to Recorded   9  Gabapentin 100 MG Oral Capsule;    Therapy: 44PCO9039 to (Last Rx:2010)  Requested for: 72IAS3266 Ordered   10  Glucagon Emergency 1 MG Injection Kit; Therapy: 52UCL3281 to Recorded   11  HumaLOG 100 UNIT/ML Subcutaneous Solution; Therapy: 88DYU2669 to Recorded   12  Jantoven 4 MG Oral Tablet; Therapy: (Recorded:13Oct2017) to Recorded   13  Lantus SoloStar 100 UNIT/ML Subcutaneous Solution Pen-injector; Therapy: 03MHP0370 to Recorded   14  NovoLOG 100 UNIT/ML Subcutaneous Solution; Therapy: 13DGY9214 to Recorded   15  NovoLOG Mix 70/30 FlexPen (70-30) 100 UNIT/ML SUSP; Therapy: 78DNC3711 to (Last Rx:38Boo3175)  Requested for: 74ARP2754 Ordered   16  Oxybutynin Chloride 5 MG Oral Tablet; Therapy: 09UYI1887 to Recorded   17  Oxycodone-Acetaminophen 5-325 MG Oral Tablet (Percocet); TAKE 1 TABLET EVERY 6    HOURS AS NEEDED FOR PAIN;    Therapy: 45AQE3349 to (Evaluate:30Nov2017); Last Rx:22Nov2017 Ordered   18  OxyCONTIN 10 MG TB12 (OxyCODONE HCl); Therapy: (Recorded:13Oct2017) to Recorded   19  Percocet  MG Oral Tablet (Oxycodone-Acetaminophen); Therapy: (Recorded:13Oct2017) to Recorded   20  Pharmacist Choice Lancets Miscellaneous; Therapy: 21Wmt5152 to (Last Rx:55Afc4409)  Requested for: 60Mtg3196 Ordered   21  PredniSONE 20 MG Oral Tablet; Therapy: 01Apr2017 to Recorded   22  Simvastatin 40 MG Oral Tablet; Therapy: 22Nov2010 to (Last Rx:22Nov2010)  Requested for: 22Nov2010 Ordered   23  Spiriva HandiHaler 18 MCG Inhalation Capsule; Therapy: 61EHZ2153 to (Last Rx:84Uip4187)  Requested for: 15LLW7212 Ordered   24  Hemant Doc Control Normal In Vitro Solution; Therapy: 38Rsm4782 to (Last Rx:94Sij3844)  Requested for: 11Euq7094 Ordered   25  Ulti-Masoud Auto-Adjust Device MISC; Therapy: 87Koh4800 to (Last Rx:94Kcy3091)  Requested for: 91Oog3807 Ordered   26  V-Go 40 KIT; Therapy: 12ESZ6883 to Recorded    Allergies    1   Aspirin EC Extra Strength TBEC   2  Baby Aspirin CHEW    Signatures   Electronically signed by : Adeola España, ; Nov 28 2017  3:33PM EST (Author)

## 2018-01-16 ENCOUNTER — GENERIC CONVERSION - ENCOUNTER (OUTPATIENT)
Dept: OTHER | Facility: OTHER | Age: 78
End: 2018-01-16

## 2018-01-16 NOTE — MISCELLANEOUS
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 (V49 75) (D20 007)    Current Meds   1  Advair Diskus 250-50 MCG/DOSE Inhalation Aerosol Powder Breath Activated; Therapy: 15QRH7450 to (Last Rx:26May2011)  Requested for: 56JQE4376 Ordered   2  Advair Diskus 500-50 MCG/DOSE Inhalation Aerosol Powder Breath Activated; Therapy: 28NVX6016 to Recorded   3  BD Pen Needle Awa U/F 32G X 4 MM Miscellaneous; Therapy: 28QLS6104 to (Last Rx:31Jan2011)  Requested for: 52XNH1275 Ordered   4  [place] Chek Auto-Code Voice Device; Therapy: 14Vmi0316 to (Last Rx:12Kmb8811)  Requested for: 56Lza8933 Ordered   5  Coumadin 5 MG Oral Tablet (Warfarin Sodium); Therapy: 42WPB6200 to Recorded   6  CVS Vit D 5000 High-Potency 5000 UNIT CAPS; Therapy: (Recorded:76Vjo4849) to Recorded   7  Furosemide 40 MG Oral Tablet; Therapy: 38AIT3173 to Recorded   8  Gabapentin 100 MG Oral Capsule; Therapy: 10QTD2753 to (Last Rx:22Nov2010)  Requested for: 22Nov2010 Ordered   9  Glucagon Emergency 1 MG Injection Kit; Therapy: 69QUY6478 to Recorded   10  HumaLOG 100 UNIT/ML Subcutaneous Solution; Therapy: 27EZF0842 to Recorded   11  Jantoven 4 MG Oral Tablet; Therapy: (Recorded:13Oct2017) to Recorded   12  Lantus SoloStar 100 UNIT/ML Subcutaneous Solution Pen-injector; Therapy: 95PRJ0877 to Recorded   13  NovoLOG 100 UNIT/ML Subcutaneous Solution;     Therapy: 77RXJ3220 to Recorded   14  NovoLOG Mix 70/30 FlexPen (70-30) 100 UNIT/ML SUSP; Therapy: 74YBY1733 to (Last Rx:98Jgh6829)  Requested for: 95TIF5582 Ordered   15  Oxybutynin Chloride 5 MG Oral Tablet; Therapy: 63GXK2992 to Recorded   16  OxyCONTIN 10 MG TB12 (OxyCODONE HCl); Therapy: (Recorded:44Tlr4122) to Recorded   17  Percocet  MG Oral Tablet (Oxycodone-Acetaminophen); Therapy: (Recorded:17Cid0013) to Recorded   18  Pharmacist Choice Lancets Miscellaneous; Therapy: 79Aiu8696 to (Last Rx:84Hhx5548)  Requested for: 09Qeq2423 Ordered   19  PredniSONE 20 MG Oral Tablet; Therapy: 57Aie0747 to Recorded   20  Simvastatin 40 MG Oral Tablet; Therapy: 54DTR8172 to (Last Rx:22Nov2010)  Requested for: 74Qsq1868 Ordered   21  Spiriva HandiHaler 18 MCG Inhalation Capsule; Therapy: 98CAB6877 to (Last Rx:27Ynf2375)  Requested for: 57LMU4170 Ordered   22  Hemant Doc Control Normal In Vitro Solution; Therapy: 02Vfs2447 to (Last Rx:74Gyl6193)  Requested for: 39Gud4515 Ordered   23  Ulti-Masoud Auto-Adjust Device MISC; Therapy: 21Ytr1524 to (Last Rx:69Dfo3494)  Requested for: 20Sba4317 Ordered   24  V-Go 40 KIT; Therapy: 47AMS8472 to Recorded    Allergies    1  Aspirin EC Extra Strength TBEC   2  Baby Aspirin CHEW    Signatures   Electronically signed by :  Marcellus Saul, ; Nov 20 2017  2:09PM EST                       (Author)

## 2018-01-23 NOTE — MISCELLANEOUS
Message  GLORIA Seay calling to report that pt's medial aspect of stump has purulent drainage and small opening  Spoke to ameena, patient needs to be scheduled this week- can see anyone- for a wound check  Pt has L BKA and had a washout on   Patient no longer has vac  Sent this message to scheduling to arrange  Made Gloria aware of plan and she will notify wife that we will be reaching out  Active Problems    1  Acquired absence of other right toe(s) (V49 72) (Z89 421)   2  Chronic ulcer of right foot (707 15) (L97 519)   3  Decubitus ulcer of right heel, stage 3 (154 57,209 48) (L89 613)   4  DM (diabetes mellitus), type 2, uncontrolled w/neurologic complication (658 81)   (E11 49,E11 65)   5  Late effect of contusion (906 3)   6  Postoperative state (V45 89) (Z98 890)   7  Postoperative wound dehiscence, subsequent encounter (V58 89,998 32) (T81 31XD)   8  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 (V49 75) (L64 318)    Current Meds   1  Advair Diskus 250-50 MCG/DOSE Inhalation Aerosol Powder Breath Activated; Therapy: 14AEF6520 to (Last Rx:2011)  Requested for: 56OYD8395 Ordered   2  Advair Diskus 500-50 MCG/DOSE Inhalation Aerosol Powder Breath Activated; Therapy: 75GIJ8964 to Recorded   3  BD Pen Needle Awa U/F 32G X 4 MM Miscellaneous; Therapy: 33JEZ4634 to (Last Rx:2011)  Requested for: 57SUW6814 Ordered   4  [place] Chek Auto-Code Voice Device; Therapy: 85Drv6844 to (Last Rx:52Yax6147)  Requested for: 94Qaf1335 Ordered   5  Coumadin 5 MG Oral Tablet (Warfarin Sodium); Therapy: 72NGH5083 to Recorded   6  [employer]S Vit D 5000 High-Potency 5000 UNIT CAPS; Therapy: (Recorded:14Baz7855) to Recorded   7  Enoxaparin Sodium 100 MG/ML Subcutaneous Solution (Lovenox); Si mg/kg   subcutaneous every 12 hours   Disp: 5 doses; Therapy: 00PLC7855 to (Last Rx:2017)  Requested for: 2017 Ordered   8  Furosemide 40 MG Oral Tablet; Therapy: 27DYE7244 to Recorded   9  Gabapentin 100 MG Oral Capsule; Therapy: 93SRN7158 to (Last Rx:22Nov2010)  Requested for: 22Nov2010 Ordered   10  Glucagon Emergency 1 MG Injection Kit; Therapy: 64XMI6715 to Recorded   11  HumaLOG 100 UNIT/ML Subcutaneous Solution; Therapy: 99LHA0232 to Recorded   12  Jantoven 4 MG Oral Tablet; Therapy: (Recorded:13Oct2017) to Recorded   13  Lantus SoloStar 100 UNIT/ML Subcutaneous Solution Pen-injector; Therapy: 69UXR3016 to Recorded   14  NovoLOG 100 UNIT/ML Subcutaneous Solution; Therapy: 09GPD6716 to Recorded   15  NovoLOG Mix 70/30 FlexPen (70-30) 100 UNIT/ML SUSP; Therapy: 44YXB2830 to (Last Rx:18May2011)  Requested for: 45SAN4574 Ordered   16  Oxybutynin Chloride 5 MG Oral Tablet; Therapy: 62SBT8342 to Recorded   17  Oxycodone-Acetaminophen 5-325 MG Oral Tablet (Percocet); TAKE 1 TABLET EVERY 6    HOURS AS NEEDED FOR PAIN;    Therapy: 41YRP4118 to (Evaluate:30Nov2017); Last Rx:22Nov2017 Ordered   18  OxyCONTIN 10 MG TB12 (OxyCODONE HCl); Therapy: (Recorded:13Oct2017) to Recorded   19  Percocet  MG Oral Tablet (Oxycodone-Acetaminophen); Therapy: (Recorded:13Oct2017) to Recorded   20  Pharmacist Choice Lancets Miscellaneous; Therapy: 36Hve9665 to (Last Rx:20Jul2011)  Requested for: 18Mqh2701 Ordered   21  Potassium Chloride Brandie ER 20 MEQ Oral Tablet Extended Release; Therapy: 92DTT8995 to Recorded   22  PredniSONE 20 MG Oral Tablet; Therapy: 01Apr2017 to Recorded   23  Simvastatin 40 MG Oral Tablet; Therapy: 34DGM5088 to (Last Rx:22Nov2010)  Requested for: 22Nov2010 Ordered   24  Spiriva HandiHaler 18 MCG Inhalation Capsule; Therapy: 66BOQ7308 to (Last Rx:26May2011)  Requested for: 97OSM5695 Ordered   25  Hemant Doc Control Normal In Vitro Solution; Therapy: 57Ziu8817 to (Last Rx:93Ejk7254)  Requested for: 68Pxq9995 Ordered   26  Ulti-Masoud Auto-Adjust Device MISC; Therapy: 24Tdo2346 to (Last Rx:08Eou0220)  Requested for: 33Cvr5951 Ordered   27   V-Go 40 KIT;    Therapy: 60LAK4443 to Recorded    Allergies    1   Aspirin EC Extra Strength TBEC   2  Baby Aspirin CHEW    Signatures   Electronically signed by : Maricel Birmingham RN; Jan 9 2018  5:06PM EST                       (Author)

## 2018-01-23 NOTE — MISCELLANEOUS
Message  Gloria from  home care called  pt's L stump has healed, scab fell off while washing today  she applied adaptic and wrapped  she is ? if she is to cont this or change to dsd or stump sock  emailed Mago Brown PA-C, see response below  Ronny Isidro was informed of same  From: Suma Quispe   Sent: Tuesday, January 16, 2018 11:20 AM  To: Susanna Dumont  Subject: RE: Mela Lacey [birth year]    I saw the patient on 1/10 and am not surprised at all that it's completely healed now  Great news  Continue current dressing x 1 week to make sure it stays the same and then convert to dsd with stump /sock  Thanks       Active Problems    1  Acquired absence of other right toe(s) (V49 72) (Z89 421)   2  Chronic ulcer of right foot (707 15) (L97 519)   3  Decubitus ulcer of right heel, stage 3 (870 27,086 05) (L89 613)   4  DM (diabetes mellitus), type 2, uncontrolled w/neurologic complication (170 08)   (E11 49,E11 65)   5  Late effect of contusion (906 3)   6  Postoperative state (V45 89) (Z98 890)   7  Postoperative wound dehiscence, subsequent encounter (V58 89,998 32) (T81 31XD)   8  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 (V49 75) (Y46 103)    Current Meds   1  Advair Diskus 250-50 MCG/DOSE Inhalation Aerosol Powder Breath Activated; Therapy: 86LHG3902 to (Last Rx:26May2011)  Requested for: 47JXM4326 Ordered   2  Advair Diskus 500-50 MCG/DOSE Inhalation Aerosol Powder Breath Activated; Therapy: 23PUB8113 to Recorded   3  BD Pen Needle Awa U/F 32G X 4 MM Miscellaneous; Therapy: 83UKY4810 to (Last Rx:31Jan2011)  Requested for: 68KFJ1523 Ordered   4  [place] Chek Auto-Code Voice Device; Therapy: 46Orm7764 to (Last Rx:10Idd4785)  Requested for: 01Abv2040 Ordered   5  Coumadin 5 MG Oral Tablet (Warfarin Sodium); Therapy: 47KGF2295 to Recorded   6  CVS Vit D 5000 High-Potency 5000 UNIT CAPS; Therapy: (Recorded:64Hum0037) to Recorded   7   Enoxaparin Sodium 100 MG/ML Subcutaneous Solution (Lovenox); Si mg/kg   subcutaneous every 12 hours   Disp: 5 doses; Therapy: 31PVJ4529 to (Last Rx:2017)  Requested for: 2017 Ordered   8  Furosemide 40 MG Oral Tablet; Therapy: 98MZA3319 to Recorded   9  Gabapentin 100 MG Oral Capsule; Therapy: 62BJE8077 to (Last Rx:2010)  Requested for: 2010 Ordered   10  Glucagon Emergency 1 MG Injection Kit; Therapy: 25QTC1824 to Recorded   11  HumaLOG 100 UNIT/ML Subcutaneous Solution; Therapy: 89JOU0162 to Recorded   12  Jantoven 4 MG Oral Tablet; Therapy: (Recorded:2017) to Recorded   13  Lantus SoloStar 100 UNIT/ML Subcutaneous Solution Pen-injector; Therapy: 46IYZ9512 to Recorded   14  NovoLOG 100 UNIT/ML Subcutaneous Solution; Therapy: 17BQS3411 to Recorded   15  NovoLOG Mix 70/30 FlexPen (70-30) 100 UNIT/ML SUSP; Therapy: 20HIZ5784 to (Last Rx:2011)  Requested for: 80NPA0924 Ordered   16  Oxybutynin Chloride 5 MG Oral Tablet; Therapy: 05ACX2059 to Recorded   17  Oxycodone-Acetaminophen 5-325 MG Oral Tablet (Percocet); TAKE 1 TABLET EVERY 6    HOURS AS NEEDED FOR PAIN;    Therapy: 19ZVX6139 to (Evaluate:2017); Last Rx:2017 Ordered   18  OxyCONTIN 10 MG TB12 (OxyCODONE HCl); Therapy: (Recorded:2017) to Recorded   19  Percocet  MG Oral Tablet (Oxycodone-Acetaminophen); Therapy: (Recorded:2017) to Recorded   20  Pharmacist Choice Lancets Miscellaneous; Therapy: 18Cqr0509 to (Last Rx:85Sfa5216)  Requested for: 57Rtv3942 Ordered   21  Potassium Chloride Brandie ER 20 MEQ Oral Tablet Extended Release; Therapy: 00GPQ2970 to Recorded   22  PredniSONE 20 MG Oral Tablet; Therapy: 2017 to Recorded   23  Simvastatin 40 MG Oral Tablet; Therapy: 68KMS4551 to (Last Rx:2010)  Requested for: 2010 Ordered   24  Spiriva HandiHaler 18 MCG Inhalation Capsule; Therapy: 44CSU8776 to (Last Rx:50Hum1375)  Requested for: 09QHK8295 Ordered   25   Hemant Doc Control Normal In Vitro Solution; Therapy: 88Ybk2987 to (Last Rx:77Uvo8119)  Requested for: 43Pzv7406 Ordered   26  Ulti-Masoud Auto-Adjust Device MISC; Therapy: 30Nsd0199 to (Last Rx:75Syd8518)  Requested for: 54Xaf2808 Ordered   27  V-Go 40 KIT; Therapy: 54SIL0670 to Recorded    Allergies    1   Aspirin EC Extra Strength TBEC   2  Baby Aspirin CHEW    Signatures   Electronically signed by : Herve Stevens, ; Jan 16 2018 11:29AM EST                       (Author)

## 2018-01-24 VITALS
RESPIRATION RATE: 20 BRPM | TEMPERATURE: 97.8 F | SYSTOLIC BLOOD PRESSURE: 122 MMHG | HEART RATE: 80 BPM | HEIGHT: 69 IN | DIASTOLIC BLOOD PRESSURE: 80 MMHG

## 2018-01-31 ENCOUNTER — OFFICE VISIT (OUTPATIENT)
Dept: VASCULAR SURGERY | Facility: CLINIC | Age: 78
End: 2018-01-31
Payer: MEDICARE

## 2018-01-31 VITALS
HEART RATE: 72 BPM | RESPIRATION RATE: 14 BRPM | DIASTOLIC BLOOD PRESSURE: 70 MMHG | TEMPERATURE: 97 F | SYSTOLIC BLOOD PRESSURE: 120 MMHG

## 2018-01-31 DIAGNOSIS — T81.31XD DEHISCENCE OF INCISION, SUBSEQUENT ENCOUNTER: ICD-10-CM

## 2018-01-31 DIAGNOSIS — Z89.512 STATUS POST BELOW KNEE AMPUTATION OF LEFT LOWER EXTREMITY: ICD-10-CM

## 2018-01-31 DIAGNOSIS — E11.51 TYPE 2 DIABETES MELLITUS WITH DIABETIC PERIPHERAL ANGIOPATHY WITHOUT GANGRENE, UNSPECIFIED LONG TERM INSULIN USE STATUS: Primary | ICD-10-CM

## 2018-01-31 DIAGNOSIS — L89.613 DECUBITUS ULCER OF RIGHT HEEL, STAGE 3 (HCC): ICD-10-CM

## 2018-01-31 PROCEDURE — 99213 OFFICE O/P EST LOW 20 MIN: CPT | Performed by: PHYSICIAN ASSISTANT

## 2018-01-31 RX ORDER — INSULIN ASPART 100 [IU]/ML
INJECTION, SUSPENSION SUBCUTANEOUS
COMMUNITY
Start: 2011-05-18 | End: 2018-01-31

## 2018-01-31 RX ORDER — WARFARIN SODIUM 4 MG/1
TABLET ORAL
COMMUNITY
End: 2018-01-31

## 2018-01-31 RX ORDER — POTASSIUM CHLORIDE 20 MEQ/1
40 TABLET, EXTENDED RELEASE ORAL
COMMUNITY
Start: 2017-12-28 | End: 2018-01-31

## 2018-01-31 RX ORDER — INSULIN GLARGINE 100 [IU]/ML
INJECTION, SOLUTION SUBCUTANEOUS
Refills: 1 | COMMUNITY
Start: 2018-01-02 | End: 2018-01-31

## 2018-01-31 RX ORDER — GABAPENTIN 100 MG/1
CAPSULE ORAL
COMMUNITY
Start: 2010-11-22

## 2018-01-31 RX ORDER — FUROSEMIDE 40 MG/1
TABLET ORAL
COMMUNITY
Start: 2015-02-25 | End: 2018-01-31

## 2018-01-31 RX ORDER — WARFARIN SODIUM 5 MG/1
TABLET ORAL
Refills: 0 | COMMUNITY
Start: 2017-12-19

## 2018-01-31 NOTE — ASSESSMENT & PLAN NOTE
Continue management per primary care provider    --strict blood sugar control to optimize wound healing

## 2018-01-31 NOTE — PATIENT INSTRUCTIONS
L BKA stump healed without active drainage  Recommend stump   Follow recommendations of [facility] next week  Return to office on a p r n  basis  Please call if we can be of any further assistance

## 2018-01-31 NOTE — PROGRESS NOTES
Problem List Items Addressed This Visit        Endocrine    Diabetes mellitus ([place] [address] ) - Primary     Continue management per primary care provider  --strict blood sugar control to optimize wound healing            Musculoskeletal and Integument    Dehiscence of incision, subsequent encounter     L BKA w/postoperative stump dehiscence and surgical site infection   --s/p debridement/VAC 10/27/2017, 12/7/2017    --left BKA stump healed, images obtained  --continue PT/OT  --follow-up with [facility] next week to discuss prosthesis fitting and stump   --return to office on a p r n  basis  Please call if we can be of any further assistance         Decubitus ulcer of right heel, stage 3 (HCC)     Continue local wound care and heel offloading            Other    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 ([place] [address] )     S/p L BKA /26/17 with postoperative wound dehiscence and surgical site infection  --wound healed  --continue PT/OT  --re-evaluation @ [facility] next week to reassess for stump  and prosthesis progression  --return to office on a p r n  basis  Assessment/Plan   Diagnoses and all orders for this visit:    Type 2 diabetes mellitus with diabetic peripheral angiopathy without gangrene, unspecified long term insulin use status (HCC)    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 (HCC)    Dehiscence of incision, subsequent encounter    Decubitus ulcer of right heel, stage 3 (HCC)    Other orders  -     COUMADIN 5 MG tablet;   -     gabapentin (NEURONTIN) 100 mg capsule; Take by mouth  -     Discontinue: LANTUS SOLOSTAR injection pen 100 units/mL;   -     Discontinue: insulin aspart protamine-insulin aspart (NovoLOG 70/30) 100 units/mL injection; Inject under the skin  -     Discontinue: furosemide (LASIX) 40 mg tablet; Take by mouth  -     Discontinue: warfarin (JANTOVEN) 4 mg tablet;  Take by mouth  -     Discontinue: potassium chloride (K-DUR,KLOR-CON) 20 mEq tablet; Take 40 mEq by mouth  -     Discontinue: tiotropium (SPIRIVA HANDIHALER) 18 mcg inhalation capsule; Place into inhaler and inhale    Chief complaint:  Wound recheck    Subjective   Patient ID: Lucas Duncan is a 78 y o  male  73-year-old gentleman with a history of type II DM, chronic atrial fibrillation, mechanical AVR on chronic Coumadin therapy, CAD s/p CABG with L GSV harvest 2007, CKD III, chronic diastolic heart failure, PAD, s/p left BKA 9/26/2017 by Dr Barbie Santana and subsequent wound dehiscence and surgical site infection, s/p debridement/VAC 10/27/2017 and 12/7/2017  The patient returns to the office today for final wound recheck  The patient's left BKA stump has healed with no areas of drainage, erythema, induration or necrosis  The patient is in a wheelchair today which is a significant improvement  The patient has been undergoing PT and OT and will be re-evaluated at [place] [address] next week to discuss prosthesis  Patient denies fever, chills, night sweats or left stump pain  The following portions of the patient's history were reviewed and updated as appropriate: allergies, current medications, past family history, past medical history, past social history, past surgical history and problem list     Review of Systems   Constitutional: Positive for appetite change and fatigue  Negative for chills, fever and unexpected weight change  HENT: Negative for congestion, drooling, ear pain, facial swelling, hearing loss, nosebleeds, sore throat, tinnitus, trouble swallowing and voice change  Eyes: Negative  Respiratory: Negative for cough, chest tightness, shortness of breath, wheezing and stridor  Cardiovascular: Negative for chest pain, palpitations and leg swelling  Gastrointestinal: Negative for abdominal distention, abdominal pain, blood in stool, diarrhea, nausea and vomiting  Endocrine: Negative      Genitourinary: Negative for difficulty urinating, dysuria, flank pain, frequency, hematuria and urgency  Musculoskeletal: Positive for joint swelling  Negative for back pain, gait problem, myalgias, neck pain and neck stiffness  Skin: Negative for pallor, rash and wound  Allergic/Immunologic: Negative  Neurological: Negative for dizziness, tremors, seizures, syncope, facial asymmetry, speech difficulty, weakness, light-headedness, numbness and headaches  Hematological: Negative for adenopathy  Does not bruise/bleed easily  Psychiatric/Behavioral: Negative for agitation, behavioral problems, confusion, hallucinations and sleep disturbance  The patient is not nervous/anxious  All other systems reviewed and are negative  Patient Active Problem List   Diagnosis    Leukocytosis    Stage 3 chronic kidney disease    Chronic indwelling Rush catheter    Abnormal urinalysis    [facility]    Diabetes mellitus [facility]    [facility]    PAD (peripheral artery disease) ([facility] )    S/P AVR    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 (HCC)    Acute blood loss anemia    Chronic kidney disease    Decubitus ulcer of right heel, stage 3 ([facility])    Venous insufficiency of both lower extremities    Sleep apnea    Malignant neoplasm of prostate ([facility])    Dehiscence of incision, subsequent encounter       Past Surgical History:   Procedure Laterality Date    AORTIC VALVE REPLACEMENT      CORONARY ARTERY BYPASS GRAFT      LEG AMPUTATION THROUGH LOWER TIBIA AND FIBULA Left 9/26/2017    Procedure: AMPUTATION BELOW KNEE (BKA);   Surgeon: Rosi Trujillo MD;  [facility];  Service: Vascular    NE DEBRIDEMENT, SKIN, SUB-Q TISSUE,=<20 SQ CM Left 12/7/2017    Procedure: DEBRIDEMENTOF BELOW THE KNEE AMPUTATION STUMP WOUND, VAC CHANGE;  Surgeon: Anais Tiwari DO;  Location:  MAIN OR;  Service: Vascular    NE NEG PRESS WOUND TX, < 50 CM Left 10/27/2017    Procedure: APPLICATION VAC DRESSING;  Surgeon: Luz Aaron MD;  [facility];  Service: Vascular    TOE AMPUTATION Right     4th and 5th    WOUND DEBRIDEMENT Left 9/8/2017    Procedure: DEBRIDEMENT WOUND [facility] OUT), I&D, PARTIAL CALCANECTOMY, APPLICATION OF WOUND VAC;  Surgeon: Xochitl Bar DPM;  [facility];  Service: Podiatry    WOUND DEBRIDEMENT Left 10/27/2017    Procedure: DEBRIDEMENT BKA  STUMP ([address] OUT); Surgeon: Luz Aaron MD;  [facility];  Service: Vascular       History reviewed  No pertinent family history  Social History     Social History    Marital status: /Civil Union     Spouse name: N/A    Number of children: N/A    Years of education: N/A     Occupational History    Not on file       Social History Main Topics    Smoking status: Former Smoker    Smokeless tobacco: Never Used      Comment: quit about 40 years ago    Alcohol use No    Drug use: No    Sexual activity: Not on file     Other Topics Concern    Not on file     Social History Narrative    No narrative on file       Allergies   Allergen Reactions    Aspirin      Chest tightness         Current Outpatient Prescriptions:     gabapentin (NEURONTIN) 100 mg capsule, Take by mouth, Disp: , Rfl:     acetaminophen (TYLENOL) 325 mg tablet, Take 650 mg by mouth every 4 (four) hours as needed for mild pain, Disp: , Rfl:     Cholecalciferol (VITAMIN D3) 5000 units CAPS, Take by mouth, Disp: , Rfl:     COUMADIN 5 MG tablet, , Disp: , Rfl: 0    ferrous sulfate 325 (65 Fe) mg tablet, Take 325 mg by mouth 2 (two) times a day with meals  , Disp: , Rfl:     fluticasone-salmeterol (ADVAIR DISKUS) 250-50 mcg/dose inhaler, Inhale 1 puff every 12 (twelve) hours, Disp: , Rfl:     furosemide (LASIX) 40 mg tablet, Take 40 mg by mouth daily, Disp: , Rfl:     insulin aspart (NovoLOG) 100 units/mL injection, Inject under the skin 4 (four) times a day (before meals and at bedtime)  , Disp: , Rfl:     MAGNESIUM OXIDE PO, Take 400 mg by mouth daily, Disp: , Rfl:     Multiple Vitamin (MULTIVITAMIN) tablet, Take 1 tablet by mouth daily, Disp: , Rfl:     oxybutynin (DITROPAN) 5 mg tablet, Take 5 mg by mouth 2 (two) times a day, Disp: , Rfl:     oxyCODONE-acetaminophen (ROXICET) 5-325 mg/5 mL solution, Take 5 mL by mouth every 4 (four) hours as needed for moderate pain, Disp: , Rfl:     potassium chloride (K-DUR,KLOR-CON) 20 mEq tablet, Take 40 mEq by mouth daily  , Disp: , Rfl:     simvastatin (ZOCOR) 40 mg tablet, Take 40 mg by mouth daily  , Disp: , Rfl:     tiotropium (SPIRIVA) 18 mcg inhalation capsule, Place 18 mcg into inhaler and inhale daily, Disp: , Rfl:     Objective     Physical Exam   Constitutional: He is oriented to person, place, and time  He appears well-developed and well-nourished  No distress  HENT:   Head: Normocephalic and atraumatic  Eyes: Conjunctivae and EOM are normal  Pupils are equal, round, and reactive to light  Neck: Trachea normal and normal range of motion  Neck supple  No JVD present  Carotid bruit is not present  No tracheal deviation present  No thyromegaly present  Cardiovascular: Normal rate, S1 normal, S2 normal and normal heart sounds  An irregularly irregular rhythm present  Exam reveals no gallop, no S3, no S4 and no friction rub  No murmur heard  Pulses:       Carotid pulses are 2+ on the right side, and 2+ on the left side  Radial pulses are 2+ on the right side, and 2+ on the left side  Femoral pulses are 2+ on the right side, and 2+ on the left side  Popliteal pulses are 0 on the right side, and 0 on the left side  [place] S2   Pulmonary/Chest: Breath sounds normal  No respiratory distress  He has no rhonchi  He has no rales  Abdominal: Soft  Bowel sounds are normal  He exhibits no distension, no abdominal bruit, no pulsatile midline mass and no mass  There is no hepatosplenomegaly  There is no tenderness  There is no rebound and no guarding  Musculoskeletal: Normal range of motion  He exhibits no edema or tenderness  S/p L BKA  Left BKA stump healed well without necrosis, erythema, induration, drainage or crepitus  Incision healed without separation, dehiscence or drainage  Minimal edema  Neurological: He is alert and oriented to person, place, and time  He has normal strength  No sensory deficit  Skin: Skin is warm, dry and intact  No ecchymosis and no rash noted  No cyanosis or erythema  Nails show no clubbing  Psychiatric: He has a normal mood and affect  His speech is normal and behavior is normal  Cognition and memory are normal    Nursing note and vitals reviewed

## 2018-01-31 NOTE — ASSESSMENT & PLAN NOTE
L BKA w/postoperative stump dehiscence and surgical site infection   --s/p debridement/VAC 10/27/2017, 12/7/2017    --left BKA stump healed, images obtained  --continue PT/OT  --follow-up with [facility] next week to discuss prosthesis fitting and stump   --return to office on a p r n  basis    Please call if we can be of any further assistance

## 2018-01-31 NOTE — ASSESSMENT & PLAN NOTE
S/p L FRANCISA /26/17 with postoperative wound dehiscence and surgical site infection  --wound healed  --continue PT/OT  --re-evaluation @ [facility] next week to reassess for stump  and prosthesis progression  --return to office on a p r n  basis

## 2018-02-12 ENCOUNTER — APPOINTMENT (OUTPATIENT)
Dept: WOUND CARE | Facility: HOSPITAL | Age: 78
End: 2018-02-12
Payer: MEDICARE

## 2018-02-12 PROCEDURE — 99213 OFFICE O/P EST LOW 20 MIN: CPT | Performed by: PODIATRIST

## 2018-02-16 ENCOUNTER — TELEPHONE (OUTPATIENT)
Dept: VASCULAR SURGERY | Facility: CLINIC | Age: 78
End: 2018-02-16

## 2018-02-16 NOTE — TELEPHONE ENCOUNTER
[facility] , she said patient shiould watch wound and if they see more bleeding, please call the office and set up appointment  Patient wife called, patient had only had a little clear drainage from amp site , but today when transferring him she noted drops of bright red blood,  [place] [facility] does not want to move forward with stump prosthesis until wound is dry  No fever or chills   Please advise

## 2018-02-20 NOTE — TELEPHONE ENCOUNTER
The wife called back and said that there is only a tiny bit of blood that shows up on the dressing, but when she squeezes the area, clear liquid comes out of a small pin hole  She states this in unchanged  It was like that when he was in [facility] and like that when he was seen by us in the office  She wants to know what dressing she should be using? Currently she is using adaptic  She said no one told her to do that  She had extra supplies and thought that may help  Her other questions is, does he need an ov to re-asses the area and  is he still approved to go ahead with getting set up for his prosthesis?

## 2018-02-20 NOTE — TELEPHONE ENCOUNTER
Apurva:  Please instruct the patient's wife to dress the site with a dry, sterile gauze dressing (4x4) daily and PRN  Please make an appointment for the patient to be reevaluated by Dr Kalin Triplett in the office to reassess the wound  Please have the patient or his wife contact the office in the interim if the wound changes or he develops fevers  Thank you

## 2018-02-23 ENCOUNTER — OFFICE VISIT (OUTPATIENT)
Dept: VASCULAR SURGERY | Facility: CLINIC | Age: 78
End: 2018-02-23
Payer: MEDICARE

## 2018-02-23 VITALS
SYSTOLIC BLOOD PRESSURE: 115 MMHG | HEART RATE: 72 BPM | TEMPERATURE: 95.7 F | DIASTOLIC BLOOD PRESSURE: 68 MMHG | RESPIRATION RATE: 16 BRPM

## 2018-02-23 DIAGNOSIS — Z89.512 STATUS POST BELOW KNEE AMPUTATION OF LEFT LOWER EXTREMITY: Primary | ICD-10-CM

## 2018-02-23 PROCEDURE — 99213 OFFICE O/P EST LOW 20 MIN: CPT | Performed by: NURSE PRACTITIONER

## 2018-02-23 RX ORDER — SIMVASTATIN 40 MG
40 TABLET ORAL
COMMUNITY
Start: 2018-02-14

## 2018-02-23 NOTE — PROGRESS NOTES
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/2017 by Dr Austyn Todd and subsequent wound dehiscence and surgical site infection, s/p debridement/VAC 10/27/2017 and 12/7/2017  He presents to the office for evaluation of left BKA stump  Wife noted scant amount of drainage from medial aspect of stump    -Very minimal drainage of the medial aspect of the stump after much manipulation  No erythema, fluctuance or concerns for infection  Area was photographed    -Hold off temporarily on prosthesis until 100% healed  -Due to transportation issues  Wife will call office in 1 week with update on stump  So long as stump is healed and no drainage he can proceed with prosthesis fittings  Problem List Items Addressed This Visit        Other    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 ([facility]) - Primary             Patient ID: Kailash Huffman is a 78 y o  male  Chief Complaint : Patient is here because the stump site is draining  Patient is s/p BKA on 9/26  Last Friday the stump site had bloody drainage on the medial side of the incision  The  was started on 2/7  His wife said that there has been drainage from the R side of the incision for quit some time  She is also concerned about the R plantar foot and maybe some "red dots " Home care nurse advised her to have it checked at today's visit  HPI   Patient and wife present today for evaluation of stump  Two instances of few small drops of drainage from medial aspect of stump on 2/7 and 2/16  No erythema or active drainage  Surrounding tissue is dry  No pain or discomfort  Stump examined and manipulated with scant droplet of serosanguineous drainage visible at medial aspect with pin hole skin crack  Area was photographed       The following portions of the patient's history were reviewed and updated as appropriate: allergies, current medications, past family history, past medical history, past social history, past surgical history and problem list     Review of Systems   Constitutional: Negative  HENT: Negative  Eyes: Negative  Respiratory: Negative  Cardiovascular: Negative  Gastrointestinal: Negative  Endocrine: Negative  Genitourinary: Negative  Musculoskeletal: Negative  Skin: Negative  Allergic/Immunologic: Negative  Neurological: Negative  Hematological: Negative  Psychiatric/Behavioral: Negative  Vitals:    02/23/18 1015   BP: 115/68   BP Location: Right arm   Patient Position: Sitting   Cuff Size: Adult   Pulse: 72   Resp: 16   Temp: (!) 95 7 °F (35 4 °C)   TempSrc: Tympanic       Patient Active Problem List   Diagnosis    Leukocytosis    Stage 3 chronic kidney disease    Chronic indwelling Rush catheter    Abnormal urinalysis    Chronic atrial fibrillation (HCC)    Diabetes mellitus (HCC)    Chronic diastolic heart failure (HCC)    [facility]AD [facility]peripheral artery disease) ([facility])    S/P AVR    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 ([facility])    Acute blood loss anemia    Chronic kidney disease    Decubitus ulcer of right heel, stage 3 ([facility])    Venous insufficiency of both lower extremities    Sleep apnea    Malignant neoplasm of prostate (HCC)    Dehiscence of incision, subsequent encounter       Past Surgical History:   Procedure Laterality Date    AORTIC VALVE REPLACEMENT      CORONARY ARTERY BYPASS GRAFT      LEG AMPUTATION THROUGH LOWER TIBIA AND FIBULA Left 9/26/2017    Procedure: AMPUTATION BELOW KNEE (BKA);   Surgeon: Hilario Eubanks MD;  Location: [facility];  Service: Vascular    NC DEBRIDEMENT, SKIN, SUB-Q TISSUE,=<20 SQ CM Left 12/7/2017    Procedure: DEBRIDEMENTOF BELOW THE KNEE AMPUTATION STUMP WOUND, VAC CHANGE;  Surgeon: Karen Kimball DO;  [facility];  Service: Vascular    NC NEG PRESS WOUND TX, < 50 CM Left 10/27/2017    Procedure: APPLICATION VAC DRESSING;  Surgeon: Jose Aaron MD;  [facility];  Service: Vascular    TOE AMPUTATION Right     4th and 5th    WOUND DEBRIDEMENT Left 9/8/2017    Procedure: DEBRIDEMENT WOUND [facility] OUT), I&D, PARTIAL CALCANECTOMY, APPLICATION OF WOUND VAC;  Surgeon: Ronny Dickerson DPM;  [facility];  Service: Podiatry    WOUND DEBRIDEMENT Left 10/27/2017    Procedure: DEBRIDEMENT BKA  STUMP ([address] OUT); Surgeon: Jose Aaron MD;  [facility];  Service: Vascular       Family History   Problem Relation Age of Onset    Diabetes Mother     Cancer Mother     Diabetes Brother        Social History     Social History    Marital status: /Civil Union     Spouse name: N/A    Number of children: N/A    Years of education: N/A     Occupational History    Not on file       Social History Main Topics    Smoking status: Former Smoker    Smokeless tobacco: Never Used      Comment: quit about 40 years ago    Alcohol use No    Drug use: No    Sexual activity: Not on file     Other Topics Concern    Not on file     Social History Narrative    No narrative on file       Allergies   Allergen Reactions    Aspirin      Chest tightness         Current Outpatient Prescriptions:     simvastatin (ZOCOR) 40 mg tablet, Take 40 mg by mouth, Disp: , Rfl:     acetaminophen (TYLENOL) 325 mg tablet, Take 650 mg by mouth every 4 (four) hours as needed for mild pain, Disp: , Rfl:     Cholecalciferol (VITAMIN D3) 5000 units CAPS, Take by mouth, Disp: , Rfl:     COUMADIN 5 MG tablet, , Disp: , Rfl: 0    ferrous sulfate 325 (65 Fe) mg tablet, Take 325 mg by mouth 2 (two) times a day with meals  , Disp: , Rfl:     fluticasone-salmeterol (ADVAIR DISKUS) 250-50 mcg/dose inhaler, Inhale 1 puff every 12 (twelve) hours, Disp: , Rfl:     furosemide (LASIX) 40 mg tablet, Take 40 mg by mouth daily, Disp: , Rfl:     gabapentin (NEURONTIN) 100 mg capsule, Take by mouth, Disp: , Rfl:     insulin aspart (NovoLOG) 100 units/mL injection, Inject under the skin 4 (four) times a day (before meals and at bedtime)  , Disp: , Rfl:     MAGNESIUM OXIDE PO, Take 400 mg by mouth daily, Disp: , Rfl:     Multiple Vitamin (MULTIVITAMIN) tablet, Take 1 tablet by mouth daily, Disp: , Rfl:     oxybutynin (DITROPAN) 5 mg tablet, Take 5 mg by mouth 2 (two) times a day, Disp: , Rfl:     oxyCODONE-acetaminophen (ROXICET) 5-325 mg/5 mL solution, Take 5 mL by mouth every 4 (four) hours as needed for moderate pain, Disp: , Rfl:     potassium chloride (K-DUR,KLOR-CON) 20 mEq tablet, Take 40 mEq by mouth daily  , Disp: , Rfl:     tiotropium (SPIRIVA) 18 mcg inhalation capsule, Place 18 mcg into inhaler and inhale daily, Disp: , Rfl:        Physical Exam    Non palpable popliteal pulse on left   L BKA stump is dry without erythema

## 2018-02-23 NOTE — PATIENT INSTRUCTIONS
Wash stump daily with soap and water and pat dry  Apply a gauze daily  Hold off on prosthesis until drainage stops and stump is completely healed  Notify the office if increase in drainage or redness develops along incision line  Call the office in 7 days with an update on stump

## 2018-03-12 ENCOUNTER — APPOINTMENT (OUTPATIENT)
Dept: WOUND CARE | Facility: HOSPITAL | Age: 78
End: 2018-03-12
Payer: MEDICARE

## 2018-03-12 PROCEDURE — 99212 OFFICE O/P EST SF 10 MIN: CPT | Performed by: PODIATRIST

## (undated) DEVICE — PREP PAD BNS: Brand: CONVERTORS

## (undated) DEVICE — JACKSON-PRATT 100CC BULB RESERVOIR: Brand: CARDINAL HEALTH

## (undated) DEVICE — ABDOMINAL PAD: Brand: DERMACEA

## (undated) DEVICE — INTENDED FOR TISSUE SEPARATION, AND OTHER PROCEDURES THAT REQUIRE A SHARP SURGICAL BLADE TO PUNCTURE OR CUT.: Brand: BARD-PARKER SAFETY BLADES SIZE 15, STERILE

## (undated) DEVICE — 3M™ V.A.C.® GRANUFOAM™ DRESSING KIT, M8275052, MEDIUM: Brand: 3M™ V.A.C.® GRANUFOAM™

## (undated) DEVICE — WEBRIL 6 IN UNSTERILE

## (undated) DEVICE — SYRINGE 10ML LL

## (undated) DEVICE — 3000CC GUARDIAN II: Brand: GUARDIAN

## (undated) DEVICE — ACE WRAP 6 IN UNSTERILE

## (undated) DEVICE — PROXIMATE PLUS MD MULTI-DIRECTIONAL RELEASE SKIN STAPLERS CONTAINS 35 STAINLESS STEEL STAPLES APPROXIMATE CLOSED DIMENSIONS: 6.9MM X 3.9MM WIDE: Brand: PROXIMATE

## (undated) DEVICE — MEDI-VAC YANKAUER SUCTION HANDLE W/BULBOUS AND CONTROL VENT: Brand: CARDINAL HEALTH

## (undated) DEVICE — GLOVE SRG BIOGEL 7.5

## (undated) DEVICE — THE SIMPULSE SOLO SYSTEM WITH ULTREX RETRACTABLE SPLASH SHIELD TIP: Brand: SIMPULSE SOLO

## (undated) DEVICE — NEEDLE 18 G X 1 1/2

## (undated) DEVICE — REM POLYHESIVE ADULT PATIENT RETURN ELECTRODE: Brand: VALLEYLAB

## (undated) DEVICE — SUT VICRYL 2-0 CT-1 36 IN J945H

## (undated) DEVICE — OCCLUSIVE GAUZE STRIP,3% BISMUTH TRIBROMOPHENATE IN PETROLATUM BLEND: Brand: XEROFORM

## (undated) DEVICE — STOCKINETTE REGULAR

## (undated) DEVICE — JP CHAN DRN SIL HUBLESS 19FR W/TRO: Brand: CARDINAL HEALTH

## (undated) DEVICE — 3M™ STERI-DRAPE™ INCISE DRAPE 1050 (60CM X 45CM): Brand: STERI-DRAPE™

## (undated) DEVICE — STRL UNIVERSAL MINOR GENERAL: Brand: CARDINAL HEALTH

## (undated) DEVICE — PLUMEPEN PRO 10FT

## (undated) DEVICE — DRAPE SHEET THREE QUARTER

## (undated) DEVICE — 2000CC GUARDIAN II: Brand: GUARDIAN

## (undated) DEVICE — SUT SILK 0 30 IN A306H

## (undated) DEVICE — MEDI-VAC YANKAUER SUCTION HANDLE W/STRAIGHT TIP & CONTROL VENT: Brand: CARDINAL HEALTH

## (undated) DEVICE — CAST PADDING 4 IN SYNTHETIC NON-STRL

## (undated) DEVICE — VAC CANISTER 500ML

## (undated) DEVICE — BAG DECANTER

## (undated) DEVICE — HEAVY DRAINAGE PACK: Brand: CURITY

## (undated) DEVICE — SPONGE LAP 18 X 18 IN

## (undated) DEVICE — TIBURON SPLIT SHEET: Brand: CONVERTORS

## (undated) DEVICE — COBAN 6 IN STERILE

## (undated) DEVICE — SUT SILK 0 SH 30 IN K834H

## (undated) DEVICE — PREMIUM DRY TRAY LF: Brand: MEDLINE INDUSTRIES, INC.

## (undated) DEVICE — GAUZE SPONGES,16 PLY: Brand: CURITY

## (undated) DEVICE — CUFF TOURNIQUET 30 X 4 IN QUICK CONNECT DISP 1BLA

## (undated) DEVICE — 10FR FRAZIER SUCTION HANDLE: Brand: CARDINAL HEALTH

## (undated) DEVICE — CHLORAPREP HI-LITE 26ML ORANGE

## (undated) DEVICE — SUT ETHILON 3-0 PS-1 18 IN 1663G

## (undated) DEVICE — GLOVE SRG BIOGEL 6

## (undated) DEVICE — IMMOBILIZER KNEE UNIVERSAL 19 IN

## (undated) DEVICE — INTENDED FOR TISSUE SEPARATION, AND OTHER PROCEDURES THAT REQUIRE A SHARP SURGICAL BLADE TO PUNCTURE OR CUT.: Brand: BARD-PARKER ® CARBON RIB-BACK BLADES

## (undated) DEVICE — GLOVE INDICATOR PI UNDERGLOVE SZ 6.5 BLUE

## (undated) DEVICE — ACE WRAP 4 IN UNSTERILE

## (undated) DEVICE — GLOVE SRG BIOGEL ECLIPSE 7

## (undated) DEVICE — SUT SILK 2-0 30 IN A305H

## (undated) DEVICE — UNIVERSAL  MINOR EXTREMITY PK: Brand: CARDINAL HEALTH

## (undated) DEVICE — KERLIX BANDAGE ROLL: Brand: KERLIX

## (undated) DEVICE — SYRINGE 3ML LL

## (undated) DEVICE — ASTOUND STANDARD SURGICAL GOWN, XXL: Brand: CONVERTORS

## (undated) DEVICE — SCD SEQUENTIAL COMPRESSION COMFORT SLEEVE MEDIUM KNEE LENGTH: Brand: KENDALL SCD

## (undated) DEVICE — WET SKIN PREP TRAY: Brand: MEDLINE INDUSTRIES, INC.

## (undated) DEVICE — GLOVE INDICATOR PI UNDERGLOVE SZ 7.5 BLUE

## (undated) DEVICE — SPECIMEN CONTAINER STERILE PEEL PACK

## (undated) DEVICE — INTENDED FOR TISSUE SEPARATION, AND OTHER PROCEDURES THAT REQUIRE A SHARP SURGICAL BLADE TO PUNCTURE OR CUT.: Brand: BARD-PARKER SAFETY BLADES SIZE 10, STERILE

## (undated) DEVICE — CURITY NON-ADHERENT STRIPS: Brand: CURITY

## (undated) DEVICE — NEEDLE 25G X 1 1/2

## (undated) DEVICE — BLADE SAGITTAL 63.0 X 19.5MM

## (undated) DEVICE — PAD CAST 4 IN COTTON NON STERILE

## (undated) DEVICE — GLOVE INDICATOR PI UNDERGLOVE SZ 8 BLUE

## (undated) DEVICE — IMPERVIOUS STOCKINETTE: Brand: DEROYAL

## (undated) DEVICE — 1200CC GUARDIAN II: Brand: GUARDIAN

## (undated) DEVICE — MAYO STAND COVER: Brand: CONVERTORS

## (undated) DEVICE — GLOVE SRG BIOGEL ECLIPSE 6

## (undated) DEVICE — STANDARD SURGICAL GOWN, L: Brand: CONVERTORS

## (undated) DEVICE — GLOVE SRG BIOGEL 7

## (undated) DEVICE — TUBING SUCTION 5MM X 12 FT

## (undated) DEVICE — SPONGE STICK WITH PVP-I: Brand: KENDALL

## (undated) DEVICE — DRESSING XEROFORM 5 X 9